# Patient Record
Sex: MALE | Race: WHITE | NOT HISPANIC OR LATINO | Employment: FULL TIME | URBAN - METROPOLITAN AREA
[De-identification: names, ages, dates, MRNs, and addresses within clinical notes are randomized per-mention and may not be internally consistent; named-entity substitution may affect disease eponyms.]

---

## 2018-05-19 LAB
BUN SERPL-MCNC: 9 MG/DL (ref 8–20)
CALCIUM SERPL-MCNC: 8.5 MG/DL (ref 7.7–10.4)
CHLORIDE: 101 MMOL/L (ref 98–110)
CO2 SERPL-SCNC: 25.9 MMOL/L (ref 22.8–31.6)
CREATININE: 0.72 MG/DL (ref 0.6–1.4)
GLUCOSE: 116 MG/DL (ref 70–99)
HCT VFR BLD AUTO: 40.1 % (ref 39–55)
HGB BLD-MCNC: 13.2 G/DL (ref 14–16)
MCH RBC QN AUTO: 29.6 PG (ref 25–35)
MCHC RBC AUTO-ENTMCNC: 32.9 G/DL (ref 31–36)
MCV RBC AUTO: 89.9 FL (ref 80–100)
NUCLEATED RBCS: 0 %
PLATELET # BLD AUTO: 247 K/UL (ref 140–440)
POTASSIUM SERPL-SCNC: 3.4 MMOL/L (ref 3.5–5)
RBC # BLD AUTO: 4.46 M/UL (ref 4.3–5.9)
SODIUM: 134 MMOL/L (ref 134–144)
WBC # BLD AUTO: 13.8 K/UL (ref 5–10)

## 2018-05-20 LAB
HCT VFR BLD AUTO: 39.9 % (ref 39–55)
HGB BLD-MCNC: 13.4 G/DL (ref 14–16)
MCH RBC QN AUTO: 29.8 PG (ref 25–35)
MCHC RBC AUTO-ENTMCNC: 33.6 G/DL (ref 31–36)
MCV RBC AUTO: 88.7 FL (ref 80–100)
NUCLEATED RBCS: 0 %
PLATELET # BLD AUTO: 254 K/UL (ref 140–440)
RBC # BLD AUTO: 4.5 M/UL (ref 4.3–5.9)
WBC # BLD AUTO: 14.2 K/UL (ref 5–10)

## 2018-05-21 LAB
HCT VFR BLD AUTO: 39 % (ref 39–55)
HGB BLD-MCNC: 13 G/DL (ref 14–16)
MCH RBC QN AUTO: 29.7 PG (ref 25–35)
MCHC RBC AUTO-ENTMCNC: 33.3 G/DL (ref 31–36)
MCV RBC AUTO: 89 FL (ref 80–100)
NUCLEATED RBCS: 0 %
PLATELET # BLD AUTO: 264 K/UL (ref 140–440)
RBC # BLD AUTO: 4.38 M/UL (ref 4.3–5.9)
WBC # BLD AUTO: 12.9 K/UL (ref 5–10)

## 2018-05-29 LAB
BUN SERPL-MCNC: 12 MG/DL (ref 8–20)
CALCIUM SERPL-MCNC: 8.5 MG/DL (ref 7.7–10.4)
CHLORIDE: 96 MMOL/L (ref 98–110)
CO2 SERPL-SCNC: 30.1 MMOL/L (ref 22.8–31.6)
CREATININE: 0.77 MG/DL (ref 0.6–1.4)
GLUCOSE: 156 MG/DL (ref 70–99)
HCT VFR BLD AUTO: 35.6 % (ref 39–55)
HGB BLD-MCNC: 11.5 G/DL (ref 14–16)
MCH RBC QN AUTO: 29 PG (ref 25–35)
MCHC RBC AUTO-ENTMCNC: 32.3 G/DL (ref 31–36)
MCV RBC AUTO: 89.7 FL (ref 80–100)
NUCLEATED RBCS: 0 %
PLATELET # BLD AUTO: 351 K/UL (ref 140–440)
POTASSIUM SERPL-SCNC: 4.1 MMOL/L (ref 3.5–5)
RBC # BLD AUTO: 3.97 M/UL (ref 4.3–5.9)
SODIUM: 133 MMOL/L (ref 134–144)
WBC # BLD AUTO: 14.1 K/UL (ref 5–10)

## 2018-05-30 LAB
HCT VFR BLD AUTO: 36.7 % (ref 39–55)
HGB BLD-MCNC: 11.9 G/DL (ref 14–16)
MCH RBC QN AUTO: 29.2 PG (ref 25–35)
MCHC RBC AUTO-ENTMCNC: 32.4 G/DL (ref 31–36)
MCV RBC AUTO: 90.2 FL (ref 80–100)
NUCLEATED RBCS: 0 %
PLATELET # BLD AUTO: 358 K/UL (ref 140–440)
RBC # BLD AUTO: 4.07 M/UL (ref 4.3–5.9)
WBC # BLD AUTO: 13.6 K/UL (ref 5–10)

## 2018-06-01 DIAGNOSIS — Z98.890 POST-OPERATIVE STATE: Primary | ICD-10-CM

## 2018-06-01 DIAGNOSIS — S52.592A OTHER CLOSED FRACTURE OF DISTAL END OF LEFT RADIUS, INITIAL ENCOUNTER: ICD-10-CM

## 2018-06-08 ENCOUNTER — OFFICE VISIT (OUTPATIENT)
Dept: ORTHOPEDICS | Facility: CLINIC | Age: 63
End: 2018-06-08
Payer: OTHER MISCELLANEOUS

## 2018-06-08 VITALS
BODY MASS INDEX: 30.06 KG/M2 | SYSTOLIC BLOOD PRESSURE: 140 MMHG | HEIGHT: 70 IN | WEIGHT: 210 LBS | HEART RATE: 83 BPM | TEMPERATURE: 99 F | DIASTOLIC BLOOD PRESSURE: 100 MMHG

## 2018-06-08 DIAGNOSIS — S80.02XD CONTUSION OF LEFT KNEE, SUBSEQUENT ENCOUNTER: ICD-10-CM

## 2018-06-08 DIAGNOSIS — S52.592A OTHER CLOSED FRACTURE OF DISTAL END OF LEFT RADIUS, INITIAL ENCOUNTER: ICD-10-CM

## 2018-06-08 DIAGNOSIS — Z98.890 POST-OPERATIVE STATE: Primary | ICD-10-CM

## 2018-06-08 PROBLEM — S80.02XA CONTUSION OF LEFT KNEE: Status: ACTIVE | Noted: 2018-06-08

## 2018-06-08 PROBLEM — S52.502A CLOSED FRACTURE OF LEFT DISTAL RADIUS: Status: ACTIVE | Noted: 2018-06-08

## 2018-06-08 PROCEDURE — 20610 DRAIN/INJ JOINT/BURSA W/O US: CPT | Mod: 58,LT,, | Performed by: ORTHOPAEDIC SURGERY

## 2018-06-08 PROCEDURE — 99499 UNLISTED E&M SERVICE: CPT | Mod: ,,, | Performed by: ORTHOPAEDIC SURGERY

## 2018-06-08 PROCEDURE — 73100 X-RAY EXAM OF WRIST: CPT | Mod: FY,LT,, | Performed by: ORTHOPAEDIC SURGERY

## 2018-06-08 RX ORDER — HYDROCODONE BITARTRATE AND ACETAMINOPHEN 10; 325 MG/1; MG/1
1 TABLET ORAL
Refills: 0 | COMMUNITY
Start: 2018-06-01 | End: 2018-06-15 | Stop reason: SDUPTHER

## 2018-06-08 RX ORDER — LISINOPRIL 40 MG/1
1 TABLET ORAL DAILY
Refills: 0 | COMMUNITY
Start: 2018-06-01

## 2018-06-08 NOTE — PROCEDURES
Large Joint Aspiration/Injection  Date/Time: 6/8/2018 4:46 PM  Performed by: CHRISTI SERVIN JR  Authorized by: CHRISTI SERVIN JR     Consent Done?:  Yes (Verbal)  Indications:  Pain and joint swelling  Procedure site marked: Yes    Timeout: Prior to procedure the correct patient, procedure, and site was verified      Location:  Knee  Site:  L knee  Prep: Patient was prepped and draped in usual sterile fashion    Ultrasonic Guidance for needle placement: No  Needle size:  22 G  Approach:  Lateral  Aspirate amount (ml): 55 cc.  Aspirate:  Clear and yellow  Patient tolerance:  Patient tolerated the procedure well with no immediate complications

## 2018-06-08 NOTE — PROGRESS NOTES
Subjective:       Chief Complaint    Chief Complaint   Patient presents with    Post-op Evaluation     11 days P/O, DOS: 05/28/18, Open reduction internal fixation closed comminuted displaced intra-articular fracture distal left radius and appliation of external fixator.  Patient having sharp, throbbing pain.  Taking Norco  mg  for pain every 4 hours.  Patient also stated he has fever every night with chills but has not taken his temperture when he gets it.         HPI  Juan C Sanchez is a 62 y.o.  male who presents  Follow-up on treatment of a very severe closed comminuted displaced distal radius fracture of his left wrist. Initially had to be placed in a external fixator because of somewhat swelling in his hand. After the swelling subsided. He was then taken back to surgery where the fixator was left in place and the fracture was reduced as well as we could get it and placed a volar plate as a buttress and continued with the external fixator.      Past History  No past medical history on file.  Past Surgical History:   Procedure Laterality Date    ORIF closed comminuted displaced intra-articular fx distal left radius & application of external fixator Left 05/28/2018     Social History     Social History    Marital status: Single     Spouse name: N/A    Number of children: N/A    Years of education: N/A     Occupational History    Not on file.     Social History Main Topics    Smoking status: Never Smoker    Smokeless tobacco: Never Used    Alcohol use Yes      Comment: social    Drug use: No    Sexual activity: Not on file     Other Topics Concern    Not on file     Social History Narrative    No narrative on file         Medications  Current Outpatient Prescriptions   Medication Sig    HYDROcodone-acetaminophen (NORCO)  mg per tablet 1 tablet 6 (six) times daily.    lisinopril (PRINIVIL,ZESTRIL) 40 MG tablet 1 tablet Daily.     No current facility-administered medications for this  visit.        Allergies  Review of patient's allergies indicates:  No Known Allergies      Review of Systems     Constitutional: Negative    HENT: Negative  Eyes: Negative  Respiratory: Negative  Cardiovascular: Negative  Musculoskeletal: HPI  Skin: Negative  Neurological: Negative  Hematological: Negative  Endocrine: Negative      Physical Exam    Vitals:    06/08/18 1414   BP: (!) 140/100   Pulse: 83   Temp: 98.7 °F (37.1 °C)     Physical Examination:External fixator in place on the left wrist and forearm area. Dressings removed except for the pin skin Dressings with Xeroform. Swelling is down. Neurovascular is intact. Incision appears to be healed lower aspect of the wrist. Sutures were taken out. Redressed. He is to continue with the external fixator.   -Moderately large effusion is present in the left knee. Range of motion 0-105°. Appears to be stable. Varus alignment.     Skin-clear  General appearance -  well appearing, and in no distress  Mental status - awake  Neck - supple  Chest -  symmetric air entry  Heart - normal rate   Abdomen - soft      Assessment/Plan   Post-operative state  -     X-Ray Wrist 2 View Left    Other closed fracture of distal end of left radius, initial encounter  -     X-Ray Wrist 2 View Left      Arthrocentesis of the left knee was performed under sterile conditions and 50 cc of clear yellow fluid removed. Follow-up in one week. As scheduled.      This note was dictated using voice recognition software and may contain grammatical errors.

## 2018-06-11 ENCOUNTER — TELEPHONE (OUTPATIENT)
Dept: ORTHOPEDICS | Facility: CLINIC | Age: 63
End: 2018-06-11

## 2018-06-11 NOTE — TELEPHONE ENCOUNTER
----- Message from Marion Tavarez sent at 6/11/2018  1:32 PM CDT -----  Contact: Christiano adjustor [phone]  Several questions concerning treatment.  Please call.  States she is [place] also.

## 2018-06-11 NOTE — TELEPHONE ENCOUNTER
Spoke w/ Christiano who advised this message was from Friday last week.  I had spoke w/ her earlier this morning and handled her questions already

## 2018-06-15 ENCOUNTER — OFFICE VISIT (OUTPATIENT)
Dept: ORTHOPEDICS | Facility: CLINIC | Age: 63
End: 2018-06-15
Payer: OTHER MISCELLANEOUS

## 2018-06-15 VITALS
SYSTOLIC BLOOD PRESSURE: 142 MMHG | HEIGHT: 70 IN | DIASTOLIC BLOOD PRESSURE: 90 MMHG | HEART RATE: 88 BPM | BODY MASS INDEX: 30.06 KG/M2 | WEIGHT: 210 LBS

## 2018-06-15 DIAGNOSIS — Z98.890 POST-OPERATIVE STATE: Primary | ICD-10-CM

## 2018-06-15 DIAGNOSIS — S52.572D OTHER CLOSED INTRA-ARTICULAR FRACTURE OF DISTAL END OF LEFT RADIUS WITH ROUTINE HEALING, SUBSEQUENT ENCOUNTER: ICD-10-CM

## 2018-06-15 PROCEDURE — 73110 X-RAY EXAM OF WRIST: CPT | Mod: FY,LT,, | Performed by: ORTHOPAEDIC SURGERY

## 2018-06-15 PROCEDURE — 99024 POSTOP FOLLOW-UP VISIT: CPT | Mod: ,,, | Performed by: ORTHOPAEDIC SURGERY

## 2018-06-15 RX ORDER — GABAPENTIN 300 MG/1
300 CAPSULE ORAL 3 TIMES DAILY
Qty: 90 CAPSULE | Refills: 1 | Status: SHIPPED | OUTPATIENT
Start: 2018-06-15 | End: 2018-06-29 | Stop reason: SINTOL

## 2018-06-15 RX ORDER — HYDROCODONE BITARTRATE AND ACETAMINOPHEN 10; 325 MG/1; MG/1
1 TABLET ORAL EVERY 6 HOURS PRN
Qty: 60 TABLET | Refills: 0 | Status: SHIPPED | OUTPATIENT
Start: 2018-06-15 | End: 2018-07-09 | Stop reason: SDUPTHER

## 2018-06-15 RX ORDER — HYDROCODONE BITARTRATE AND ACETAMINOPHEN 10; 325 MG/1; MG/1
1 TABLET ORAL EVERY 6 HOURS PRN
Qty: 1 TABLET | Refills: 0 | Status: SHIPPED | OUTPATIENT
Start: 2018-06-15 | End: 2018-06-15 | Stop reason: SDUPTHER

## 2018-06-15 NOTE — PROGRESS NOTES
Subjective:       Chief Complaint    Chief Complaint   Patient presents with    Post-op Evaluation     2 weeks and 4 days P/O, DOS: 05/28/18, Open reduction internal fixation closed comminuted displaced intra-articular fracture distal left radius and appliation of external fixator.  Patient states he needs more pain medication for his pain (Norco  mg. ) His fingers stay swollen.all the time.  He puts ice on it but it doesn't help with the swelling.  He get nauseated in the evenings and would like something for the nausea.        HPI  Juan C Sanchez is a 62 y.o.  male who presentsFollow-up on distal radius fracture, left wrist. Having an issue with pain and moderate swelling of his fingers.       Past History  History reviewed. No pertinent past medical history.  Past Surgical History:   Procedure Laterality Date    ORIF closed comminuted displaced intra-articular fx distal left radius & application of external fixator Left 05/28/2018     Social History     Social History    Marital status: Single     Spouse name: N/A    Number of children: N/A    Years of education: N/A     Occupational History    Not on file.     Social History Main Topics    Smoking status: Never Smoker    Smokeless tobacco: Never Used    Alcohol use Yes      Comment: social    Drug use: No    Sexual activity: Not on file     Other Topics Concern    Not on file     Social History Narrative    No narrative on file         Medications  Current Outpatient Prescriptions   Medication Sig    HYDROcodone-acetaminophen (NORCO)  mg per tablet 1 tablet 6 (six) times daily.    lisinopril (PRINIVIL,ZESTRIL) 40 MG tablet 1 tablet Daily.     No current facility-administered medications for this visit.        Allergies  Review of patient's allergies indicates:  No Known Allergies      Review of Systems     Constitutional: Negative    HENT: Negative  Eyes: Negative  Respiratory: Negative  Cardiovascular: Negative  Musculoskeletal:  HPI  Skin: Negative  Neurological: Negative  Hematological: Negative  Endocrine: Negative      Physical Exam    Vitals:    06/15/18 1338   BP: (!) 142/90   Pulse: 88     Physical Examination:Examination left wrist shows the presence of the external fixator. Fingers are swollen in the motion is restricted at the DIP and PIP joints. Neurovascular is intact.     Skin-clear  General appearance -  well appearing, and in no distress  Mental status - awake  Neck - supple  Chest -  symmetric air entry  Heart - normal rate   Abdomen - soft      Assessment/Plan   Post-operative state  -     X-Ray Wrist Complete Left  -     Ambulatory Referral to Physical/Occupational Therapy    Other closed intra-articular fracture of distal end of left radius with routine healing, subsequent encounter  -     X-Ray Wrist Complete Left  -     Ambulatory Referral to Physical/Occupational Therapy    Repeat x-ray of the left wrist shows no change in position. Alignment of the fracture.        This note was dictated using voice recognition software and may contain grammatical errors.

## 2018-06-29 ENCOUNTER — OFFICE VISIT (OUTPATIENT)
Dept: ORTHOPEDICS | Facility: CLINIC | Age: 63
End: 2018-06-29
Payer: OTHER MISCELLANEOUS

## 2018-06-29 VITALS
HEART RATE: 73 BPM | HEIGHT: 70 IN | BODY MASS INDEX: 30.06 KG/M2 | DIASTOLIC BLOOD PRESSURE: 90 MMHG | SYSTOLIC BLOOD PRESSURE: 148 MMHG | WEIGHT: 210 LBS

## 2018-06-29 DIAGNOSIS — S80.01XD CONTUSION OF RIGHT KNEE, SUBSEQUENT ENCOUNTER: ICD-10-CM

## 2018-06-29 DIAGNOSIS — S52.572D OTHER CLOSED INTRA-ARTICULAR FRACTURE OF DISTAL END OF LEFT RADIUS WITH ROUTINE HEALING, SUBSEQUENT ENCOUNTER: ICD-10-CM

## 2018-06-29 DIAGNOSIS — S80.02XD CONTUSION OF LEFT KNEE, SUBSEQUENT ENCOUNTER: ICD-10-CM

## 2018-06-29 DIAGNOSIS — Z98.890 POST-OPERATIVE STATE: Primary | ICD-10-CM

## 2018-06-29 PROBLEM — S80.01XA CONTUSION OF RIGHT KNEE: Status: ACTIVE | Noted: 2018-06-29

## 2018-06-29 PROCEDURE — 99024 POSTOP FOLLOW-UP VISIT: CPT | Mod: 25,,, | Performed by: ORTHOPAEDIC SURGERY

## 2018-06-29 NOTE — PROGRESS NOTES
"Subjective:       Chief Complaint    Chief Complaint   Patient presents with    Post-op Evaluation     4 weeks & 4 days.  DOS: 05/28/18, ORIF of closed comminuted displaced intra-articular fracture distal left radius and appliation of external fixator.  DOI: 5/17/18, due to jumping off a concrete dumpster @ work.  Patient reports still having pain where the external fixator goes into the skin.  Started therapy on Wednesday on his fingers & will be attending 2 times a week for 3 weeks @ [facility].  He can't take the Gabapentin because it "knocks me out & gives me nightmares".    Follow-up     Both knees are still painful & still slightly swollen.       HPI  Juan C Sanchez is a 62 y.o.  male who presents Follow up of fractured distal left radius and contusion of  Both knees.      Past History  History reviewed. No pertinent past medical history.  Past Surgical History:   Procedure Laterality Date    ORIF closed comminuted displaced intra-articular fx distal left radius & application of external fixator Left 05/28/2018     Social History     Social History    Marital status: Single     Spouse name: N/A    Number of children: N/A    Years of education: N/A     Occupational History    Not on file.     Social History Main Topics    Smoking status: Never Smoker    Smokeless tobacco: Never Used    Alcohol use Yes      Comment: social    Drug use: No    Sexual activity: Not on file     Other Topics Concern    Not on file     Social History Narrative    No narrative on file         Medications  Current Outpatient Prescriptions   Medication Sig    HYDROcodone-acetaminophen (NORCO)  mg per tablet Take 1 tablet by mouth every 6 (six) hours as needed for Pain.    lisinopril (PRINIVIL,ZESTRIL) 40 MG tablet 1 tablet Daily.     No current facility-administered medications for this visit.        Allergies  Review of patient's allergies indicates:   Allergen Reactions    Gabapentin Other (See Comments)     " Hypersomnia, nightmares         Review of Systems     Constitutional: Negative    HENT: Negative  Eyes: Negative  Respiratory: Negative  Cardiovascular: Negative  Musculoskeletal: HPI  Skin: Negative  Neurological: Negative  Hematological: Negative  Endocrine: Negative      Physical Exam    Vitals:    06/29/18 0956   BP: (!) 148/90   Pulse: 73     Physical Examination:Left wrist reveals external fixator in place. The pin skin junctions are intact. His fingers are Swollen and stiff.  Right knee-Range of motion 120 plus. Knee is Swollen. Varus alignment. Diffuse tenderness,  Moderate patellofemora crepitance.  Left knee-range of motion 0-105. Large effusion. Varus alignment. Diffuse joint line tenderness. Patellofemoral crepitance.    .   Skin-Clear  General appearance -  well appearing, and in no distress  Mental status - awake  Neck - supple  Chest -  symmetric air entry  Heart - normal rate   Abdomen - soft      Assessment/Plan   Post-operative state    Other closed intra-articular fracture of distal end of left radius with routine healing, subsequent encounter    Contusion of left knee, subsequent encounter    Contusion of right knee, subsequent encounter       Occupational therapy ordered for his hand.  Started therapy was day. Advised to use an elliptical for his knee issues. He's had an aggravation of severe tricompartment arthrith knees. Will ultimately require knee replacement.      This note was dictated using voice recognition software and may contain grammatical errors.

## 2018-07-06 RX ORDER — CEPHALEXIN 500 MG/1
500 CAPSULE ORAL 4 TIMES DAILY
Qty: 20 CAPSULE | Refills: 0
Start: 2018-07-06 | End: 2018-07-09 | Stop reason: ALTCHOICE

## 2018-07-09 ENCOUNTER — OFFICE VISIT (OUTPATIENT)
Dept: ORTHOPEDICS | Facility: CLINIC | Age: 63
End: 2018-07-09
Payer: OTHER MISCELLANEOUS

## 2018-07-09 VITALS
WEIGHT: 210 LBS | HEIGHT: 70 IN | HEART RATE: 80 BPM | DIASTOLIC BLOOD PRESSURE: 82 MMHG | SYSTOLIC BLOOD PRESSURE: 138 MMHG | BODY MASS INDEX: 30.06 KG/M2

## 2018-07-09 DIAGNOSIS — M17.0 PRIMARY OSTEOARTHRITIS OF BOTH KNEES: ICD-10-CM

## 2018-07-09 DIAGNOSIS — S52.572D OTHER CLOSED INTRA-ARTICULAR FRACTURE OF DISTAL END OF LEFT RADIUS WITH ROUTINE HEALING, SUBSEQUENT ENCOUNTER: Primary | ICD-10-CM

## 2018-07-09 DIAGNOSIS — S80.02XD CONTUSION OF LEFT KNEE, SUBSEQUENT ENCOUNTER: ICD-10-CM

## 2018-07-09 DIAGNOSIS — S80.01XD CONTUSION OF RIGHT KNEE, SUBSEQUENT ENCOUNTER: ICD-10-CM

## 2018-07-09 PROCEDURE — 73110 X-RAY EXAM OF WRIST: CPT | Mod: FY,LT,, | Performed by: ORTHOPAEDIC SURGERY

## 2018-07-09 PROCEDURE — 99024 POSTOP FOLLOW-UP VISIT: CPT | Mod: ,,, | Performed by: ORTHOPAEDIC SURGERY

## 2018-07-09 RX ORDER — HYDROCODONE BITARTRATE AND ACETAMINOPHEN 10; 325 MG/1; MG/1
1 TABLET ORAL EVERY 6 HOURS PRN
Qty: 60 TABLET | Refills: 0 | Status: SHIPPED | OUTPATIENT
Start: 2018-07-09 | End: 2018-08-21

## 2018-07-09 NOTE — PROGRESS NOTES
Subjective:       Chief Complaint    Chief Complaint   Patient presents with    Post-op Evaluation     6 weeks.  DOS: 05/28/18, ORIF of closed comminuted displaced intra-articular fracture distal left radius and appliation of external fixator.  DOI: 5/17/18, due to jumping off a concrete dumpster @ work.  Patient reports his hand is still swelling & painful.  Finished Keflex as instructed.    Follow-up     Both knees are still painful & swollen w/ the left one being worse than the right.       HPI  Juan C Sanchez is a 62 y.o.  male who presents Follow-up on fractured left wrist contusion of both knees with pre-existing primary osteoarthritis.      Past History  Past Medical History:   Diagnosis Date    Hypertension      Past Surgical History:   Procedure Laterality Date    ORIF closed comminuted displaced intra-articular fx distal left radius & application of external fixator Left 05/28/2018     Social History     Social History    Marital status: Single     Spouse name: N/A    Number of children: N/A    Years of education: N/A     Occupational History    Not on file.     Social History Main Topics    Smoking status: Never Smoker    Smokeless tobacco: Never Used    Alcohol use Yes      Comment: social    Drug use: No    Sexual activity: Not on file     Other Topics Concern    Not on file     Social History Narrative    No narrative on file         Medications  Current Outpatient Prescriptions   Medication Sig    HYDROcodone-acetaminophen (NORCO)  mg per tablet Take 1 tablet by mouth every 6 (six) hours as needed for Pain.    lisinopril (PRINIVIL,ZESTRIL) 40 MG tablet 1 tablet Daily.     No current facility-administered medications for this visit.        Allergies  Review of patient's allergies indicates:   Allergen Reactions    Gabapentin Other (See Comments)     Hypersomnia, nightmares         Review of Systems     Constitutional: Negative    HENT: Negative  Eyes: Negative  Respiratory:  Negative  Cardiovascular: Negative  Musculoskeletal: HPI  Skin: Negative  Neurological: Negative  Hematological: Negative  Endocrine: Negative      Physical Exam    Vitals:    07/09/18 1418   BP: 138/82   Pulse: 80     Physical Examination:Nontender at the distal metaphyseal area of the left wrist. Rotation is moderately restricted with 45° of pronation 20° of supination, flexion,. Wrist flexion 20° wrist extension 0°. Hand is stiff, unable to make a complete fist. Thumb can reach the tips of the second, third and fourth fingers. Cannot reach the tip of the fifth finger. Range of motion in right and left knees is 110° from 0. Both knees are in varus and swollen with the left being more swollen than the right.     Skin- clear  General appearance -  well appearing, and in no distress  Mental status - awake  Neck - supple  Chest -  symmetric air entry  Heart - normal rate   Abdomen - soft      Assessment/Plan   Other closed intra-articular fracture of distal end of left radius with routine healing, subsequent encounter  -     X-Ray Wrist Complete Left    Primary osteoarthritis of both knees  -     X-Ray Wrist Complete Left    Contusion of left knee, subsequent encounter  -     X-Ray Wrist Complete Left    Contusion of right knee, subsequent encounter  -     X-Ray Wrist Complete Left    Continue OT 3 times a week. Discontinue the wrist splint. History of transient be the biggest problem though is difficult to hip. Full return. The injury to his knees is aggravated his underlying primary osteoarthritis. He will require bilateral total knee replacements in near future. He is not going to be suitable to return to his previous occupation.        This note was dictated using voice recognition software and may contain grammatical errors.

## 2018-07-31 ENCOUNTER — OFFICE VISIT (OUTPATIENT)
Dept: ORTHOPEDICS | Facility: CLINIC | Age: 63
End: 2018-07-31
Payer: OTHER MISCELLANEOUS

## 2018-07-31 VITALS — BODY MASS INDEX: 30.06 KG/M2 | WEIGHT: 210 LBS | HEIGHT: 70 IN

## 2018-07-31 DIAGNOSIS — S52.572D OTHER CLOSED INTRA-ARTICULAR FRACTURE OF DISTAL END OF LEFT RADIUS WITH ROUTINE HEALING, SUBSEQUENT ENCOUNTER: Primary | ICD-10-CM

## 2018-07-31 DIAGNOSIS — M17.0 PRIMARY OSTEOARTHRITIS OF BOTH KNEES: ICD-10-CM

## 2018-07-31 DIAGNOSIS — S80.01XD CONTUSION OF RIGHT KNEE, SUBSEQUENT ENCOUNTER: ICD-10-CM

## 2018-07-31 DIAGNOSIS — S80.02XD CONTUSION OF LEFT KNEE, SUBSEQUENT ENCOUNTER: ICD-10-CM

## 2018-07-31 PROBLEM — S52.502D CLOSED FRACTURE OF LOWER END OF LEFT RADIUS WITH ROUTINE HEALING: Status: ACTIVE | Noted: 2018-06-08

## 2018-07-31 PROCEDURE — 99214 OFFICE O/P EST MOD 30 MIN: CPT | Mod: ,,, | Performed by: ORTHOPAEDIC SURGERY

## 2018-07-31 RX ORDER — GABAPENTIN 300 MG/1
1 CAPSULE ORAL 3 TIMES DAILY PRN
Refills: 1 | COMMUNITY
Start: 2018-07-18 | End: 2018-10-24 | Stop reason: SINTOL

## 2018-07-31 NOTE — PROGRESS NOTES
Subjective:       Chief Complaint    Chief Complaint   Patient presents with    Post-op Evaluation     9 weeks & 1 day.  DOS: 05/28/18, ORIF of closed comminuted displaced intra-articular fracture distal left radius and appliation of external fixator.  DOI: 5/17/18, due to jumping off a concrete dumpster @ work.  Patient reports still slightly swollen & sore.  Attending P.T. 3 times a week.    Follow-up     Bilateral knees (left is worse).  No change in pain level since last visit.         HPI  Juan C Sanchez is a 62 y.o.  male who presents Follow-up on a severe comminuted displaced fracture of his distal radius, left wrist and continued pain and swelling in his left knee. Also has continued problems with the right knee, not as bad as the left.      Past History  Past Medical History:   Diagnosis Date    Hypertension      Past Surgical History:   Procedure Laterality Date    ORIF closed comminuted displaced intra-articular fx distal left radius & application of external fixator Left 05/28/2018     Social History     Social History    Marital status: Single     Spouse name: N/A    Number of children: N/A    Years of education: N/A     Occupational History    Not on file.     Social History Main Topics    Smoking status: Never Smoker    Smokeless tobacco: Never Used    Alcohol use Yes      Comment: social    Drug use: No    Sexual activity: Not on file     Other Topics Concern    Not on file     Social History Narrative    No narrative on file         Medications  Current Outpatient Prescriptions   Medication Sig    gabapentin (NEURONTIN) 300 MG capsule Take 1 capsule by mouth 3 (three) times daily as needed.    HYDROcodone-acetaminophen (NORCO)  mg per tablet Take 1 tablet by mouth every 6 (six) hours as needed for Pain.    lisinopril (PRINIVIL,ZESTRIL) 40 MG tablet 1 tablet Daily.     No current facility-administered medications for this visit.        Allergies  Review of patient's  allergies indicates:   Allergen Reactions    Gabapentin Other (See Comments)     Hypersomnia, nightmares         Review of Systems     Constitutional: Negative    HENT: Negative  Eyes: Negative  Respiratory: Negative  Cardiovascular: Negative  Musculoskeletal: HPI  Skin: Negative  Neurological: Negative  Hematological: Negative  Endocrine: Negative      Physical Exam    There were no vitals filed for this visit.  Physical Examination: Left wrist reveals increased flexion-extension of the wrist and increased ability to make a fist with decreased swelling in the wrist and hand area. Dramatic improvement from the previous evaluation. Forearm rotation. Has also improved considerably. Left knee is in varus, boggy and diffusely tender. Range of motion 0-110°. Right knee is about same, but not as boggy and not as painful.     Skin-clear  General appearance -  well appearing, and in no distress  Mental status - awake  Neck - supple  Chest -  symmetric air entry  Heart - normal rate   Abdomen - soft      Assessment/Plan   Other closed intra-articular fracture of distal end of left radius with routine healing, subsequent encounter    Primary osteoarthritis of both knees    Contusion of right knee, subsequent encounter    Contusion of left knee, subsequent encounter    Patient is not fit for duty at this time. He will continue with occupational therapy with Janina Estrada. He has been improving quite well. At some point in time, a total knee arthroplasty of the left knee will have to be done. This knee hurts considerably more than the right knee which also is a problem.        This note was dictated using voice recognition software and may contain grammatical errors.

## 2018-08-21 ENCOUNTER — OFFICE VISIT (OUTPATIENT)
Dept: ORTHOPEDICS | Facility: CLINIC | Age: 63
End: 2018-08-21
Payer: OTHER MISCELLANEOUS

## 2018-08-21 VITALS — HEIGHT: 70 IN | BODY MASS INDEX: 30.06 KG/M2 | WEIGHT: 210 LBS

## 2018-08-21 DIAGNOSIS — S52.572D OTHER CLOSED INTRA-ARTICULAR FRACTURE OF DISTAL END OF LEFT RADIUS WITH ROUTINE HEALING, SUBSEQUENT ENCOUNTER: ICD-10-CM

## 2018-08-21 DIAGNOSIS — M17.0 PRIMARY OSTEOARTHRITIS OF BOTH KNEES: ICD-10-CM

## 2018-08-21 DIAGNOSIS — Z98.890 POST-OPERATIVE STATE: Primary | ICD-10-CM

## 2018-08-21 PROCEDURE — 99213 OFFICE O/P EST LOW 20 MIN: CPT | Mod: ,,, | Performed by: ORTHOPAEDIC SURGERY

## 2018-08-21 NOTE — PROGRESS NOTES
Subjective:       Chief Complaint    Chief Complaint   Patient presents with    Post-op Evaluation     12 weeks & 1 day.  DOS: 05/28/18, ORIF of closed comminuted displaced intra-articular fracture distal left radius and appliation of external fixator.  DOI: 5/17/18, due to jumping off a concrete dumpster @ work.  Patient reports pain near the lateral wrist.  Knows his limits as far as lifting/picking up items.  P.T. 3 times a week.    Follow-up     Bilateral knees (left is worse).  Left knee constantly hurts & at times it is unbearable.  Right knee hurts but nowhere near the level of the left knee.       HPI  Juan C Sanchez is a 62 y.o.  male who presents follow-up on his left wrist and both knees. He continues to have problems with his knees. The knee problems will get worse with time. . He continues to improve with the occupational therapy.      Past History  Past Medical History:   Diagnosis Date    Hypertension      Past Surgical History:   Procedure Laterality Date    ORIF closed comminuted displaced intra-articular fx distal left radius & application of external fixator Left 05/28/2018     Social History     Socioeconomic History    Marital status: Single     Spouse name: Not on file    Number of children: Not on file    Years of education: Not on file    Highest education level: Not on file   Social Needs    Financial resource strain: Not on file    Food insecurity - worry: Not on file    Food insecurity - inability: Not on file    Transportation needs - medical: Not on file    Transportation needs - non-medical: Not on file   Occupational History    Not on file   Tobacco Use    Smoking status: Never Smoker    Smokeless tobacco: Never Used   Substance and Sexual Activity    Alcohol use: Yes     Comment: social    Drug use: No    Sexual activity: Not on file   Other Topics Concern    Not on file   Social History Narrative    Not on file         Medications  Current Outpatient  Medications   Medication Sig    gabapentin (NEURONTIN) 300 MG capsule Take 1 capsule by mouth 3 (three) times daily as needed.    lisinopril (PRINIVIL,ZESTRIL) 40 MG tablet 1 tablet Daily.     No current facility-administered medications for this visit.        Allergies  Review of patient's allergies indicates:   Allergen Reactions    Gabapentin Other (See Comments)     Hypersomnia, nightmares         Review of Systems     Constitutional: Negative    HENT: Negative  Eyes: Negative  Respiratory: Negative  Cardiovascular: Negative  Musculoskeletal: HPI  Skin: Negative  Neurological: Negative  Hematological: Negative  Endocrine: Negative      Physical Exam    There were no vitals filed for this visit.  Physical Examination: There is swelling and some tenderness at the radiocarpal area with the moderately severe restriction of dorsiflexion and extension. Has excellent range of forearm rotation with mild restriction of terminal motion. Still some swelling in his hand, but that continues to decrease. Right and left knees remain boggy in varus with restricted motion and diffuse tenderness and patellofemoral crepitance. Range of motion roughly 0-105°. Left hurts more than the right.     Skin-clear  General appearance -  well appearing, and in no distress  Mental status - awake  Neck - supple  Chest -  symmetric air entry  Heart - normal rate   Abdomen - soft      Assessment/Plan   Post-operative state    Other closed intra-articular fracture of distal end of left radius with routine healing, subsequent encounter    Primary osteoarthritis of both knees      Continue with his occupational therapy. Encouraged to continue losing weight. He is not fit for duty.      This note was dictated using voice recognition software and may contain grammatical errors.

## 2018-09-18 ENCOUNTER — OFFICE VISIT (OUTPATIENT)
Dept: ORTHOPEDICS | Facility: CLINIC | Age: 63
End: 2018-09-18
Payer: OTHER MISCELLANEOUS

## 2018-09-18 VITALS — BODY MASS INDEX: 30.06 KG/M2 | WEIGHT: 210 LBS | HEIGHT: 70 IN

## 2018-09-18 DIAGNOSIS — S52.572D OTHER CLOSED INTRA-ARTICULAR FRACTURE OF DISTAL END OF LEFT RADIUS WITH ROUTINE HEALING, SUBSEQUENT ENCOUNTER: ICD-10-CM

## 2018-09-18 DIAGNOSIS — Z98.890 POST-OPERATIVE STATE: Primary | ICD-10-CM

## 2018-09-18 DIAGNOSIS — M17.0 PRIMARY OSTEOARTHRITIS OF BOTH KNEES: ICD-10-CM

## 2018-09-18 PROCEDURE — 99214 OFFICE O/P EST MOD 30 MIN: CPT | Mod: ,,, | Performed by: ORTHOPAEDIC SURGERY

## 2018-09-18 NOTE — LETTER
September 18, 2018      [facility] Orthopedic [facility]  [address] [place] [address]  [address]  [facility] [address]  Phone: [phone]  Fax: [phone]       Patient: Juan C Sanchez   YOB: 1955  Date of Visit: 09/18/2018      To Whom It May Concern:      Mr. Sanchez was seen in my office today for a post operative visit on his left radius fracture and a follow up on both knees.  He is still not fit for duty.  His next appointment will be on October 23, 2018.  Please contact my office with any questions or concerns        Sincerely,              Alejandro Barber Jr., M.D.    KIM[facility]glenn

## 2018-10-23 ENCOUNTER — OFFICE VISIT (OUTPATIENT)
Dept: ORTHOPEDICS | Facility: CLINIC | Age: 63
End: 2018-10-23
Payer: OTHER MISCELLANEOUS

## 2018-10-23 VITALS — WEIGHT: 204 LBS | HEIGHT: 70 IN | BODY MASS INDEX: 29.2 KG/M2

## 2018-10-23 DIAGNOSIS — M17.0 PRIMARY OSTEOARTHRITIS OF BOTH KNEES: ICD-10-CM

## 2018-10-23 DIAGNOSIS — S52.572D OTHER CLOSED INTRA-ARTICULAR FRACTURE OF DISTAL END OF LEFT RADIUS WITH ROUTINE HEALING, SUBSEQUENT ENCOUNTER: Primary | ICD-10-CM

## 2018-10-23 DIAGNOSIS — Z98.890 POST-OPERATIVE STATE: ICD-10-CM

## 2018-10-23 PROCEDURE — 99214 OFFICE O/P EST MOD 30 MIN: CPT | Mod: ,,, | Performed by: ORTHOPAEDIC SURGERY

## 2018-10-23 RX ORDER — ACETAMINOPHEN 500 MG
500 TABLET ORAL EVERY 6 HOURS PRN
COMMUNITY

## 2018-10-23 RX ORDER — IBUPROFEN 200 MG
200 TABLET ORAL EVERY 6 HOURS PRN
COMMUNITY

## 2018-10-23 NOTE — PROGRESS NOTES
Subjective:       Chief Complaint    Chief Complaint   Patient presents with    Follow-up     DOS: 05/28/18, ORIF of closed comminuted displaced intra-articular fracture distal left radius and appliation of external fixator.  DOI: 5/17/18, due to jumping off a concrete dumpster @ work.  States some improvement but nowhere near where he wants to be.  Attending P.T. and doing exercises at home.    Follow-up     Bilateral knees (left is worse).  Left knee is still very painful.       HPI  Juan C Sanchez is a 62 y.o.  male who presents follow-up on fixation of closed comminuted displaced intra-articular fracture of his distal left radius. He also injured both knees which had moderately severe bilateral osteoarthritis pre-existing but was not very symptomatic. Following his injury both knees have been a problem the left more so than the right.      Past History  Past Medical History:   Diagnosis Date    Hypertension      Past Surgical History:   Procedure Laterality Date    ORIF closed comminuted displaced intra-articular fx distal left radius & application of external fixator Left 05/28/2018     Social History     Socioeconomic History    Marital status: Single     Spouse name: Not on file    Number of children: Not on file    Years of education: Not on file    Highest education level: Not on file   Social Needs    Financial resource strain: Not on file    Food insecurity - worry: Not on file    Food insecurity - inability: Not on file    Transportation needs - medical: Not on file    Transportation needs - non-medical: Not on file   Occupational History    Not on file   Tobacco Use    Smoking status: Never Smoker    Smokeless tobacco: Never Used   Substance and Sexual Activity    Alcohol use: Yes     Comment: social    Drug use: No    Sexual activity: Not on file   Other Topics Concern    Not on file   Social History Narrative    Not on file         Medications  Current Outpatient Medications    Medication Sig    acetaminophen (TYLENOL EXTRA STRENGTH) 500 MG tablet Take 500 mg by mouth every 6 (six) hours as needed for Pain.    ibuprofen (ADVIL,MOTRIN) 200 MG tablet Take 200 mg by mouth every 6 (six) hours as needed for Pain.    lisinopril (PRINIVIL,ZESTRIL) 40 MG tablet 1 tablet Daily.    nortriptyline (PAMELOR) 50 MG capsule Take 1 capsule (50 mg total) by mouth every evening.     No current facility-administered medications for this visit.        Allergies  Review of patient's allergies indicates:   Allergen Reactions    Gabapentin Other (See Comments)     Hypersomnia, nightmares         Review of Systems     Constitutional: Negative    HENT: Negative  Eyes: Negative  Respiratory: Negative  Cardiovascular: Negative  Musculoskeletal: HPI  Skin: Negative  Neurological: Negative  Hematological: Negative  Endocrine: Negative      Physical Exam    There were no vitals filed for this visit.  Physical Examination: Left wrist is moderately swollen tender at the radial carpal area. Extension is 15°. Wrist flexion is 30°. Forearm rotation mildly restricted terminally but very functional.  Right knee boggy and diffusely tender varus alignment range of motion 0-110°. Left knee has a moderate effusion diffusely tender range of motion 0-95°. Patellofemoral crepitance.     Skin-clear  General appearance -  well appearing, and in no distress  Mental status - awake  Neck - supple  Chest -  symmetric air entry  Heart - normal rate   Abdomen - soft      Assessment/Plan   Other closed intra-articular fracture of distal end of left radius with routine healing, subsequent encounter  -     gabapentin (NEURONTIN) 300 MG capsule; Take 1 capsule (300 mg total) by mouth 3 (three) times daily as needed.  Dispense: 90 capsule; Refill: 2    Post-operative state    Primary osteoarthritis of both knees  -     gabapentin (NEURONTIN) 300 MG capsule; Take 1 capsule (300 mg total) by mouth 3 (three) times daily as needed.  Dispense:  90 capsule; Refill: 2      Discussion on non-total knee replacement on the left knee. Both knees were aggravated by the injury the left more so than the right. He did have pre-existing primary osteoarthritis in both knees but was functioning very well with it prior to the injury. He is currently not fit for duty. Continue with occupational therapy. The occupational therapist has done wonders with his range of motion. He still has a high level of discomfort with the wrist when he uses it. Gabapentin is canceled he has had adverse reactions. He is placed on amitriptyline to help him sleep..      This note was dictated using voice recognition software and may contain grammatical errors.

## 2018-10-23 NOTE — LETTER
October 23, 2018      [facility] - [facility]  [address] [place] [address]  [address]  [facility] [address]  Phone: [phone]  Fax: [phone]       Patient: Juan C Sanchez   YOB: 1955  Date of Visit: 10/23/2018      To Whom It May Concern:      Mr. Sanchez was seen in my office today for a follow up on his left radius fracture and bilateral knees.  He is still not fit for duty.  His next appointment will be on November 27, 2018.  Please contact my office with any questions or concerns      Sincerely,              Alejandro Barber Jr., M.D.    KIM/glenn

## 2018-10-23 NOTE — LETTER
October 23, 2018      [facility]  [address] [place] [address]  [address]  [facility] [address]  Phone: [phone]  Fax: [phone]       Patient: Juan C Sanchez   YOB: 1955  Date of Visit: 10/23/2018      To Whom It May Concern:      Mr. Sanchez was seen in my office today for a follow up on his left radius fracture and bilateral knees.  He is still not fit for duty.  His next appointment will be on November 27, 2018.  Please contact my office with any questions or concerns      Sincerely,          Alejandro Barber Jr., M.D.    KIM/glenn

## 2018-10-24 DIAGNOSIS — S52.502D CLOSED FRACTURE OF DISTAL END OF LEFT RADIUS WITH ROUTINE HEALING, UNSPECIFIED FRACTURE MORPHOLOGY, SUBSEQUENT ENCOUNTER: ICD-10-CM

## 2018-10-24 DIAGNOSIS — Z98.890 POST-OPERATIVE STATE: Primary | ICD-10-CM

## 2018-10-24 RX ORDER — NORTRIPTYLINE HYDROCHLORIDE 50 MG/1
50 CAPSULE ORAL NIGHTLY
Qty: 30 CAPSULE | Refills: 11 | Status: SHIPPED | OUTPATIENT
Start: 2018-10-24 | End: 2019-10-24

## 2018-10-28 RX ORDER — GABAPENTIN 300 MG/1
300 CAPSULE ORAL 3 TIMES DAILY PRN
Qty: 90 CAPSULE | Refills: 2 | Status: CANCELLED | OUTPATIENT
Start: 2018-10-28

## 2018-12-12 ENCOUNTER — TELEPHONE (OUTPATIENT)
Dept: ORTHOPEDICS | Facility: CLINIC | Age: 63
End: 2018-12-12

## 2018-12-12 NOTE — TELEPHONE ENCOUNTER
Spoke w/ Alie, , to get status of their decision of which physician to see since Dr. Barber is no longer w/ the network.  She stated they are in the process of getting in w/ Dr. Null @ [facility] because none of the other providers let the  in with the patient.

## 2022-02-18 NOTE — TELEPHONE ENCOUNTER
Nortriptyline sent to pharmacy per Dr. Barber.  Alie notified who will notify the patient.  
Patient states Gabapentin gives him nightmares.  Requesting something else for pain.  
Name band;

## 2024-01-31 NOTE — PROGRESS NOTES
IV started, blood work collected and sent to lab   Subjective:       Chief Complaint    Chief Complaint   Patient presents with    Follow-up     DOS: 05/28/18, ORIF of closed comminuted displaced intra-articular fracture distal left radius and appliation of external fixator.  DOI: 5/17/18, due to jumping off a concrete dumpster @ work.  Patient reports severe pain if he uses it to push up or with twisting motions.    Follow-up     Bilateral knees (left is worse).  Difficulty getting up from a kneeling position.  Knee are still giving him issues.       HPI  Juan C Sanchez is a 62 y.o.  male who presents follow-up on internal fixation distal left radius and reevaluation on his knees. He is attending occupational therapy for his wrist and is making improvement in range of motion, but he still has significant pain at the radiohumeral joint      Past History  Past Medical History:   Diagnosis Date    Hypertension      Past Surgical History:   Procedure Laterality Date    ORIF closed comminuted displaced intra-articular fx distal left radius & application of external fixator Left 05/28/2018     Social History     Socioeconomic History    Marital status: Single     Spouse name: Not on file    Number of children: Not on file    Years of education: Not on file    Highest education level: Not on file   Social Needs    Financial resource strain: Not on file    Food insecurity - worry: Not on file    Food insecurity - inability: Not on file    Transportation needs - medical: Not on file    Transportation needs - non-medical: Not on file   Occupational History    Not on file   Tobacco Use    Smoking status: Never Smoker    Smokeless tobacco: Never Used   Substance and Sexual Activity    Alcohol use: Yes     Comment: social    Drug use: No    Sexual activity: Not on file   Other Topics Concern    Not on file   Social History Narrative    Not on file         Medications  Current Outpatient Medications   Medication Sig    gabapentin (NEURONTIN) 300 MG  capsule Take 1 capsule by mouth 3 (three) times daily as needed.    lisinopril (PRINIVIL,ZESTRIL) 40 MG tablet 1 tablet Daily.     No current facility-administered medications for this visit.        Allergies  Review of patient's allergies indicates:   Allergen Reactions    Gabapentin Other (See Comments)     Hypersomnia, nightmares         Review of Systems     Constitutional: Negative    HENT: Negative  Eyes: Negative  Respiratory: Negative  Cardiovascular: Negative  Musculoskeletal: HPI  Skin: Negative  Neurological: Negative  Hematological: Negative  Endocrine: Negative      Physical Exam    There were no vitals filed for this visit.  Physical Examination: Left wrist demonstrates decreased range of motion in flexion-extension, but ranges as measured by the occupational therapist is available in the record. Overall he is showing some improvement in his range of motion in both flexion, extension and forearm rotation. Neurovascular status is intact. He is tender at the distal radiohumeral joint.  strength is still weak because of his injury. But the swelling in his hand has subsided considerably and is able to make a fist. Describes his wrist pain is 6/10. He has 20° loss of pronation and 30° loss of supination. Flexion is 40°, extension 10°.  Left knees demonstrate range of motion 0-95°. Right knee range of motion is 0-120° with varus alignment, 4/10 pain level. left knee pain level approaches 9-10/10. Neurovascular status is intact.     Skin-clear  General appearance -  well appearing, and in no distress  Mental status - awake  Neck - supple  Chest -  symmetric air entry  Heart - normal rate   Abdomen - soft      Assessment/Plan   Post-operative state  -     X-Ray Wrist Complete Left    Other closed intra-articular fracture of distal end of left radius with routine healing, subsequent encounter  -     X-Ray Wrist Complete Left    Primary osteoarthritis of both knees         Continue with physical and  therapy. Again, encouraged to continue using r the elliptical or stationary bike as able.    This note was dictated using voice recognition software and may contain grammatical errors.

## 2025-02-11 ENCOUNTER — HOSPITAL ENCOUNTER (INPATIENT)
Facility: HOSPITAL | Age: 70
LOS: 13 days | Discharge: NURSING, REHAB OR LONG-TERM CARE | DRG: 255 | End: 2025-02-24
Attending: STUDENT IN AN ORGANIZED HEALTH CARE EDUCATION/TRAINING PROGRAM | Admitting: INTERNAL MEDICINE
Payer: MEDICARE

## 2025-02-11 DIAGNOSIS — Z13.6 SCREENING FOR CARDIOVASCULAR CONDITION: ICD-10-CM

## 2025-02-11 DIAGNOSIS — I50.23 ACUTE ON CHRONIC SYSTOLIC CONGESTIVE HEART FAILURE: ICD-10-CM

## 2025-02-11 DIAGNOSIS — I50.9 ACUTE ON CHRONIC CONGESTIVE HEART FAILURE, UNSPECIFIED HEART FAILURE TYPE: ICD-10-CM

## 2025-02-11 DIAGNOSIS — M79.89 FOOT SWELLING: ICD-10-CM

## 2025-02-11 DIAGNOSIS — I50.9 ACUTE ON CHRONIC CONGESTIVE HEART FAILURE: Primary | ICD-10-CM

## 2025-02-11 DIAGNOSIS — I10 HYPERTENSION: ICD-10-CM

## 2025-02-11 DIAGNOSIS — I48.0 PAROXYSMAL ATRIAL FIBRILLATION: ICD-10-CM

## 2025-02-11 DIAGNOSIS — R07.9 CHEST PAIN: ICD-10-CM

## 2025-02-11 DIAGNOSIS — L97.524 ULCER OF LEFT FOOT WITH NECROSIS OF BONE: ICD-10-CM

## 2025-02-11 DIAGNOSIS — I50.9 ACUTE EXACERBATION OF CHF (CONGESTIVE HEART FAILURE): ICD-10-CM

## 2025-02-11 DIAGNOSIS — M86.10 ACUTE OSTEOMYELITIS: ICD-10-CM

## 2025-02-11 DIAGNOSIS — L89.152 PRESSURE INJURY OF SACRAL REGION, STAGE 2: ICD-10-CM

## 2025-02-11 DIAGNOSIS — I73.9 PAD (PERIPHERAL ARTERY DISEASE): ICD-10-CM

## 2025-02-11 PROBLEM — I42.8 NON-ISCHEMIC CARDIOMYOPATHY: Status: ACTIVE | Noted: 2025-01-07

## 2025-02-11 PROBLEM — Z95.810 USES LIFEVEST DEFIBRILLATOR: Status: ACTIVE | Noted: 2025-01-27

## 2025-02-11 PROBLEM — I34.0 MR (MITRAL REGURGITATION): Status: ACTIVE | Noted: 2025-02-11

## 2025-02-11 LAB
ALBUMIN SERPL BCP-MCNC: 3.1 G/DL (ref 3.5–5.2)
ALP SERPL-CCNC: 76 U/L (ref 55–135)
ALT SERPL W/O P-5'-P-CCNC: 8 U/L (ref 10–44)
ANION GAP SERPL CALC-SCNC: 6 MMOL/L (ref 8–16)
AST SERPL-CCNC: 12 U/L (ref 10–40)
BASOPHILS # BLD AUTO: 0.06 K/UL (ref 0–0.2)
BASOPHILS NFR BLD: 0.8 % (ref 0–1.9)
BILIRUB SERPL-MCNC: 0.6 MG/DL (ref 0.1–1)
BNP SERPL-MCNC: 3163 PG/ML (ref 0–99)
BUN SERPL-MCNC: 17 MG/DL (ref 8–23)
CALCIUM SERPL-MCNC: 8.3 MG/DL (ref 8.7–10.5)
CHLORIDE SERPL-SCNC: 97 MMOL/L (ref 95–110)
CO2 SERPL-SCNC: 31 MMOL/L (ref 23–29)
CREAT SERPL-MCNC: 1.4 MG/DL (ref 0.5–1.4)
DIFFERENTIAL METHOD BLD: ABNORMAL
EOSINOPHIL # BLD AUTO: 0.2 K/UL (ref 0–0.5)
EOSINOPHIL NFR BLD: 2.8 % (ref 0–8)
ERYTHROCYTE [DISTWIDTH] IN BLOOD BY AUTOMATED COUNT: 18.5 % (ref 11.5–14.5)
EST. GFR  (NO RACE VARIABLE): 55.1 ML/MIN/1.73 M^2
GLUCOSE SERPL-MCNC: 86 MG/DL (ref 70–110)
HCT VFR BLD AUTO: 32.8 % (ref 40–54)
HGB BLD-MCNC: 10 G/DL (ref 14–18)
IMM GRANULOCYTES # BLD AUTO: 0.03 K/UL (ref 0–0.04)
IMM GRANULOCYTES NFR BLD AUTO: 0.4 % (ref 0–0.5)
INFLUENZA A, MOLECULAR: NEGATIVE
INFLUENZA B, MOLECULAR: NEGATIVE
LACTATE SERPL-SCNC: 2.1 MMOL/L (ref 0.5–1.9)
LDH SERPL L TO P-CCNC: 1.72 MMOL/L (ref 0.5–2.2)
LYMPHOCYTES # BLD AUTO: 1.5 K/UL (ref 1–4.8)
LYMPHOCYTES NFR BLD: 20.3 % (ref 18–48)
MCH RBC QN AUTO: 23.9 PG (ref 27–31)
MCHC RBC AUTO-ENTMCNC: 30.5 G/DL (ref 32–36)
MCV RBC AUTO: 78 FL (ref 82–98)
MONOCYTES # BLD AUTO: 0.9 K/UL (ref 0.3–1)
MONOCYTES NFR BLD: 11.4 % (ref 4–15)
NEUTROPHILS # BLD AUTO: 4.8 K/UL (ref 1.8–7.7)
NEUTROPHILS NFR BLD: 64.3 % (ref 38–73)
NRBC BLD-RTO: 0 /100 WBC
PLATELET # BLD AUTO: 269 K/UL (ref 150–450)
PMV BLD AUTO: 10 FL (ref 9.2–12.9)
POTASSIUM SERPL-SCNC: 3.8 MMOL/L (ref 3.5–5.1)
PROT SERPL-MCNC: 6.8 G/DL (ref 6–8.4)
RBC # BLD AUTO: 4.19 M/UL (ref 4.6–6.2)
SAMPLE: NORMAL
SARS-COV-2 RDRP RESP QL NAA+PROBE: NEGATIVE
SODIUM SERPL-SCNC: 134 MMOL/L (ref 136–145)
SPECIMEN SOURCE: NORMAL
TROPONIN I SERPL HS-MCNC: 21.3 PG/ML (ref 0–14.9)
WBC # BLD AUTO: 7.45 K/UL (ref 3.9–12.7)

## 2025-02-11 PROCEDURE — 85025 COMPLETE CBC W/AUTO DIFF WBC: CPT | Performed by: PHYSICIAN ASSISTANT

## 2025-02-11 PROCEDURE — 93005 ELECTROCARDIOGRAM TRACING: CPT | Performed by: GENERAL PRACTICE

## 2025-02-11 PROCEDURE — 96375 TX/PRO/DX INJ NEW DRUG ADDON: CPT

## 2025-02-11 PROCEDURE — 36415 COLL VENOUS BLD VENIPUNCTURE: CPT | Performed by: PHYSICIAN ASSISTANT

## 2025-02-11 PROCEDURE — 83605 ASSAY OF LACTIC ACID: CPT | Performed by: PHYSICIAN ASSISTANT

## 2025-02-11 PROCEDURE — 25000003 PHARM REV CODE 250: Performed by: STUDENT IN AN ORGANIZED HEALTH CARE EDUCATION/TRAINING PROGRAM

## 2025-02-11 PROCEDURE — 12000002 HC ACUTE/MED SURGE SEMI-PRIVATE ROOM

## 2025-02-11 PROCEDURE — 87040 BLOOD CULTURE FOR BACTERIA: CPT | Mod: 59 | Performed by: PHYSICIAN ASSISTANT

## 2025-02-11 PROCEDURE — 84484 ASSAY OF TROPONIN QUANT: CPT | Performed by: PHYSICIAN ASSISTANT

## 2025-02-11 PROCEDURE — 99285 EMERGENCY DEPT VISIT HI MDM: CPT | Mod: 25

## 2025-02-11 PROCEDURE — 93010 ELECTROCARDIOGRAM REPORT: CPT | Mod: ,,, | Performed by: GENERAL PRACTICE

## 2025-02-11 PROCEDURE — 96367 TX/PROPH/DG ADDL SEQ IV INF: CPT

## 2025-02-11 PROCEDURE — 87635 SARS-COV-2 COVID-19 AMP PRB: CPT | Performed by: STUDENT IN AN ORGANIZED HEALTH CARE EDUCATION/TRAINING PROGRAM

## 2025-02-11 PROCEDURE — 83880 ASSAY OF NATRIURETIC PEPTIDE: CPT | Performed by: PHYSICIAN ASSISTANT

## 2025-02-11 PROCEDURE — 63600175 PHARM REV CODE 636 W HCPCS: Performed by: STUDENT IN AN ORGANIZED HEALTH CARE EDUCATION/TRAINING PROGRAM

## 2025-02-11 PROCEDURE — 96365 THER/PROPH/DIAG IV INF INIT: CPT

## 2025-02-11 PROCEDURE — 87502 INFLUENZA DNA AMP PROBE: CPT | Performed by: PHYSICIAN ASSISTANT

## 2025-02-11 PROCEDURE — 80053 COMPREHEN METABOLIC PANEL: CPT | Performed by: PHYSICIAN ASSISTANT

## 2025-02-11 RX ORDER — FUROSEMIDE 10 MG/ML
20 INJECTION INTRAMUSCULAR; INTRAVENOUS
Status: COMPLETED | OUTPATIENT
Start: 2025-02-11 | End: 2025-02-11

## 2025-02-11 RX ORDER — DOXYCYCLINE 100 MG/1
100 CAPSULE ORAL 2 TIMES DAILY
Status: ON HOLD | COMMUNITY
End: 2025-02-24 | Stop reason: HOSPADM

## 2025-02-11 RX ORDER — LANOLIN ALCOHOL/MO/W.PET/CERES
800 CREAM (GRAM) TOPICAL
Status: DISCONTINUED | OUTPATIENT
Start: 2025-02-12 | End: 2025-02-24 | Stop reason: HOSPADM

## 2025-02-11 RX ORDER — GABAPENTIN 300 MG/1
300 CAPSULE ORAL 2 TIMES DAILY
COMMUNITY

## 2025-02-11 RX ORDER — SODIUM,POTASSIUM PHOSPHATES 280-250MG
2 POWDER IN PACKET (EA) ORAL
Status: DISCONTINUED | OUTPATIENT
Start: 2025-02-12 | End: 2025-02-24 | Stop reason: HOSPADM

## 2025-02-11 RX ORDER — CARVEDILOL 3.12 MG/1
3.12 TABLET ORAL 2 TIMES DAILY
Status: ON HOLD | COMMUNITY
End: 2025-02-24 | Stop reason: HOSPADM

## 2025-02-11 RX ORDER — FUROSEMIDE 20 MG/1
40 TABLET ORAL 2 TIMES DAILY
Status: ON HOLD | COMMUNITY
Start: 2025-01-29 | End: 2025-02-24 | Stop reason: HOSPADM

## 2025-02-11 RX ORDER — PANTOPRAZOLE SODIUM 40 MG/1
40 TABLET, DELAYED RELEASE ORAL DAILY
Status: DISCONTINUED | OUTPATIENT
Start: 2025-02-12 | End: 2025-02-24 | Stop reason: HOSPADM

## 2025-02-11 RX ORDER — PANTOPRAZOLE SODIUM 40 MG/1
40 TABLET, DELAYED RELEASE ORAL DAILY
COMMUNITY

## 2025-02-11 RX ORDER — MIDODRINE HYDROCHLORIDE 2.5 MG/1
2.5 TABLET ORAL 3 TIMES DAILY PRN
Status: DISCONTINUED | OUTPATIENT
Start: 2025-02-12 | End: 2025-02-24 | Stop reason: HOSPADM

## 2025-02-11 RX ORDER — SACUBITRIL AND VALSARTAN 24; 26 MG/1; MG/1
1 TABLET, FILM COATED ORAL 2 TIMES DAILY
Status: ON HOLD | COMMUNITY
Start: 2025-02-03 | End: 2025-02-24 | Stop reason: HOSPADM

## 2025-02-11 RX ORDER — OXYCODONE AND ACETAMINOPHEN 10; 325 MG/1; MG/1
1 TABLET ORAL EVERY 8 HOURS PRN
Status: ON HOLD | COMMUNITY
Start: 2025-02-07 | End: 2025-02-24

## 2025-02-11 RX ORDER — AMOXICILLIN 250 MG
1 CAPSULE ORAL 2 TIMES DAILY PRN
Status: DISCONTINUED | OUTPATIENT
Start: 2025-02-12 | End: 2025-02-24 | Stop reason: HOSPADM

## 2025-02-11 RX ORDER — GLUCAGON 1 MG
1 KIT INJECTION
Status: DISCONTINUED | OUTPATIENT
Start: 2025-02-12 | End: 2025-02-12

## 2025-02-11 RX ORDER — IBUPROFEN 200 MG
24 TABLET ORAL
Status: DISCONTINUED | OUTPATIENT
Start: 2025-02-12 | End: 2025-02-12

## 2025-02-11 RX ORDER — AMIODARONE HYDROCHLORIDE 200 MG/1
200 TABLET ORAL 2 TIMES DAILY
Status: DISCONTINUED | OUTPATIENT
Start: 2025-02-12 | End: 2025-02-24 | Stop reason: HOSPADM

## 2025-02-11 RX ORDER — FUROSEMIDE 10 MG/ML
20 INJECTION INTRAMUSCULAR; INTRAVENOUS EVERY 12 HOURS
Status: DISCONTINUED | OUTPATIENT
Start: 2025-02-12 | End: 2025-02-12

## 2025-02-11 RX ORDER — CYCLOBENZAPRINE HCL 10 MG
1 TABLET ORAL 3 TIMES DAILY PRN
Status: ON HOLD | COMMUNITY
Start: 2025-02-07 | End: 2025-02-13 | Stop reason: SINTOL

## 2025-02-11 RX ORDER — ENOXAPARIN SODIUM 100 MG/ML
40 INJECTION SUBCUTANEOUS EVERY 24 HOURS
Status: DISCONTINUED | OUTPATIENT
Start: 2025-02-12 | End: 2025-02-12

## 2025-02-11 RX ORDER — METOPROLOL TARTRATE 25 MG/1
25 TABLET, FILM COATED ORAL 2 TIMES DAILY
Status: ON HOLD | COMMUNITY
Start: 2025-02-03 | End: 2025-02-24 | Stop reason: HOSPADM

## 2025-02-11 RX ORDER — AMIODARONE HYDROCHLORIDE 200 MG/1
200 TABLET ORAL 2 TIMES DAILY
COMMUNITY

## 2025-02-11 RX ORDER — ONDANSETRON HYDROCHLORIDE 2 MG/ML
4 INJECTION, SOLUTION INTRAVENOUS EVERY 6 HOURS PRN
Status: DISCONTINUED | OUTPATIENT
Start: 2025-02-12 | End: 2025-02-24 | Stop reason: HOSPADM

## 2025-02-11 RX ORDER — NALOXONE HCL 0.4 MG/ML
0.02 VIAL (ML) INJECTION
Status: DISCONTINUED | OUTPATIENT
Start: 2025-02-12 | End: 2025-02-24 | Stop reason: HOSPADM

## 2025-02-11 RX ORDER — SODIUM CHLORIDE 0.9 % (FLUSH) 0.9 %
10 SYRINGE (ML) INJECTION
Status: DISCONTINUED | OUTPATIENT
Start: 2025-02-12 | End: 2025-02-14

## 2025-02-11 RX ORDER — NITROGLYCERIN 0.4 MG/1
0.4 TABLET SUBLINGUAL EVERY 5 MIN PRN
Status: ON HOLD | COMMUNITY
End: 2025-02-24 | Stop reason: HOSPADM

## 2025-02-11 RX ORDER — PROMETHAZINE HYDROCHLORIDE AND DEXTROMETHORPHAN HYDROBROMIDE 6.25; 15 MG/5ML; MG/5ML
5 SYRUP ORAL EVERY 6 HOURS PRN
Status: ON HOLD | COMMUNITY
Start: 2025-02-07 | End: 2025-02-24 | Stop reason: HOSPADM

## 2025-02-11 RX ORDER — ASPIRIN 81 MG/1
81 TABLET ORAL DAILY
COMMUNITY
End: 2025-02-11

## 2025-02-11 RX ORDER — TALC
9 POWDER (GRAM) TOPICAL NIGHTLY PRN
Status: DISCONTINUED | OUTPATIENT
Start: 2025-02-12 | End: 2025-02-24 | Stop reason: HOSPADM

## 2025-02-11 RX ORDER — IBUPROFEN 200 MG
16 TABLET ORAL
Status: DISCONTINUED | OUTPATIENT
Start: 2025-02-12 | End: 2025-02-12

## 2025-02-11 RX ORDER — ALUMINUM HYDROXIDE, MAGNESIUM HYDROXIDE, AND SIMETHICONE 1200; 120; 1200 MG/30ML; MG/30ML; MG/30ML
30 SUSPENSION ORAL 4 TIMES DAILY PRN
Status: DISCONTINUED | OUTPATIENT
Start: 2025-02-12 | End: 2025-02-24 | Stop reason: HOSPADM

## 2025-02-11 RX ORDER — ACETAMINOPHEN 325 MG/1
650 TABLET ORAL EVERY 4 HOURS PRN
Status: DISCONTINUED | OUTPATIENT
Start: 2025-02-12 | End: 2025-02-24 | Stop reason: HOSPADM

## 2025-02-11 RX ORDER — LEVOFLOXACIN 500 MG/1
500 TABLET, FILM COATED ORAL DAILY
Status: ON HOLD | COMMUNITY
Start: 2025-02-07 | End: 2025-02-24 | Stop reason: HOSPADM

## 2025-02-11 RX ADMIN — PIPERACILLIN SODIUM AND TAZOBACTAM SODIUM 4.5 G: 4; .5 INJECTION, POWDER, LYOPHILIZED, FOR SOLUTION INTRAVENOUS at 07:02

## 2025-02-11 RX ADMIN — FUROSEMIDE 20 MG: 10 INJECTION, SOLUTION INTRAMUSCULAR; INTRAVENOUS at 09:02

## 2025-02-11 RX ADMIN — VANCOMYCIN HYDROCHLORIDE 1750 MG: 500 INJECTION, POWDER, LYOPHILIZED, FOR SOLUTION INTRAVENOUS at 08:02

## 2025-02-11 RX ADMIN — SODIUM CHLORIDE 500 ML: 9 INJECTION, SOLUTION INTRAVENOUS at 07:02

## 2025-02-11 NOTE — FIRST PROVIDER EVALUATION
" Emergency Department TeleTriage Encounter Note      CHIEF COMPLAINT    Chief Complaint   Patient presents with    Hypotension     Patient had PT come into the home today; found BP to be low and referred to ER.  Pt "feels bad"  since this morning.  +SOB; increased swelling in both legs.  Recently hospitalized for CHF        VITAL SIGNS   Initial Vitals [02/11/25 1727]   BP Pulse Resp Temp SpO2   95/62 109 (!) 24 97.8 °F (36.6 °C) 98 %      MAP       --            ALLERGIES    Review of patient's allergies indicates:   Allergen Reactions    Gabapentin Other (See Comments)     Hypersomnia, nightmares    Morphine Other (See Comments)     Pt states systemic cramping       PROVIDER TRIAGE NOTE  Patient on antibiotics for lung infection  and recent CHF exacerbation presenting with hypotension, edema and increased shortness of breath. No fever.       ORDERS  Labs Reviewed - No data to display    ED Orders (720h ago, onward)      Start Ordered     Status Ordering Provider    02/11/25 1720 02/11/25 1719  EKG 12-lead  Once         Completed by LIZ DUNCAN on 2/11/2025 at  5:20 PM RADHA FELIX              Virtual Visit Note: The provider triage portion of this emergency department evaluation and documentation was performed via [employer], a HIPAA-compliant telemedicine application, in concert with a tele-presenter in the room. A face to face patient evaluation with one of my colleagues will occur once the patient is placed in an emergency department room.      DISCLAIMER: This note was prepared with M*Modal voice recognition transcription software. Garbled syntax, mangled pronouns, and other bizarre constructions may be attributed to that software system.    "

## 2025-02-11 NOTE — Clinical Note
25 ml of contrast were injected throughout the case. 125 mL of contrast was the total wasted during the case. 150 mL was the total amount used during the case.

## 2025-02-11 NOTE — Clinical Note
An angiography of the  abdominal aorta and bilateral lower extremity was performed with the catheter and hand injected with 5 mL of contrast

## 2025-02-12 PROBLEM — R93.89 ABNORMAL COMPUTED TOMOGRAPHY ANGIOGRAPHY (CTA): Status: ACTIVE | Noted: 2025-02-12

## 2025-02-12 LAB
ALBUMIN SERPL BCP-MCNC: 2.9 G/DL (ref 3.5–5.2)
ALP SERPL-CCNC: 76 U/L (ref 55–135)
ALT SERPL W/O P-5'-P-CCNC: 8 U/L (ref 10–44)
ANION GAP SERPL CALC-SCNC: 7 MMOL/L (ref 8–16)
AST SERPL-CCNC: 17 U/L (ref 10–40)
BASOPHILS # BLD AUTO: 0.06 K/UL (ref 0–0.2)
BASOPHILS NFR BLD: 0.7 % (ref 0–1.9)
BILIRUB SERPL-MCNC: 0.6 MG/DL (ref 0.1–1)
BUN SERPL-MCNC: 18 MG/DL (ref 8–23)
CALCIUM SERPL-MCNC: 8.2 MG/DL (ref 8.7–10.5)
CHLORIDE SERPL-SCNC: 97 MMOL/L (ref 95–110)
CHOLEST SERPL-MCNC: 102 MG/DL (ref 120–199)
CHOLEST/HDLC SERPL: 3.1 {RATIO} (ref 2–5)
CO2 SERPL-SCNC: 31 MMOL/L (ref 23–29)
CREAT SERPL-MCNC: 1.4 MG/DL (ref 0.5–1.4)
DIFFERENTIAL METHOD BLD: ABNORMAL
EOSINOPHIL # BLD AUTO: 0.2 K/UL (ref 0–0.5)
EOSINOPHIL NFR BLD: 2.2 % (ref 0–8)
ERYTHROCYTE [DISTWIDTH] IN BLOOD BY AUTOMATED COUNT: 18.4 % (ref 11.5–14.5)
EST. GFR  (NO RACE VARIABLE): 55.1 ML/MIN/1.73 M^2
ESTIMATED AVG GLUCOSE: 154 MG/DL (ref 68–131)
FERRITIN SERPL-MCNC: 28.6 NG/ML (ref 20–300)
GLUCOSE SERPL-MCNC: 120 MG/DL (ref 70–110)
HBA1C MFR BLD: 7 % (ref 4.5–6.2)
HCT VFR BLD AUTO: 33.8 % (ref 40–54)
HDLC SERPL-MCNC: 33 MG/DL (ref 40–75)
HDLC SERPL: 32.4 % (ref 20–50)
HGB BLD-MCNC: 10.2 G/DL (ref 14–18)
IMM GRANULOCYTES # BLD AUTO: 0.02 K/UL (ref 0–0.04)
IMM GRANULOCYTES NFR BLD AUTO: 0.2 % (ref 0–0.5)
IRON SERPL-MCNC: 12 UG/DL (ref 45–160)
LACTATE SERPL-SCNC: 1.8 MMOL/L (ref 0.5–1.9)
LDLC SERPL CALC-MCNC: 52.6 MG/DL (ref 63–159)
LYMPHOCYTES # BLD AUTO: 1.5 K/UL (ref 1–4.8)
LYMPHOCYTES NFR BLD: 18.8 % (ref 18–48)
MAGNESIUM SERPL-MCNC: 2 MG/DL (ref 1.6–2.6)
MCH RBC QN AUTO: 23.7 PG (ref 27–31)
MCHC RBC AUTO-ENTMCNC: 30.2 G/DL (ref 32–36)
MCV RBC AUTO: 79 FL (ref 82–98)
MONOCYTES # BLD AUTO: 0.8 K/UL (ref 0.3–1)
MONOCYTES NFR BLD: 9.3 % (ref 4–15)
NEUTROPHILS # BLD AUTO: 5.6 K/UL (ref 1.8–7.7)
NEUTROPHILS NFR BLD: 68.8 % (ref 38–73)
NONHDLC SERPL-MCNC: 69 MG/DL
NRBC BLD-RTO: 0 /100 WBC
PHOSPHATE SERPL-MCNC: 4.1 MG/DL (ref 2.7–4.5)
PLATELET # BLD AUTO: 249 K/UL (ref 150–450)
PMV BLD AUTO: 9.9 FL (ref 9.2–12.9)
POTASSIUM SERPL-SCNC: 4.2 MMOL/L (ref 3.5–5.1)
PROT SERPL-MCNC: 6.9 G/DL (ref 6–8.4)
RBC # BLD AUTO: 4.3 M/UL (ref 4.6–6.2)
SATURATED IRON: 3 % (ref 20–50)
SODIUM SERPL-SCNC: 135 MMOL/L (ref 136–145)
T4 FREE SERPL-MCNC: 0.83 NG/DL (ref 0.71–1.51)
TOTAL IRON BINDING CAPACITY: 423 UG/DL (ref 250–450)
TRANSFERRIN SERPL-MCNC: 302 MG/DL (ref 200–375)
TRIGL SERPL-MCNC: 82 MG/DL (ref 30–150)
TROPONIN I SERPL HS-MCNC: 16.8 PG/ML (ref 0–14.9)
TSH SERPL DL<=0.005 MIU/L-ACNC: 7.49 UIU/ML (ref 0.34–5.6)
WBC # BLD AUTO: 8.15 K/UL (ref 3.9–12.7)

## 2025-02-12 PROCEDURE — 63600175 PHARM REV CODE 636 W HCPCS: Performed by: STUDENT IN AN ORGANIZED HEALTH CARE EDUCATION/TRAINING PROGRAM

## 2025-02-12 PROCEDURE — 83605 ASSAY OF LACTIC ACID: CPT

## 2025-02-12 PROCEDURE — 84466 ASSAY OF TRANSFERRIN: CPT

## 2025-02-12 PROCEDURE — 80061 LIPID PANEL: CPT

## 2025-02-12 PROCEDURE — 63600175 PHARM REV CODE 636 W HCPCS

## 2025-02-12 PROCEDURE — 83735 ASSAY OF MAGNESIUM: CPT

## 2025-02-12 PROCEDURE — 99223 1ST HOSP IP/OBS HIGH 75: CPT | Mod: ,,, | Performed by: INTERNAL MEDICINE

## 2025-02-12 PROCEDURE — 85025 COMPLETE CBC W/AUTO DIFF WBC: CPT

## 2025-02-12 PROCEDURE — 82728 ASSAY OF FERRITIN: CPT

## 2025-02-12 PROCEDURE — 99900031 HC PATIENT EDUCATION (STAT)

## 2025-02-12 PROCEDURE — 86580 TB INTRADERMAL TEST: CPT | Performed by: STUDENT IN AN ORGANIZED HEALTH CARE EDUCATION/TRAINING PROGRAM

## 2025-02-12 PROCEDURE — 25000003 PHARM REV CODE 250

## 2025-02-12 PROCEDURE — 84100 ASSAY OF PHOSPHORUS: CPT

## 2025-02-12 PROCEDURE — 63600150 PHARM REV CODE 636

## 2025-02-12 PROCEDURE — 97161 PT EVAL LOW COMPLEX 20 MIN: CPT

## 2025-02-12 PROCEDURE — 21400001 HC TELEMETRY ROOM

## 2025-02-12 PROCEDURE — 94761 N-INVAS EAR/PLS OXIMETRY MLT: CPT

## 2025-02-12 PROCEDURE — 83036 HEMOGLOBIN GLYCOSYLATED A1C: CPT

## 2025-02-12 PROCEDURE — 84443 ASSAY THYROID STIM HORMONE: CPT

## 2025-02-12 PROCEDURE — 84484 ASSAY OF TROPONIN QUANT: CPT

## 2025-02-12 PROCEDURE — 97535 SELF CARE MNGMENT TRAINING: CPT

## 2025-02-12 PROCEDURE — 84439 ASSAY OF FREE THYROXINE: CPT

## 2025-02-12 PROCEDURE — 30200315 PPD INTRADERMAL TEST REV CODE 302: Performed by: STUDENT IN AN ORGANIZED HEALTH CARE EDUCATION/TRAINING PROGRAM

## 2025-02-12 PROCEDURE — 25000003 PHARM REV CODE 250: Performed by: STUDENT IN AN ORGANIZED HEALTH CARE EDUCATION/TRAINING PROGRAM

## 2025-02-12 PROCEDURE — 99223 1ST HOSP IP/OBS HIGH 75: CPT | Mod: ,,, | Performed by: NURSE PRACTITIONER

## 2025-02-12 PROCEDURE — 80053 COMPREHEN METABOLIC PANEL: CPT

## 2025-02-12 PROCEDURE — 36415 COLL VENOUS BLD VENIPUNCTURE: CPT

## 2025-02-12 PROCEDURE — 97165 OT EVAL LOW COMPLEX 30 MIN: CPT

## 2025-02-12 RX ORDER — FUROSEMIDE 10 MG/ML
40 INJECTION INTRAMUSCULAR; INTRAVENOUS EVERY 12 HOURS
Status: DISCONTINUED | OUTPATIENT
Start: 2025-02-12 | End: 2025-02-12

## 2025-02-12 RX ORDER — OXYCODONE AND ACETAMINOPHEN 10; 325 MG/1; MG/1
1 TABLET ORAL EVERY 8 HOURS PRN
Status: DISCONTINUED | OUTPATIENT
Start: 2025-02-12 | End: 2025-02-15

## 2025-02-12 RX ORDER — FUROSEMIDE 10 MG/ML
40 INJECTION INTRAMUSCULAR; INTRAVENOUS ONCE
Status: DISCONTINUED | OUTPATIENT
Start: 2025-02-12 | End: 2025-02-12

## 2025-02-12 RX ORDER — METOPROLOL TARTRATE 25 MG/1
25 TABLET, FILM COATED ORAL 3 TIMES DAILY
Status: DISCONTINUED | OUTPATIENT
Start: 2025-02-12 | End: 2025-02-13

## 2025-02-12 RX ORDER — METOPROLOL TARTRATE 1 MG/ML
5 INJECTION, SOLUTION INTRAVENOUS EVERY 5 MIN PRN
Status: DISCONTINUED | OUTPATIENT
Start: 2025-02-12 | End: 2025-02-24 | Stop reason: HOSPADM

## 2025-02-12 RX ORDER — FUROSEMIDE 10 MG/ML
80 INJECTION INTRAMUSCULAR; INTRAVENOUS EVERY 12 HOURS
Status: DISCONTINUED | OUTPATIENT
Start: 2025-02-12 | End: 2025-02-12

## 2025-02-12 RX ORDER — DOBUTAMINE HYDROCHLORIDE 400 MG/100ML
2.5 INJECTION INTRAVENOUS CONTINUOUS
Status: DISCONTINUED | OUTPATIENT
Start: 2025-02-12 | End: 2025-02-19

## 2025-02-12 RX ORDER — AMMONIUM LACTATE 12 G/100G
LOTION TOPICAL 2 TIMES DAILY
Status: DISCONTINUED | OUTPATIENT
Start: 2025-02-12 | End: 2025-02-24 | Stop reason: HOSPADM

## 2025-02-12 RX ORDER — METOPROLOL TARTRATE 25 MG/1
25 TABLET, FILM COATED ORAL 4 TIMES DAILY
Status: DISCONTINUED | OUTPATIENT
Start: 2025-02-12 | End: 2025-02-12

## 2025-02-12 RX ORDER — ENOXAPARIN SODIUM 100 MG/ML
1 INJECTION SUBCUTANEOUS
Status: DISCONTINUED | OUTPATIENT
Start: 2025-02-12 | End: 2025-02-24 | Stop reason: HOSPADM

## 2025-02-12 RX ADMIN — ENOXAPARIN SODIUM 100 MG: 100 INJECTION SUBCUTANEOUS at 06:02

## 2025-02-12 RX ADMIN — SODIUM CHLORIDE 10 MG/HR: 9 INJECTION, SOLUTION INTRAVENOUS at 05:02

## 2025-02-12 RX ADMIN — ACETAMINOPHEN 650 MG: 325 TABLET ORAL at 01:02

## 2025-02-12 RX ADMIN — OXYCODONE HYDROCHLORIDE AND ACETAMINOPHEN 1 TABLET: 10; 325 TABLET ORAL at 02:02

## 2025-02-12 RX ADMIN — FUROSEMIDE 40 MG: 10 INJECTION, SOLUTION INTRAMUSCULAR; INTRAVENOUS at 08:02

## 2025-02-12 RX ADMIN — DOBUTAMINE HYDROCHLORIDE 5 MCG/KG/MIN: 400 INJECTION INTRAVENOUS at 06:02

## 2025-02-12 RX ADMIN — TUBERCULIN PURIFIED PROTEIN DERIVATIVE 5 UNITS: 5 INJECTION, SOLUTION INTRADERMAL at 06:02

## 2025-02-12 RX ADMIN — PANTOPRAZOLE SODIUM 40 MG: 40 TABLET, DELAYED RELEASE ORAL at 05:02

## 2025-02-12 RX ADMIN — METOPROLOL TARTRATE 25 MG: 25 TABLET, FILM COATED ORAL at 01:02

## 2025-02-12 RX ADMIN — METOPROLOL TARTRATE 25 MG: 25 TABLET ORAL at 09:02

## 2025-02-12 RX ADMIN — PIPERACILLIN SODIUM AND TAZOBACTAM SODIUM 4.5 G: 4; .5 INJECTION, POWDER, LYOPHILIZED, FOR SOLUTION INTRAVENOUS at 03:02

## 2025-02-12 RX ADMIN — METOPROLOL TARTRATE 25 MG: 25 TABLET, FILM COATED ORAL at 08:02

## 2025-02-12 RX ADMIN — AMIODARONE HYDROCHLORIDE 200 MG: 200 TABLET ORAL at 10:02

## 2025-02-12 RX ADMIN — AMIODARONE HYDROCHLORIDE 200 MG: 200 TABLET ORAL at 08:02

## 2025-02-12 RX ADMIN — AMMONIUM LACTATE: 12 LOTION TOPICAL at 09:02

## 2025-02-12 NOTE — SUBJECTIVE & OBJECTIVE
Past Medical History:   Diagnosis Date    Hypertension        Past Surgical History:   Procedure Laterality Date    ORIF closed comminuted displaced intra-articular fx distal left radius & application of external fixator Left 05/28/2018       Review of patient's allergies indicates:   Allergen Reactions    Gabapentin Other (See Comments)     Hypersomnia, nightmares    Morphine Other (See Comments)     Pt states systemic cramping       No current facility-administered medications on file prior to encounter.     Current Outpatient Medications on File Prior to Encounter   Medication Sig    acetaminophen (TYLENOL EXTRA STRENGTH) 500 MG tablet Take 1,000 mg by mouth every 6 (six) hours as needed for Pain.    amiodarone (PACERONE) 200 MG Tab Take 200 mg by mouth 2 (two) times daily.    cyclobenzaprine (FLEXERIL) 10 MG tablet Take 1 tablet by mouth 3 times daily as needed for Muscle spasms.    doxycycline (MONODOX) 100 MG capsule Take 100 mg by mouth 2 (two) times daily.    empagliflozin (JARDIANCE) 10 mg tablet Take 10 mg by mouth once daily.    ENTRESTO 24-26 mg per tablet Take 1 tablet by mouth 2 (two) times daily.    furosemide (LASIX) 20 MG tablet Take 40 mg by mouth 0900, 1800.    gabapentin (NEURONTIN) 300 MG capsule Take 300 mg by mouth 2 (two) times daily.    levoFLOXacin (LEVAQUIN) 500 MG tablet Take 500 mg by mouth once daily.    metoprolol tartrate (LOPRESSOR) 25 MG tablet Take 25 mg by mouth 2 (two) times daily.    nitroGLYCERIN (NITROSTAT) 0.4 MG SL tablet Place 0.4 mg under the tongue every 5 (five) minutes as needed for Chest pain.    oxyCODONE-acetaminophen (PERCOCET)  mg per tablet Take 1 tablet by mouth every 8 (eight) hours as needed for Pain.    pantoprazole (PROTONIX) 40 MG tablet Take 40 mg by mouth once daily.    promethazine-dextromethorphan (PROMETHAZINE-DM) 6.25-15 mg/5 mL Syrp Take 5 mLs by mouth every 6 (six) hours as needed.    [DISCONTINUED] apixaban (ELIQUIS) 5 mg Tab Take 5 mg by  mouth 2 (two) times daily.    carvediloL (COREG) 3.125 MG tablet Take 3.125 mg by mouth 2 (two) times daily. (Patient not taking: Reported on 2/11/2025)    [DISCONTINUED] aspirin (ECOTRIN) 81 MG EC tablet Take 81 mg by mouth once daily.    [DISCONTINUED] ibuprofen (ADVIL,MOTRIN) 200 MG tablet Take 200 mg by mouth every 6 (six) hours as needed for Pain.    [DISCONTINUED] lisinopril (PRINIVIL,ZESTRIL) 40 MG tablet 1 tablet Daily.    [DISCONTINUED] nortriptyline (PAMELOR) 50 MG capsule Take 1 capsule (50 mg total) by mouth every evening.     Family History    None       Tobacco Use    Smoking status: Never    Smokeless tobacco: Never   Substance and Sexual Activity    Alcohol use: Yes     Comment: social    Drug use: No    Sexual activity: Not on file     Review of Systems   Constitutional:  Positive for chills and fatigue. Negative for fever.   Respiratory:  Positive for cough and shortness of breath.    Cardiovascular:  Positive for leg swelling. Negative for chest pain.   Gastrointestinal:  Positive for abdominal pain and nausea. Negative for diarrhea and vomiting.   Genitourinary:  Positive for decreased urine volume. Negative for dysuria and hematuria.   Neurological:  Positive for light-headedness. Negative for dizziness and syncope.     Objective:     Vital Signs (Most Recent):  Temp: 97.8 °F (36.6 °C) (02/11/25 1727)  Pulse: (!) 112 (02/11/25 2142)  Resp: 18 (02/11/25 2007)  BP: 116/66 (02/11/25 2142)  SpO2: 98 % (02/11/25 1727) Vital Signs (24h Range):  Temp:  [97.8 °F (36.6 °C)] 97.8 °F (36.6 °C)  Pulse:  [108-112] 112  Resp:  [18-24] 18  SpO2:  [98 %] 98 %  BP: ()/(62-74) 116/66     Weight: 92.5 kg (204 lb)  Body mass index is 29.27 kg/m².     Physical Exam  Vitals reviewed.   Constitutional:       General: He is awake. He is not in acute distress.     Appearance: He is not diaphoretic.   Cardiovascular:      Rate and Rhythm: Tachycardia present.      Heart sounds:      No friction rub. No gallop.    Pulmonary:      Effort: No accessory muscle usage or respiratory distress.      Breath sounds: Examination of the right-lower field reveals decreased breath sounds. Examination of the left-lower field reveals decreased breath sounds. Decreased breath sounds and rales (diffuse bilateral) present. No wheezing or rhonchi.      Comments: Patient not in respiratory distress.  However, during interview and exam conversational dyspnea appreciated as well as increased pulmonary effort.  Abdominal:      General: Bowel sounds are normal.      Palpations: Abdomen is soft.      Tenderness: There is abdominal tenderness in the epigastric area. There is no guarding or rebound.   Musculoskeletal:      Right lower le+ Pitting Edema present.      Left lower le+ Pitting Edema present.      Comments: Significant bilateral lower extremity swelling appreciated.  4+ pitting edema to bilateral lower extremities up to bilateral thighs.   Feet:      Comments: Left foot is wrapped and covered.  Patient reports wound to left 3rd digit.  Right foot is cold to touch and purple discoloration appreciated.  Neurological:      Mental Status: He is alert and oriented to person, place, and time.   Psychiatric:         Behavior: Behavior is cooperative.                Significant Labs: All pertinent labs within the past 24 hours have been reviewed.  CBC:   Recent Labs   Lab 25   WBC 7.45   HGB 10.0*   HCT 32.8*        CMP:   Recent Labs   Lab 25   *   K 3.8   CL 97   CO2 31*   GLU 86   BUN 17   CREATININE 1.4   CALCIUM 8.3*   PROT 6.8   ALBUMIN 3.1*   BILITOT 0.6   ALKPHOS 76   AST 12   ALT 8*   ANIONGAP 6*     Cardiac Markers:   Recent Labs   Lab 258   BNP 3,163*     Lactic Acid:   Recent Labs   Lab 25  2115   LACTATE 2.1*     Troponin:   Recent Labs   Lab 25  1818   TROPONINIHS 21.3*       Significant Imaging:   Imaging Results              US Lower Extremity Veins Bilateral (In  process)                      X-Ray Foot Complete Left (Final result)  Result time 02/11/25 21:08:06      Final result by Eusebio Leonardo MD (02/11/25 21:08:06)                   Impression:      As above.  If concern for osteomyelitis, recommend MRI.      Electronically signed by: Eusebio Leonardo  Date:    02/11/2025  Time:    21:08               Narrative:    EXAMINATION:  XR FOOT COMPLETE 3 VIEW LEFT    CLINICAL HISTORY:  .  Other specified soft tissue disorders    TECHNIQUE:  AP, lateral and oblique views of the left foot were performed.    COMPARISON:  None    FINDINGS:  Osteopenia.  No overt erosions or osseous destruction.  No acute fracture or dislocation.  Mild multifocal midfoot osteoarthritis.  Forefoot swelling.                                       X-Ray Chest AP Portable (Final result)  Result time 02/11/25 18:27:01      Final result by Eusebio Leonardo MD (02/11/25 18:27:01)                   Impression:      As above      Electronically signed by: Eusebio Leonardo  Date:    02/11/2025  Time:    18:27               Narrative:    EXAMINATION:  XR CHEST AP PORTABLE    CLINICAL HISTORY:  Sepsis;    TECHNIQUE:  Single frontal view of the chest was performed.    COMPARISON:  05/28/2018    FINDINGS:  Enlarged cardiac silhouette with pulmonary vascular congestion.  Bilateral pleural effusions.  Mild bibasilar airspace disease and/or atelectasis.

## 2025-02-12 NOTE — CONSULTS
[facility]  Wound Care    Patient Name:  Juan C Sanchez   MRN:  [id number]  Date: 2/12/2025  Diagnosis: Acute on chronic congestive heart failure    History:     Past Medical History:   Diagnosis Date    Hypertension        Social History     Socioeconomic History    Marital status: Single   Tobacco Use    Smoking status: Never    Smokeless tobacco: Never   Substance and Sexual Activity    Alcohol use: Yes     Comment: social    Drug use: No     Social Drivers of Health     Financial Resource Strain: Patient Declined (2/12/2025)    Overall Financial Resource Strain (CARDIA)     Difficulty of Paying Living Expenses: Patient declined   Food Insecurity: Patient Declined (2/12/2025)    Hunger Vital Sign     Worried About Running Out of Food in the Last Year: Patient declined     Ran Out of Food in the Last Year: Patient declined   Transportation Needs: Patient Declined (2/3/2025)    Received from [facility]    PRAPARE - Transportation     Lack of Transportation (Medical): Patient declined     Lack of Transportation (Non-Medical): Patient declined   Physical Activity: Patient Declined (2/3/2025)    Received from [facility]    Exercise Vital Sign     Days of Exercise per Week: Patient declined     Minutes of Exercise per Session: Patient declined   Stress: Patient Declined (2/12/2025)    [employer] [place] [employer] - Occupational Stress Questionnaire     Feeling of Stress : Patient declined   Housing Stability: Patient Declined (2/12/2025)    Housing Stability Vital Sign     Unable to Pay for Housing in the Last Year: Patient declined     Homeless in the Last Year: Patient declined       Precautions:     Allergies as of 02/11/2025 - Reviewed 02/11/2025   Allergen Reaction Noted    Gabapentin Other (See Comments) 06/29/2018    Morphine Other (See Comments) 01/24/2025       [facility] Assessment Details/Treatment     Pt seen by  wound Care for consultation of Left foot.  Skin assessment performed.  Right heel noted with Blanchable redness, dry flaking skin, no bogginess.  Left heel noted with dry linear fissure with red wound base.  No drainage noted.  No s/s infection.  Left lateral 3rd toe with open wound with red, granular moist wound base with yellow slough. Medial and lateral 4th toe noted with open wounds with red and yellow tissue, moist web spaces.  Cleaned and applied Xerofoam, gauze, ABD pad and wrapped with kerlex, including left heel.  Bilateral groin noted with redness and excoriation with satellite petechiae.  cleaned with bath wipe and applied Miconazole powder.Sacrum noted with Stage 2 pressure injury.  Small wound noted on left.  Dry flaking tissue noted.  Non-blanchable redness with excoriation.  Applied Triad cream and educated Pt on pressure injury prevention by turning and repositioning often.        02/12/25 1015   WOCN Assessment   WOCN Total Time (mins) 50   Visit Date 02/12/25   Visit Time 1015   Consult Type New   WOCN Speciality Wound   Wound moisture;other;pressure  (fissure)   Intervention assessed;changed;applied;chart review   Teaching on-going        Wound 02/12/25 0730 Other (comment) Right anterior Leg #1   Date First Assessed/Time First Assessed: 02/12/25 0730   Present on Original Admission: Yes  Primary Wound Type: (c) Other (comment)  Side: Right  Orientation: anterior  Location: Leg  Wound Number: #1  Is this injury device related?: No   Wound Image    Dressing Appearance Open to air   Drainage Amount None   Drainage Characteristics/Odor No odor   Appearance Intact;Red;Maroon;Dry;Ecchymotic   Periwound Area Intact;Dry;Ecchymotic;Hemosiderin Staining;Redness   Wound Edges Undefined   Care Cleansed with:;Wound cleanser   Dressing   (Moisturizer cream--ordered)   Periwound Care Topical treatment applied        Wound 02/12/25 0730 Other (comment) Left distal;anterior Leg #2   Date First Assessed/Time First  Assessed: 02/12/25 0730   Present on Original Admission: Yes  Primary Wound Type: (c) Other (comment)  Side: Left  Orientation: distal;anterior  Location: Leg  Wound Number: #2   Wound Image    Dressing Appearance Open to air   Drainage Amount None   Drainage Characteristics/Odor No odor   Appearance Intact;Red;Maroon;Dry;Ecchymotic   Periwound Area Intact;Dry;Ecchymotic;Hemosiderin Staining;Redness   Wound Edges Undefined   Care Cleansed with:;Wound cleanser   Dressing   (Moisturizer--ordered Lac-Hydrin)   Periwound Care Topical treatment applied        Wound 02/12/25 0730 Other (comment) Left anterior;dorsal Foot #3   Date First Assessed/Time First Assessed: 02/12/25 0730   Present on Original Admission: Yes  Primary Wound Type: Other (comment)  Side: Left  Orientation: anterior;dorsal  Location: (c) Foot  Wound Number: #3   Wound Image       Dressing Appearance Intact;Moist drainage   Drainage Amount None   Drainage Characteristics/Odor No odor   Appearance Red;Black;Yellow;Eschar;Moist;Ecchymotic   Periwound Area Denuded;Excoriated;Moist;Redness   Wound Edges Irregular   Care Cleansed with:;Wound cleanser   Dressing Non-adherent;Gauze;Absorptive Pad;Rolled gauze  (Xerofoam, gauze, ABD pad, kerlex)   Periwound Care Cleansed with pH balanced cleanser;Dry periwound area maintained;Skin barrier film applied        Wound 02/12/25 0730 Pressure Injury posterior Sacral spine #4   Date First Assessed/Time First Assessed: 02/12/25 0730   Present on Original Admission: Yes  Primary Wound Type: (c) Pressure Injury  Orientation: posterior  Location: Sacral spine  Wound Number: #4   Wound Image    Pressure Injury Stage 2   Dressing Appearance Open to air   Drainage Amount None   Drainage Characteristics/Odor No odor   Appearance Red;Maroon;Dry;Ecchymotic   Periwound Area Ecchymotic;Excoriated;Moist;Maroon;Redness   Wound Edges Undefined   Wound Length (cm) 0.5 cm   Wound Width (cm) 0.5 cm   Wound Depth (cm) 0.1 cm   Wound  Volume (cm^3) 0.025 cm^3   Wound Surface Area (cm^2) 0.25 cm^2   Care Cleansed with:;Soap and water   Dressing Applied  (Triad cream)   Periwound Care Cleansed with pH balanced cleanser;Dry periwound area maintained;Moisture barrier applied        Wound 02/12/25 1015 Fissure Left Heel   Date First Assessed/Time First Assessed: 02/12/25 1015   Present on Original Admission: Yes  Primary Wound Type: Fissure  Side: Left  Location: Heel   Wound Image     Dressing Appearance Intact;Dry   Drainage Amount None   Drainage Characteristics/Odor No odor   Appearance Red;Dry;Moist;Ecchymotic   Periwound Area Intact;Dry;Excoriated   Wound Edges Defined   Care Cleansed with:;Soap and water   Dressing Non-adherent;Foam;Absorptive Pad;Rolled gauze  (Xerofoam, foam dressing, ABD pad, Kerlex.)   Periwound Care Cleansed with pH balanced cleanser;Dry periwound area maintained;Moisturizer applied        Wound 02/12/25 1015 Moisture associated dermatitis Groin   Date First Assessed/Time First Assessed: 02/12/25 1015   Present on Original Admission: Yes  Primary Wound Type: Moisture associated dermatitis  Location: Groin   Wound Image     Dressing Appearance Open to air   Drainage Amount None   Drainage Characteristics/Odor No odor   Appearance Red;Maroon;Dry;Moist;Ecchymotic   Periwound Area Ecchymotic;Excoriated;Maroon;Moist;Redness;Satellite lesion   Care Wound cleanser   Dressing Applied  (Miconazole powder)   Periwound Care Cleansed with pH balanced cleanser;Dry periwound area maintained;Topical treatment applied       Recommendations made to primary team:  Sacrum--Stage 2 pressure injury-POA-clean with soap and water.  Apply Triad  cream to area BID and PRN.      Bilateral Groins--MASD/Yeast--clean with soap and water.  Apply Miconazole powder BID and PRN.    Left heel-Fissure--clean with soap and water or wound cleanser.  Apply Xerofoam to fissue/heel.  Apply ABD pad, Wrap lightly with kerlex.  Change Daily and PRN    Left Foot/3rd  and 4th toes--Clean with soap and water.  Dry thoroughly, especially between toes.  Apply Xerofoam between toes. Cover with gauze.  Wrap with Kerlex.  Change Daily and PRN.    02/12/2025

## 2025-02-12 NOTE — ASSESSMENT & PLAN NOTE
Patient has Systolic (HFrEF) heart failure that is Acute on chronic. On presentation their CHF was decompensated. Evidence of decompensated CHF on presentation includes: edema, crackles on lung auscultation, orthopnea, paroxysmal nocturnal dyspnea (PND), dyspnea on exertion (COOPER), and shortness of breath. Most recent BNP and echo results are listed below.  Recent Labs     02/11/25  1818   BNP 3,163*     Latest ECHO 1/8/25  Impressions   -----------     1.The estimated LV ejection fraction is 25 %     2.There is moderate to severe global hypokinesis     3.Diastolic function is indeterminate.     4.There is mild aortic valve sclerosis without significant stenosis     5.There is severe mitral regurgitation     6.Estimated RVSP systolic pressure is 37.13 mmHg     7.Compared to the report from prior study dated 04/04/2022, the   severity of mitral regurgitation has increased       Current Heart Failure Medications  , 2 times daily, Oral  , 2 times daily, Oral  , Daily, Oral  , 2 times daily, Oral  furosemide injection 20 mg, Every 12 hours, Intravenous    Plan  - Monitor strict I&Os and daily weights.    - Place on telemetry  - Low sodium diet  - Place on fluid restriction of 1.5 L.   - Cardiology has been consulted, appreciate recs  - The patient's volume status is stable but not at their baseline as indicated by edema, crackles on lung auscultation, orthopnea, paroxysmal nocturnal dyspnea (PND), dyspnea on exertion (COOPER), and shortness of breath  - Patient is significantly fluid overloaded with 4+ pitting edema to bilateral lower extremities and rales on auscultation.  - Will need gentle diuresis for now in the setting of hypotension  - Lasix 20 mg IV BID  - Midodrine TID PRN for SBP <120 or MAP <65  - Holding home antihypertensives for now, can resume once BP improves  - Patient also tells me he has had decreased urine output, PRN bladder scans

## 2025-02-12 NOTE — ED PROVIDER NOTES
"Encounter Date: 2/11/2025       History     Chief Complaint   Patient presents with    Hypotension     Patient had PT come into the home today; found BP to be low and referred to ER.  Pt "feels bad"  since this morning.  +SOB; increased swelling in both legs.  Recently hospitalized for CHF      HPI    Juan C Sanchez is a 69 y.o. male with a past medical history of hypertension and heart failure with reduced ejection fraction, atrial fibrillation currently on amiodarone and Eliquis, and anemia that presents emergency department for evaluation shortness of breath and bilateral leg swelling.  Patient was last seen in the emergency department on 02/06 for CHF exacerbation.  At that time, he had borderline low blood pressures with BP of 96/68 and 95/60.  He received a dose of IV Lasix at the ED visit and was supposed to be admitted to the hospital.  Hospitalist at that time reviewed the chart and believe that he would need to be transferred for cardiology evaluation.  Patient wished to not be transferred.  Received a 2nd dose of Lasix and was feeling better.  Ultimately left against medical advice rather than be transferred for cardiology evaluation.  Continued to feel unwell following this with worsening bilateral lower extremity swelling and shortness of breath.  He states that his urine output has decreased and he takes Lasix 20 mg b.i.d..  He does endorse a cough with yellow sputum.  He does feel like he gets significantly short of breath when he gets up and we will sometimes fall over.  He feels like he has no balance.  Not complaining of any chest pain, nausea, vomiting, or changes in bowel habits.    Review of patient's allergies indicates:   Allergen Reactions    Gabapentin Other (See Comments)     Hypersomnia, nightmares    Morphine Other (See Comments)     Pt states systemic cramping     Past Medical History:   Diagnosis Date    Hypertension      Past Surgical History:   Procedure Laterality Date    ORIF " closed comminuted displaced intra-articular fx distal left radius & application of external fixator Left 05/28/2018     No family history on file.  Social History     Tobacco Use    Smoking status: Never    Smokeless tobacco: Never   Substance Use Topics    Alcohol use: Yes     Comment: social    Drug use: No     Review of Systems   Constitutional:  Negative for fever.   HENT:  Negative for sore throat.    Respiratory:  Positive for cough and shortness of breath.    Cardiovascular:  Positive for leg swelling. Negative for chest pain.   Gastrointestinal:  Negative for abdominal pain, diarrhea, nausea and vomiting.   Genitourinary:  Negative for dysuria, frequency and hematuria.   Musculoskeletal:  Negative for back pain.   Skin:  Negative for rash.   Neurological:  Negative for dizziness, weakness, numbness and headaches.   Hematological:  Does not bruise/bleed easily.       Physical Exam     Initial Vitals [02/11/25 1727]   BP Pulse Resp Temp SpO2   95/62 109 (!) 24 97.8 °F (36.6 °C) 98 %      MAP       --         Physical Exam    Nursing note and vitals reviewed.  Constitutional: He appears well-developed and well-nourished.   HENT:   Head: Normocephalic and atraumatic.   Eyes: EOM are normal. Pupils are equal, round, and reactive to light.   Neck:   Normal range of motion.  Cardiovascular:  Normal rate, regular rhythm and normal heart sounds.           Pulmonary/Chest: No respiratory distress. He has no wheezes. He has no rhonchi. He has rales.   Abdominal: Abdomen is soft. He exhibits no distension. There is no abdominal tenderness. There is no rebound.   Musculoskeletal:         General: Edema (4+ bilateral lower extremity edema.) present. Normal range of motion.      Cervical back: Normal range of motion.     Neurological: He is alert and oriented to person, place, and time. He has normal strength. No cranial nerve deficit or sensory deficit. GCS score is 15. GCS eye subscore is 4. GCS verbal subscore is 5. GCS  motor subscore is 6.   Skin: Capillary refill takes less than 2 seconds.   Skin changes noted as pictured below.  There are skin excoriations in the intertriginous spaces between the 3rd and 4th as well as 4th and 5th toes.  Minimal fibrinous drainage noted.  No bleeding.  Minimally tender.  No surrounding erythema.   Psychiatric: He has a normal mood and affect.               ED Course   Procedures  Labs Reviewed   CBC W/ AUTO DIFFERENTIAL - Abnormal       Result Value    WBC 7.45      RBC 4.19 (*)     Hemoglobin 10.0 (*)     Hematocrit 32.8 (*)     MCV 78 (*)     MCH 23.9 (*)     MCHC 30.5 (*)     RDW 18.5 (*)     Platelets 269      MPV 10.0      Immature Granulocytes 0.4      Gran # (ANC) 4.8      Immature Grans (Abs) 0.03      Lymph # 1.5      Mono # 0.9      Eos # 0.2      Baso # 0.06      nRBC 0      Gran % 64.3      Lymph % 20.3      Mono % 11.4      Eosinophil % 2.8      Basophil % 0.8      Differential Method Automated     COMPREHENSIVE METABOLIC PANEL - Abnormal    Sodium 134 (*)     Potassium 3.8      Chloride 97      CO2 31 (*)     Glucose 86      BUN 17      Creatinine 1.4      Calcium 8.3 (*)     Total Protein 6.8      Albumin 3.1 (*)     Total Bilirubin 0.6      Alkaline Phosphatase 76      AST 12      ALT 8 (*)     eGFR 55.1 (*)     Anion Gap 6 (*)    B-TYPE NATRIURETIC PEPTIDE - Abnormal    BNP 3,163 (*)    TROPONIN I HIGH SENSITIVITY - Abnormal    Troponin I High Sensitivity 21.3 (*)    CULTURE, BLOOD   CULTURE, BLOOD   INFLUENZA A AND B ANTIGEN    Influenza A, Molecular Negative      Influenza B, Molecular Negative      Flu A & B Source Nasal swab      Narrative:     Specimen Source->Nasopharyngeal Swab   SARS-COV-2 RNA AMPLIFICATION, QUAL    SARS-CoV-2 RNA, Amplification, Qual Negative     LACTIC ACID, PLASMA   URINALYSIS, REFLEX TO URINE CULTURE   ISTAT LACTATE    POC Lactate 1.72      Sample VENOUS     POCT LACTATE          Imaging Results              X-Ray Foot Complete Left (Final result)   Result time 02/11/25 21:08:06      Final result by Eusebio Leonardo MD (02/11/25 21:08:06)                   Impression:      As above.  If concern for osteomyelitis, recommend MRI.      Electronically signed by: Eusebio Leonardo  Date:    02/11/2025  Time:    21:08               Narrative:    EXAMINATION:  XR FOOT COMPLETE 3 VIEW LEFT    CLINICAL HISTORY:  .  Other specified soft tissue disorders    TECHNIQUE:  AP, lateral and oblique views of the left foot were performed.    COMPARISON:  None    FINDINGS:  Osteopenia.  No overt erosions or osseous destruction.  No acute fracture or dislocation.  Mild multifocal midfoot osteoarthritis.  Forefoot swelling.                                       X-Ray Chest AP Portable (Final result)  Result time 02/11/25 18:27:01      Final result by Eusebio Leonardo MD (02/11/25 18:27:01)                   Impression:      As above      Electronically signed by: Eusebio Leonardo  Date:    02/11/2025  Time:    18:27               Narrative:    EXAMINATION:  XR CHEST AP PORTABLE    CLINICAL HISTORY:  Sepsis;    TECHNIQUE:  Single frontal view of the chest was performed.    COMPARISON:  05/28/2018    FINDINGS:  Enlarged cardiac silhouette with pulmonary vascular congestion.  Bilateral pleural effusions.  Mild bibasilar airspace disease and/or atelectasis.                                       Medications   vancomycin - pharmacy to dose (has no administration in time range)   piperacillin-tazobactam (ZOSYN) 4.5 g in D5W 100 mL IVPB (MB+) (0 g Intravenous Stopped 2/11/25 2040)   sodium chloride 0.9% bolus 500 mL 500 mL (0 mLs Intravenous Stopped 2/11/25 2040)   vancomycin (VANCOCIN) 1,750 mg in 0.9% NaCl 500 mL IVPB (1,750 mg Intravenous New Bag 2/11/25 2002)   furosemide injection 20 mg (20 mg Intravenous Given 2/11/25 2154)     Medical Decision Making  Juan C Sanchez is a 69 y.o. male with a past medical history of hypertension and heart failure with reduced  ejection fraction, atrial fibrillation currently on amiodarone and Eliquis, and anemia that presents emergency department for evaluation shortness of breath and bilateral leg swelling.  Initial blood pressure 95/62 and heart rate of 109.  Nontoxic male.  Rales heard bilaterally.  Significant bilateral lower extremity edema.  Wounds as described above.  Presentation concerning for heart failure exacerbation.  EKG shows atrial fibrillation without acute ST segment or T-wave changes.  Rate is slightly overall 100, but given hypotension, we will hold off on rate-controlling agents.  Troponin mildly elevated to 21.3.  BNP of 31 63.  Chest x-ray with bilateral edema.  Given the relative hypotension with poor ejection fraction and decompensated heart failure, I am concerned he could be approaching cardiogenic shock.  Given 500 cc of fluid with normalization of blood pressure.   Considered sepsis initially.  Blood cultures pending.  Initial lactic was within normal limits.  Given vanc and Zosyn.  Obtained an x-ray of the foot to evaluate for possible osteomyelitis which was negative.  Currently do not have a good source of infection at this time.  No leukocytosis seen.  Patient does appear to be volume overloaded and after a small bolus of fluids, his blood pressure has stabilized.  We will begin to gently diurese with 20 mg of Lasix.  Admitted to Hospital Medicine.    Amount and/or Complexity of Data Reviewed  Radiology: ordered.    Risk  Prescription drug management.  Decision regarding hospitalization.                                      Clinical Impression:  Final diagnoses:  [I10] Hypertension  [Z13.6] Screening for cardiovascular condition  [M79.89] Foot swelling  [M79.89] Foot swelling - C/f osteomyelitis                 Cody Morataya MD  02/12/25 0129

## 2025-02-12 NOTE — PT/OT/SLP EVAL
Occupational Therapy   Evaluation    Name: Juan C Sanchez  MRN: [id number]  Admitting Diagnosis: Acute on chronic congestive heart failure  Recent Surgery: * No surgery found *      Recommendations:     Discharge Recommendations: Moderate Intensity Therapy  Discharge Equipment Recommendations:  none  Barriers to discharge:   (increased physical assistance with ADLs and functional mobility.)    Assessment:     Juan C Sanchez is a 69 y.o. male with a medical diagnosis of Acute on chronic congestive heart failure.  He presents with general weakness. Performance deficits affecting function: weakness, impaired endurance, impaired self care skills, impaired functional mobility, gait instability, impaired balance, decreased safety awareness, decreased lower extremity function, decreased upper extremity function.      Rehab Prognosis: Fair; patient would benefit from acute skilled OT services to address these deficits and reach maximum level of function.       Plan:     Patient to be seen 3 x/week to address the above listed problems via self-care/home management, therapeutic activities, therapeutic exercises  Plan of Care Expires: 03/12/25  Plan of Care Reviewed with: patient    Subjective     Chief Complaint: General weakness  Patient/Family Comments/goals: Improved functional mobility and ADL independence.     Occupational Profile:  Living Environment: lives alone in a trailer with 3 steps, no rails to enter.   Previous level of function: modified independent using either cane or walker to ambulate, ADLs and IADLs.   Roles and Routines: primary homemaker  Equipment Used at Home: walker, rolling, cane, straight, cane, quad, bedside commode  Assistance upon Discharge: Neighbors and family, intermittent only.    Pain/Comfort:  Pain Rating Post-Intervention 1: 0/10  Pain Rating Post-Intervention 2: 0/10    Patients cultural, spiritual, Mosque conflicts given the current situation:      Objective:     Communicated  with: nurse prior to session.  Patient found HOB elevated with telemetry, peripheral IV upon OT entry to room.    General Precautions: Standard, fall  Orthopedic Precautions: N/A  Braces: N/A  Respiratory Status: Room air    Occupational Performance:    Bed Mobility:    Patient completed Scooting/Bridging with contact guard assistance  Patient completed Supine to Sit with contact guard assistance  Patient completed Sit to Supine with contact guard assistance  Performed unsupported sitting EOB with contact guard assistance.     Functional Mobility/Transfers:  Patient completed Sit <> Stand Transfer with minimum assistance  with  rolling walker     Activities of Daily Living:  Lower Body Dressing: minimum assistance to don sock sitting EOB.     Cognitive/Visual Perceptual:  Cognitive/Psychosocial Skills:     -       Oriented to: Person, Place, Time, and Situation   -       Follows Commands/attention:Follows multistep  commands  -       Communication: clear/fluent  -       Memory: No Deficits noted  -       Safety awareness/insight to disability: impaired   -       Mood/Affect/Coping skills/emotional control: Cooperative and Pleasant  Visual/Perceptual:      -Intact Acuity    Physical Exam:  Balance:    -       Sitting: Contact Guard, Standing: Minimal Assist  Upper Extremity Range of Motion:     -       Right Upper Extremity: WFL  -       Left Upper Extremity: WFL  Upper Extremity Strength:    -       Right Upper Extremity: 4/5  -       Left Upper Extremity: 4/5   Strength:    -       Right Upper Extremity: WFL  -       Left Upper Extremity: WFL  Fine Motor Coordination:    -       Intact    AMPAC 6 Click ADL:  AMPAC Total Score: 19    Treatment & Education:  Patient educated on the purpose of Occupational Therapy and the importance of getting OOB.     Patient left HOB elevated with all lines intact, call button in reach, and bed alarm on    GOALS:   Multidisciplinary Problems       Occupational Therapy Goals           Problem: Occupational Therapy    Goal Priority Disciplines Outcome Interventions   Occupational Therapy Goal     OT, PT/OT     Description: Goals to be met by: 3/12/2025     Patient will increase functional independence with ADLs by performing:    UE Dressing with Supervision.  LE Dressing with Supervision.  Grooming while standing at sink with Supervision.  Toileting from toilet with Supervision for hygiene and clothing management.   Toilet transfer to toilet with Supervision.  Perform sitting/standing ADL activity with no LOB.                       History:     Past Medical History:   Diagnosis Date    Hypertension          Past Surgical History:   Procedure Laterality Date    ORIF closed comminuted displaced intra-articular fx distal left radius & application of external fixator Left 05/28/2018       Time Tracking:     OT Date of Treatment: 02/12/25  OT Start Time: 1105  OT Stop Time: 1124  OT Total Time (min): 19 min    Billable Minutes:Evaluation 9  Self Care/Home Management 10    2/12/2025

## 2025-02-12 NOTE — PLAN OF CARE
Referrals sent in [facility]     02/12/25 [id number]   Post-Acute Status   Post-Acute Authorization Placement   Post-Acute Placement Status Referrals Sent   Coverage Payor: [facility] DUAL COMPLETE O SNP -   Discharge Delays None known at this time   Discharge Plan   Discharge Plan A Skilled Nursing Facility   Discharge Plan B Home Health

## 2025-02-12 NOTE — PT/OT/SLP EVAL
Physical Therapy Evaluation    Patient Name:  Juan C Sanchez   MRN:  [id number]    Recommendations:     Discharge Recommendations: Moderate Intensity Therapy   Discharge Equipment Recommendations: none   Barriers to discharge: increased need for physical assistance, impaired endurance    Assessment:     Juan C Sanchez is a 69 y.o. male admitted with a medical diagnosis of Acute on chronic congestive heart failure.  He presents with the following impairments/functional limitations: impaired endurance, weakness, impaired self care skills, impaired functional mobility, gait instability, impaired balance, decreased lower extremity function, impaired cardiopulmonary response to activity.    Pt found supine upon arrival. Pt agreeable to therapy. Pt performed supine<>sit with SBA and sat EOB with SBA. Pt transferred sit<>stand with RW/CGA. Pt ambulated in room ~25' with RW/CGA. Pt returned seated EOB and left with OT in room.    Rehab Prognosis: Fair; patient would benefit from acute skilled PT services to address these deficits and reach maximum level of function.    Recent Surgery: * No surgery found *      Plan:     During this hospitalization, patient to be seen 5 x/week to address the identified rehab impairments via gait training, therapeutic activities, therapeutic exercises and progress toward the following goals:    Plan of Care Expires:  03/12/25    Subjective     Chief Complaint: left foot pain  Patient/Family Comments/goals: to get stronger  Pain/Comfort:  Pain Rating 1: 0/10    Patients cultural, spiritual, Islam conflicts given the current situation: no    Living Environment:  Pt lives with friend in a camper, has 3 steps to enter. Pt reports he wants to become independent as possible because his friend can't help a lot due to work.  Prior to admission, patients level of function was independent.  Equipment used at home: walker, rolling, bedside commode, cane, quad, cane, straight.  DME owned (not  currently used): none.  Upon discharge, patient will have assistance from friend.    Objective:     Communicated with RN prior to session.  Patient found supine with telemetry, peripheral IV  upon PT entry to room.    General Precautions: Standard, fall  Orthopedic Precautions:N/A   Braces: N/A  Respiratory Status: Room air    Exams:  RLE ROM: WFL  RLE Strength: WFL  LLE ROM: WFL  LLE Strength: WFL    Functional Mobility:  Bed Mobility:     Supine to Sit: stand by assistance  Transfers:     Sit to Stand:  contact guard assistance with rolling walker  Gait: ~25' with RW/CGA, initially pt able to hop on R leg. Pt then progressed to full WB on LLE.      AM-PAC 6 CLICK MOBILITY  Total Score:21       Treatment & Education:  Pt educated on PT POC.    Patient left sitting edge of bed with all lines intact, call button in reach, and OT present.    GOALS:   Multidisciplinary Problems       Physical Therapy Goals          Problem: Physical Therapy    Goal Priority Disciplines Outcome Interventions   Physical Therapy Goal     PT, PT/OT     Description: Goals to be met by: 3/12/25     Patient will increase functional independence with mobility by performin. Supine to sit with Modified [place]  2. Sit to supine with Modified [place]  3. Sit to stand transfer with Supervision and using Rolling Walker  4. Gait  x 100 feet with Stand-by Assistance using Rolling Walker.   5. Ascend/descend 3 stair with bilateral Handrails Contact Guard Assistance using No Assistive Device.                          DME Justifications:  No DME recommended requiring DME justifications    History:     Past Medical History:   Diagnosis Date    Hypertension        Past Surgical History:   Procedure Laterality Date    ORIF closed comminuted displaced intra-articular fx distal left radius & application of external fixator Left 2018       Time Tracking:     PT Received On: 25  PT Start Time: 1107     PT Stop Time: 1117  PT Total  Time (min): 10 min     Billable Minutes: Evaluation 10      02/12/2025

## 2025-02-12 NOTE — ASSESSMENT & PLAN NOTE
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 Faint opacification of PTA at the ankle. Slow flow could be partial etiology of nonvisualization of distal vessels. Clinical correlation needed. Left lower extremity without significant stenosis with three-vessel opacification to the ankle.   - Vascular surgery consulted, appreciate recs

## 2025-02-12 NOTE — PLAN OF CARE
[facility]  Initial Discharge Assessment       Primary Care Provider: Katlin Graf NP    Admission Diagnosis: Acute on chronic congestive heart failure, unspecified heart failure type [I50.9]    Admission Date: 2/11/2025  Expected Discharge Date:     Transition of Care Barriers: None    CM met with patient bedside. CM verified demographics, insurance, supports, and PCP.  Patient reported he lives alone and has minimal help, has had multiple hospitalizations and feels like he needs more therapy/help. CM assessed patient's needs. Patient is able to complete ADLs independently. Patient verified at home DME: rolling walker, walker, bedside commode, canes.  Patient HH with [facility], No Dialysis, No Blood Thinners, and No Oxygen.     Patient verified pharmacy of choice: [facility] [address] [place]  Patient confirmed [facility] [phone] will be source of transportation at the time of discharge.     Payor: [facility] MCARE / Plan: [facility] DUAL COMPLETE HMO SNP / Product Type: Medicare Advantage /     Extended Emergency Contact Information  Primary Emergency Contact: Suly Cox   [facility]  Home Phone: [phone]  Mobile Phone: [phone]  Relation: Friend    Discharge Plan A: Skilled Nursing Facility  Discharge Plan B: Home with [facility] - [place], [place] - [address]  [address] [facility]  [place] [address]  Phone: [phone] Fax: [phone]    [id number] - [place], [place] - [address] AT [facility]  [address]  [address]  [place] [address]  Phone: [phone] Fax: [phone]      Initial Assessment (most recent)       Adult Discharge Assessment - 02/12/25 0956          Discharge Assessment    Assessment Type Discharge Planning Assessment     Confirmed/corrected address, phone number and insurance Yes     Confirmed Demographics Correct on Facesheet     Source of  Information patient     Communicated PHILIP with patient/caregiver Yes     Reason For Admission CHF     People in Home alone     Facility Arrived From: home     Prior to hospitilization cognitive status: Alert/Oriented;No Deficits     Current cognitive status: Alert/Oriented;No Deficits     Walking or Climbing Stairs Difficulty yes     Walking or Climbing Stairs ambulation difficulty, requires equipment     Mobility Management independent with equipment     Dressing/Bathing Difficulty no     Equipment Currently Used at Home walker, rolling;walker, standard;cane, quad;cane, straight;bedside commode     Readmission within 30 days? Yes   at [facility] for CHF    Patient currently being followed by outpatient case management? No     Do you currently have service(s) that help you manage your care at home? Yes     Name and Contact number of agency [facility] [place] [phone]     Is the pt/caregiver preference to resume services with current agency Yes     Do you take prescription medications? Yes     Do you have prescription coverage? Yes     Coverage [facility]     Do you have any problems affording any of your prescribed medications? No     Is the patient taking medications as prescribed? yes     Who is going to help you get home at discharge? [facility], [phone]     How do you get to doctors appointments? car, drives self     Are you on dialysis? No     Do you take coumadin? No     Discharge Plan A Skilled Nursing Facility     Discharge Plan B Home with family     DME Needed Upon Discharge  none     Discharge Plan discussed with: Patient     Transition of Care Barriers None

## 2025-02-12 NOTE — ASSESSMENT & PLAN NOTE
Patient has paroxysmal (<7 days) atrial fibrillation. Patient is currently in atrial fibrillation. [id number] Score: 1. The patients heart rate in the last 24 hours is as follows:  Pulse  Min: 108  Max: 112     Antiarrhythmics  , 2 times daily, Oral  , 2 times daily, Oral  amiodarone tablet 200 mg, 2 times daily, Oral    Anticoagulants  enoxaparin injection 40 mg, Every 24 hours, Subcutaneous    Plan  - Replete lytes with a goal of K>4, Mg >2  - Patient is not anticoagulated due to not on OP anticoag  - Patient's afib is currently controlled  - Continue home amiodarone

## 2025-02-12 NOTE — CONSULTS
[facility]  Consult Note      PATIENT NAME: Juan C Sanchez  MRN: [id number]  TODAY'S DATE: 02/12/2025  ADMIT DATE: 2/11/2025                          CONSULT REQUESTED BY: Dao Sterling MD    SUBJECTIVE   PRINCIPAL PROBLEM: Acute on chronic congestive heart failure    Reason for Consult/Chief Complaint: High grade stenosis RLE on CTA 1/14/2025    History of Present Illness:  Juan C Sanchez is a 69 y.o. male with a history of hypertension, mitral regurgitation, HFrEF (EF 25%), atrial fibrillation (on home amiodarone and Eliquis), PAD, anemia, obesity, and GERD who presented to the ED on 2/11/2025 with complaints of shortness of breath and bilateral lower extremity edema. He was admitted for an acute on chronic HFrEF exacerbation and afib with RVR. Cardiology has been consulted and he is currently being treated with Lasix 40mg IVP BID and metoprolol 25mg PO 4 times daily and IV PRN. Per the patient, he has been experiencing severe lower extremity edema for the past 3-4 months that has been worked up outpatient. The workup included a lower extremity CTA which illustrated right lower extremity with high-grade stenosis anterior tibial artery nonostial origin tapering to occlusion mid calf with posterior tibial artery dominance and small caliber peroneal artery to near the ankle. This was noticed during chart review at admit and vascular surgery was consulted.  The patient denies lower extremity pain, just tightness and itching secondary to the swelling. He reports that his leg swelling is slightly improved with leg elevation but quickly returns when they are dependent again. He denies use of compression stockings.     Past Medical History:   Diagnosis Date    Hypertension      Past Surgical History:   Procedure Laterality Date    ORIF closed comminuted displaced intra-articular fx distal left radius & application of external fixator Left 05/28/2018     Review of patient's  allergies indicates:   Allergen Reactions    Gabapentin Other (See Comments)     Hypersomnia, nightmares    Morphine Other (See Comments)     Pt states systemic cramping     No family history on file.  Social History     Tobacco Use    Smoking status: Never    Smokeless tobacco: Never   Substance Use Topics    Alcohol use: Yes     Comment: social    Drug use: No          Review of Systems:  Review of Systems   Constitutional:  Negative for chills and fever.   Respiratory:  Positive for cough, sputum production and shortness of breath. Negative for wheezing.    Cardiovascular:  Positive for palpitations and leg swelling. Negative for chest pain.   Gastrointestinal:  Negative for diarrhea, nausea and vomiting.   Skin:  Positive for itching (LE itching).   Neurological:  Positive for weakness. Negative for dizziness and headaches.       OBJECTIVE   VITAL SIGNS (Most Recent)  Temp: 97.7 °F (36.5 °C) (02/12/25 0800)  Pulse: (!) 113 (02/12/25 0800)  Resp: 18 (02/12/25 0800)  BP: 108/80 (89) (02/12/25 0800)  SpO2: 96 % (02/12/25 0800)    I & O (Last 24H):  Intake/Output Summary (Last 24 hours) at 2/12/2025 0931  Last data filed at 2/12/2025 0907  Gross per 24 hour   Intake 1340 ml   Output 900 ml   Net 440 ml       PHYSICAL EXAM  Physical Exam  Constitutional: Awake and oriented to person, place, and time. Appears well-developed and well-nourished. In no apparent distress.  HEENT: Normocephalic. Pupils normal and conjunctivae normal. Mucous membranes normal, no cyanosis or icterus, trachea central, no pallor or icterus is noted.  Neck: Normal range of motion. Neck supple. No JVD present. No bruit present.   Cardiovascular: Afib 100s-110s. Normal heart sounds. S1, S2.   Pulmonary/Chest: Effort normal. No respiratory distress. Dyspnea after repositioning in bed. Coarse breath sounds.   Abdominal: Obese. Soft. Bowel sounds are normal.   Urinary: No livingston catheter present  Skin: Skin is warm and dry. Bilateral lower extremity  "erythema calf down.  Extremities: Left foot dressing in place, wound between 3rd and 4th digits per patient. Bilateral LE pitting edema +4.   Peripheral vascular system: Palpable right DP pulse. Unable to palpate left due to dressing.               INPATIENT MEDS:      amiodarone  200 mg Oral BID    enoxparin  40 mg Subcutaneous Daily    furosemide (LASIX) injection  40 mg Intravenous Q12H    metoprolol tartrate  25 mg Oral QID    pantoprazole  40 mg Oral Daily       Current Facility-Administered Medications:     acetaminophen, 650 mg, Oral, Q4H PRN    aluminum-magnesium hydroxide-simethicone, 30 mL, Oral, QID PRN    magnesium oxide, 800 mg, Oral, PRN    magnesium oxide, 800 mg, Oral, PRN    melatonin, 9 mg, Oral, Nightly PRN    metoprolol, 5 mg, Intravenous, Q5 Min PRN    midodrine, 2.5 mg, Oral, TID PRN    naloxone, 0.02 mg, Intravenous, PRN    ondansetron, 4 mg, Intravenous, Q6H PRN    potassium bicarbonate, 35 mEq, Oral, PRN    potassium bicarbonate, 50 mEq, Oral, PRN    potassium bicarbonate, 60 mEq, Oral, PRN    potassium, sodium phosphates, 2 packet, Oral, PRN    potassium, sodium phosphates, 2 packet, Oral, PRN    potassium, sodium phosphates, 2 packet, Oral, PRN    senna-docusate 8.6-50 mg, 1 tablet, Oral, BID PRN    sodium chloride 0.9%, 10 mL, Intravenous, PRN       LABS AND DIAGNOSTICS   CURRENT/PREVIOUS VISIT EKG    CARDIAC PROFILE LAST 3 DAYS  Recent Labs   Lab 02/11/25 1818   BNP 3,163*       CBC LAST 3 DAYS  Recent Labs   Lab 02/11/25 1818 02/12/25  0158   WBC 7.45 8.15   HGB 10.0* 10.2*   HCT 32.8* 33.8*    249       COAGULATION LAST 3 DAYS  No results for input(s): "LABPT", "INR", "APTT" in the last 168 hours.    CHEMISTRY LAST 3 DAYS  Recent Labs   Lab 02/11/25 1818 02/12/25  0158   * 135*   K 3.8 4.2   CO2 31* 31*   ANIONGAP 6* 7*   BUN 17 18   CREATININE 1.4 1.4   GLU 86 120*   CALCIUM 8.3* 8.2*   MG  --  2.0   ALBUMIN 3.1* 2.9*   PROT 6.8 6.9   ALKPHOS 76 76   ALT 8* 8*   AST 12 " 17   BILITOT 0.6 0.6       MOST RECENT IMAGING  CT Abdomen Pelvis  Without Contrast  Narrative: EXAMINATION:  CT ABDOMEN PELVIS WITHOUT CONTRAST    CLINICAL HISTORY:  Abdominal pain, acute, nonlocalized;    TECHNIQUE:  Axial CT imaging obtained through the abdomen and pelvis without contrast, coronal and sagittal reformatted images    CMS Mandated Quality Data-CT Radiation Dose-436    All CT scans at this facility dose modulation, iterative reconstruction, and or weight-based dosing when appropriate to reduce radiation dose to as low as reasonably achievable.    COMPARISON:  None    FINDINGS:  Imaging through the lower thorax shows interlobular septal thickening and small volume pleural fluid bilaterally, potentially on the basis of pulmonary edema.  There is diffuse body wall edema.  Bone window images show no acute or aggressive osseous abnormality.  Thoracolumbar degenerative changes and scoliosis.    Low-attenuation of blood pool relative to myocardium suggesting anemia.    On this noncontrast exam, no focal hepatic or splenic lesion.  Somewhat nodular hepatic contour raising the possibility of cirrhosis.  Cholelithiasis.  No intrahepatic or extrahepatic bile duct dilation.  Small volume perihepatic fluid and perisplenic fluid.  Mesenteric edema.  No focal pancreatic lesion.  No adrenal lesion.  Left upper pole renal cysts.  No renal calculi.  Ureters are normal in caliber.  Urinary bladder is unremarkable.    Stomach is unremarkable.  No evidence of small-bowel obstruction.  No evidence of colitis.  Distal colonic diverticula.    Aortoiliac atherosclerotic calcification.  Prominent retroperitoneal and mesenteric lymph nodes.  Prominent linn hepatis and portacaval lymph nodes.  Small volume free fluid along the pericolic gutters and in the pelvis.  Fluid in the left inguinal canal.  Impression: Small bilateral pleural effusions, pulmonary edema, as well as diffuse body wall/mesenteric edema.    Small volume  free fluid in the abdomen and pelvis.    Nodular hepatic contour suggestive of cirrhosis.    Cholelithiasis.    Diverticulosis of the distal colon.    CT findings suggestive of anemia.    Atherosclerosis, fluid in the left inguinal canal, and other incidental findings as above.    Electronically signed by: Alejandro Carney  Date:    02/12/2025  Time:    07:10  US Lower Extremity Veins Bilateral  Narrative: EXAMINATION:  US LOWER EXTREMITY VEINS BILATERAL    CLINICAL HISTORY:  extensive bilateral lower extremity edema;    TECHNIQUE:  Duplex and color flow Doppler and dynamic compression was performed of the bilateral lower extremity veins was performed.    COMPARISON:  None    FINDINGS:  Right thigh veins: The common femoral, femoral, popliteal, upper greater saphenous, and deep femoral veins are patent and free of thrombus. The veins are normally compressible and have normal phasic flow and augmentation response.    Right calf veins: The visualized calf veins are patent.    Left thigh veins: The common femoral, femoral, popliteal, upper greater saphenous, and deep femoral veins are patent and free of thrombus. The veins are normally compressible and have normal phasic flow and augmentation response.    Left calf veins: The visualized calf veins are patent.    Miscellaneous: Soft tissue edema present.  Few prominent lymph nodes left groin, nonspecific.  Impression: No evidence of deep venous thrombosis in either lower extremity.    Additional findings as above.    Electronically signed by: Rocco Levin MD  Date:    02/12/2025  Time:    00:38    Results for orders placed or performed during the hospital encounter of 02/11/25 (from the past 2160 hours)   CT Abdomen Pelvis  Without Contrast    Narrative    EXAMINATION:  CT ABDOMEN PELVIS WITHOUT CONTRAST    CLINICAL HISTORY:  Abdominal pain, acute, nonlocalized;    TECHNIQUE:  Axial CT imaging obtained through the abdomen and pelvis without contrast, coronal and  sagittal reformatted images    CMS Mandated Quality Data-CT Radiation Dose-436    All CT scans at this facility dose modulation, iterative reconstruction, and or weight-based dosing when appropriate to reduce radiation dose to as low as reasonably achievable.    COMPARISON:  None    FINDINGS:  Imaging through the lower thorax shows interlobular septal thickening and small volume pleural fluid bilaterally, potentially on the basis of pulmonary edema.  There is diffuse body wall edema.  Bone window images show no acute or aggressive osseous abnormality.  Thoracolumbar degenerative changes and scoliosis.    Low-attenuation of blood pool relative to myocardium suggesting anemia.    On this noncontrast exam, no focal hepatic or splenic lesion.  Somewhat nodular hepatic contour raising the possibility of cirrhosis.  Cholelithiasis.  No intrahepatic or extrahepatic bile duct dilation.  Small volume perihepatic fluid and perisplenic fluid.  Mesenteric edema.  No focal pancreatic lesion.  No adrenal lesion.  Left upper pole renal cysts.  No renal calculi.  Ureters are normal in caliber.  Urinary bladder is unremarkable.    Stomach is unremarkable.  No evidence of small-bowel obstruction.  No evidence of colitis.  Distal colonic diverticula.    Aortoiliac atherosclerotic calcification.  Prominent retroperitoneal and mesenteric lymph nodes.  Prominent linn hepatis and portacaval lymph nodes.  Small volume free fluid along the pericolic gutters and in the pelvis.  Fluid in the left inguinal canal.      Impression    Small bilateral pleural effusions, pulmonary edema, as well as diffuse body wall/mesenteric edema.    Small volume free fluid in the abdomen and pelvis.    Nodular hepatic contour suggestive of cirrhosis.    Cholelithiasis.    Diverticulosis of the distal colon.    CT findings suggestive of anemia.    Atherosclerosis, fluid in the left inguinal canal, and other incidental findings as above.      Electronically  signed by: Alejandro Carney  Date:    02/12/2025  Time:    07:10        ASSESSMENT/PLAN:     Active Hospital Problems    Diagnosis    *Acute on chronic congestive heart failure    Abnormal computed tomography angiography (CTA)    MR (mitral regurgitation)    Paroxysmal atrial fibrillation    Hypertension     # Bilateral lower extremity swelling  # Abnormal CTA  - Venous US lower extremity 2/12/2025: negative for DVT  - Vascular LE arterial US 1/9/2025: ARMANDO on the right 1.5, left 1.1  - Repeat vascular LE arterial US 1/24/2025: Mild atherosclerosis but no arterial insufficiency. Left leg spectral Doppler waveforms and color flow interrogation normal. Right leg spectral Doppler wave forms and color flow interrogation normal. Some mild atherosclerosis noted.   - CTA lower extremity 1/14/2025: Right lower extremity with high-grade stenosis anterior tibial artery nonostial origin tapering to occlusion mid calf with posterior tibial artery dominance and small caliber peroneal artery to near the ankle. Faint opacification of PTA at the ankle. Slow flow could be partial etiology of nonvisualization of distal vessels. Clinical correlation needed. Left lower extremity without significant stenosis with three-vessel opacification to the ankle.     - Compression stockings and leg elevation  - Consider outpatient venous insufficiency ultrasound. Unfortunately, this study cannot be completed while inpatient.  - Continue diuretics per cardiology   - Palpable right lower extremity pulse. Therefore, no indication for intervention at this time.     Case and plan of care discussed with MD. Additional recommendations from Dr. Faith to follow.     Vianca Medina NP  [facility]   Date of Service: 02/12/2025

## 2025-02-12 NOTE — PLAN OF CARE
Assuming care of 69 year old male with a past medical history of HFrEF, Afib, PAD, anemia, obesity, and GERD who presented with an acute on chronic HFrEF exacerbation as well as Afib with RVR. He is being diuresed with IV Lasix. He is on PO metoprolol with PRN IV pushes at this time. Cardiology has been consulted.

## 2025-02-12 NOTE — CONSULTS
"[facility]  Department of Cardiology  Consult Note      PATIENT NAME: Juan C Sanchez  MRN: [id number]  TODAY'S DATE: 02/12/2025  ADMIT DATE: 2/11/2025                          CONSULT REQUESTED BY: Dao Sterling MD    SUBJECTIVE     PRINCIPAL PROBLEM: Acute on chronic congestive heart failure      REASON FOR CONSULT:  CHF exacerbation     HPI:  Mr. Sanchez is a 69 yr old male with pmh of known HFrEF, HTN, mitral regurg, OA, GERD, and anemia who originally come into the ER yesterday evening after home PT encouraged him to come be evaluated after noticing some worsening swelling in BLE, hypotension, and generalized weakness. It appears he has been in and out of the ER about 8x since the start of this new year. BNP 3163. Trops 21.3 then 16.8. Most recent ECHO uploaded in care everywhere shows EF 25%. Pt also states he had a recent angiogram done in [place] (the last dated one I could see was 2022) showed trivial nonobstructive CAD. He states he follows with Dr. Alegria but has not been seen "in a while." He states compliance with medications but feels PO lasix is not working for him.     Per hospital medicine notes:  Mr. Sanchez is a 69 yr old male with a hx of HTN, MR, PAF, chronic systolic CHF with EF of 25, OA, GERD, nonischemic cardiomyopathy, anemia who presented to the ED with a chief complaint of shortness of breath, leg swelling and hypotension.  Patient states that he had PT come to his home today and he was found to be hypotensive and advised to come to the emergency room.  Patient endorses shortness of breath as well as leg swelling, malaise, fatigue, occasional chills, productive cough with milky sputum, upper abdominal pain, occasional nausea, decreased urine output, and positional lightheadedness.  He states that these symptoms have been progressively worsening over the last 1-2 months possibly even longer  He states that his symptoms are worse when lying flat as well as with " exertion and better with rest and while sitting up.  He states that he has been taking his home medications without improvement of his symptoms.  He does endorse that most of all of his contacts recently have been sick. He denies any home oxygen use.  He does endorse occasional PND and states that he has been having to sit to sleep.  He denies tobacco and recreational drug use and states he seldomly drinks alcohol.  He denies fever, chest pain, vomiting, diarrhea, dysuria, hematuria, dizziness, and syncope.     Upon arrival to ED, patient afebrile, HR of 109, RR of 24, BP of 95/62, satting 98% on RA.  Workup in the ED revealed RBC of 4.19, H/H of 10/32.8, sodium of 134, pCO2 of 31, GFR of 55.1, corrected calcium of 9, albumin of 3.1, BNP of 3163, troponin of 21.3, lactic acid of 2.1.  Blood cultures pending.  CXR shows enlarged cardiac silhouette with pulmonary vascular congestion.  Bilateral pleural effusions.  Mild bibasilar airspace disease and or atelectasis.  Left foot x-ray shows osteopenia.  No overt erosions or osseous destruction.  No acute fracture or dislocation.  Mild multifocal midfoot arthritis.  Forefoot swelling.  Patient was given Lasix 20 mg IV, Zosyn 4.5 g IV, 500 mL NS bolus, vancomycin 20 mg/kg while in the ED. discussed case with ED provider and patient will be admitted under hospital medicine services for further management.        Review of patient's allergies indicates:   Allergen Reactions    Gabapentin Other (See Comments)     Hypersomnia, nightmares    Morphine Other (See Comments)     Pt states systemic cramping       Past Medical History:   Diagnosis Date    Hypertension      Past Surgical History:   Procedure Laterality Date    ORIF closed comminuted displaced intra-articular fx distal left radius & application of external fixator Left 05/28/2018     Social History     Tobacco Use    Smoking status: Never    Smokeless tobacco: Never   Substance Use Topics    Alcohol use: Yes      Comment: social    Drug use: No        REVIEW OF SYSTEMS    As mentioned in HPI    OBJECTIVE     VITAL SIGNS (Most Recent)  Temp: 97.7 °F (36.5 °C) (02/12/25 0800)  Pulse: (!) 113 (02/12/25 0800)  Resp: 18 (02/12/25 0800)  BP: 108/80 (89) (02/12/25 0800)  SpO2: 96 % (02/12/25 0800)    VENTILATION STATUS  Resp: 18 (02/12/25 0800)  SpO2: 96 % (02/12/25 0800)           I & O (Last 24H):  Intake/Output Summary (Last 24 hours) at 2/12/2025 0912  Last data filed at 2/12/2025 0907  Gross per 24 hour   Intake 1340 ml   Output 900 ml   Net 440 ml       WEIGHTS  Wt Readings from Last 3 Encounters:   02/12/25 0050 98.6 kg (217 lb 6 oz)   02/11/25 1727 92.5 kg (204 lb)   10/23/18 1426 92.5 kg (204 lb)   09/18/18 1505 95.3 kg (210 lb)       PHYSICAL EXAM    CONSTITUTIONAL: NAD, elderly male lying in bed with HOB elevated   HEENT: Normocephalic. No pallor  NECK: no JVD  LUNGS: crackles in the bases, mild tachypnea   HEART: irregular rate and rhythm -afib 80-90s, S1, S2 normal, no murmur   ABDOMEN: soft, mild tenderness+, bowel sounds normal  EXTREMITIES: BLE edema +2  SKIN: No rash  NEURO: AAO X 3  PSYCH: normal affect     HOME MEDICATIONS:  No current facility-administered medications on file prior to encounter.     Current Outpatient Medications on File Prior to Encounter   Medication Sig Dispense Refill    acetaminophen (TYLENOL EXTRA STRENGTH) 500 MG tablet Take 1,000 mg by mouth every 6 (six) hours as needed for Pain.      amiodarone (PACERONE) 200 MG Tab Take 200 mg by mouth 2 (two) times daily.      cyclobenzaprine (FLEXERIL) 10 MG tablet Take 1 tablet by mouth 3 times daily as needed for Muscle spasms.      doxycycline (MONODOX) 100 MG capsule Take 100 mg by mouth 2 (two) times daily.      empagliflozin (JARDIANCE) 10 mg tablet Take 10 mg by mouth once daily.      ENTRESTO 24-26 mg per tablet Take 1 tablet by mouth 2 (two) times daily.      furosemide (LASIX) 20 MG tablet Take 40 mg by mouth 0900, 1800.      gabapentin  (NEURONTIN) 300 MG capsule Take 300 mg by mouth 2 (two) times daily.      levoFLOXacin (LEVAQUIN) 500 MG tablet Take 500 mg by mouth once daily.      metoprolol tartrate (LOPRESSOR) 25 MG tablet Take 25 mg by mouth 2 (two) times daily.      nitroGLYCERIN (NITROSTAT) 0.4 MG SL tablet Place 0.4 mg under the tongue every 5 (five) minutes as needed for Chest pain.      oxyCODONE-acetaminophen (PERCOCET)  mg per tablet Take 1 tablet by mouth every 8 (eight) hours as needed for Pain.      pantoprazole (PROTONIX) 40 MG tablet Take 40 mg by mouth once daily.      promethazine-dextromethorphan (PROMETHAZINE-DM) 6.25-15 mg/5 mL Syrp Take 5 mLs by mouth every 6 (six) hours as needed.      carvediloL (COREG) 3.125 MG tablet Take 3.125 mg by mouth 2 (two) times daily. (Patient not taking: Reported on 2/11/2025)         SCHEDULED MEDS:   amiodarone  200 mg Oral BID    enoxparin  40 mg Subcutaneous Daily    furosemide (LASIX) injection  40 mg Intravenous Q12H    metoprolol tartrate  25 mg Oral QID    pantoprazole  40 mg Oral Daily       CONTINUOUS INFUSIONS:    PRN MEDS:  Current Facility-Administered Medications:     acetaminophen, 650 mg, Oral, Q4H PRN    aluminum-magnesium hydroxide-simethicone, 30 mL, Oral, QID PRN    magnesium oxide, 800 mg, Oral, PRN    magnesium oxide, 800 mg, Oral, PRN    melatonin, 9 mg, Oral, Nightly PRN    metoprolol, 5 mg, Intravenous, Q5 Min PRN    midodrine, 2.5 mg, Oral, TID PRN    naloxone, 0.02 mg, Intravenous, PRN    ondansetron, 4 mg, Intravenous, Q6H PRN    potassium bicarbonate, 35 mEq, Oral, PRN    potassium bicarbonate, 50 mEq, Oral, PRN    potassium bicarbonate, 60 mEq, Oral, PRN    potassium, sodium phosphates, 2 packet, Oral, PRN    potassium, sodium phosphates, 2 packet, Oral, PRN    potassium, sodium phosphates, 2 packet, Oral, PRN    senna-docusate 8.6-50 mg, 1 tablet, Oral, BID PRN    sodium chloride 0.9%, 10 mL, Intravenous, PRN    LABS AND DIAGNOSTICS     CBC LAST 3  "DAYS  Recent Labs   Lab 02/11/25 1818 02/12/25  0158   WBC 7.45 8.15   RBC 4.19* 4.30*   HGB 10.0* 10.2*   HCT 32.8* 33.8*   MCV 78* 79*   MCH 23.9* 23.7*   MCHC 30.5* 30.2*   RDW 18.5* 18.4*    249   MPV 10.0 9.9   GRAN 64.3  4.8 68.8  5.6   LYMPH 20.3  1.5 18.8  1.5   MONO 11.4  0.9 9.3  0.8   BASO 0.06 0.06   NRBC 0 0       COAGULATION LAST 3 DAYS  No results for input(s): "LABPT", "INR", "APTT" in the last 168 hours.    CHEMISTRY LAST 3 DAYS  Recent Labs   Lab 02/11/25 1818 02/12/25  0158   * 135*   K 3.8 4.2   CL 97 97   CO2 31* 31*   ANIONGAP 6* 7*   BUN 17 18   CREATININE 1.4 1.4   GLU 86 120*   CALCIUM 8.3* 8.2*   MG  --  2.0   ALBUMIN 3.1* 2.9*   PROT 6.8 6.9   ALKPHOS 76 76   ALT 8* 8*   AST 12 17   BILITOT 0.6 0.6       CARDIAC PROFILE LAST 3 DAYS  Recent Labs   Lab 02/11/25 1818 02/12/25  0158   BNP 3,163*  --    TROPONINIHS 21.3* 16.8*       ENDOCRINE LAST 3 DAYS  Recent Labs   Lab 02/12/25  0158   TSH 7.487*       LAST ARTERIAL BLOOD GAS  ABG  No results for input(s): "PH", "PO2", "PCO2", "HCO3", "BE" in the last 168 hours.    LAST 7 DAYS MICROBIOLOGY   Microbiology Results (last 7 days)       Procedure Component Value Units Date/Time    Blood culture x two cultures. Draw prior to antibiotics. [[id number]] Collected: 02/11/25 1744    Order Status: Completed Specimen: Blood from Peripheral, Antecubital, Left Updated: 02/12/25 0117     Blood Culture, Routine No Growth to date    Narrative:      Aerobic and anaerobic    Blood culture x two cultures. Draw prior to antibiotics. [[id number]] Collected: 02/11/25 1749    Order Status: Completed Specimen: Blood from Peripheral, Antecubital, Right Updated: 02/12/25 0117     Blood Culture, Routine No Growth to date    Narrative:      Aerobic and anaerobic            MOST RECENT IMAGING  CT Abdomen Pelvis  Without Contrast  Narrative: EXAMINATION:  CT ABDOMEN PELVIS WITHOUT CONTRAST    CLINICAL HISTORY:  Abdominal pain, acute, " nonlocalized;    TECHNIQUE:  Axial CT imaging obtained through the abdomen and pelvis without contrast, coronal and sagittal reformatted images    CMS Mandated Quality Data-CT Radiation Dose-436    All CT scans at this facility dose modulation, iterative reconstruction, and or weight-based dosing when appropriate to reduce radiation dose to as low as reasonably achievable.    COMPARISON:  None    FINDINGS:  Imaging through the lower thorax shows interlobular septal thickening and small volume pleural fluid bilaterally, potentially on the basis of pulmonary edema.  There is diffuse body wall edema.  Bone window images show no acute or aggressive osseous abnormality.  Thoracolumbar degenerative changes and scoliosis.    Low-attenuation of blood pool relative to myocardium suggesting anemia.    On this noncontrast exam, no focal hepatic or splenic lesion.  Somewhat nodular hepatic contour raising the possibility of cirrhosis.  Cholelithiasis.  No intrahepatic or extrahepatic bile duct dilation.  Small volume perihepatic fluid and perisplenic fluid.  Mesenteric edema.  No focal pancreatic lesion.  No adrenal lesion.  Left upper pole renal cysts.  No renal calculi.  Ureters are normal in caliber.  Urinary bladder is unremarkable.    Stomach is unremarkable.  No evidence of small-bowel obstruction.  No evidence of colitis.  Distal colonic diverticula.    Aortoiliac atherosclerotic calcification.  Prominent retroperitoneal and mesenteric lymph nodes.  Prominent linn hepatis and portacaval lymph nodes.  Small volume free fluid along the pericolic gutters and in the pelvis.  Fluid in the left inguinal canal.  Impression: Small bilateral pleural effusions, pulmonary edema, as well as diffuse body wall/mesenteric edema.    Small volume free fluid in the abdomen and pelvis.    Nodular hepatic contour suggestive of cirrhosis.    Cholelithiasis.    Diverticulosis of the distal colon.    CT findings suggestive of  anemia.    Atherosclerosis, fluid in the left inguinal canal, and other incidental findings as above.    Electronically signed by: Alejandro Carney  Date:    02/12/2025  Time:    07:10  US Lower Extremity Veins Bilateral  Narrative: EXAMINATION:  US LOWER EXTREMITY VEINS BILATERAL    CLINICAL HISTORY:  extensive bilateral lower extremity edema;    TECHNIQUE:  Duplex and color flow Doppler and dynamic compression was performed of the bilateral lower extremity veins was performed.    COMPARISON:  None    FINDINGS:  Right thigh veins: The common femoral, femoral, popliteal, upper greater saphenous, and deep femoral veins are patent and free of thrombus. The veins are normally compressible and have normal phasic flow and augmentation response.    Right calf veins: The visualized calf veins are patent.    Left thigh veins: The common femoral, femoral, popliteal, upper greater saphenous, and deep femoral veins are patent and free of thrombus. The veins are normally compressible and have normal phasic flow and augmentation response.    Left calf veins: The visualized calf veins are patent.    Miscellaneous: Soft tissue edema present.  Few prominent lymph nodes left groin, nonspecific.  Impression: No evidence of deep venous thrombosis in either lower extremity.    Additional findings as above.    Electronically signed by: Rocco Levin MD  Date:    02/12/2025  Time:    00:38      ECHOCARDIOGRAM RESULTS (last 5)  No results found for this or any previous visit.    Echo done locally    Impressions   -----------     1.The estimated LV ejection fraction is 25 %     2.There is moderate to severe global hypokinesis     3.Diastolic function is indeterminate.     4.There is mild aortic valve sclerosis without significant stenosis     5.There is severe mitral regurgitation     6.Estimated RVSP systolic pressure is 37.13 mmHg     7.Compared to the report from prior study dated 04/04/2022, the   severity of mitral regurgitation has  increased     Findings   --------     Left Ventricle   The estimated LV ejection fraction is 25 %. The calculated LV ejection   fraction (MOD, Biplane) is 29.99 %. LV chamber is mildly dilated.   There is mild concentric LV hypertrophy. LV systolic function is   normal. There is moderate to severe global hypokinesis. Diastolic   function is indeterminate.     Right Ventricle   RV size is moderately dilated. The RV systolic function is normal.     Septum   There is no atrial septal defect (ASD). There is no ventricular septal   defect (VSD).     Left Atrium   LA chamber size is normal. LA diameter is 3.81 cm.     Right Atrium   RA chamber size is normal.     Aortic Valve   There is a trileaflet aortic valve. There is mild aortic valve   sclerosis without significant stenosis. There is no aortic   regurgitation. Aortic valve area by VMax: 1.02 cm2. Aortic valve area   by VTI: 0.93 cm2. AV Mean gradient: 7.37 mmHg. AV Peak gradient: 13.48   mmHg.     Mitral Valve   Mitral valve is not well visualized. There is severe mitral   regurgitation. There is no mitral stenosis.     Tricuspid Valve   Tricuspid valve is structurally normal. There is mild tricuspid   regurgitation. The estimated RV systolic pressure is normal. Estimated   RVSP systolic pressure is 37.13 mmHg. There is no tricuspid valve   stenosis.     Pulmonary Valve   Pulmonic valve is not well visualized.     Pericardium   The pericardium is normal. There is no pericardial effusion present.     Aorta   The size of the visualized portion of aortic root is within normal   limits. The thoracic aorta is not well visualized. The ascending aorta   is not well visualized.     Venous   The inferior vena cava dimension is normal.     Thrombus/Mass   There is no intracardiac mass or thrombus identified.     Echo Dimensions   --------------     LA Dimen (2D): 3.81 cm   IVS(D) (2D): 1.16 cm   LVPW(D) (2D): 1.16 cm   LV(D) (2D): 6.14 cm   LV(S) (2D): 4.56 cm   RV(D (2D):  3.7 cm   LVOT (2D): 2 cm   EF Teich (2D): 50 %   EF Teich (M-Mode): 25 %   FS (2D): 26 %   RVSP (Doppler): 37.13 mmHg   Doppler   -------   AVvel: 1.84 m/s   Pk Grad: 13.48 mmHg   LUCAS(pk): 1.02 cm2   PV Lee: 0.69 m/s   LVOTvel: 0.6 m/s   Mn Grad: 7.37 mmHg   LUCAS(VTI): 0.93 cm2   Ao Stenosis Dimen Idx: 0.33   MV PHT: 51.71 ms   Diastology   ----------   MV E: 1.27 m/s   MV A: 0.42 m/s   MV E/A: 3.01   MV Dec T: 175.64 ms   E' Lat: 9.29 cm/s   E' Med: 3.93 cm/s   E/Lat E': 13.66   E/Med E': 32.29   TR Lee: 1.72 m/s     Electronically signed by: Sudheer Lee DO   01/08/2025 11:49 AM   Impressions   -----------     1.The estimated LV ejection fraction is 25 %     2.There is moderate to severe global hypokinesis     3.Diastolic function is indeterminate.     4.There is mild aortic valve sclerosis without significant stenosis     5.There is severe mitral regurgitation     6.Estimated RVSP systolic pressure is 37.13 mmHg     7.Compared to the report from prior study dated 04/04/2022, the   severity of mitral regurgitation has increased     Findings   --------     Left Ventricle   The estimated LV ejection fraction is 25 %. The calculated LV ejection   fraction (MOD, Biplane) is 29.99 %. LV chamber is mildly dilated.   There is mild concentric LV hypertrophy. LV systolic function is   normal. There is moderate to severe global hypokinesis. Diastolic   function is indeterminate.     Right Ventricle   RV size is moderately dilated. The RV systolic function is normal.     Septum   There is no atrial septal defect (ASD). There is no ventricular septal   defect (VSD).     Left Atrium   LA chamber size is normal. LA diameter is 3.81 cm.     Right Atrium   RA chamber size is normal.     Aortic Valve   There is a trileaflet aortic valve. There is mild aortic valve   sclerosis without significant stenosis. There is no aortic   regurgitation. Aortic valve area by VMax: 1.02 cm2. Aortic valve area   by VTI: 0.93 cm2. AV Mean  gradient: 7.37 mmHg. AV Peak gradient: 13.48   mmHg.     Mitral Valve   Mitral valve is not well visualized. There is severe mitral   regurgitation. There is no mitral stenosis.     Tricuspid Valve   Tricuspid valve is structurally normal. There is mild tricuspid   regurgitation. The estimated RV systolic pressure is normal. Estimated   RVSP systolic pressure is 37.13 mmHg. There is no tricuspid valve   stenosis.     Pulmonary Valve   Pulmonic valve is not well visualized.     Pericardium   The pericardium is normal. There is no pericardial effusion present.     Aorta   The size of the visualized portion of aortic root is within normal   limits. The thoracic aorta is not well visualized. The ascending aorta   is not well visualized.     Venous   The inferior vena cava dimension is normal.     Thrombus/Mass   There is no intracardiac mass or thrombus identified.     Echo Dimensions   --------------     LA Dimen (2D): 3.81 cm   IVS(D) (2D): 1.16 cm   LVPW(D) (2D): 1.16 cm   LV(D) (2D): 6.14 cm   LV(S) (2D): 4.56 cm   RV(D (2D): 3.7 cm   LVOT (2D): 2 cm   EF Teich (2D): 50 %   EF Teich (M-Mode): 25 %   FS (2D): 26 %   RVSP (Doppler): 37.13 mmHg   Doppler   -------   AVvel: 1.84 m/s   Pk Grad: 13.48 mmHg   LUCAS(pk): 1.02 cm2   PV Lee: 0.69 m/s   LVOTvel: 0.6 m/s   Mn Grad: 7.37 mmHg   LUCAS(VTI): 0.93 cm2   Ao Stenosis Dimen Idx: 0.33   MV PHT: 51.71 ms   Diastology   ----------   MV E: 1.27 m/s   MV A: 0.42 m/s   MV E/A: 3.01   MV Dec T: 175.64 ms   E' Lat: 9.29 cm/s   E' Med: 3.93 cm/s   E/Lat E': 13.66   E/Med E': 32.29   TR Lee: 1.72 m/s     Electronically signed by: Sudheer Lee DO   01/08/2025 11:49 AM     CURRENT/PREVIOUS VISIT EKG  Results for orders placed or performed during the hospital encounter of 02/11/25   EKG 12-lead    Collection Time: 02/11/25  5:14 PM   Result Value Ref Range    QRS Duration 110 ms    [facility] QTC Calculation 534 ms    Narrative    Test Reason : I10,    Vent. Rate : 107 BPM     Atrial Rate  :    BPM     P-R Int :    ms          QRS Dur : 110 ms      QT Int : 400 ms       P-R-T Axes :     99 124 degrees    QTcB Int : 534 ms    Atrial fibrillation with rapid ventricular response  Rightward axis  Nonspecific T wave abnormality  Abnormal ECG  No previous ECGs available    Referred By: ANDERSONERRAL SELF           Confirmed By:            ASSESSMENT/PLAN:     Active Hospital Problems    Diagnosis    *Acute on chronic congestive heart failure    Abnormal computed tomography angiography (CTA)    MR (mitral regurgitation)    Paroxysmal atrial fibrillation    Hypertension       ASSESSMENT & PLAN:     Acute on chronic HFrEF   Mitral regurgitation   PAF  HTN       RECOMMENDATIONS:    Start dobutamine gtt at 5.   BNP 3163; Start Lasix gtt at 10.   Continue with strict I/Os, daily weights, and adherence to low sodium diet.  He has history of PAF and is currently in Afib (not on anticoagulation) consider increasing Lovenox to full dose (was on Eliquis at home per notes? But pt states no). TSH also elevated at 7.487.   Continue amio 200 mg BID.  Lopressor was increased by hospital team to 25 mg 4x daily - in Afib with rate currently controlled in 70s-80s.   Will eventually need GDMT optimized after more euvolemic - he was on Entresto, jardiance, metoprolol, and Lasix at home. Will make adjustments as course progresses.   We will continue to follow, thank you for this consult.     Geri Schrader   Department of Cardiology  Date of Service: 02/12/2025    69-year-old gentleman with history of dilated cardiomyopathy morbid obesity multiple admissions in the past month for this gentleman at the local hospital now admitted here with the acute decompensation of chronic congestive heart failure with severe systolic dysfunction with the extreme fluid overload.  Patient has severe bilateral lower extremity edema apparently tested negative for deep vein thrombosis.  Ejection fraction estimated to be paroxysmally 25%  I have  personally interviewed and examined the patient, I have reviewed the Nurse Practitioner's history and physical, assessment, and plan. I agree with the findings and plan.  Recommend to initiate intravenous Lasix therapy 10 mg and also with dobutamine inotrope therapy at 5 mics per kilos per minute.  And patient has maintain chronic atrial fibrillation recommend full anticoagulation therapy eventually he will need to be on Eliquis which she has not started as yet although instructed do so on last discharge from the local hospital  Long-term prognosis appears guarded.    Dr. José Luis Arellano   Department of Cardiology  Date of Service: 02/12/2025  9:12 AM

## 2025-02-12 NOTE — H&P
"  [facility] - Emergency Dept  Hospital Medicine  History & Physical    Patient Name: Juan C Sanchez  MRN: [id number]  Patient Class: IP- Inpatient  Admission Date: 2/11/2025  Attending Physician: Yaniv Lucas MD   Primary Care Provider: Katlin Graf NP         Patient information was obtained from patient, past medical records, and ER records.     Subjective:     Principal Problem:Acute on chronic congestive heart failure    Chief Complaint:   Chief Complaint   Patient presents with    Hypotension     Patient had PT come into the home today; found BP to be low and referred to ER.  Pt "feels bad"  since this morning.  +SOB; increased swelling in both legs.  Recently hospitalized for CHF         HPI: Mr. Sanchez is a 69 yr old male with a hx of HTN, MR, PAF, chronic systolic CHF with EF of 25, OA, GERD, nonischemic cardiomyopathy, anemia who presented to the ED with a chief complaint of shortness of breath, leg swelling and hypotension.  Patient states that he had PT come to his home today and he was found to be hypotensive and advised to come to the emergency room.  Patient endorses shortness of breath as well as leg swelling, malaise, fatigue, occasional chills, productive cough with milky sputum, upper abdominal pain, occasional nausea, decreased urine output, and positional lightheadedness.  He states that these symptoms have been progressively worsening over the last 1-2 months possibly even longer  He states that his symptoms are worse when lying flat as well as with exertion and better with rest and while sitting up.  He states that he has been taking his home medications without improvement of his symptoms.  He does endorse that most of all of his contacts recently have been sick. He denies any home oxygen use.  He does endorse occasional PND and states that he has been having to sit to sleep.  He denies tobacco and recreational drug use and states he seldomly drinks alcohol.  He " denies fever, chest pain, vomiting, diarrhea, dysuria, hematuria, dizziness, and syncope.    Upon arrival to ED, patient afebrile, HR of 109, RR of 24, BP of 95/62, satting 98% on RA.  Workup in the ED revealed RBC of 4.19, H/H of 10/32.8, sodium of 134, pCO2 of 31, GFR of 55.1, corrected calcium of 9, albumin of 3.1, BNP of 3163, troponin of 21.3, lactic acid of 2.1.  Blood cultures pending.  CXR shows enlarged cardiac silhouette with pulmonary vascular congestion.  Bilateral pleural effusions.  Mild bibasilar airspace disease and or atelectasis.  Left foot x-ray shows osteopenia.  No overt erosions or osseous destruction.  No acute fracture or dislocation.  Mild multifocal midfoot arthritis.  Forefoot swelling.  Patient was given Lasix 20 mg IV, Zosyn 4.5 g IV, 500 mL NS bolus, vancomycin 20 mg/kg while in the ED. discussed case with ED provider and patient will be admitted under hospital medicine services for further management.    Past Medical History:   Diagnosis Date    Hypertension        Past Surgical History:   Procedure Laterality Date    ORIF closed comminuted displaced intra-articular fx distal left radius & application of external fixator Left 05/28/2018       Review of patient's allergies indicates:   Allergen Reactions    Gabapentin Other (See Comments)     Hypersomnia, nightmares    Morphine Other (See Comments)     Pt states systemic cramping       No current facility-administered medications on file prior to encounter.     Current Outpatient Medications on File Prior to Encounter   Medication Sig    acetaminophen (TYLENOL EXTRA STRENGTH) 500 MG tablet Take 1,000 mg by mouth every 6 (six) hours as needed for Pain.    amiodarone (PACERONE) 200 MG Tab Take 200 mg by mouth 2 (two) times daily.    cyclobenzaprine (FLEXERIL) 10 MG tablet Take 1 tablet by mouth 3 times daily as needed for Muscle spasms.    doxycycline (MONODOX) 100 MG capsule Take 100 mg by mouth 2 (two) times daily.    empagliflozin  (JARDIANCE) 10 mg tablet Take 10 mg by mouth once daily.    ENTRESTO 24-26 mg per tablet Take 1 tablet by mouth 2 (two) times daily.    furosemide (LASIX) 20 MG tablet Take 40 mg by mouth 0900, 1800.    gabapentin (NEURONTIN) 300 MG capsule Take 300 mg by mouth 2 (two) times daily.    levoFLOXacin (LEVAQUIN) 500 MG tablet Take 500 mg by mouth once daily.    metoprolol tartrate (LOPRESSOR) 25 MG tablet Take 25 mg by mouth 2 (two) times daily.    nitroGLYCERIN (NITROSTAT) 0.4 MG SL tablet Place 0.4 mg under the tongue every 5 (five) minutes as needed for Chest pain.    oxyCODONE-acetaminophen (PERCOCET)  mg per tablet Take 1 tablet by mouth every 8 (eight) hours as needed for Pain.    pantoprazole (PROTONIX) 40 MG tablet Take 40 mg by mouth once daily.    promethazine-dextromethorphan (PROMETHAZINE-DM) 6.25-15 mg/5 mL Syrp Take 5 mLs by mouth every 6 (six) hours as needed.    [DISCONTINUED] apixaban (ELIQUIS) 5 mg Tab Take 5 mg by mouth 2 (two) times daily.    carvediloL (COREG) 3.125 MG tablet Take 3.125 mg by mouth 2 (two) times daily. (Patient not taking: Reported on 2/11/2025)    [DISCONTINUED] aspirin (ECOTRIN) 81 MG EC tablet Take 81 mg by mouth once daily.    [DISCONTINUED] ibuprofen (ADVIL,MOTRIN) 200 MG tablet Take 200 mg by mouth every 6 (six) hours as needed for Pain.    [DISCONTINUED] lisinopril (PRINIVIL,ZESTRIL) 40 MG tablet 1 tablet Daily.    [DISCONTINUED] nortriptyline (PAMELOR) 50 MG capsule Take 1 capsule (50 mg total) by mouth every evening.     Family History    None       Tobacco Use    Smoking status: Never    Smokeless tobacco: Never   Substance and Sexual Activity    Alcohol use: Yes     Comment: social    Drug use: No    Sexual activity: Not on file     Review of Systems   Constitutional:  Positive for chills and fatigue. Negative for fever.   Respiratory:  Positive for cough and shortness of breath.    Cardiovascular:  Positive for leg swelling. Negative for chest pain.    Gastrointestinal:  Positive for abdominal pain and nausea. Negative for diarrhea and vomiting.   Genitourinary:  Positive for decreased urine volume. Negative for dysuria and hematuria.   Neurological:  Positive for light-headedness. Negative for dizziness and syncope.     Objective:     Vital Signs (Most Recent):  Temp: 97.8 °F (36.6 °C) (25)  Pulse: (!) 112 (25)  Resp: 18 (25)  BP: 116/66 (25)  SpO2: 98 % (25) Vital Signs (24h Range):  Temp:  [97.8 °F (36.6 °C)] 97.8 °F (36.6 °C)  Pulse:  [108-112] 112  Resp:  [18-24] 18  SpO2:  [98 %] 98 %  BP: ()/(62-74) 116/66     Weight: 92.5 kg (204 lb)  Body mass index is 29.27 kg/m².     Physical Exam  Vitals reviewed.   Constitutional:       General: He is awake. He is not in acute distress.     Appearance: He is not diaphoretic.   Cardiovascular:      Rate and Rhythm: Tachycardia present.      Heart sounds:      No friction rub. No gallop.   Pulmonary:      Effort: No accessory muscle usage or respiratory distress.      Breath sounds: Examination of the right-lower field reveals decreased breath sounds. Examination of the left-lower field reveals decreased breath sounds. Decreased breath sounds and rales (diffuse bilateral) present. No wheezing or rhonchi.      Comments: Patient not in respiratory distress.  However, during interview and exam conversational dyspnea appreciated as well as increased pulmonary effort.  Abdominal:      General: Bowel sounds are normal.      Palpations: Abdomen is soft.      Tenderness: There is abdominal tenderness in the epigastric area. There is no guarding or rebound.   Musculoskeletal:      Right lower le+ Pitting Edema present.      Left lower le+ Pitting Edema present.      Comments: Significant bilateral lower extremity swelling appreciated.  4+ pitting edema to bilateral lower extremities up to bilateral thighs.   Feet:      Comments: Left foot is wrapped and  covered.  Patient reports wound to left 3rd digit.  Right foot is cold to touch and purple discoloration appreciated.  Neurological:      Mental Status: He is alert and oriented to person, place, and time.   Psychiatric:         Behavior: Behavior is cooperative.                Significant Labs: All pertinent labs within the past 24 hours have been reviewed.  CBC:   Recent Labs   Lab 02/11/25 1818   WBC 7.45   HGB 10.0*   HCT 32.8*        CMP:   Recent Labs   Lab 02/11/25 1818   *   K 3.8   CL 97   CO2 31*   GLU 86   BUN 17   CREATININE 1.4   CALCIUM 8.3*   PROT 6.8   ALBUMIN 3.1*   BILITOT 0.6   ALKPHOS 76   AST 12   ALT 8*   ANIONGAP 6*     Cardiac Markers:   Recent Labs   Lab 02/11/25 1818   BNP 3,163*     Lactic Acid:   Recent Labs   Lab 02/11/25 2115   LACTATE 2.1*     Troponin:   Recent Labs   Lab 02/11/25 1818   TROPONINIHS 21.3*       Significant Imaging:   Imaging Results              US Lower Extremity Veins Bilateral (In process)                      X-Ray Foot Complete Left (Final result)  Result time 02/11/25 21:08:06      Final result by Eusebio Leonardo MD (02/11/25 21:08:06)                   Impression:      As above.  If concern for osteomyelitis, recommend MRI.      Electronically signed by: Eusebio Leonardo  Date:    02/11/2025  Time:    21:08               Narrative:    EXAMINATION:  XR FOOT COMPLETE 3 VIEW LEFT    CLINICAL HISTORY:  .  Other specified soft tissue disorders    TECHNIQUE:  AP, lateral and oblique views of the left foot were performed.    COMPARISON:  None    FINDINGS:  Osteopenia.  No overt erosions or osseous destruction.  No acute fracture or dislocation.  Mild multifocal midfoot osteoarthritis.  Forefoot swelling.                                       X-Ray Chest AP Portable (Final result)  Result time 02/11/25 18:27:01      Final result by Eusebio Leonardo MD (02/11/25 18:27:01)                   Impression:      As above      Electronically  signed by: Eusebio Leonardo  Date:    02/11/2025  Time:    18:27               Narrative:    EXAMINATION:  XR CHEST AP PORTABLE    CLINICAL HISTORY:  Sepsis;    TECHNIQUE:  Single frontal view of the chest was performed.    COMPARISON:  05/28/2018    FINDINGS:  Enlarged cardiac silhouette with pulmonary vascular congestion.  Bilateral pleural effusions.  Mild bibasilar airspace disease and/or atelectasis.                                     Assessment/Plan:     * Acute on chronic congestive heart failure  Patient has Systolic (HFrEF) heart failure that is Acute on chronic. On presentation their CHF was decompensated. Evidence of decompensated CHF on presentation includes: edema, crackles on lung auscultation, orthopnea, paroxysmal nocturnal dyspnea (PND), dyspnea on exertion (COOPER), and shortness of breath. Most recent BNP and echo results are listed below.  Recent Labs     02/11/25  1818   BNP 3,163*     Latest ECHO 1/8/25  Impressions   -----------     1.The estimated LV ejection fraction is 25 %     2.There is moderate to severe global hypokinesis     3.Diastolic function is indeterminate.     4.There is mild aortic valve sclerosis without significant stenosis     5.There is severe mitral regurgitation     6.Estimated RVSP systolic pressure is 37.13 mmHg     7.Compared to the report from prior study dated 04/04/2022, the   severity of mitral regurgitation has increased       Current Heart Failure Medications  , 2 times daily, Oral  , 2 times daily, Oral  , Daily, Oral  , 2 times daily, Oral  furosemide injection 20 mg, Every 12 hours, Intravenous    Plan  - Monitor strict I&Os and daily weights.    - Place on telemetry  - Low sodium diet  - Place on fluid restriction of 1.5 L.   - Cardiology has been consulted, appreciate recs  - The patient's volume status is stable but not at their baseline as indicated by edema, crackles on lung auscultation, orthopnea, paroxysmal nocturnal dyspnea (PND), dyspnea on exertion  (COOPER), and shortness of breath  - Patient is significantly fluid overloaded with 4+ pitting edema to bilateral lower extremities and rales on auscultation.  - Will need gentle diuresis for now in the setting of hypotension  - Lasix 20 mg IV BID  - Midodrine TID PRN for SBP <120 or MAP <65  - Holding home antihypertensives for now, can resume once BP improves  - Patient also tells me he has had decreased urine output, PRN bladder scans    Abnormal computed tomography angiography (CTA)  - Patients feet cool to touch on exam and has discoloration  - CTA lower extremity runoff reviewed from 1/14/25 and revealed Right lower extremity with high-grade stenosis anterior tibial artery nonostial origin tapering to occlusion mid calf with posterior tibial artery dominance and small caliber peroneal artery to near the ankle. Faint opacification of PTA at the ankle. Slow flow could be partial etiology of nonvisualization of distal vessels. Clinical correlation needed. Left lower extremity without significant stenosis with three-vessel opacification to the ankle.   - Vascular surgery consulted, appreciate recs    Paroxysmal atrial fibrillation  Patient has paroxysmal (<7 days) atrial fibrillation. Patient is currently in atrial fibrillation. [id number] Score: 1. The patients heart rate in the last 24 hours is as follows:  Pulse  Min: 108  Max: 112     Antiarrhythmics  , 2 times daily, Oral  , 2 times daily, Oral  amiodarone tablet 200 mg, 2 times daily, Oral    Anticoagulants  enoxaparin injection 40 mg, Every 24 hours, Subcutaneous    Plan  - Replete lytes with a goal of K>4, Mg >2  - Patient is not anticoagulated due to not on OP anticoag  - Patient's afib is currently controlled  - Continue home amiodarone    MR (mitral regurgitation)  Echocardiogram with evidence of mitral regurgitation that is severe . The patient's most recent echocardiogram result is listed below.    1/8/2025  Impressions   -----------     1.The estimated  LV ejection fraction is 25 %     2.There is moderate to severe global hypokinesis     3.Diastolic function is indeterminate.     4.There is mild aortic valve sclerosis without significant stenosis     5.There is severe mitral regurgitation     6.Estimated RVSP systolic pressure is 37.13 mmHg     7.Compared to the report from prior study dated 04/04/2022, the   severity of mitral regurgitation has increased       Hypertension  Chronic,  Latest blood pressure and vitals reviewed-     Temp:  [97.8 °F (36.6 °C)]   Pulse:  [108-112]   Resp:  [18-24]   BP: ()/(62-74)   SpO2:  [98 %] .   Home meds for hypertension were reviewed and noted below-  Hypertension Medications               carvediloL (COREG) 3.125 MG tablet Take 3.125 mg by mouth 2 (two) times daily.    ENTRESTO 24-26 mg per tablet Take 1 tablet by mouth 2 (two) times daily.    furosemide (LASIX) 20 MG tablet Take 40 mg by mouth 0900, 1800.    metoprolol tartrate (LOPRESSOR) 25 MG tablet Take 25 mg by mouth 2 (two) times daily.    nitroGLYCERIN (NITROSTAT) 0.4 MG SL tablet Place 0.4 mg under the tongue every 5 (five) minutes as needed for Chest pain.            While in the hospital, will manage blood pressure as follows; Adjust home antihypertensive regimen as follows- holding home antihypertensives in the setting of hypotension, resume once blood pressure improves    Will utilize p.r.n. blood pressure medication only if patient's blood pressure greater than 180/110 and he develops symptoms such as worsening chest pain or shortness of breath.        VTE Risk Mitigation (From admission, onward)           Ordered     enoxaparin injection 40 mg  Daily         02/11/25 2331     IP VTE HIGH RISK PATIENT  Once         02/11/25 2331     Place sequential compression device  Until discontinued         02/11/25 2331                       On 02/12/2025, patient will be placed under hospital medicine services in collaboration with Yaniv Lucas MD.                Amanda Salmeron PA-C  Department of Hospital Medicine  [facility] - Emergency Dept

## 2025-02-12 NOTE — PROGRESS NOTES
VANCOMYCIN PHARMACOKINETIC NOTE:  Vancomycin Day # 1    Objective/Assessment:    Diagnosis/Indication for Vancomycin:Sepsis      69 y.o., male; Actual Body Weight = 98.6 kg (217 lb 6 oz).    The patient has the following labs:  2/12/2025 Estimated Creatinine Clearance: 61.3 mL/min (based on SCr of 1.4 mg/dL). Lab Results   Component Value Date    BUN 18 02/12/2025     Lab Results   Component Value Date    WBC 8.15 02/12/2025          Plan:  Adjust vancomycin dose and/or frequency based on the patient's actual weight and renal function:  Initiate Vancomycin 2000 mg IV every 24 hours.  Orders have been entered into patient's chart.        Vancomycin trough level has been ordered for 1900 on 02/13.    Pharmacy will manage vancomycin therapy, monitor serum vancomycin levels, monitor renal function and adjust regimen as necessary.    Thank you for allowing us to participate in this patient's care.     Virginia Loving 2/12/2025   Department of Pharmacy  Ext [phone]

## 2025-02-12 NOTE — HOSPITAL COURSE
"Juan C Sanchez is a 69 year old male with a past medical history of HFrEF, Afib, PAD, anemia, obesity, and GERD who presented with an acute on chronic HFrEF (EF 20-25%) exacerbation as well as Afib with RVR. He is being diuresed with a Lasix infusion as well as dobutamine. Cardiology has been consulted.  Underwent ANGEL cardioversion 2/14. His course was also complicated by a third L toe acute osteomyelitis seen on MRI. Podiatry and ID have been consulted.  He underwent L 3rd toe amputation 2/14. He is on empiric vancomycin and cefepime. Vascular Surgery has been consulted performed angiogram of the lower extremities 2/17.  Underwent angiogram without intervention.  Patient was transitioned off of Lasix and dobutamine infusions and changed to p.o. torsemide and spironolactone.  Medications were adjusted by Cardiology. Plan per ID "Continue Unasyn while in the hospital.  At discharge use Augmentin 875 mg b.i.d.  to complete 4 week course of antibiotics through 03/03/2025.     He should follow with podiatry within 2 weeks, Infectious Disease within 2 weeks and Cardiology within 2 weeks.        "

## 2025-02-12 NOTE — PROGRESS NOTES
"   02/12/25 0050   Vital Signs   Temp 97.6 °F (36.4 °C)   Temp Source Oral   Pulse 107   Resp 18   SpO2 98 %   /77   MAP (mmHg) 87   BP Location Left arm   BP Method Automatic   Height and Weight   Height 5' 11" (1.803 m)   Height Method Stated   Weight 98.6 kg (217 lb 6 oz)   Weight Method Bed Scale   BSA (Calculated - sq m) 2.22 sq meters   BMI (Calculated) 30.3   Weight in (lb) to have BMI = 25 178.9     Received report from nurse Elizabeth RN in ER. Patient admitted to room 2503. Arrived via stretcher. Transferred to room bed without incident. Orientated to unit. Plan of care discussed. AAOx4 answer all questions appropriately. Belonging at bedside and phone in hand. On tele. SR up x 2; urinal; call bell and bedside table near. Bed low and lock. See flowsheet for full assessment. Encouraged the pt. to call for assistance when needed. Patient stated he notified his family on admission.   "

## 2025-02-12 NOTE — PLAN OF CARE
Spoke with Jeremiah from [facility] she is on the list for [facility], no bed available at this time. Patient cannot go home due to no caregiver available.  following to assess for senior living.       02/12/25 8214   Post-Acute Status   Post-Acute Authorization Hospice   Post-Acute Placement Status Pending Bed Availability

## 2025-02-12 NOTE — ED NOTES
Transfer of care given to RADHIKA Rucker. Telemetry on/ verified. VSS. Ultrasound at bedside, will transfer to unit when US is complete.

## 2025-02-12 NOTE — ED NOTES
Pt c/o trouble urinating. Bladder scan showed 259. Physician made aware. Will bladder scan in a hour.

## 2025-02-12 NOTE — HPI
Mr. Sanchez is a 69 yr old male with a hx of HTN, MR, PAF, chronic systolic CHF with EF of 25, OA, GERD, nonischemic cardiomyopathy, anemia who presented to the ED with a chief complaint of shortness of breath, leg swelling and hypotension.  Patient states that he had PT come to his home today and he was found to be hypotensive and advised to come to the emergency room.  Patient endorses shortness of breath as well as leg swelling, malaise, fatigue, occasional chills, productive cough with milky sputum, upper abdominal pain, occasional nausea, decreased urine output, and positional lightheadedness.  He states that these symptoms have been progressively worsening over the last 1-2 months possibly even longer  He states that his symptoms are worse when lying flat as well as with exertion and better with rest and while sitting up.  He states that he has been taking his home medications without improvement of his symptoms.  He does endorse that most of all of his contacts recently have been sick. He denies any home oxygen use.  He does endorse occasional PND and states that he has been having to sit to sleep.  He denies tobacco and recreational drug use and states he seldomly drinks alcohol.  He denies fever, chest pain, vomiting, diarrhea, dysuria, hematuria, dizziness, and syncope.    Upon arrival to ED, patient afebrile, HR of 109, RR of 24, BP of 95/62, satting 98% on RA.  Workup in the ED revealed RBC of 4.19, H/H of 10/32.8, sodium of 134, pCO2 of 31, GFR of 55.1, corrected calcium of 9, albumin of 3.1, BNP of 3163, troponin of 21.3, lactic acid of 2.1.  Blood cultures pending.  CXR shows enlarged cardiac silhouette with pulmonary vascular congestion.  Bilateral pleural effusions.  Mild bibasilar airspace disease and or atelectasis.  Left foot x-ray shows osteopenia.  No overt erosions or osseous destruction.  No acute fracture or dislocation.  Mild multifocal midfoot arthritis.  Forefoot swelling.  Patient was  given Lasix 20 mg IV, Zosyn 4.5 g IV, 500 mL NS bolus, vancomycin 20 mg/kg while in the ED. discussed case with ED provider and patient will be admitted under hospital medicine services for further management.

## 2025-02-12 NOTE — CONSULTS
[facility]  Adult Nutrition  Education Short Note      Nutrition Education    Previous education: no    Diet at home: Regular      Handouts provided: Low Sodium Nutrition Therapy, Fluid-Restricted Nutrition Therapy      Comments: Discussed importance of a low sodium diet. Reviewed high sodium foods that should be avoided. Food labels, salt free seasonings, and recommended sodium intake reviewed. Encouraged healthy, fresh foods that are low in sodium that are good for consumption. Fluid intake and conversions discussed. Foods considered fluids were reviewed and encouraged to monitor. General healthy diet encouraged. All questions/concerns were addressed.    68 yo M admitted with hypotension, SOB, swelling in legs; acute on chronic CHF. Recently hospitalized for CHF. States #, wt up 2/2 fluid retention in abdomin and legs. Skin intact per chart.     Discussed with: patient    Educational Need? yes    Barriers: none identified    Interventions: General healthful diet, Fluid modified diet, and Sodium modified diet    Patient and/or family comprehend instructions: yes    Outcome: Verbalizes understanding     Thanks for the consult!    Waqas Starkey RD 02/12/2025 3:12 PM

## 2025-02-12 NOTE — ASSESSMENT & PLAN NOTE
Echocardiogram with evidence of mitral regurgitation that is severe . The patient's most recent echocardiogram result is listed below.    1/8/2025  Impressions   -----------     1.The estimated LV ejection fraction is 25 %     2.There is moderate to severe global hypokinesis     3.Diastolic function is indeterminate.     4.There is mild aortic valve sclerosis without significant stenosis     5.There is severe mitral regurgitation     6.Estimated RVSP systolic pressure is 37.13 mmHg     7.Compared to the report from prior study dated 04/04/2022, the   severity of mitral regurgitation has increased

## 2025-02-12 NOTE — PROGRESS NOTES
Unable to complete right foot at this time. Nurse called that vascular surgeon was at the patients room to see pt. & to bring him back now that dr could not wait for us to do other mri

## 2025-02-12 NOTE — ASSESSMENT & PLAN NOTE
Chronic,  Latest blood pressure and vitals reviewed-     Temp:  [97.8 °F (36.6 °C)]   Pulse:  [108-112]   Resp:  [18-24]   BP: ()/(62-74)   SpO2:  [98 %] .   Home meds for hypertension were reviewed and noted below-  Hypertension Medications               carvediloL (COREG) 3.125 MG tablet Take 3.125 mg by mouth 2 (two) times daily.    ENTRESTO 24-26 mg per tablet Take 1 tablet by mouth 2 (two) times daily.    furosemide (LASIX) 20 MG tablet Take 40 mg by mouth 0900, 1800.    metoprolol tartrate (LOPRESSOR) 25 MG tablet Take 25 mg by mouth 2 (two) times daily.    nitroGLYCERIN (NITROSTAT) 0.4 MG SL tablet Place 0.4 mg under the tongue every 5 (five) minutes as needed for Chest pain.            While in the hospital, will manage blood pressure as follows; Adjust home antihypertensive regimen as follows- holding home antihypertensives in the setting of hypotension, resume once blood pressure improves    Will utilize p.r.n. blood pressure medication only if patient's blood pressure greater than 180/110 and he develops symptoms such as worsening chest pain or shortness of breath.

## 2025-02-12 NOTE — PROGRESS NOTES
Pharmacy Consult Discontinuation: Vancomycin    Juan C Sanchez [id number] is a 69 y.o. male was consulted for vancomycin pharmacotherapy management by pharmacy.    Pharmacy consult for vancomycin dosing is no longer required.  Vancomycin was discontinued 02/12/2025.    Thank you for allowing us to participate in this patient's care. Should you have any questions or concerns please feel free to contact the pharmacy department at [phone].    Rao Loving, PharmD

## 2025-02-13 PROBLEM — L89.152 PRESSURE INJURY OF SACRAL REGION, STAGE 2: Status: ACTIVE | Noted: 2025-02-13

## 2025-02-13 PROBLEM — L97.524 ULCER OF LEFT FOOT WITH NECROSIS OF BONE: Status: ACTIVE | Noted: 2025-02-13

## 2025-02-13 PROBLEM — I73.9 PAD (PERIPHERAL ARTERY DISEASE): Status: ACTIVE | Noted: 2025-02-13

## 2025-02-13 PROBLEM — S80.01XA CONTUSION OF RIGHT KNEE: Status: RESOLVED | Noted: 2018-06-29 | Resolved: 2025-02-13

## 2025-02-13 PROBLEM — M86.10 ACUTE OSTEOMYELITIS: Status: ACTIVE | Noted: 2025-02-13

## 2025-02-13 PROBLEM — S80.02XA CONTUSION OF LEFT KNEE: Status: RESOLVED | Noted: 2018-06-08 | Resolved: 2025-02-13

## 2025-02-13 PROBLEM — S52.502D CLOSED FRACTURE OF LOWER END OF LEFT RADIUS WITH ROUTINE HEALING: Status: RESOLVED | Noted: 2018-06-08 | Resolved: 2025-02-13

## 2025-02-13 PROBLEM — Z98.890 POST-OPERATIVE STATE: Status: RESOLVED | Noted: 2018-06-08 | Resolved: 2025-02-13

## 2025-02-13 LAB
ALBUMIN SERPL BCP-MCNC: 3 G/DL (ref 3.5–5.2)
ALP SERPL-CCNC: 73 U/L (ref 55–135)
ALT SERPL W/O P-5'-P-CCNC: 6 U/L (ref 10–44)
ANION GAP SERPL CALC-SCNC: 8 MMOL/L (ref 8–16)
AST SERPL-CCNC: 11 U/L (ref 10–40)
BASOPHILS # BLD AUTO: 0.05 K/UL (ref 0–0.2)
BASOPHILS NFR BLD: 0.8 % (ref 0–1.9)
BILIRUB SERPL-MCNC: 0.7 MG/DL (ref 0.1–1)
BUN SERPL-MCNC: 18 MG/DL (ref 8–23)
CALCIUM SERPL-MCNC: 8.1 MG/DL (ref 8.7–10.5)
CHLORIDE SERPL-SCNC: 98 MMOL/L (ref 95–110)
CO2 SERPL-SCNC: 28 MMOL/L (ref 23–29)
CREAT SERPL-MCNC: 1.4 MG/DL (ref 0.5–1.4)
CRP SERPL-MCNC: 2.2 MG/DL
DIFFERENTIAL METHOD BLD: ABNORMAL
EOSINOPHIL # BLD AUTO: 0.1 K/UL (ref 0–0.5)
EOSINOPHIL NFR BLD: 2 % (ref 0–8)
ERYTHROCYTE [DISTWIDTH] IN BLOOD BY AUTOMATED COUNT: 18.4 % (ref 11.5–14.5)
ERYTHROCYTE [SEDIMENTATION RATE] IN BLOOD BY WESTERGREN METHOD: 9 MM/HR (ref 0–10)
EST. GFR  (NO RACE VARIABLE): 55.1 ML/MIN/1.73 M^2
GLUCOSE SERPL-MCNC: 120 MG/DL (ref 70–110)
HCT VFR BLD AUTO: 32 % (ref 40–54)
HGB BLD-MCNC: 9.7 G/DL (ref 14–18)
IMM GRANULOCYTES # BLD AUTO: 0.03 K/UL (ref 0–0.04)
IMM GRANULOCYTES NFR BLD AUTO: 0.5 % (ref 0–0.5)
LYMPHOCYTES # BLD AUTO: 1.7 K/UL (ref 1–4.8)
LYMPHOCYTES NFR BLD: 25.5 % (ref 18–48)
MAGNESIUM SERPL-MCNC: 2 MG/DL (ref 1.6–2.6)
MCH RBC QN AUTO: 23.5 PG (ref 27–31)
MCHC RBC AUTO-ENTMCNC: 30.3 G/DL (ref 32–36)
MCV RBC AUTO: 78 FL (ref 82–98)
MONOCYTES # BLD AUTO: 0.9 K/UL (ref 0.3–1)
MONOCYTES NFR BLD: 13.2 % (ref 4–15)
MRSA SCREEN BY PCR: DETECTED
NEUTROPHILS # BLD AUTO: 3.9 K/UL (ref 1.8–7.7)
NEUTROPHILS NFR BLD: 58 % (ref 38–73)
NRBC BLD-RTO: 0 /100 WBC
PLATELET # BLD AUTO: 260 K/UL (ref 150–450)
PMV BLD AUTO: 10.1 FL (ref 9.2–12.9)
POTASSIUM SERPL-SCNC: 3.5 MMOL/L (ref 3.5–5.1)
PROCALCITONIN SERPL IA-MCNC: <0.05 NG/ML (ref 0–0.5)
PROT SERPL-MCNC: 6.6 G/DL (ref 6–8.4)
RBC # BLD AUTO: 4.12 M/UL (ref 4.6–6.2)
SODIUM SERPL-SCNC: 134 MMOL/L (ref 136–145)
WBC # BLD AUTO: 6.66 K/UL (ref 3.9–12.7)

## 2025-02-13 PROCEDURE — 36415 COLL VENOUS BLD VENIPUNCTURE: CPT

## 2025-02-13 PROCEDURE — 85025 COMPLETE CBC W/AUTO DIFF WBC: CPT

## 2025-02-13 PROCEDURE — 86140 C-REACTIVE PROTEIN: CPT

## 2025-02-13 PROCEDURE — 25000003 PHARM REV CODE 250: Performed by: NURSE PRACTITIONER

## 2025-02-13 PROCEDURE — 63600175 PHARM REV CODE 636 W HCPCS: Performed by: NURSE PRACTITIONER

## 2025-02-13 PROCEDURE — 0QBR0ZZ EXCISION OF LEFT TOE PHALANX, OPEN APPROACH: ICD-10-PCS | Performed by: PODIATRIST

## 2025-02-13 PROCEDURE — 99233 SBSQ HOSP IP/OBS HIGH 50: CPT | Mod: ,,, | Performed by: NURSE PRACTITIONER

## 2025-02-13 PROCEDURE — 80053 COMPREHEN METABOLIC PANEL: CPT

## 2025-02-13 PROCEDURE — 25000003 PHARM REV CODE 250

## 2025-02-13 PROCEDURE — C1751 CATH, INF, PER/CENT/MIDLINE: HCPCS

## 2025-02-13 PROCEDURE — 99222 1ST HOSP IP/OBS MODERATE 55: CPT | Mod: 25,,, | Performed by: PODIATRIST

## 2025-02-13 PROCEDURE — 87205 SMEAR GRAM STAIN: CPT | Performed by: PODIATRIST

## 2025-02-13 PROCEDURE — 76937 US GUIDE VASCULAR ACCESS: CPT

## 2025-02-13 PROCEDURE — 36410 VNPNXR 3YR/> PHY/QHP DX/THER: CPT

## 2025-02-13 PROCEDURE — 99223 1ST HOSP IP/OBS HIGH 75: CPT | Mod: ,,, | Performed by: NURSE PRACTITIONER

## 2025-02-13 PROCEDURE — 99233 SBSQ HOSP IP/OBS HIGH 50: CPT | Mod: ,,, | Performed by: INTERNAL MEDICINE

## 2025-02-13 PROCEDURE — 63600175 PHARM REV CODE 636 W HCPCS: Performed by: INTERNAL MEDICINE

## 2025-02-13 PROCEDURE — 27000207 HC ISOLATION

## 2025-02-13 PROCEDURE — 87070 CULTURE OTHR SPECIMN AEROBIC: CPT | Performed by: PODIATRIST

## 2025-02-13 PROCEDURE — 21400001 HC TELEMETRY ROOM

## 2025-02-13 PROCEDURE — 11044 DBRDMT BONE 1ST 20 SQ CM/<: CPT | Mod: ,,, | Performed by: PODIATRIST

## 2025-02-13 PROCEDURE — 63600175 PHARM REV CODE 636 W HCPCS

## 2025-02-13 PROCEDURE — 87077 CULTURE AEROBIC IDENTIFY: CPT | Performed by: PODIATRIST

## 2025-02-13 PROCEDURE — 99221 1ST HOSP IP/OBS SF/LOW 40: CPT | Mod: ,,, | Performed by: FAMILY MEDICINE

## 2025-02-13 PROCEDURE — 87075 CULTR BACTERIA EXCEPT BLOOD: CPT | Performed by: PODIATRIST

## 2025-02-13 PROCEDURE — 84145 PROCALCITONIN (PCT): CPT

## 2025-02-13 PROCEDURE — 83735 ASSAY OF MAGNESIUM: CPT

## 2025-02-13 PROCEDURE — 87186 SC STD MICRODIL/AGAR DIL: CPT | Performed by: PODIATRIST

## 2025-02-13 PROCEDURE — 87641 MR-STAPH DNA AMP PROBE: CPT | Performed by: NURSE PRACTITIONER

## 2025-02-13 PROCEDURE — 85651 RBC SED RATE NONAUTOMATED: CPT

## 2025-02-13 PROCEDURE — 25000003 PHARM REV CODE 250: Performed by: STUDENT IN AN ORGANIZED HEALTH CARE EDUCATION/TRAINING PROGRAM

## 2025-02-13 RX ORDER — SODIUM CHLORIDE 9 MG/ML
INJECTION, SOLUTION INTRAVENOUS CONTINUOUS
Status: CANCELLED | OUTPATIENT
Start: 2025-02-13

## 2025-02-13 RX ORDER — CEFEPIME HYDROCHLORIDE 2 G/1
2 INJECTION, POWDER, FOR SOLUTION INTRAVENOUS
Status: DISCONTINUED | OUTPATIENT
Start: 2025-02-13 | End: 2025-02-16

## 2025-02-13 RX ORDER — METOCLOPRAMIDE HYDROCHLORIDE 5 MG/ML
10 INJECTION INTRAMUSCULAR; INTRAVENOUS ONCE
Status: COMPLETED | OUTPATIENT
Start: 2025-02-14 | End: 2025-02-13

## 2025-02-13 RX ORDER — MAGNESIUM SULFATE HEPTAHYDRATE 40 MG/ML
2 INJECTION, SOLUTION INTRAVENOUS ONCE
Status: CANCELLED | OUTPATIENT
Start: 2025-02-13 | End: 2025-02-13

## 2025-02-13 RX ORDER — DIAZEPAM 10 MG/2ML
2.5 INJECTION INTRAMUSCULAR ONCE
Status: COMPLETED | OUTPATIENT
Start: 2025-02-14 | End: 2025-02-13

## 2025-02-13 RX ORDER — SODIUM CHLORIDE 0.9 % (FLUSH) 0.9 %
10 SYRINGE (ML) INJECTION
Status: DISCONTINUED | OUTPATIENT
Start: 2025-02-13 | End: 2025-02-14 | Stop reason: HOSPADM

## 2025-02-13 RX ADMIN — PANTOPRAZOLE SODIUM 40 MG: 40 TABLET, DELAYED RELEASE ORAL at 05:02

## 2025-02-13 RX ADMIN — DIAZEPAM 2.5 MG: 10 INJECTION, SOLUTION INTRAMUSCULAR; INTRAVENOUS at 11:02

## 2025-02-13 RX ADMIN — ENOXAPARIN SODIUM 100 MG: 100 INJECTION SUBCUTANEOUS at 05:02

## 2025-02-13 RX ADMIN — CEFEPIME 2 G: 2 INJECTION, POWDER, FOR SOLUTION INTRAVENOUS at 05:02

## 2025-02-13 RX ADMIN — METOCLOPRAMIDE 10 MG: 5 INJECTION, SOLUTION INTRAMUSCULAR; INTRAVENOUS at 11:02

## 2025-02-13 RX ADMIN — AMIODARONE HYDROCHLORIDE 200 MG: 200 TABLET ORAL at 08:02

## 2025-02-13 RX ADMIN — AMMONIUM LACTATE: 12 LOTION TOPICAL at 09:02

## 2025-02-13 RX ADMIN — AMMONIUM LACTATE: 12 LOTION TOPICAL at 08:02

## 2025-02-13 RX ADMIN — AMIODARONE HYDROCHLORIDE 200 MG: 200 TABLET ORAL at 09:02

## 2025-02-13 RX ADMIN — POTASSIUM BICARBONATE 50 MEQ: 978 TABLET, EFFERVESCENT ORAL at 05:02

## 2025-02-13 RX ADMIN — OXYCODONE HYDROCHLORIDE AND ACETAMINOPHEN 1 TABLET: 10; 325 TABLET ORAL at 05:02

## 2025-02-13 RX ADMIN — OXYCODONE HYDROCHLORIDE AND ACETAMINOPHEN 1 TABLET: 10; 325 TABLET ORAL at 09:02

## 2025-02-13 RX ADMIN — SODIUM CHLORIDE 10 MG/HR: 9 INJECTION, SOLUTION INTRAVENOUS at 10:02

## 2025-02-13 RX ADMIN — OXYCODONE HYDROCHLORIDE AND ACETAMINOPHEN 1 TABLET: 10; 325 TABLET ORAL at 12:02

## 2025-02-13 RX ADMIN — VANCOMYCIN HYDROCHLORIDE 2000 MG: 500 INJECTION, POWDER, LYOPHILIZED, FOR SOLUTION INTRAVENOUS at 03:02

## 2025-02-13 NOTE — ASSESSMENT & PLAN NOTE
-Lasix and dobutamine infusions  -Hold metoprolol  -Telemetry  -Strict I's and O's  -Keep K > 4 and Mg > 2  -Cardiology consulted
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
## 2025-02-13 NOTE — NURSING
Pt is scheduled for surgery in am to have toe amputated from left foot. He is also scheduled for a ANGEL with Cardioversion in am. Dr. Arellano wants to have ANGEL done before the surgery for the toe. Pt to be NPO after midnight.

## 2025-02-13 NOTE — PROGRESS NOTES
[facility]  Progress Note  PATIENT NAME: Juan C Sanchez  MRN: [id number]  TODAY'S DATE: 02/13/2025  ADMIT DATE: 2/11/2025    Patient's Chief Complaint:  Acute on chronic congestive heart failure    History of Present Illness:  Juan C Sanchez is a 69 y.o. male with a history of hypertension, mitral regurgitation, HFrEF (EF 25%), atrial fibrillation (on home amiodarone and Eliquis), PAD, anemia, obesity, and GERD who presented to the ED on 2/11/2025 with complaints of shortness of breath and bilateral lower extremity edema. He was admitted for an acute on chronic HFrEF exacerbation and afib with RVR. Cardiology has been consulted and he is currently being treated with Lasix 40mg IVP BID and metoprolol 25mg PO 4 times daily and IV PRN. Per the patient, he has been experiencing severe lower extremity edema for the past 3-4 months that has been worked up outpatient. The workup included a lower extremity CTA which illustrated right lower extremity with high-grade stenosis anterior tibial artery nonostial origin tapering to occlusion mid calf with posterior tibial artery dominance and small caliber peroneal artery to near the ankle. This was noticed during chart review at admit and vascular surgery was consulted.  The patient denies lower extremity pain, just tightness and itching secondary to the swelling. He reports that his leg swelling is slightly improved with leg elevation but quickly returns when they are dependent again. He denies use of compression stockings.     Last 24 hour interval:  2/13/2025           Patient s/p left 3rd toe debridement per podiatry, dressing to left foot CDI. Patient now on dobutamine and lasix infusions.                                        Past Medical History:   Diagnosis Date    Hypertension      Past Surgical History:   Procedure Laterality Date    ORIF closed comminuted displaced intra-articular fx distal left radius & application of  external fixator Left 05/28/2018     Review of patient's allergies indicates:   Allergen Reactions    Gabapentin Other (See Comments)     Hypersomnia, nightmares    Morphine Other (See Comments)     Pt states systemic cramping     No family history on file.  Social History     Tobacco Use    Smoking status: Never    Smokeless tobacco: Never   Substance Use Topics    Alcohol use: Yes     Comment: social    Drug use: No        Review of Systems:  Constitutional:  Negative for chills and fever.   Respiratory:  Positive for cough, sputum production and shortness of breath. Negative for wheezing.    Cardiovascular:  Positive for palpitations and leg swelling. Negative for chest pain.   Gastrointestinal:  Negative for diarrhea, nausea and vomiting.   Skin:  Positive for itching (LE itching).   Neurological:  Positive for weakness. Negative for dizziness and headaches.      OBJECTIVE   VITAL SIGNS (Most Recent)  Temp: 96.3 °F (35.7 °C) (02/13/25 1145)  Pulse: 101 (02/13/25 0800)  Resp: 18 (02/13/25 1145)  BP: 95/65 (02/13/25 1145)  SpO2: 96 % (02/12/25 2016)    I & O (Last 24H):  Intake/Output Summary (Last 24 hours) at 2/13/2025 1321  Last data filed at 2/13/2025 1052  Gross per 24 hour   Intake 480 ml   Output 4400 ml   Net -3920 ml       PHYSICAL EXAM  Constitutional: Awake and oriented to person, place, and time. Appears well-developed and well-nourished. In no apparent distress.  HEENT: Normocephalic. Pupils normal and conjunctivae normal. Mucous membranes normal, no cyanosis or icterus, trachea central, no pallor or icterus is noted.  Neck: Normal range of motion. Neck supple. No JVD present. No bruit present.   Cardiovascular: Afib 100s-110s. Normal heart sounds. S1, S2.   Pulmonary/Chest: Effort normal. No respiratory distress. Dyspnea after repositioning in bed. Coarse breath sounds.   Abdominal: Obese. Soft. Bowel sounds are normal.   Urinary: No livingston catheter present  Skin: Skin is warm and dry. Bilateral lower  extremity erythema calf down.  Extremities: Left foot dressing in place, wound between 3rd and 4th digits per patient. Bilateral LE pitting edema +4.   Peripheral vascular system: Palpable bilateral DP pulses.     INPATIENT MEDS:    DOBUTamine IV infusion (non-titrating)  5 mcg/kg/min Intravenous Continuous 7.4 mL/hr at 02/12/25 1805 5 mcg/kg/min at 02/12/25 1805    furosemide (LASIX) 2 mg/mL continuous infusion (non-titrating)  10 mg/hr Intravenous Continuous 5 mL/hr at 02/13/25 1023 10 mg/hr at 02/13/25 1023      amiodarone  200 mg Oral BID    ammonium lactate   Topical (Top) BID    ceFEPime IV (PEDS and ADULTS)  2 g Intravenous Q8H    enoxparin  1 mg/kg Subcutaneous Q12H    pantoprazole  40 mg Oral Daily    vancomycin (VANCOCIN) IV (PEDS and ADULTS)  2,000 mg Intravenous Q24H       Current Facility-Administered Medications:     acetaminophen, 650 mg, Oral, Q4H PRN    aluminum-magnesium hydroxide-simethicone, 30 mL, Oral, QID PRN    magnesium oxide, 800 mg, Oral, PRN    magnesium oxide, 800 mg, Oral, PRN    melatonin, 9 mg, Oral, Nightly PRN    metoprolol, 5 mg, Intravenous, Q5 Min PRN    midodrine, 2.5 mg, Oral, TID PRN    naloxone, 0.02 mg, Intravenous, PRN    ondansetron, 4 mg, Intravenous, Q6H PRN    oxyCODONE-acetaminophen, 1 tablet, Oral, Q8H PRN    potassium bicarbonate, 35 mEq, Oral, PRN    potassium bicarbonate, 50 mEq, Oral, PRN    potassium bicarbonate, 60 mEq, Oral, PRN    potassium, sodium phosphates, 2 packet, Oral, PRN    potassium, sodium phosphates, 2 packet, Oral, PRN    potassium, sodium phosphates, 2 packet, Oral, PRN    senna-docusate 8.6-50 mg, 1 tablet, Oral, BID PRN    sodium chloride 0.9%, 10 mL, Intravenous, PRN    sodium chloride 0.9%, 10 mL, Intravenous, PRN    Pharmacy to dose Vancomycin consult, , , Once **AND** vancomycin - pharmacy to dose, , Intravenous, pharmacy to manage frequency    GI Prophylaxis:  PPI:proton pump inhibitor per orders  DVT Prophylaxis:  Anticoagulants  "  Medication Route Frequency    enoxaparin injection 100 mg Subcutaneous Q12H       CURRENT CONSULTS:  IP CONSULT TO SOCIAL WORK/CASE MANAGEMENT  IP CONSULT TO REGISTERED DIETITIAN/NUTRITIONIST  IP CONSULT TO CARDIOLOGY  WOUND CARE CONSULT  IP CONSULT TO VASCULAR SURGERY  IP CONSULT TO PODIATRY  IP CONSULT TO VASCULAR SURGERY  IP CONSULT TO INFECTIOUS DISEASES  IP CONSULT TO INFECTIOUS DISEASES  WOUND CARE CONSULT  PHARMACY TO DOSE VANCOMYCIN CONSULT  IP CONSULT TO MIDLINE TEAM    LABS AND DIAGNOSTICS   CBC LAST 3 DAYS  Recent Labs   Lab 02/11/25 1818 02/12/25 0158 02/13/25  0346   WBC 7.45 8.15 6.66   HGB 10.0* 10.2* 9.7*   HCT 32.8* 33.8* 32.0*    249 260       COAGULATION LAST 3 DAYS  No results for input(s): "LABPT", "INR", "APTT" in the last 168 hours.    CHEMISTRY LAST 3 DAYS  Recent Labs   Lab 02/11/25 1818 02/12/25 0158 02/13/25  0346   * 135* 134*   K 3.8 4.2 3.5   CO2 31* 31* 28   ANIONGAP 6* 7* 8   BUN 17 18 18   CREATININE 1.4 1.4 1.4   GLU 86 120* 120*   CALCIUM 8.3* 8.2* 8.1*   MG  --  2.0 2.0   ALBUMIN 3.1* 2.9* 3.0*   PROT 6.8 6.9 6.6   ALKPHOS 76 76 73   ALT 8* 8* 6*   AST 12 17 11   BILITOT 0.6 0.6 0.7       CARDIAC PROFILE LAST 3 DAYS  Recent Labs   Lab 02/11/25 1818   BNP 3,163*       MOST RECENT IMAGING  MRI Forefoot WO Contrast LT  Narrative: EXAMINATION:  MRI FOREFOOT WO CONTRAST LT    CLINICAL HISTORY:  Foot swelling, nondiabetic, osteomyelitis suspected;    COMPARISON:  Radiographs, February 11, 2025    FINDINGS:  Multiplanar noncontrast imaging was performed.  Exam is limited by repetitive motion artifact.  Additional sequences were not obtained as result of patient discomfort.    There is diffuse soft tissue edema over the dorsum of the midfoot and forefoot, as well as nonspecific edema within the interosseous musculature.  No focal soft tissue fluid collections are identified to indicate abscess formation.    Best appreciated on short axis T1 weighted images, there is " confluent T1 hypointensity throughout the middle phalanx of the 3rd toe, and to a lesser extent involving the distal aspect of the proximal phalanx.  MR findings are suspicious for osteomyelitis.  Marrow signal is within normal limits at all other levels.    There is no evidence of fracture or dislocation.  No pathologic joint fluid accumulation is identified.  All included tendons appear intact.  Impression: 1. MR findings suspicious for acute osteomyelitis of the left 3rd toe.  2. Subcutaneous and intramuscular edema throughout the midfoot and forefoot.  No evidence of abscess formation.    Electronically signed by: Johnny Joel  Date:    02/12/2025  Time:    15:53  US Lower Extremity Arteries Bilateral  Narrative: EXAMINATION:  US LOWER EXTREMITY ARTERIES BILATERAL    CLINICAL HISTORY:  Ulcers Pvd bilateral feet;    COMPARISON:  None    FINDINGS:  Grayscale, color and spectral Doppler analysis of the bilateral lower extremity arteries was performed.    Right leg:    Grayscale images show trace atherosclerotic plaque.  Triphasic spectral waveforms occur in right common femoral, superficial femoral, popliteal, and dorsalis pedis arteries.  Monophasic spectral waveforms occur in profundus femoral, posterior tibial and anterior tibial arteries distally.  Right peroneal artery appears occluded.  No focal elevated peak systolic velocity to suggest a hemodynamically significant arterial stenosis.    Left leg:    Grayscale images show trace atherosclerotic plaque.  Triphasic spectral waveforms occur in left common femoral, superficial femoral, and popliteal arteries.  Biphasic spectral waveform occurs in left profundus femoral artery.  Monophasic flow occurs in distal left posterior tibial and peroneal arteries.  Anterior tibial and dorsalis pedis arteries contain triphasic spectral waveforms.  No focal elevated peak systolic velocity to suggest a hemodynamically significant stenosis, no focal arterial  occlusion.  Impression: 1. Apparent occlusion of right peroneal artery, with no flow identified.  2. Monophasic spectral waveforms in bilateral calf arteries suggesting sequelae of peripheral arterial disease.  3. No focal hemodynamically significant arterial stenosis identified.    Electronically signed by: Tomi Nelson  Date:    02/12/2025  Time:    13:38  CT Abdomen Pelvis  Without Contrast  Narrative: EXAMINATION:  CT ABDOMEN PELVIS WITHOUT CONTRAST    CLINICAL HISTORY:  Abdominal pain, acute, nonlocalized;    TECHNIQUE:  Axial CT imaging obtained through the abdomen and pelvis without contrast, coronal and sagittal reformatted images    CMS Mandated Quality Data-CT Radiation Dose-436    All CT scans at this facility dose modulation, iterative reconstruction, and or weight-based dosing when appropriate to reduce radiation dose to as low as reasonably achievable.    COMPARISON:  None    FINDINGS:  Imaging through the lower thorax shows interlobular septal thickening and small volume pleural fluid bilaterally, potentially on the basis of pulmonary edema.  There is diffuse body wall edema.  Bone window images show no acute or aggressive osseous abnormality.  Thoracolumbar degenerative changes and scoliosis.    Low-attenuation of blood pool relative to myocardium suggesting anemia.    On this noncontrast exam, no focal hepatic or splenic lesion.  Somewhat nodular hepatic contour raising the possibility of cirrhosis.  Cholelithiasis.  No intrahepatic or extrahepatic bile duct dilation.  Small volume perihepatic fluid and perisplenic fluid.  Mesenteric edema.  No focal pancreatic lesion.  No adrenal lesion.  Left upper pole renal cysts.  No renal calculi.  Ureters are normal in caliber.  Urinary bladder is unremarkable.    Stomach is unremarkable.  No evidence of small-bowel obstruction.  No evidence of colitis.  Distal colonic diverticula.    Aortoiliac atherosclerotic calcification.  Prominent retroperitoneal and  mesenteric lymph nodes.  Prominent linn hepatis and portacaval lymph nodes.  Small volume free fluid along the pericolic gutters and in the pelvis.  Fluid in the left inguinal canal.  Impression: Small bilateral pleural effusions, pulmonary edema, as well as diffuse body wall/mesenteric edema.    Small volume free fluid in the abdomen and pelvis.    Nodular hepatic contour suggestive of cirrhosis.    Cholelithiasis.    Diverticulosis of the distal colon.    CT findings suggestive of anemia.    Atherosclerosis, fluid in the left inguinal canal, and other incidental findings as above.    Electronically signed by: Alejandro Carney  Date:    02/12/2025  Time:    07:10  US Lower Extremity Veins Bilateral  Narrative: EXAMINATION:  US LOWER EXTREMITY VEINS BILATERAL    CLINICAL HISTORY:  extensive bilateral lower extremity edema;    TECHNIQUE:  Duplex and color flow Doppler and dynamic compression was performed of the bilateral lower extremity veins was performed.    COMPARISON:  None    FINDINGS:  Right thigh veins: The common femoral, femoral, popliteal, upper greater saphenous, and deep femoral veins are patent and free of thrombus. The veins are normally compressible and have normal phasic flow and augmentation response.    Right calf veins: The visualized calf veins are patent.    Left thigh veins: The common femoral, femoral, popliteal, upper greater saphenous, and deep femoral veins are patent and free of thrombus. The veins are normally compressible and have normal phasic flow and augmentation response.    Left calf veins: The visualized calf veins are patent.    Miscellaneous: Soft tissue edema present.  Few prominent lymph nodes left groin, nonspecific.  Impression: No evidence of deep venous thrombosis in either lower extremity.    Additional findings as above.    Electronically signed by: Rocco Levin MD  Date:    02/12/2025  Time:    00:38    Results for orders placed or performed during the hospital  encounter of 02/11/25 (from the past 2160 hours)   CT Abdomen Pelvis  Without Contrast    Narrative    EXAMINATION:  CT ABDOMEN PELVIS WITHOUT CONTRAST    CLINICAL HISTORY:  Abdominal pain, acute, nonlocalized;    TECHNIQUE:  Axial CT imaging obtained through the abdomen and pelvis without contrast, coronal and sagittal reformatted images    CMS Mandated Quality Data-CT Radiation Dose-436    All CT scans at this facility dose modulation, iterative reconstruction, and or weight-based dosing when appropriate to reduce radiation dose to as low as reasonably achievable.    COMPARISON:  None    FINDINGS:  Imaging through the lower thorax shows interlobular septal thickening and small volume pleural fluid bilaterally, potentially on the basis of pulmonary edema.  There is diffuse body wall edema.  Bone window images show no acute or aggressive osseous abnormality.  Thoracolumbar degenerative changes and scoliosis.    Low-attenuation of blood pool relative to myocardium suggesting anemia.    On this noncontrast exam, no focal hepatic or splenic lesion.  Somewhat nodular hepatic contour raising the possibility of cirrhosis.  Cholelithiasis.  No intrahepatic or extrahepatic bile duct dilation.  Small volume perihepatic fluid and perisplenic fluid.  Mesenteric edema.  No focal pancreatic lesion.  No adrenal lesion.  Left upper pole renal cysts.  No renal calculi.  Ureters are normal in caliber.  Urinary bladder is unremarkable.    Stomach is unremarkable.  No evidence of small-bowel obstruction.  No evidence of colitis.  Distal colonic diverticula.    Aortoiliac atherosclerotic calcification.  Prominent retroperitoneal and mesenteric lymph nodes.  Prominent linn hepatis and portacaval lymph nodes.  Small volume free fluid along the pericolic gutters and in the pelvis.  Fluid in the left inguinal canal.      Impression    Small bilateral pleural effusions, pulmonary edema, as well as diffuse body wall/mesenteric  edema.    Small volume free fluid in the abdomen and pelvis.    Nodular hepatic contour suggestive of cirrhosis.    Cholelithiasis.    Diverticulosis of the distal colon.    CT findings suggestive of anemia.    Atherosclerosis, fluid in the left inguinal canal, and other incidental findings as above.      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 Left leg spectral Doppler waveforms and color flow interrogation normal. Right leg spectral Doppler wave forms and color flow interrogation normal. Some mild atherosclerosis noted.   - CTA lower extremity 1/14/2025: Right lower extremity with high-grade stenosis anterior tibial artery nonostial origin tapering to occlusion mid calf with posterior tibial artery dominance and small caliber peroneal artery to near the ankle. Faint opacification of PTA at the ankle. Slow flow could be partial etiology of nonvisualization of distal vessels. Clinical correlation needed. Left lower extremity without significant stenosis with three-vessel opacification to the ankle.     - Compression stockings and leg elevation  - Consider outpatient venous insufficiency ultrasound. Unfortunately, this study cannot be completed while inpatient.  - Continue diuretics per cardiology   - Continue antibiotics per ID   - Palpable  bilateral DP pulses  - Plan for OR Friday, February 14th for amputation of left 3rd toe with podiatry   - Per imaging and the presence of palpable pulse, Dr. Faith does believe that the patient has adequate blood flow to heal amputation but will perform a LLE angiogram on Monday to ensure considering the patient's history of PVD  - Plan for LLE angiogram with Dr. Faith on Monday, February 17th. NPO after midnight.     Case and plan of care discussed with MD. Additional recommendations from Dr. Faith to follow.     Vianca Medina NP  [facility]   Date of Service: 02/13/2025

## 2025-02-13 NOTE — PROGRESS NOTES
Pharmacokinetic Initial Assessment: IV Vancomycin    Assessment/Plan:    Initiate intravenous vancomycin with a maintenance dose of vancomycin 2000 mg IV every 24 hours  Desired empiric serum trough concentration is 15 to 20 mcg/mL  Draw vancomycin trough level 60 min prior to third dose on 2/15 at approximately 1000  Pharmacy will continue to follow and monitor vancomycin.      Please contact pharmacy at extension [phone] with any questions regarding this assessment.     Thank you for the consult,   Rao Fabienne       Patient brief summary:  Juan C Sanchez is a 69 y.o. male initiated on antimicrobial therapy with IV Vancomycin for treatment of suspected bone/joint infection    Drug Allergies:   Review of patient's allergies indicates:   Allergen Reactions    Gabapentin Other (See Comments)     Hypersomnia, nightmares    Morphine Other (See Comments)     Pt states systemic cramping       Actual Body Weight:   93.9 kg    Renal Function:   Estimated Creatinine Clearance: 59.9 mL/min (based on SCr of 1.4 mg/dL).,     Dialysis Method (if applicable):  N/A    CBC (last 72 hours):  Recent Labs   Lab Result Units 02/11/25 1818 02/12/25 0158 02/12/25 0159 02/13/25  0346   WBC K/uL 7.45 8.15  --  6.66   Hemoglobin g/dL 10.0* 10.2*  --  9.7*   Hemoglobin A1C %  --   --  7.0*  --    Hematocrit % 32.8* 33.8*  --  32.0*   Platelets K/uL 269 249  --  260   Gran % % 64.3 68.8  --  58.0   Lymph % % 20.3 18.8  --  25.5   Mono % % 11.4 9.3  --  13.2   Eosinophil % % 2.8 2.2  --  2.0   Basophil % % 0.8 0.7  --  0.8   Differential Method  Automated Automated  --  Automated       Metabolic Panel (last 72 hours):  Recent Labs   Lab Result Units 02/11/25 1818 02/12/25 0158 02/13/25  0346   Sodium mmol/L 134* 135* 134*   Potassium mmol/L 3.8 4.2 3.5   Chloride mmol/L 97 97 98   CO2 mmol/L 31* 31* 28   Glucose mg/dL 86 120* 120*   BUN mg/dL 17 18 18   Creatinine mg/dL 1.4 1.4 1.4   Albumin g/dL 3.1* 2.9* 3.0*   Total Bilirubin mg/dL  "0.6 0.6 0.7   Alkaline Phosphatase U/L 76 76 73   AST U/L 12 17 11   ALT U/L 8* 8* 6*   Magnesium mg/dL  --  2.0 2.0   Phosphorus mg/dL  --  4.1  --        Drug levels (last 3 results):  No results for input(s): "VANCOMYCINRA", "VANCORANDOM", "VANCOMYCINPE", "VANCOPEAK", "VANCOMYCINTR", "VANCOTROUGH" in the last 72 hours.    Microbiologic Results:  Microbiology Results (last 7 days)       Procedure Component Value Units Date/Time    MRSA Screen by PCR [[id number]]     Order Status: No result Specimen: Nasal Swab     Tissue culture [[id number]] Collected: 02/13/25 0748    Order Status: No result Specimen: Bone from Toe, Left Foot Updated: 02/13/25 0808    Culture, Anaerobic [[id number]] Collected: 02/13/25 0748    Order Status: Sent Specimen: Bone from Toe, Left Foot Updated: 02/13/25 0757    Aerobic culture [[id number]] Collected: 02/13/25 0748    Order Status: Canceled Specimen: Bone from Toe, Left Foot     Blood culture x two cultures. Draw prior to antibiotics. [[id number]] Collected: 02/11/25 1744    Order Status: Completed Specimen: Blood from Peripheral, Antecubital, Left Updated: 02/12/25 2032     Blood Culture, Routine No Growth to date      No Growth to date    Narrative:      Aerobic and anaerobic    Blood culture x two cultures. Draw prior to antibiotics. [[id number]] Collected: 02/11/25 1749    Order Status: Completed Specimen: Blood from Peripheral, Antecubital, Right Updated: 02/12/25 2032     Blood Culture, Routine No Growth to date      No Growth to date    Narrative:      Aerobic and anaerobic            "

## 2025-02-13 NOTE — PROGRESS NOTES
"[facility]  Department of Cardiology  Progress Note      PATIENT NAME: Juan C Sanchez  MRN: [id number]  TODAY'S DATE: 02/13/2025  ADMIT DATE: 2/11/2025                          CONSULT REQUESTED BY: Dao Sterling MD    SUBJECTIVE     PRINCIPAL PROBLEM: Acute on chronic congestive heart failure    02/13/2025  Patient seen sitting up in bed with no acute distress noted.  Denies any chest discomfort.  Breathing is stable.  He has been diuresing very well, and his weight is down significantly.    REASON FOR CONSULT:  CHF exacerbation     HPI:  Mr. Sanchez is a 69 yr old male with pmh of known HFrEF, HTN, mitral regurg, OA, GERD, and anemia who originally come into the ER yesterday evening after home PT encouraged him to come be evaluated after noticing some worsening swelling in BLE, hypotension, and generalized weakness. It appears he has been in and out of the ER about 8x since the start of this new year. BNP 3163. Trops 21.3 then 16.8. Most recent ECHO uploaded in care everywhere shows EF 25%. Pt also states he had a recent angiogram done in [place] (the last dated one I could see was 2022) showed trivial nonobstructive CAD. He states he follows with Dr. Alegria but has not been seen "in a while." He states compliance with medications but feels PO lasix is not working for him.     Per hospital medicine notes:  Mr. Sanchez is a 69 yr old male with a hx of HTN, MR, PAF, chronic systolic CHF with EF of 25, OA, GERD, nonischemic cardiomyopathy, anemia who presented to the ED with a chief complaint of shortness of breath, leg swelling and hypotension.  Patient states that he had PT come to his home today and he was found to be hypotensive and advised to come to the emergency room.  Patient endorses shortness of breath as well as leg swelling, malaise, fatigue, occasional chills, productive cough with milky sputum, upper abdominal pain, occasional nausea, decreased urine output, and positional " lightheadedness.  He states that these symptoms have been progressively worsening over the last 1-2 months possibly even longer  He states that his symptoms are worse when lying flat as well as with exertion and better with rest and while sitting up.  He states that he has been taking his home medications without improvement of his symptoms.  He does endorse that most of all of his contacts recently have been sick. He denies any home oxygen use.  He does endorse occasional PND and states that he has been having to sit to sleep.  He denies tobacco and recreational drug use and states he seldomly drinks alcohol.  He denies fever, chest pain, vomiting, diarrhea, dysuria, hematuria, dizziness, and syncope.     Upon arrival to ED, patient afebrile, HR of 109, RR of 24, BP of 95/62, satting 98% on RA.  Workup in the ED revealed RBC of 4.19, H/H of 10/32.8, sodium of 134, pCO2 of 31, GFR of 55.1, corrected calcium of 9, albumin of 3.1, BNP of 3163, troponin of 21.3, lactic acid of 2.1.  Blood cultures pending.  CXR shows enlarged cardiac silhouette with pulmonary vascular congestion.  Bilateral pleural effusions.  Mild bibasilar airspace disease and or atelectasis.  Left foot x-ray shows osteopenia.  No overt erosions or osseous destruction.  No acute fracture or dislocation.  Mild multifocal midfoot arthritis.  Forefoot swelling.  Patient was given Lasix 20 mg IV, Zosyn 4.5 g IV, 500 mL NS bolus, vancomycin 20 mg/kg while in the ED. discussed case with ED provider and patient will be admitted under hospital medicine services for further management.        Review of patient's allergies indicates:   Allergen Reactions    Gabapentin Other (See Comments)     Hypersomnia, nightmares    Morphine Other (See Comments)     Pt states systemic cramping       Past Medical History:   Diagnosis Date    Hypertension      Past Surgical History:   Procedure Laterality Date    ORIF closed comminuted displaced intra-articular fx distal left  radius & application of external fixator Left 05/28/2018     Social History     Tobacco Use    Smoking status: Never    Smokeless tobacco: Never   Substance Use Topics    Alcohol use: Yes     Comment: social    Drug use: No        REVIEW OF SYSTEMS    As mentioned in HPI    OBJECTIVE     VITAL SIGNS (Most Recent)  Temp: 97.8 °F (36.6 °C) (02/13/25 0800)  Pulse: 101 (02/13/25 0800)  Resp: 18 (02/13/25 0800)  BP: 111/77 (02/13/25 0800)  SpO2: 96 % (02/12/25 2016)    VENTILATION STATUS  Resp: 18 (02/13/25 0800)  SpO2: 96 % (02/12/25 2016)           I & O (Last 24H):  Intake/Output Summary (Last 24 hours) at 2/13/2025 0845  Last data filed at 2/13/2025 0646  Gross per 24 hour   Intake 720 ml   Output 3900 ml   Net -3180 ml       WEIGHTS  Wt Readings from Last 3 Encounters:   02/13/25 0604 93.9 kg (207 lb 0.2 oz)   02/12/25 0050 98.6 kg (217 lb 6 oz)   02/11/25 1727 92.5 kg (204 lb)   10/23/18 1426 92.5 kg (204 lb)   09/18/18 1505 95.3 kg (210 lb)       PHYSICAL EXAM    CONSTITUTIONAL: NAD, elderly male lying in bed with HOB elevated   HEENT: Normocephalic. No pallor  NECK: no JVD  LUNGS: crackles in the bases, mild tachypnea   HEART: irregular rate and rhythm -afib 80-90s, S1, S2 normal, no murmur   ABDOMEN: soft, mild tenderness+, bowel sounds normal  EXTREMITIES: BLE edema +2 persists, redness noted to left lower extremity, left foot with dressing clean dry and intact  SKIN: No rash  NEURO: AAO X 3  PSYCH: normal affect     HOME MEDICATIONS:  No current facility-administered medications on file prior to encounter.     Current Outpatient Medications on File Prior to Encounter   Medication Sig Dispense Refill    acetaminophen (TYLENOL EXTRA STRENGTH) 500 MG tablet Take 1,000 mg by mouth every 6 (six) hours as needed for Pain.      amiodarone (PACERONE) 200 MG Tab Take 200 mg by mouth 2 (two) times daily.      cyclobenzaprine (FLEXERIL) 10 MG tablet Take 1 tablet by mouth 3 times daily as needed for Muscle spasms.       doxycycline (MONODOX) 100 MG capsule Take 100 mg by mouth 2 (two) times daily.      empagliflozin (JARDIANCE) 10 mg tablet Take 10 mg by mouth once daily.      ENTRESTO 24-26 mg per tablet Take 1 tablet by mouth 2 (two) times daily.      furosemide (LASIX) 20 MG tablet Take 40 mg by mouth 0900, 1800.      gabapentin (NEURONTIN) 300 MG capsule Take 300 mg by mouth 2 (two) times daily.      levoFLOXacin (LEVAQUIN) 500 MG tablet Take 500 mg by mouth once daily.      metoprolol tartrate (LOPRESSOR) 25 MG tablet Take 25 mg by mouth 2 (two) times daily.      nitroGLYCERIN (NITROSTAT) 0.4 MG SL tablet Place 0.4 mg under the tongue every 5 (five) minutes as needed for Chest pain.      oxyCODONE-acetaminophen (PERCOCET)  mg per tablet Take 1 tablet by mouth every 8 (eight) hours as needed for Pain.      pantoprazole (PROTONIX) 40 MG tablet Take 40 mg by mouth once daily.      promethazine-dextromethorphan (PROMETHAZINE-DM) 6.25-15 mg/5 mL Syrp Take 5 mLs by mouth every 6 (six) hours as needed.      carvediloL (COREG) 3.125 MG tablet Take 3.125 mg by mouth 2 (two) times daily. (Patient not taking: Reported on 2/11/2025)         SCHEDULED MEDS:   amiodarone  200 mg Oral BID    ammonium lactate   Topical (Top) BID    enoxparin  1 mg/kg Subcutaneous Q12H    pantoprazole  40 mg Oral Daily       CONTINUOUS INFUSIONS:   DOBUTamine IV infusion (non-titrating)  5 mcg/kg/min Intravenous Continuous 7.4 mL/hr at 02/12/25 1805 5 mcg/kg/min at 02/12/25 1805    furosemide (LASIX) 2 mg/mL continuous infusion (non-titrating)  10 mg/hr Intravenous Continuous 5 mL/hr at 02/12/25 1757 10 mg/hr at 02/12/25 1757       PRN MEDS:  Current Facility-Administered Medications:     acetaminophen, 650 mg, Oral, Q4H PRN    aluminum-magnesium hydroxide-simethicone, 30 mL, Oral, QID PRN    magnesium oxide, 800 mg, Oral, PRN    magnesium oxide, 800 mg, Oral, PRN    melatonin, 9 mg, Oral, Nightly PRN    metoprolol, 5 mg, Intravenous, Q5 Min PRN     "midodrine, 2.5 mg, Oral, TID PRN    naloxone, 0.02 mg, Intravenous, PRN    ondansetron, 4 mg, Intravenous, Q6H PRN    oxyCODONE-acetaminophen, 1 tablet, Oral, Q8H PRN    potassium bicarbonate, 35 mEq, Oral, PRN    potassium bicarbonate, 50 mEq, Oral, PRN    potassium bicarbonate, 60 mEq, Oral, PRN    potassium, sodium phosphates, 2 packet, Oral, PRN    potassium, sodium phosphates, 2 packet, Oral, PRN    potassium, sodium phosphates, 2 packet, Oral, PRN    senna-docusate 8.6-50 mg, 1 tablet, Oral, BID PRN    sodium chloride 0.9%, 10 mL, Intravenous, PRN    sodium chloride 0.9%, 10 mL, Intravenous, PRN    LABS AND DIAGNOSTICS     CBC LAST 3 DAYS  Recent Labs   Lab 02/11/25 1818 02/12/25 0158 02/13/25  0346   WBC 7.45 8.15 6.66   RBC 4.19* 4.30* 4.12*   HGB 10.0* 10.2* 9.7*   HCT 32.8* 33.8* 32.0*   MCV 78* 79* 78*   MCH 23.9* 23.7* 23.5*   MCHC 30.5* 30.2* 30.3*   RDW 18.5* 18.4* 18.4*    249 260   MPV 10.0 9.9 10.1   GRAN 64.3  4.8 68.8  5.6 58.0  3.9   LYMPH 20.3  1.5 18.8  1.5 25.5  1.7   MONO 11.4  0.9 9.3  0.8 13.2  0.9   BASO 0.06 0.06 0.05   NRBC 0 0 0       COAGULATION LAST 3 DAYS  No results for input(s): "LABPT", "INR", "APTT" in the last 168 hours.    CHEMISTRY LAST 3 DAYS  Recent Labs   Lab 02/11/25 1818 02/12/25 0158 02/13/25  0346   * 135* 134*   K 3.8 4.2 3.5   CL 97 97 98   CO2 31* 31* 28   ANIONGAP 6* 7* 8   BUN 17 18 18   CREATININE 1.4 1.4 1.4   GLU 86 120* 120*   CALCIUM 8.3* 8.2* 8.1*   MG  --  2.0 2.0   ALBUMIN 3.1* 2.9* 3.0*   PROT 6.8 6.9 6.6   ALKPHOS 76 76 73   ALT 8* 8* 6*   AST 12 17 11   BILITOT 0.6 0.6 0.7       CARDIAC PROFILE LAST 3 DAYS  Recent Labs   Lab 02/11/25  1818 02/12/25  0158   BNP 3,163*  --    TROPONINIHS 21.3* 16.8*       ENDOCRINE LAST 3 DAYS  Recent Labs   Lab 02/12/25  0158   TSH 7.487*       LAST ARTERIAL BLOOD GAS  ABG  No results for input(s): "PH", "PO2", "PCO2", "HCO3", "BE" in the last 168 hours.    LAST 7 DAYS MICROBIOLOGY   Microbiology " Results (last 7 days)       Procedure Component Value Units Date/Time    Tissue culture [[id number]] Collected: 02/13/25 0748    Order Status: No result Specimen: Bone from Toe, Left Foot Updated: 02/13/25 0808    Culture, Anaerobic [[id number]] Collected: 02/13/25 0748    Order Status: Sent Specimen: Bone from Toe, Left Foot Updated: 02/13/25 0757    Aerobic culture [[id number]] Collected: 02/13/25 0748    Order Status: Canceled Specimen: Bone from Toe, Left Foot     Blood culture x two cultures. Draw prior to antibiotics. [[id number]] Collected: 02/11/25 1744    Order Status: Completed Specimen: Blood from Peripheral, Antecubital, Left Updated: 02/12/25 2032     Blood Culture, Routine No Growth to date      No Growth to date    Narrative:      Aerobic and anaerobic    Blood culture x two cultures. Draw prior to antibiotics. [[id number]] Collected: 02/11/25 1749    Order Status: Completed Specimen: Blood from Peripheral, Antecubital, Right Updated: 02/12/25 2032     Blood Culture, Routine No Growth to date      No Growth to date    Narrative:      Aerobic and anaerobic            MOST RECENT IMAGING  MRI Forefoot WO Contrast LT  Narrative: EXAMINATION:  MRI FOREFOOT WO CONTRAST LT    CLINICAL HISTORY:  Foot swelling, nondiabetic, osteomyelitis suspected;    COMPARISON:  Radiographs, February 11, 2025    FINDINGS:  Multiplanar noncontrast imaging was performed.  Exam is limited by repetitive motion artifact.  Additional sequences were not obtained as result of patient discomfort.    There is diffuse soft tissue edema over the dorsum of the midfoot and forefoot, as well as nonspecific edema within the interosseous musculature.  No focal soft tissue fluid collections are identified to indicate abscess formation.    Best appreciated on short axis T1 weighted images, there is confluent T1 hypointensity throughout the middle phalanx of the 3rd toe, and to a lesser extent involving the distal aspect of the proximal  phalanx.  MR findings are suspicious for osteomyelitis.  Marrow signal is within normal limits at all other levels.    There is no evidence of fracture or dislocation.  No pathologic joint fluid accumulation is identified.  All included tendons appear intact.  Impression: 1. MR findings suspicious for acute osteomyelitis of the left 3rd toe.  2. Subcutaneous and intramuscular edema throughout the midfoot and forefoot.  No evidence of abscess formation.    Electronically signed by: Johnny Joel  Date:    02/12/2025  Time:    15:53  US Lower Extremity Arteries Bilateral  Narrative: EXAMINATION:  US LOWER EXTREMITY ARTERIES BILATERAL    CLINICAL HISTORY:  Ulcers Pvd bilateral feet;    COMPARISON:  None    FINDINGS:  Grayscale, color and spectral Doppler analysis of the bilateral lower extremity arteries was performed.    Right leg:    Grayscale images show trace atherosclerotic plaque.  Triphasic spectral waveforms occur in right common femoral, superficial femoral, popliteal, and dorsalis pedis arteries.  Monophasic spectral waveforms occur in profundus femoral, posterior tibial and anterior tibial arteries distally.  Right peroneal artery appears occluded.  No focal elevated peak systolic velocity to suggest a hemodynamically significant arterial stenosis.    Left leg:    Grayscale images show trace atherosclerotic plaque.  Triphasic spectral waveforms occur in left common femoral, superficial femoral, and popliteal arteries.  Biphasic spectral waveform occurs in left profundus femoral artery.  Monophasic flow occurs in distal left posterior tibial and peroneal arteries.  Anterior tibial and dorsalis pedis arteries contain triphasic spectral waveforms.  No focal elevated peak systolic velocity to suggest a hemodynamically significant stenosis, no focal arterial occlusion.  Impression: 1. Apparent occlusion of right peroneal artery, with no flow identified.  2. Monophasic spectral waveforms in bilateral calf  arteries suggesting sequelae of peripheral arterial disease.  3. No focal hemodynamically significant arterial stenosis identified.    Electronically signed by: Tomi Nelson  Date:    02/12/2025  Time:    13:38  CT Abdomen Pelvis  Without Contrast  Narrative: EXAMINATION:  CT ABDOMEN PELVIS WITHOUT CONTRAST    CLINICAL HISTORY:  Abdominal pain, acute, nonlocalized;    TECHNIQUE:  Axial CT imaging obtained through the abdomen and pelvis without contrast, coronal and sagittal reformatted images    CMS Mandated Quality Data-CT Radiation Dose-436    All CT scans at this facility dose modulation, iterative reconstruction, and or weight-based dosing when appropriate to reduce radiation dose to as low as reasonably achievable.    COMPARISON:  None    FINDINGS:  Imaging through the lower thorax shows interlobular septal thickening and small volume pleural fluid bilaterally, potentially on the basis of pulmonary edema.  There is diffuse body wall edema.  Bone window images show no acute or aggressive osseous abnormality.  Thoracolumbar degenerative changes and scoliosis.    Low-attenuation of blood pool relative to myocardium suggesting anemia.    On this noncontrast exam, no focal hepatic or splenic lesion.  Somewhat nodular hepatic contour raising the possibility of cirrhosis.  Cholelithiasis.  No intrahepatic or extrahepatic bile duct dilation.  Small volume perihepatic fluid and perisplenic fluid.  Mesenteric edema.  No focal pancreatic lesion.  No adrenal lesion.  Left upper pole renal cysts.  No renal calculi.  Ureters are normal in caliber.  Urinary bladder is unremarkable.    Stomach is unremarkable.  No evidence of small-bowel obstruction.  No evidence of colitis.  Distal colonic diverticula.    Aortoiliac atherosclerotic calcification.  Prominent retroperitoneal and mesenteric lymph nodes.  Prominent linn hepatis and portacaval lymph nodes.  Small volume free fluid along the pericolic gutters and in the pelvis.   Fluid in the left inguinal canal.  Impression: Small bilateral pleural effusions, pulmonary edema, as well as diffuse body wall/mesenteric edema.    Small volume free fluid in the abdomen and pelvis.    Nodular hepatic contour suggestive of cirrhosis.    Cholelithiasis.    Diverticulosis of the distal colon.    CT findings suggestive of anemia.    Atherosclerosis, fluid in the left inguinal canal, and other incidental findings as above.    Electronically signed by: Alejandro Carney  Date:    02/12/2025  Time:    07:10  US Lower Extremity Veins Bilateral  Narrative: EXAMINATION:  US LOWER EXTREMITY VEINS BILATERAL    CLINICAL HISTORY:  extensive bilateral lower extremity edema;    TECHNIQUE:  Duplex and color flow Doppler and dynamic compression was performed of the bilateral lower extremity veins was performed.    COMPARISON:  None    FINDINGS:  Right thigh veins: The common femoral, femoral, popliteal, upper greater saphenous, and deep femoral veins are patent and free of thrombus. The veins are normally compressible and have normal phasic flow and augmentation response.    Right calf veins: The visualized calf veins are patent.    Left thigh veins: The common femoral, femoral, popliteal, upper greater saphenous, and deep femoral veins are patent and free of thrombus. The veins are normally compressible and have normal phasic flow and augmentation response.    Left calf veins: The visualized calf veins are patent.    Miscellaneous: Soft tissue edema present.  Few prominent lymph nodes left groin, nonspecific.  Impression: No evidence of deep venous thrombosis in either lower extremity.    Additional findings as above.    Electronically signed by: Rocco Levin MD  Date:    02/12/2025  Time:    00:38      ECHOCARDIOGRAM RESULTS (last 5)  No results found for this or any previous visit.    Echo done locally    Impressions   -----------     1.The estimated LV ejection fraction is 25 %     2.There is moderate to severe  global hypokinesis     3.Diastolic function is indeterminate.     4.There is mild aortic valve sclerosis without significant stenosis     5.There is severe mitral regurgitation     6.Estimated RVSP systolic pressure is 37.13 mmHg     7.Compared to the report from prior study dated 04/04/2022, the   severity of mitral regurgitation has increased     Findings   --------     Left Ventricle   The estimated LV ejection fraction is 25 %. The calculated LV ejection   fraction (MOD, Biplane) is 29.99 %. LV chamber is mildly dilated.   There is mild concentric LV hypertrophy. LV systolic function is   normal. There is moderate to severe global hypokinesis. Diastolic   function is indeterminate.     Right Ventricle   RV size is moderately dilated. The RV systolic function is normal.     Septum   There is no atrial septal defect (ASD). There is no ventricular septal   defect (VSD).     Left Atrium   LA chamber size is normal. LA diameter is 3.81 cm.     Right Atrium   RA chamber size is normal.     Aortic Valve   There is a trileaflet aortic valve. There is mild aortic valve   sclerosis without significant stenosis. There is no aortic   regurgitation. Aortic valve area by VMax: 1.02 cm2. Aortic valve area   by VTI: 0.93 cm2. AV Mean gradient: 7.37 mmHg. AV Peak gradient: 13.48   mmHg.     Mitral Valve   Mitral valve is not well visualized. There is severe mitral   regurgitation. There is no mitral stenosis.     Tricuspid Valve   Tricuspid valve is structurally normal. There is mild tricuspid   regurgitation. The estimated RV systolic pressure is normal. Estimated   RVSP systolic pressure is 37.13 mmHg. There is no tricuspid valve   stenosis.     Pulmonary Valve   Pulmonic valve is not well visualized.     Pericardium   The pericardium is normal. There is no pericardial effusion present.     Aorta   The size of the visualized portion of aortic root is within normal   limits. The thoracic aorta is not well visualized. The  ascending aorta   is not well visualized.     Venous   The inferior vena cava dimension is normal.     Thrombus/Mass   There is no intracardiac mass or thrombus identified.     Echo Dimensions   --------------     LA Dimen (2D): 3.81 cm   IVS(D) (2D): 1.16 cm   LVPW(D) (2D): 1.16 cm   LV(D) (2D): 6.14 cm   LV(S) (2D): 4.56 cm   RV(D (2D): 3.7 cm   LVOT (2D): 2 cm   EF Teich (2D): 50 %   EF Teich (M-Mode): 25 %   FS (2D): 26 %   RVSP (Doppler): 37.13 mmHg   Doppler   -------   AVvel: 1.84 m/s   Pk Grad: 13.48 mmHg   LUCAS(pk): 1.02 cm2   PV Lee: 0.69 m/s   LVOTvel: 0.6 m/s   Mn Grad: 7.37 mmHg   LUCAS(VTI): 0.93 cm2   Ao Stenosis Dimen Idx: 0.33   MV PHT: 51.71 ms   Diastology   ----------   MV E: 1.27 m/s   MV A: 0.42 m/s   MV E/A: 3.01   MV Dec T: 175.64 ms   E' Lat: 9.29 cm/s   E' Med: 3.93 cm/s   E/Lat E': 13.66   E/Med E': 32.29   TR Lee: 1.72 m/s     Electronically signed by: Sudheer Lee DO   01/08/2025 11:49 AM   Impressions   -----------     1.The estimated LV ejection fraction is 25 %     2.There is moderate to severe global hypokinesis     3.Diastolic function is indeterminate.     4.There is mild aortic valve sclerosis without significant stenosis     5.There is severe mitral regurgitation     6.Estimated RVSP systolic pressure is 37.13 mmHg     7.Compared to the report from prior study dated 04/04/2022, the   severity of mitral regurgitation has increased     Findings   --------     Left Ventricle   The estimated LV ejection fraction is 25 %. The calculated LV ejection   fraction (MOD, Biplane) is 29.99 %. LV chamber is mildly dilated.   There is mild concentric LV hypertrophy. LV systolic function is   normal. There is moderate to severe global hypokinesis. Diastolic   function is indeterminate.     Right Ventricle   RV size is moderately dilated. The RV systolic function is normal.     Septum   There is no atrial septal defect (ASD). There is no ventricular septal   defect (VSD).     Left Atrium   LA  chamber size is normal. LA diameter is 3.81 cm.     Right Atrium   RA chamber size is normal.     Aortic Valve   There is a trileaflet aortic valve. There is mild aortic valve   sclerosis without significant stenosis. There is no aortic   regurgitation. Aortic valve area by VMax: 1.02 cm2. Aortic valve area   by VTI: 0.93 cm2. AV Mean gradient: 7.37 mmHg. AV Peak gradient: 13.48   mmHg.     Mitral Valve   Mitral valve is not well visualized. There is severe mitral   regurgitation. There is no mitral stenosis.     Tricuspid Valve   Tricuspid valve is structurally normal. There is mild tricuspid   regurgitation. The estimated RV systolic pressure is normal. Estimated   RVSP systolic pressure is 37.13 mmHg. There is no tricuspid valve   stenosis.     Pulmonary Valve   Pulmonic valve is not well visualized.     Pericardium   The pericardium is normal. There is no pericardial effusion present.     Aorta   The size of the visualized portion of aortic root is within normal   limits. The thoracic aorta is not well visualized. The ascending aorta   is not well visualized.     Venous   The inferior vena cava dimension is normal.     Thrombus/Mass   There is no intracardiac mass or thrombus identified.     Echo Dimensions   --------------     LA Dimen (2D): 3.81 cm   IVS(D) (2D): 1.16 cm   LVPW(D) (2D): 1.16 cm   LV(D) (2D): 6.14 cm   LV(S) (2D): 4.56 cm   RV(D (2D): 3.7 cm   LVOT (2D): 2 cm   EF Teich (2D): 50 %   EF Teich (M-Mode): 25 %   FS (2D): 26 %   RVSP (Doppler): 37.13 mmHg   Doppler   -------   AVvel: 1.84 m/s   Pk Grad: 13.48 mmHg   LUCAS(pk): 1.02 cm2   PV Lee: 0.69 m/s   LVOTvel: 0.6 m/s   Mn Grad: 7.37 mmHg   LUCAS(VTI): 0.93 cm2   Ao Stenosis Dimen Idx: 0.33   MV PHT: 51.71 ms   Diastology   ----------   MV E: 1.27 m/s   MV A: 0.42 m/s   MV E/A: 3.01   MV Dec T: 175.64 ms   E' Lat: 9.29 cm/s   E' Med: 3.93 cm/s   E/Lat E': 13.66   E/Med E': 32.29   TR Lee: 1.72 m/s     Electronically signed by: Sudheer Lee DO    01/08/2025 11:49 AM     CURRENT/PREVIOUS VISIT EKG  Results for orders placed or performed during the hospital encounter of 02/11/25   EKG 12-lead    Collection Time: 02/11/25  5:14 PM   Result Value Ref Range    QRS Duration 110 ms    [facility] QTC Calculation 534 ms    Narrative    Test Reason : I10,    Vent. Rate : 107 BPM     Atrial Rate :    BPM     P-R Int :    ms          QRS Dur : 110 ms      QT Int : 400 ms       P-R-T Axes :     99 124 degrees    QTcB Int : 534 ms    Atrial fibrillation with rapid ventricular response  Rightward axis  Nonspecific T wave abnormality  Abnormal ECG  No previous ECGs available    Referred By: AAAREFERRAL SELF           Confirmed By:            ASSESSMENT/PLAN:     Active Hospital Problems    Diagnosis    *Acute on chronic congestive heart failure    PAD (peripheral artery disease)    Acute osteomyelitis    MR (mitral regurgitation)    Paroxysmal atrial fibrillation    GERD (gastroesophageal reflux disease)    Hypertension       ASSESSMENT & PLAN:     Acute on chronic HFrEF   Mitral regurgitation   PAF  HTN   Osteomyelitis of left 3rd toe      RECOMMENDATIONS:    Continue dobutamine at 5 microgram/kilogram per minute and IV Lasix at 10 mg an hour.  Strict I&O.  Trend labs.  Remains in AFib with heart rate in the low 100s.  Continue amiodarone 200 mg p.o. b.i.d..  Consider cardioversion once optimized and once other potential procedures have been completed.  MRI was suspicious for acute osteomyelitis of the left 3rd toe.  ID is on the case and discussing plans and recommendations.  Will follow.    Thelma Perez NP  Department of Cardiology  Date of Service: 02/13/2025    69-year-old admitted with acute decompensation of chronic congestive heart failure with significant fluid overload  Aggressive diuresis was initiated with intravenous Lasix and dobutamine inotrope therapy  He has responded very well overnight.  He is negative over 3.5 L  Patient denies having chest discomfort is  breathing easier.  Cellulitis changes noted in the lower extremities  Suspected to have osteomyelitis of the 3rd toe in the left may need surgical intervention  Meanwhile continue on present therapy to include amiodarone  And Lovenox    Patient has persistent to have atrial fibrillation  May consider ANGEL and electrical cardioversion this has may help his cardiac output better  Will keep him NPO overnight and plan for ANGEL cardioversion tomorrow  I have personally interviewed and examined the patient, I have reviewed the Nurse Practitioner's history and physical, assessment, and plan. I have personally evaluated the patient at bedside and agree with the findings and made appropriate changes as necessary in recommendations.    Risks and Benefits of the procedure have been explained to the patient. alternatives discussed in detail regarding upcoming procedures and sedation and possible complications. Some of the more  complications include but not limited to immediate or delayed perforation, bleeding, infections, pain, inadvertent injury to surrounding tissue / organs and possible need for surgical evaluation.To include but  not limited to oropharyngeal damage, tooth damage, aspiration, sore throat and risk of anesthesia have been explained to patient.  Other risks of conscious sedation to be explained by anesthesiologist.  All questions have been answered to patient's satisfaction Informed consent obtained.  Also discussed with the nursing staff    José Luis Arellano MD  Department of Cardiology  [facility]  02/13/2025 12:53 PM

## 2025-02-13 NOTE — HPI
Mr. Sanchez is a 69 yr old male with a hx of HTN, MR, PAF, chronic systolic CHF with EF of 25, OA, GERD, nonischemic cardiomyopathy, anemia who presented to the ED with a chief complaint of shortness of breath, leg swelling and hypotension.  Patient states that he had PT come to his home today and he was found to be hypotensive and advised to come to the emergency room.  Patient endorses shortness of breath as well as leg swelling, malaise, fatigue, occasional chills, productive cough with milky sputum, upper abdominal pain, occasional nausea, decreased urine output, and positional lightheadedness.  He states that these symptoms have been progressively worsening over the last 1-2 months possibly even longer  He states that his symptoms are worse when lying flat as well as with exertion and better with rest and while sitting up.  He states that he has been taking his home medications without improvement of his symptoms.  He does endorse that most of all of his contacts recently have been sick. He denies any home oxygen use.  He does endorse occasional PND and states that he has been having to sit to sleep.  He denies tobacco and recreational drug use and states he seldomly drinks alcohol.  He denies fever, chest pain, vomiting, diarrhea, dysuria, hematuria, dizziness, and syncope.     Upon arrival to ED, patient afebrile, HR of 109, RR of 24, BP of 95/62, satting 98% on RA.  Workup in the ED revealed RBC of 4.19, H/H of 10/32.8, sodium of 134, pCO2 of 31, GFR of 55.1, corrected calcium of 9, albumin of 3.1, BNP of 3163, troponin of 21.3, lactic acid of 2.1.  Blood cultures pending.  CXR shows enlarged cardiac silhouette with pulmonary vascular congestion.  Bilateral pleural effusions.  Mild bibasilar airspace disease and or atelectasis.  Left foot x-ray shows osteopenia.  No overt erosions or osseous destruction.  No acute fracture or dislocation.  Mild multifocal midfoot arthritis.  Forefoot swelling.  Patient was  given Lasix 20 mg IV, Zosyn 4.5 g IV, 500 mL NS bolus, vancomycin 20 mg/kg while in the ED. discussed case with ED provider and patient will be admitted under hospital medicine services for further management.        Once admitted, patient was noted to have chronic wounds to his left foot.  Podiatry was consulted.

## 2025-02-13 NOTE — PROGRESS NOTES
02/12/25 1901   Edema   Edema dependent;arm, left;arm, right;periorbital;hip, left;hip, right   Leg, Right Edema 4+ (Severe)   Leg, Left Edema 4+ (Severe)   Dependent Edema 4+ (Severe)   Arm, Left Edema 3+ (Moderate)   Arm, Right Edema 2+ (Mild)   Periorbital Edema 1+ (Trace)   Hip, Right Edema 2+ (Mild)   Hip, Left Edema 2+ (Mild)     MD made aware of severe edema, patient unable to tolerate purewick and can only urinate when standing, however, high fall risk. Orders for livingston placed per MD Rosetta for accurate I&O's. 14 Fr. Coude livingston catheter placed. Pt tolerated well. Educated patient on strict fluid intake given being on lasix gtt. Safety measures addressed.

## 2025-02-13 NOTE — CONSULTS
VASCULAR ACCESS NOTE       Bed:2503/2503-A    Single lumen 18G, 10CM midline placed in the left basilic vein. Needle advanced into the vessel under real time ultrasound guidance.    Attempts: 1  Max dwell date: 3/14/25  Lot number: [id number]     Dakota Higuera RN

## 2025-02-13 NOTE — PROGRESS NOTES
[facility]  Progress Note    Patient Name: Juan C Sanchez  MRN: [id number]  Patient Class: IP- Inpatient   Admission Date: 2/11/2025  Length of Stay: 2 days  Attending Physician: Dao Sterling MD  Primary Care Provider: Katlin Graf NP        Subjective     Principal Problem:Acute on chronic congestive heart failure        HPI:  Mr. Sanchez is a 69 yr old male with a hx of HTN, MR, PAF, chronic systolic CHF with EF of 25, OA, GERD, nonischemic cardiomyopathy, anemia who presented to the ED with a chief complaint of shortness of breath, leg swelling and hypotension.  Patient states that he had PT come to his home today and he was found to be hypotensive and advised to come to the emergency room.  Patient endorses shortness of breath as well as leg swelling, malaise, fatigue, occasional chills, productive cough with milky sputum, upper abdominal pain, occasional nausea, decreased urine output, and positional lightheadedness.  He states that these symptoms have been progressively worsening over the last 1-2 months possibly even longer  He states that his symptoms are worse when lying flat as well as with exertion and better with rest and while sitting up.  He states that he has been taking his home medications without improvement of his symptoms.  He does endorse that most of all of his contacts recently have been sick. He denies any home oxygen use.  He does endorse occasional PND and states that he has been having to sit to sleep.  He denies tobacco and recreational drug use and states he seldomly drinks alcohol.  He denies fever, chest pain, vomiting, diarrhea, dysuria, hematuria, dizziness, and syncope.    Upon arrival to ED, patient afebrile, HR of 109, RR of 24, BP of 95/62, satting 98% on RA.  Workup in the ED revealed RBC of 4.19, H/H of 10/32.8, sodium of 134, pCO2 of 31, GFR of 55.1, corrected calcium of 9, albumin of 3.1, BNP of 3163, troponin of 21.3, lactic acid  "of 2.1.  Blood cultures pending.  CXR shows enlarged cardiac silhouette with pulmonary vascular congestion.  Bilateral pleural effusions.  Mild bibasilar airspace disease and or atelectasis.  Left foot x-ray shows osteopenia.  No overt erosions or osseous destruction.  No acute fracture or dislocation.  Mild multifocal midfoot arthritis.  Forefoot swelling.  Patient was given Lasix 20 mg IV, Zosyn 4.5 g IV, 500 mL NS bolus, vancomycin 20 mg/kg while in the ED. discussed case with ED provider and patient will be admitted under hospital medicine services for further management.    Overview/Hospital Course:  Juan C Sanchez is a 69 year old male with a past medical history of HFrEF, Afib, PAD, anemia, obesity, and GERD who presented with an acute on chronic HFrEF exacerbation as well as Afib with RVR. He is being diuresed with a Lasix infusion as as dobutamine. Cardiology has been consulted. His course was also complicated by a third L toe acute osteomyelitis seen on MRI. Podiatry and ID have been consulted.    Interval History: see "Hospital Course"    Review of Systems   Constitutional:  Positive for activity change.   Respiratory:  Positive for shortness of breath.    Cardiovascular:  Positive for leg swelling. Negative for chest pain and palpitations.   Skin:  Positive for wound.     Objective:     Vital Signs (Most Recent):  Temp: 97.7 °F (36.5 °C) (02/13/25 0353)  Pulse: 74 (02/13/25 0554)  Resp: 18 (02/13/25 0305)  BP: 93/61 (02/13/25 0554)  SpO2: 96 % (02/12/25 2016) Vital Signs (24h Range):  Temp:  [97.7 °F (36.5 °C)-98.1 °F (36.7 °C)] 97.7 °F (36.5 °C)  Pulse:  [] 74  Resp:  [17-46] 18  SpO2:  [96 %] 96 %  BP: ()/(54-80) 93/61     Weight: 93.9 kg (207 lb 0.2 oz)  Body mass index is 28.87 kg/m².    Intake/Output Summary (Last 24 hours) at 2/13/2025 2028  Last data filed at 2/13/2025 0646  Gross per 24 hour   Intake 720 ml   Output 3900 ml   Net -3180 ml         Physical Exam  Vitals and nursing " note reviewed.   Constitutional:       General: He is not in acute distress.  HENT:      Head: Normocephalic and atraumatic.      Right Ear: External ear normal.      Left Ear: External ear normal.      Nose: Nose normal.      Mouth/Throat:      Mouth: Mucous membranes are moist.   Eyes:      Extraocular Movements: Extraocular movements intact.      Conjunctiva/sclera: Conjunctivae normal.   Cardiovascular:      Rate and Rhythm: Normal rate. Rhythm irregular.      Pulses: Normal pulses.      Heart sounds: Normal heart sounds.   Pulmonary:      Effort: Pulmonary effort is normal.      Breath sounds: Rales present.      Comments: RA.  Abdominal:      General: Bowel sounds are normal. There is no distension.      Palpations: Abdomen is soft.      Tenderness: There is no abdominal tenderness.   Genitourinary:     Comments: Viri.  Musculoskeletal:      Cervical back: Normal range of motion and neck supple.      Right lower leg: Edema present.      Left lower leg: Edema present.   Skin:     General: Skin is warm and dry.      Comments: See pictures regarding wounds.   Neurological:      Mental Status: He is alert. Mental status is at baseline.   Psychiatric:         Behavior: Behavior normal.             Significant Labs: All pertinent labs within the past 24 hours have been reviewed.    Significant Imaging: I have reviewed all pertinent imaging results/findings within the past 24 hours.    Assessment and Plan     * Acute on chronic congestive heart failure  -Lasix and dobutamine infusions  -Hold metoprolol  -Telemetry  -Strict I's and O's  -Keep K > 4 and Mg > 2  -Cardiology consulted    Acute osteomyelitis  Not septic. L third toe.  -Follow up MRI R foot  -ID consulted  -Podiatry consulted  -Will defer initiation of antibiotics to ID and Podiatry pending possible bone biopsy      PAD (peripheral artery disease)  -No intervention indicated per Vascular Surgery at this time.      Anemia  Ferritin at lower limit of  normal.  -Trend Hgb with CBC    Paroxysmal atrial fibrillation  -Telemetry  -Lovenox  -Hold metoprolol while on dobutamine    MR (mitral regurgitation)  Seen on TTE at OSH.  -Cardiology consulted      Hypertension  -Home medications held  -Continue to monitor      GERD (gastroesophageal reflux disease)  -PPI        VTE Risk Mitigation (From admission, onward)           Ordered     enoxaparin injection 100 mg  Every 12 hours (non-standard times)         02/12/25 1622     IP VTE HIGH RISK PATIENT  Once         02/11/25 2331     Place sequential compression device  Until discontinued         02/11/25 2331                    Discharge Planning   PHILIP: 2/14/2025     Code Status: Full Code   Medical Readiness for Discharge Date:   Discharge Plan A: Skilled Nursing Facility   Discharge Delays: None known at this time            Please place Justification for DME        Dao Sterling MD  Department of Hospital Medicine   [facility]

## 2025-02-13 NOTE — ASSESSMENT & PLAN NOTE
Bedside debridement of left 3rd toe ulcer with bone debrided sent for culture analysis    MRI left forefoot:  Impression:  1. MR findings suspicious for acute osteomyelitis of the left 3rd toe.  2. Subcutaneous and intramuscular edema throughout the midfoot and forefoot.  No evidence of abscess formation.     Arterial ultrasounds reviewed.  Vascular surgery was consulted.  Discussed with vascular surgery. At this time vascular surgery states that they do believe that patient has adequate blood flow to heal a left 3rd toe amputation.    Discussed with patient in detail options for treatment for bone infection with wound to left 3rd toe.  Discussed IV antibiotics and wound care versus amputation.  Patient would like to proceed with amputation of left 3rd toe.     Plans for OR Friday, February 14th for amputation of left 3rd toe.    Recommend Aquacel Ag weaved in between all digits of left foot.  Followed by lightly wrapped Kerlix.    Patient will need to be weight-bearing as tolerated in a Cam walker boot following amputation of left 3rd toe.  Patient will need to wear cam walker boot at all times while transferring or walking until the sutures are removed at a proximally 2-3 weeks postop..    Infectious disease consulted    Carlos A and protein drinks     Counseled patient on increasing protein intake, not getting wound wet, keeping dressing clean dry and intact, following a healthy diet, elevating legs when able, removing pressure from wound    Discussed with vascular surgery

## 2025-02-13 NOTE — CONSULTS
Wound care done with Dr. Canchola and Rick. Cleaned and dried bilateral feet and toes, painted toes dry scabs with betadine, weaved aquacel ag thru toes on left foot, elevated on pillows.

## 2025-02-13 NOTE — SUBJECTIVE & OBJECTIVE
"Interval History: see "Hospital Course"    Review of Systems   Constitutional:  Positive for activity change.   Respiratory:  Positive for shortness of breath.    Cardiovascular:  Positive for leg swelling. Negative for chest pain and palpitations.   Skin:  Positive for wound.     Objective:     Vital Signs (Most Recent):  Temp: 97.7 °F (36.5 °C) (02/13/25 0353)  Pulse: 74 (02/13/25 0554)  Resp: 18 (02/13/25 0305)  BP: 93/61 (02/13/25 0554)  SpO2: 96 % (02/12/25 2016) Vital Signs (24h Range):  Temp:  [97.7 °F (36.5 °C)-98.1 °F (36.7 °C)] 97.7 °F (36.5 °C)  Pulse:  [] 74  Resp:  [17-46] 18  SpO2:  [96 %] 96 %  BP: ()/(54-80) 93/61     Weight: 93.9 kg (207 lb 0.2 oz)  Body mass index is 28.87 kg/m².    Intake/Output Summary (Last 24 hours) at 2/13/2025 0738  Last data filed at 2/13/2025 0646  Gross per 24 hour   Intake 720 ml   Output 3900 ml   Net -3180 ml         Physical Exam  Vitals and nursing note reviewed.   Constitutional:       General: He is not in acute distress.  HENT:      Head: Normocephalic and atraumatic.      Right Ear: External ear normal.      Left Ear: External ear normal.      Nose: Nose normal.      Mouth/Throat:      Mouth: Mucous membranes are moist.   Eyes:      Extraocular Movements: Extraocular movements intact.      Conjunctiva/sclera: Conjunctivae normal.   Cardiovascular:      Rate and Rhythm: Normal rate. Rhythm irregular.      Pulses: Normal pulses.      Heart sounds: Normal heart sounds.   Pulmonary:      Effort: Pulmonary effort is normal.      Breath sounds: Rales present.      Comments: RA.  Abdominal:      General: Bowel sounds are normal. There is no distension.      Palpations: Abdomen is soft.      Tenderness: There is no abdominal tenderness.   Genitourinary:     Comments: Meredith.  Musculoskeletal:      Cervical back: Normal range of motion and neck supple.      Right lower leg: Edema present.      Left lower leg: Edema present.   Skin:     General: Skin is warm and " dry.      Comments: See pictures regarding wounds.   Neurological:      Mental Status: He is alert. Mental status is at baseline.   Psychiatric:         Behavior: Behavior normal.             Significant Labs: All pertinent labs within the past 24 hours have been reviewed.    Significant Imaging: I have reviewed all pertinent imaging results/findings within the past 24 hours.

## 2025-02-13 NOTE — PLAN OF CARE
Pt has been accepted to [facility]. SONIA will meet with pt at bedside to discuss this.      02/13/25 0854   Post-Acute Status   Post-Acute Authorization Placement   Post-Acute Placement Status Referrals Sent   Discharge Plan   Discharge Plan A Skilled Nursing Facility       1150- SONIA met with pt at bedside to discuss SNF. Pt states he wants to go to [facility]. SONIA called Apolonia and notified her. Per [facility] she is going to submit for auth. SW will cont to follow. Pt choice signed into .     4667- SONIA called Apolonia with [facility] and asked her not to submit auth yet due to pt having a procedure tomorrow, 02/14/2025. Per Apolonia they have already submitted.

## 2025-02-13 NOTE — CONSULTS
[facility]  Podiatry  Consult Note    Patient Name: Juan C Sanchez  MRN: [id number]  Admission Date: 2/11/2025  Hospital Length of Stay: 2 days  Attending Physician: Dao Sterling MD  Primary Care Provider: Katlin Graf, NP     Consults  Subjective:     History of Present Illness:  Mr. Sanchez is a 69 yr old male with a hx of HTN, MR, PAF, chronic systolic CHF with EF of 25, OA, GERD, nonischemic cardiomyopathy, anemia who presented to the ED with a chief complaint of shortness of breath, leg swelling and hypotension.  Patient states that he had PT come to his home today and he was found to be hypotensive and advised to come to the emergency room.  Patient endorses shortness of breath as well as leg swelling, malaise, fatigue, occasional chills, productive cough with milky sputum, upper abdominal pain, occasional nausea, decreased urine output, and positional lightheadedness.  He states that these symptoms have been progressively worsening over the last 1-2 months possibly even longer  He states that his symptoms are worse when lying flat as well as with exertion and better with rest and while sitting up.  He states that he has been taking his home medications without improvement of his symptoms.  He does endorse that most of all of his contacts recently have been sick. He denies any home oxygen use.  He does endorse occasional PND and states that he has been having to sit to sleep.  He denies tobacco and recreational drug use and states he seldomly drinks alcohol.  He denies fever, chest pain, vomiting, diarrhea, dysuria, hematuria, dizziness, and syncope.     Upon arrival to ED, patient afebrile, HR of 109, RR of 24, BP of 95/62, satting 98% on RA.  Workup in the ED revealed RBC of 4.19, H/H of 10/32.8, sodium of 134, pCO2 of 31, GFR of 55.1, corrected calcium of 9, albumin of 3.1, BNP of 3163, troponin of 21.3, lactic acid of 2.1.  Blood cultures pending.  CXR shows enlarged cardiac  silhouette with pulmonary vascular congestion.  Bilateral pleural effusions.  Mild bibasilar airspace disease and or atelectasis.  Left foot x-ray shows osteopenia.  No overt erosions or osseous destruction.  No acute fracture or dislocation.  Mild multifocal midfoot arthritis.  Forefoot swelling.  Patient was given Lasix 20 mg IV, Zosyn 4.5 g IV, 500 mL NS bolus, vancomycin 20 mg/kg while in the ED. discussed case with ED provider and patient will be admitted under hospital medicine services for further management.        Once admitted, patient was noted to have chronic wounds to his left foot.  Podiatry was consulted.      Scheduled Meds:   amiodarone  200 mg Oral BID    ammonium lactate   Topical (Top) BID    ceFEPime IV (PEDS and ADULTS)  2 g Intravenous Q8H    enoxparin  1 mg/kg Subcutaneous Q12H    pantoprazole  40 mg Oral Daily    vancomycin (VANCOCIN) IV (PEDS and ADULTS)  2,000 mg Intravenous Q24H     Continuous Infusions:   DOBUTamine IV infusion (non-titrating)  5 mcg/kg/min Intravenous Continuous 7.4 mL/hr at 02/12/25 1805 5 mcg/kg/min at 02/12/25 1805    furosemide (LASIX) 2 mg/mL continuous infusion (non-titrating)  10 mg/hr Intravenous Continuous 5 mL/hr at 02/13/25 1023 10 mg/hr at 02/13/25 1023     PRN Meds:  Current Facility-Administered Medications:     acetaminophen, 650 mg, Oral, Q4H PRN    aluminum-magnesium hydroxide-simethicone, 30 mL, Oral, QID PRN    magnesium oxide, 800 mg, Oral, PRN    magnesium oxide, 800 mg, Oral, PRN    melatonin, 9 mg, Oral, Nightly PRN    metoprolol, 5 mg, Intravenous, Q5 Min PRN    midodrine, 2.5 mg, Oral, TID PRN    naloxone, 0.02 mg, Intravenous, PRN    ondansetron, 4 mg, Intravenous, Q6H PRN    oxyCODONE-acetaminophen, 1 tablet, Oral, Q8H PRN    potassium bicarbonate, 35 mEq, Oral, PRN    potassium bicarbonate, 50 mEq, Oral, PRN    potassium bicarbonate, 60 mEq, Oral, PRN    potassium, sodium phosphates, 2 packet, Oral, PRN    potassium, sodium phosphates, 2  packet, Oral, PRN    potassium, sodium phosphates, 2 packet, Oral, PRN    senna-docusate 8.6-50 mg, 1 tablet, Oral, BID PRN    sodium chloride 0.9%, 10 mL, Intravenous, PRN    sodium chloride 0.9%, 10 mL, Intravenous, PRN    Pharmacy to dose Vancomycin consult, , , Once **AND** vancomycin - pharmacy to dose, , Intravenous, pharmacy to manage frequency    Review of patient's allergies indicates:   Allergen Reactions    Gabapentin Other (See Comments)     Hypersomnia, nightmares    Morphine Other (See Comments)     Pt states systemic cramping        Past Medical History:   Diagnosis Date    Hypertension      Past Surgical History:   Procedure Laterality Date    ORIF closed comminuted displaced intra-articular fx distal left radius & application of external fixator Left 05/28/2018       Family History    None       Tobacco Use    Smoking status: Never    Smokeless tobacco: Never   Substance and Sexual Activity    Alcohol use: Yes     Comment: social    Drug use: No    Sexual activity: Not on file     Review of Systems  Objective:     Vital Signs (Most Recent):  Temp: 96.3 °F (35.7 °C) (02/13/25 1145)  Pulse: 101 (02/13/25 0800)  Resp: 18 (02/13/25 1145)  BP: 95/65 (02/13/25 1145)  SpO2: 96 % (02/12/25 2016) Vital Signs (24h Range):  Temp:  [96.3 °F (35.7 °C)-97.8 °F (36.6 °C)] 96.3 °F (35.7 °C)  Pulse:  [] 101  Resp:  [17-20] 18  SpO2:  [96 %] 96 %  BP: ()/(54-80) 95/65     Weight: 93.9 kg (207 lb 0.2 oz)  Body mass index is 28.87 kg/m².    Foot Exam    Right Foot/Ankle     Neurovascular  Dorsalis pedis: 2+  Posterior tibial: 2+  Saphenous nerve sensation: diminished  Tibial nerve sensation: diminished  Superficial peroneal nerve sensation: diminished  Deep peroneal nerve sensation: diminished  Sural nerve sensation: diminished      Left Foot/Ankle      Neurovascular  Dorsalis pedis: 2+  Posterior tibial: 2+  Saphenous nerve sensation: diminished  Tibial nerve sensation: diminished  Superficial peroneal  nerve sensation: diminished  Deep peroneal nerve sensation: diminished  Sural nerve sensation: diminished                Post debridement picture failed to load.  Grade 3 ulcer to left 3rd toe lateral aspect with bone fully exposed.  Joint exposed.  Surrounding erythema and edema.  Infectious drainage noted.    Grade 2 ulcer present to medial aspect of left 5th toe.  Probes to subcutaneous tissue  No signs of infection  Grade 2 ulcer present to medial aspect left 4th toe.  Probes to subcutaneous tissue No signs of infection                      Laboratory:  All pertinent labs reviewed within the last 24 hours.    Diagnostic Results:  I have reviewed all pertinent imaging results/findings within the past 24 hours.  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 MR findings suspicious for acute osteomyelitis of the left 3rd toe.  2. Subcutaneous and intramuscular edema throughout the midfoot and forefoot.  No evidence of abscess formation.     Arterial ultrasounds reviewed.  Vascular surgery was consulted.  Discussed with vascular surgery. At this time vascular surgery states that they do believe that patient has adequate blood flow to heal a left 3rd toe amputation.    Discussed with patient in detail options for treatment for bone infection with wound to left 3rd toe.  Discussed IV antibiotics and wound care versus amputation.  Patient would like to proceed with amputation of left 3rd toe.     Plans for OR Friday, February 14th for amputation of left 3rd toe.    Recommend Aquacel Ag weaved in between all digits of left foot.  Followed by lightly wrapped Kerlix.    Patient will need to be weight-bearing as tolerated in a Cam walker boot following amputation of left 3rd toe.  Patient will need to wear cam walker boot at all times while transferring or walking until  the sutures are removed at a proximally 2-3 weeks postop..    Infectious disease consulted    Carlos A and protein drinks     Counseled patient on increasing protein intake, not getting wound wet, keeping dressing clean dry and intact, following a healthy diet, elevating legs when able, removing pressure from wound    Discussed with vascular surgery         Thank you for your consult. I will follow-up with patient. Please contact us if you have any additional questions.    Loren España DPM  Podiatry  [facility]

## 2025-02-13 NOTE — PT/OT/SLP PROGRESS
Occupational Therapy      Patient Name:  Juan C Sanchez   MRN:  [id number]    Patient not seen today secondary to Patient unwilling to participate. Will follow-up tomorrow.    2/13/2025

## 2025-02-13 NOTE — DISCHARGE INSTRUCTIONS
Sacrum--Stage 2 pressure injury-POA-clean with soap and water.  Apply Triad  cream to area BID and PRN.       Bilateral Groins--MASD/Yeast--clean with soap and water.  Apply Miconazole powder BID and PRN.     Left heel-Fissure--clean with soap and water or wound cleanser.  Apply Xerofoam to fissue/heel.  Apply ABD pad, Wrap lightly with kerlex.  Change Daily and PRN     Left Foot/3rd and 4th toes--Clean with soap and water.  Dry thoroughly, especially between toes.  Apply Xerofoam between toes. Cover with gauze.  Wrap with Kerlex.  Change Daily and PRN.         [facility]  Facility Transfer Orders        Admit to: SNF    Diagnoses:   Active Hospital Problems    Diagnosis  POA    *Acute on chronic congestive heart failure [I50.9]  Yes    Moderate malnutrition [E44.0]  Yes    PAD (peripheral artery disease) [I73.9]  Yes    Acute osteomyelitis [M86.10]  Yes    Ulcer of left foot with necrosis of bone [L97.524]  Yes    Pressure injury of sacral region, stage 2 [L89.152]  Yes    MR (mitral regurgitation) [I34.0]  Yes    Paroxysmal atrial fibrillation [I48.0]  Yes    GERD (gastroesophageal reflux disease) [K21.9]  Yes    Anemia [D64.9]  Yes    Hypertension [I10]  Yes      Resolved Hospital Problems   No resolved problems to display.     Allergies:   Review of patient's allergies indicates:   Allergen Reactions    Gabapentin Other (See Comments)     Hypersomnia, nightmares    Morphine Other (See Comments)     Pt states systemic cramping       Code Status: Full    Vitals: Every shift       Diet: cardiac diet and 2 gram sodium diet    Activity:LLE: WBAT with camwalker boot     Nursing Precautions: Fall and Pressure ulcer prevention    Bed/Surface: Low Air Loss    Consults: PT to evaluate and treat- 5 times a week, OT to evaluate and treat- 5 times a week, and Wound Care    Oxygen: room air    Dialysis: Patient is not on dialysis.     Labs:   Check CBC, CMP, magnesium and phosphorus twice weekly while as  skilled nursing facility.      Follow-up:  Patient requires follow up with Cardiology in 2 weeks (heart failure and atrial fibrillation.  Discussed tapering amiodarone)   Podiatry within 2 weeks   Infectious Disease within 2 weeks     Pending Diagnostic Studies:       None              Miscellaneous Care:   Routine Skin for Bedridden Patients:  Apply moisture barrier cream to all  CHF Care: Daily Weight with notification of MD/NP of 2lb or > increase in 24 hours    v/s and O2 sat every shift    Oxygen as needed for sats <90%    Report abnormal breath sounds to MD/NP    Edema checks q shift- notify MD/NP of increased edema    Task segmentation by nursing for daily care to decrease exertion        CHF education to include diet ,medication, and CHF flags for MD notification    Wound care:     Left toe amputation site: continue xeroform to incision site, aquacel AG weaved in between digits with wounds, followed by conform, followed by lightly wrapped ace wrap. Three times a week changes.           Left heel-Fissure--clean with soap and water or wound cleanser.  Apply Xerofoam to fissue/heel.  Apply ABD pad, Wrap lightly with kerlex.  Change Daily and PRN    Bilateral Groins--MASD/Yeast--clean with soap and water.  Apply Miconazole powder BID and PRN.    Sacrum--Stage 2 pressure injury-POA-clean with soap and water.  Apply Triad  cream to area BID and PRN.    Lifevest:  Wear at all times except when showering.        Medications: Discontinue all previous medication orders, if any. See new list below.  Current Discharge Medication List        START taking these medications    Details   amoxicillin-clavulanate 875-125mg (AUGMENTIN) 875-125 mg per tablet Take 1 tablet by mouth 2 (two) times daily. for 8 days  Qty: 16 tablet, Refills: 0      apixaban (ELIQUIS) 5 mg Tab Take 1 tablet (5 mg total) by mouth 2 (two) times daily.  Qty: 60 tablet, Refills: 2      ferrous sulfate 325 (65 FE) MG EC tablet Take 1 tablet (325 mg  total) by mouth once daily.  Qty: 30 tablet, Refills: 2      magnesium oxide (MAG-OX) 400 mg (241.3 mg magnesium) tablet Take 1 tablet (400 mg total) by mouth every other day.  Qty: 15 tablet, Refills: 2      metoprolol succinate (TOPROL-XL) 25 MG 24 hr tablet Take 1 tablet (25 mg total) by mouth once daily.  Qty: 30 tablet, Refills: 2    Comments: .      spironolactone (ALDACTONE) 25 MG tablet Take 1 tablet (25 mg total) by mouth 2 (two) times daily.  Qty: 60 tablet, Refills: 2    Comments: .      tamsulosin (FLOMAX) 0.4 mg Cap Take 1 capsule (0.4 mg total) by mouth once daily.  Qty: 30 capsule, Refills: 2      torsemide (DEMADEX) 20 MG Tab Take 1 tablet (20 mg total) by mouth 2 (two) times daily before meals.  Qty: 60 tablet, Refills: 2    Comments: .      zinc sulfate (ZINCATE) 50 mg zinc (220 mg) capsule Take 1 capsule (220 mg total) by mouth once daily. for 15 days  Qty: 15 capsule, Refills: 0           CONTINUE these medications which have CHANGED    Details   acetaminophen (TYLENOL) 325 MG tablet Take 2 tablets (650 mg total) by mouth every 6 (six) hours as needed for Pain (Mild-moderate pain).      oxyCODONE-acetaminophen (PERCOCET)  mg per tablet Take 1 tablet by mouth every 8 (eight) hours as needed for Pain (Severe pain).  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      empagliflozin (JARDIANCE) 10 mg tablet Take 10 mg by mouth once daily.      gabapentin (NEURONTIN) 300 MG capsule Take 300 mg by mouth 2 (two) times daily.      pantoprazole (PROTONIX) 40 MG tablet Take 40 mg by mouth once daily.           STOP taking these medications       doxycycline (MONODOX) 100 MG capsule Comments:   Reason for Stopping:         ENTRESTO 24-26 mg per tablet Comments:   Reason for Stopping:          furosemide (LASIX) 20 MG tablet Comments:   Reason for Stopping:         levoFLOXacin (LEVAQUIN) 500 MG tablet Comments:   Reason for Stopping:         metoprolol tartrate (LOPRESSOR) 25 MG tablet Comments:   Reason for Stopping:         nitroGLYCERIN (NITROSTAT) 0.4 MG SL tablet Comments:   Reason for Stopping:         promethazine-dextromethorphan (PROMETHAZINE-DM) 6.25-15 mg/5 mL Syrp Comments:   Reason for Stopping:         carvediloL (COREG) 3.125 MG tablet Comments:   Reason for Stopping:             Follow up:    Follow-up Information       [facility] Follow up.    Specialties: Nursing Home Agency, SNF Agency, Skilled Nursing  Contact information:  [address] [place] [id number] [phone]                               Immunizations Administered as of 2/24/2025       No immunizations on file.                Isis Dean MD

## 2025-02-13 NOTE — ASSESSMENT & PLAN NOTE
Not septic. L third toe.  -Follow up MRI R foot  -ID consulted  -Podiatry consulted  -Will defer initiation of antibiotics to ID and Podiatry pending possible bone biopsy

## 2025-02-13 NOTE — PT/OT/SLP PROGRESS
Physical Therapy      Patient Name:  Juan C Sanchez   MRN:  [id number]    Patient not seen today secondary to Other (Comment) (Pt declined reports he is to have his toe amputated tomorrow and wants to rest.). Will follow-up 2/14/25.

## 2025-02-13 NOTE — PROCEDURES
"Juan C Sanchez is a 69 y.o. male patient.    Temp: 96.3 °F (35.7 °C) (02/13/25 1145)  Pulse: 101 (02/13/25 0800)  Resp: 18 (02/13/25 1145)  BP: 95/65 (02/13/25 1145)  SpO2: 96 % (02/12/25 2016)  Weight: 93.9 kg (207 lb 0.2 oz) (02/13/25 0604)  Height: 5' 11" (180.3 cm) (02/12/25 0050)       Debridement    Date/Time: 2/13/2025 12:38 PM    Performed by: Loren España DPM  Authorized by: Loren España DPM    Time out: Immediately prior to procedure a "time out" was called to verify the correct patient, procedure, equipment, support staff and site/side marked as required.    Consent Done?:  Yes (Verbal)    Preparation: Patient was prepped and draped in usual sterile fashion, Patient was prepped and draped with aseptic technique and Patient was prepped and draped with clean technique    Local anesthesia used?: No      Wound Details:    Location:  Left foot    Location:  Left 3rd Toe    Type of Debridement:  Excisional       Length (cm):  1.2       Width (cm):  1       Depth (cm):  0.5       Area (sq cm):  1.2       Percent Debrided (%):  60       Total Area Debrided (sq cm):  0.72    Depth of debridement:  Bone    Tissue debrided:  Bone and Subcutaneous    Devitalized tissue debrided:  Biofilm, Slough, Exudate and Fibrin    Instruments:  Forceps, Blade, Curette, Nippers and Rongeur  Patient tolerance:  Patient tolerated the procedure well with no immediate complications  1st Wound Pain Assessment: 1  Specimen Collected: Specimen sent to microbiology      2/13/2025    "

## 2025-02-13 NOTE — SUBJECTIVE & OBJECTIVE
Scheduled Meds:   amiodarone  200 mg Oral BID    ammonium lactate   Topical (Top) BID    ceFEPime IV (PEDS and ADULTS)  2 g Intravenous Q8H    enoxparin  1 mg/kg Subcutaneous Q12H    pantoprazole  40 mg Oral Daily    vancomycin (VANCOCIN) IV (PEDS and ADULTS)  2,000 mg Intravenous Q24H     Continuous Infusions:   DOBUTamine IV infusion (non-titrating)  5 mcg/kg/min Intravenous Continuous 7.4 mL/hr at 02/12/25 1805 5 mcg/kg/min at 02/12/25 1805    furosemide (LASIX) 2 mg/mL continuous infusion (non-titrating)  10 mg/hr Intravenous Continuous 5 mL/hr at 02/13/25 1023 10 mg/hr at 02/13/25 1023     PRN Meds:  Current Facility-Administered Medications:     acetaminophen, 650 mg, Oral, Q4H PRN    aluminum-magnesium hydroxide-simethicone, 30 mL, Oral, QID PRN    magnesium oxide, 800 mg, Oral, PRN    magnesium oxide, 800 mg, Oral, PRN    melatonin, 9 mg, Oral, Nightly PRN    metoprolol, 5 mg, Intravenous, Q5 Min PRN    midodrine, 2.5 mg, Oral, TID PRN    naloxone, 0.02 mg, Intravenous, PRN    ondansetron, 4 mg, Intravenous, Q6H PRN    oxyCODONE-acetaminophen, 1 tablet, Oral, Q8H PRN    potassium bicarbonate, 35 mEq, Oral, PRN    potassium bicarbonate, 50 mEq, Oral, PRN    potassium bicarbonate, 60 mEq, Oral, PRN    potassium, sodium phosphates, 2 packet, Oral, PRN    potassium, sodium phosphates, 2 packet, Oral, PRN    potassium, sodium phosphates, 2 packet, Oral, PRN    senna-docusate 8.6-50 mg, 1 tablet, Oral, BID PRN    sodium chloride 0.9%, 10 mL, Intravenous, PRN    sodium chloride 0.9%, 10 mL, Intravenous, PRN    Pharmacy to dose Vancomycin consult, , , Once **AND** vancomycin - pharmacy to dose, , Intravenous, pharmacy to manage frequency    Review of patient's allergies indicates:   Allergen Reactions    Gabapentin Other (See Comments)     Hypersomnia, nightmares    Morphine Other (See Comments)     Pt states systemic cramping        Past Medical History:   Diagnosis Date    Hypertension      Past Surgical History:    Procedure Laterality Date    ORIF closed comminuted displaced intra-articular fx distal left radius & application of external fixator Left 05/28/2018       Family History    None       Tobacco Use    Smoking status: Never    Smokeless tobacco: Never   Substance and Sexual Activity    Alcohol use: Yes     Comment: social    Drug use: No    Sexual activity: Not on file     Review of Systems  Objective:     Vital Signs (Most Recent):  Temp: 96.3 °F (35.7 °C) (02/13/25 1145)  Pulse: 101 (02/13/25 0800)  Resp: 18 (02/13/25 1145)  BP: 95/65 (02/13/25 1145)  SpO2: 96 % (02/12/25 2016) Vital Signs (24h Range):  Temp:  [96.3 °F (35.7 °C)-97.8 °F (36.6 °C)] 96.3 °F (35.7 °C)  Pulse:  [] 101  Resp:  [17-20] 18  SpO2:  [96 %] 96 %  BP: ()/(54-80) 95/65     Weight: 93.9 kg (207 lb 0.2 oz)  Body mass index is 28.87 kg/m².    Foot Exam    Right Foot/Ankle     Neurovascular  Dorsalis pedis: 2+  Posterior tibial: 2+  Saphenous nerve sensation: diminished  Tibial nerve sensation: diminished  Superficial peroneal nerve sensation: diminished  Deep peroneal nerve sensation: diminished  Sural nerve sensation: diminished      Left Foot/Ankle      Neurovascular  Dorsalis pedis: 2+  Posterior tibial: 2+  Saphenous nerve sensation: diminished  Tibial nerve sensation: diminished  Superficial peroneal nerve sensation: diminished  Deep peroneal nerve sensation: diminished  Sural nerve sensation: diminished                Post debridement picture failed to load.  Grade 3 ulcer to left 3rd toe lateral aspect with bone fully exposed.  Joint exposed.  Surrounding erythema and edema.  Infectious drainage noted.    Grade 2 ulcer present to medial aspect of left 5th toe.  Probes to subcutaneous tissue  No signs of infection  Grade 2 ulcer present to medial aspect left 4th toe.  Probes to subcutaneous tissue No signs of infection                      Laboratory:  All pertinent labs reviewed within the last 24 hours.    Diagnostic  Results:  I have reviewed all pertinent imaging results/findings within the past 24 hours.

## 2025-02-14 ENCOUNTER — ANESTHESIA (OUTPATIENT)
Dept: SURGERY | Facility: HOSPITAL | Age: 70
End: 2025-02-14
Payer: MEDICARE

## 2025-02-14 ENCOUNTER — ANESTHESIA EVENT (OUTPATIENT)
Dept: CARDIOLOGY | Facility: HOSPITAL | Age: 70
End: 2025-02-14
Payer: MEDICARE

## 2025-02-14 ENCOUNTER — ANESTHESIA EVENT (OUTPATIENT)
Dept: SURGERY | Facility: HOSPITAL | Age: 70
End: 2025-02-14
Payer: MEDICARE

## 2025-02-14 ENCOUNTER — ANESTHESIA (OUTPATIENT)
Dept: CARDIOLOGY | Facility: HOSPITAL | Age: 70
End: 2025-02-14
Payer: MEDICARE

## 2025-02-14 ENCOUNTER — CLINICAL SUPPORT (OUTPATIENT)
Dept: CARDIOLOGY | Facility: HOSPITAL | Age: 70
End: 2025-02-14
Attending: INTERNAL MEDICINE
Payer: MEDICARE

## 2025-02-14 VITALS
BODY MASS INDEX: 27 KG/M2 | RESPIRATION RATE: 17 BRPM | WEIGHT: 192.88 LBS | DIASTOLIC BLOOD PRESSURE: 52 MMHG | SYSTOLIC BLOOD PRESSURE: 85 MMHG | HEART RATE: 84 BPM | HEIGHT: 71 IN | OXYGEN SATURATION: 100 %

## 2025-02-14 LAB
ALBUMIN SERPL BCP-MCNC: 3.1 G/DL (ref 3.5–5.2)
ALP SERPL-CCNC: 79 U/L (ref 55–135)
ALT SERPL W/O P-5'-P-CCNC: 6 U/L (ref 10–44)
ANION GAP SERPL CALC-SCNC: 8 MMOL/L (ref 8–16)
AST SERPL-CCNC: 11 U/L (ref 10–40)
BASOPHILS # BLD AUTO: 0.05 K/UL (ref 0–0.2)
BASOPHILS NFR BLD: 0.9 % (ref 0–1.9)
BILIRUB SERPL-MCNC: 0.8 MG/DL (ref 0.1–1)
BSA FOR ECHO PROCEDURE: 2.09 M2
BUN SERPL-MCNC: 19 MG/DL (ref 8–23)
CALCIUM SERPL-MCNC: 8.8 MG/DL (ref 8.7–10.5)
CHLORIDE SERPL-SCNC: 97 MMOL/L (ref 95–110)
CO2 SERPL-SCNC: 32 MMOL/L (ref 23–29)
CREAT SERPL-MCNC: 1.2 MG/DL (ref 0.5–1.4)
DIFFERENTIAL METHOD BLD: ABNORMAL
EOSINOPHIL # BLD AUTO: 0.2 K/UL (ref 0–0.5)
EOSINOPHIL NFR BLD: 4.3 % (ref 0–8)
ERYTHROCYTE [DISTWIDTH] IN BLOOD BY AUTOMATED COUNT: 18.4 % (ref 11.5–14.5)
EST. GFR  (NO RACE VARIABLE): >60 ML/MIN/1.73 M^2
GLUCOSE SERPL-MCNC: 87 MG/DL (ref 70–110)
HCT VFR BLD AUTO: 32.5 % (ref 40–54)
HGB BLD-MCNC: 10 G/DL (ref 14–18)
IMM GRANULOCYTES # BLD AUTO: 0.02 K/UL (ref 0–0.04)
IMM GRANULOCYTES NFR BLD AUTO: 0.4 % (ref 0–0.5)
LYMPHOCYTES # BLD AUTO: 1.6 K/UL (ref 1–4.8)
LYMPHOCYTES NFR BLD: 29.1 % (ref 18–48)
MAGNESIUM SERPL-MCNC: 2 MG/DL (ref 1.6–2.6)
MCH RBC QN AUTO: 23.5 PG (ref 27–31)
MCHC RBC AUTO-ENTMCNC: 30.8 G/DL (ref 32–36)
MCV RBC AUTO: 76 FL (ref 82–98)
MICROSCOPIC COMMENT: NORMAL
MONOCYTES # BLD AUTO: 0.7 K/UL (ref 0.3–1)
MONOCYTES NFR BLD: 12.1 % (ref 4–15)
NEUTROPHILS # BLD AUTO: 3 K/UL (ref 1.8–7.7)
NEUTROPHILS NFR BLD: 53.2 % (ref 38–73)
NRBC BLD-RTO: 0 /100 WBC
PLATELET # BLD AUTO: 253 K/UL (ref 150–450)
PMV BLD AUTO: 9.6 FL (ref 9.2–12.9)
POTASSIUM SERPL-SCNC: 3.7 MMOL/L (ref 3.5–5.1)
PROT SERPL-MCNC: 7.2 G/DL (ref 6–8.4)
RBC # BLD AUTO: 4.26 M/UL (ref 4.6–6.2)
SODIUM SERPL-SCNC: 137 MMOL/L (ref 136–145)
WBC # BLD AUTO: 5.56 K/UL (ref 3.9–12.7)

## 2025-02-14 PROCEDURE — 36000706: Performed by: PODIATRIST

## 2025-02-14 PROCEDURE — 85025 COMPLETE CBC W/AUTO DIFF WBC: CPT

## 2025-02-14 PROCEDURE — 25000003 PHARM REV CODE 250: Performed by: NURSE ANESTHETIST, CERTIFIED REGISTERED

## 2025-02-14 PROCEDURE — 63600175 PHARM REV CODE 636 W HCPCS: Performed by: NURSE PRACTITIONER

## 2025-02-14 PROCEDURE — 21400001 HC TELEMETRY ROOM

## 2025-02-14 PROCEDURE — 63600175 PHARM REV CODE 636 W HCPCS: Performed by: NURSE ANESTHETIST, CERTIFIED REGISTERED

## 2025-02-14 PROCEDURE — 71000039 HC RECOVERY, EACH ADD'L HOUR: Performed by: PODIATRIST

## 2025-02-14 PROCEDURE — 25000003 PHARM REV CODE 250

## 2025-02-14 PROCEDURE — 25000003 PHARM REV CODE 250: Performed by: INTERNAL MEDICINE

## 2025-02-14 PROCEDURE — 93010 ELECTROCARDIOGRAM REPORT: CPT | Mod: ,,, | Performed by: GENERAL PRACTICE

## 2025-02-14 PROCEDURE — 83735 ASSAY OF MAGNESIUM: CPT

## 2025-02-14 PROCEDURE — 27000207 HC ISOLATION

## 2025-02-14 PROCEDURE — 63600175 PHARM REV CODE 636 W HCPCS: Performed by: PODIATRIST

## 2025-02-14 PROCEDURE — 94761 N-INVAS EAR/PLS OXIMETRY MLT: CPT

## 2025-02-14 PROCEDURE — 0Y6U0Z0 DETACHMENT AT LEFT 3RD TOE, COMPLETE, OPEN APPROACH: ICD-10-PCS | Performed by: PODIATRIST

## 2025-02-14 PROCEDURE — 88305 TISSUE EXAM BY PATHOLOGIST: CPT | Mod: TC | Performed by: PATHOLOGY

## 2025-02-14 PROCEDURE — 81001 URINALYSIS AUTO W/SCOPE: CPT | Performed by: INTERNAL MEDICINE

## 2025-02-14 PROCEDURE — 71000033 HC RECOVERY, INTIAL HOUR: Performed by: PODIATRIST

## 2025-02-14 PROCEDURE — 25000003 PHARM REV CODE 250: Performed by: PODIATRIST

## 2025-02-14 PROCEDURE — 36000707: Performed by: PODIATRIST

## 2025-02-14 PROCEDURE — 0JBR0ZZ EXCISION OF LEFT FOOT SUBCUTANEOUS TISSUE AND FASCIA, OPEN APPROACH: ICD-10-PCS | Performed by: PODIATRIST

## 2025-02-14 PROCEDURE — 37000008 HC ANESTHESIA 1ST 15 MINUTES: Performed by: INTERNAL MEDICINE

## 2025-02-14 PROCEDURE — 5A2204Z RESTORATION OF CARDIAC RHYTHM, SINGLE: ICD-10-PCS | Performed by: INTERNAL MEDICINE

## 2025-02-14 PROCEDURE — 99233 SBSQ HOSP IP/OBS HIGH 50: CPT | Mod: ,,, | Performed by: NURSE PRACTITIONER

## 2025-02-14 PROCEDURE — 63600175 PHARM REV CODE 636 W HCPCS

## 2025-02-14 PROCEDURE — 37000008 HC ANESTHESIA 1ST 15 MINUTES: Performed by: PODIATRIST

## 2025-02-14 PROCEDURE — 37000009 HC ANESTHESIA EA ADD 15 MINS: Performed by: INTERNAL MEDICINE

## 2025-02-14 PROCEDURE — 37000009 HC ANESTHESIA EA ADD 15 MINS: Performed by: PODIATRIST

## 2025-02-14 PROCEDURE — 93325 DOPPLER ECHO COLOR FLOW MAPG: CPT

## 2025-02-14 PROCEDURE — 99233 SBSQ HOSP IP/OBS HIGH 50: CPT | Mod: 25,,, | Performed by: INTERNAL MEDICINE

## 2025-02-14 PROCEDURE — 11042 DBRDMT SUBQ TIS 1ST 20SQCM/<: CPT | Mod: 59,,, | Performed by: PODIATRIST

## 2025-02-14 PROCEDURE — 93005 ELECTROCARDIOGRAM TRACING: CPT | Performed by: GENERAL PRACTICE

## 2025-02-14 PROCEDURE — 25000003 PHARM REV CODE 250: Performed by: STUDENT IN AN ORGANIZED HEALTH CARE EDUCATION/TRAINING PROGRAM

## 2025-02-14 PROCEDURE — 80053 COMPREHEN METABOLIC PANEL: CPT

## 2025-02-14 PROCEDURE — 28820 AMPUTATION OF TOE: CPT | Mod: T2,,, | Performed by: PODIATRIST

## 2025-02-14 PROCEDURE — 36415 COLL VENOUS BLD VENIPUNCTURE: CPT

## 2025-02-14 RX ORDER — GLUCAGON 1 MG
1 KIT INJECTION
Status: DISCONTINUED | OUTPATIENT
Start: 2025-02-14 | End: 2025-02-14 | Stop reason: HOSPADM

## 2025-02-14 RX ORDER — ACETAMINOPHEN 10 MG/ML
INJECTION, SOLUTION INTRAVENOUS
Status: DISCONTINUED | OUTPATIENT
Start: 2025-02-14 | End: 2025-02-14

## 2025-02-14 RX ORDER — PROPOFOL 10 MG/ML
VIAL (ML) INTRAVENOUS
Status: DISCONTINUED | OUTPATIENT
Start: 2025-02-14 | End: 2025-02-14

## 2025-02-14 RX ORDER — TRAMADOL HYDROCHLORIDE 50 MG/1
50 TABLET ORAL EVERY 4 HOURS PRN
Status: DISCONTINUED | OUTPATIENT
Start: 2025-02-14 | End: 2025-02-24 | Stop reason: HOSPADM

## 2025-02-14 RX ORDER — ONDANSETRON HYDROCHLORIDE 2 MG/ML
4 INJECTION, SOLUTION INTRAVENOUS DAILY PRN
Status: DISCONTINUED | OUTPATIENT
Start: 2025-02-14 | End: 2025-02-14 | Stop reason: HOSPADM

## 2025-02-14 RX ORDER — CEFAZOLIN 2 G/1
2 INJECTION, POWDER, FOR SOLUTION INTRAMUSCULAR; INTRAVENOUS ONCE
Status: DISCONTINUED | OUTPATIENT
Start: 2025-02-14 | End: 2025-02-14

## 2025-02-14 RX ORDER — LIDOCAINE HYDROCHLORIDE 20 MG/ML
INJECTION, SOLUTION EPIDURAL; INFILTRATION; INTRACAUDAL; PERINEURAL
Status: DISCONTINUED | OUTPATIENT
Start: 2025-02-14 | End: 2025-02-14

## 2025-02-14 RX ORDER — DIPHENHYDRAMINE HYDROCHLORIDE 50 MG/ML
12.5 INJECTION, SOLUTION INTRAMUSCULAR; INTRAVENOUS
Status: DISCONTINUED | OUTPATIENT
Start: 2025-02-14 | End: 2025-02-14 | Stop reason: HOSPADM

## 2025-02-14 RX ORDER — ONDANSETRON 4 MG/1
8 TABLET, ORALLY DISINTEGRATING ORAL EVERY 8 HOURS PRN
Status: CANCELLED | OUTPATIENT
Start: 2025-02-14

## 2025-02-14 RX ORDER — HYDROMORPHONE HYDROCHLORIDE 1 MG/ML
0.2 INJECTION, SOLUTION INTRAMUSCULAR; INTRAVENOUS; SUBCUTANEOUS EVERY 5 MIN PRN
Status: DISCONTINUED | OUTPATIENT
Start: 2025-02-14 | End: 2025-02-14 | Stop reason: HOSPADM

## 2025-02-14 RX ORDER — ONDANSETRON HYDROCHLORIDE 2 MG/ML
INJECTION, SOLUTION INTRAVENOUS
Status: DISCONTINUED | OUTPATIENT
Start: 2025-02-14 | End: 2025-02-14

## 2025-02-14 RX ORDER — PHENAZOPYRIDINE HYDROCHLORIDE 100 MG/1
100 TABLET, FILM COATED ORAL
Status: DISCONTINUED | OUTPATIENT
Start: 2025-02-14 | End: 2025-02-24

## 2025-02-14 RX ORDER — BUPIVACAINE 13.3 MG/ML
INJECTION, SUSPENSION, LIPOSOMAL INFILTRATION
Status: DISCONTINUED | OUTPATIENT
Start: 2025-02-14 | End: 2025-02-14 | Stop reason: HOSPADM

## 2025-02-14 RX ORDER — HYDROCODONE BITARTRATE AND ACETAMINOPHEN 5; 325 MG/1; MG/1
1 TABLET ORAL EVERY 4 HOURS PRN
Status: CANCELLED | OUTPATIENT
Start: 2025-02-14

## 2025-02-14 RX ORDER — LIDOCAINE HYDROCHLORIDE 20 MG/ML
JELLY TOPICAL
Status: DISCONTINUED | OUTPATIENT
Start: 2025-02-14 | End: 2025-02-14

## 2025-02-14 RX ORDER — PROMETHAZINE HYDROCHLORIDE 25 MG/1
25 TABLET ORAL EVERY 6 HOURS PRN
Status: CANCELLED | OUTPATIENT
Start: 2025-02-14

## 2025-02-14 RX ORDER — SPIRONOLACTONE 25 MG/1
25 TABLET ORAL DAILY
Status: DISCONTINUED | OUTPATIENT
Start: 2025-02-14 | End: 2025-02-19

## 2025-02-14 RX ORDER — MUPIROCIN 20 MG/G
OINTMENT TOPICAL 2 TIMES DAILY
Status: DISPENSED | OUTPATIENT
Start: 2025-02-14 | End: 2025-02-19

## 2025-02-14 RX ORDER — FENTANYL CITRATE 50 UG/ML
INJECTION, SOLUTION INTRAMUSCULAR; INTRAVENOUS
Status: DISCONTINUED | OUTPATIENT
Start: 2025-02-14 | End: 2025-02-14

## 2025-02-14 RX ORDER — PHENYLEPHRINE HYDROCHLORIDE 10 MG/ML
INJECTION INTRAVENOUS
Status: DISCONTINUED | OUTPATIENT
Start: 2025-02-14 | End: 2025-02-14

## 2025-02-14 RX ORDER — FAMOTIDINE 10 MG/ML
INJECTION INTRAVENOUS
Status: DISCONTINUED | OUTPATIENT
Start: 2025-02-14 | End: 2025-02-14

## 2025-02-14 RX ORDER — HYDROCODONE BITARTRATE AND ACETAMINOPHEN 10; 325 MG/1; MG/1
1 TABLET ORAL EVERY 4 HOURS PRN
Status: CANCELLED | OUTPATIENT
Start: 2025-02-14

## 2025-02-14 RX ORDER — BUPIVACAINE HYDROCHLORIDE 5 MG/ML
INJECTION, SOLUTION EPIDURAL; INTRACAUDAL; PERINEURAL
Status: DISCONTINUED | OUTPATIENT
Start: 2025-02-14 | End: 2025-02-14 | Stop reason: HOSPADM

## 2025-02-14 RX ORDER — OXYCODONE HYDROCHLORIDE 5 MG/1
5 TABLET ORAL
Status: DISCONTINUED | OUTPATIENT
Start: 2025-02-14 | End: 2025-02-14 | Stop reason: HOSPADM

## 2025-02-14 RX ADMIN — PROPOFOL 20 MG: 10 INJECTION, EMULSION INTRAVENOUS at 09:02

## 2025-02-14 RX ADMIN — CEFEPIME 2 G: 2 INJECTION, POWDER, FOR SOLUTION INTRAVENOUS at 01:02

## 2025-02-14 RX ADMIN — PROPOFOL 50 MG: 10 INJECTION, EMULSION INTRAVENOUS at 09:02

## 2025-02-14 RX ADMIN — VANCOMYCIN HYDROCHLORIDE 2000 MG: 500 INJECTION, POWDER, LYOPHILIZED, FOR SOLUTION INTRAVENOUS at 12:02

## 2025-02-14 RX ADMIN — AMIODARONE HYDROCHLORIDE 200 MG: 200 TABLET ORAL at 07:02

## 2025-02-14 RX ADMIN — AMIODARONE HYDROCHLORIDE 200 MG: 200 TABLET ORAL at 03:02

## 2025-02-14 RX ADMIN — CEFEPIME 2 G: 2 INJECTION, POWDER, FOR SOLUTION INTRAVENOUS at 05:02

## 2025-02-14 RX ADMIN — FAMOTIDINE 20 MG: 10 INJECTION, SOLUTION INTRAVENOUS at 12:02

## 2025-02-14 RX ADMIN — SODIUM CHLORIDE 10 MG/HR: 9 INJECTION, SOLUTION INTRAVENOUS at 01:02

## 2025-02-14 RX ADMIN — MIDODRINE HYDROCHLORIDE 2.5 MG: 2.5 TABLET ORAL at 10:02

## 2025-02-14 RX ADMIN — OXYCODONE HYDROCHLORIDE AND ACETAMINOPHEN 1 TABLET: 10; 325 TABLET ORAL at 03:02

## 2025-02-14 RX ADMIN — ACETAMINOPHEN 1000 MG: 10 INJECTION, SOLUTION INTRAVENOUS at 01:02

## 2025-02-14 RX ADMIN — PHENAZOPYRIDINE HYDROCHLORIDE 100 MG: 100 TABLET ORAL at 10:02

## 2025-02-14 RX ADMIN — PROPOFOL 100 MG: 10 INJECTION, EMULSION INTRAVENOUS at 12:02

## 2025-02-14 RX ADMIN — MUPIROCIN 1 G: 20 OINTMENT TOPICAL at 07:02

## 2025-02-14 RX ADMIN — ONDANSETRON 4 MG: 2 INJECTION INTRAMUSCULAR; INTRAVENOUS at 12:02

## 2025-02-14 RX ADMIN — LIDOCAINE HYDROCHLORIDE 50 MG: 20 INJECTION, SOLUTION EPIDURAL; INFILTRATION; INTRACAUDAL; PERINEURAL at 09:02

## 2025-02-14 RX ADMIN — POTASSIUM BICARBONATE 50 MEQ: 978 TABLET, EFFERVESCENT ORAL at 06:02

## 2025-02-14 RX ADMIN — ENOXAPARIN SODIUM 100 MG: 100 INJECTION SUBCUTANEOUS at 05:02

## 2025-02-14 RX ADMIN — PHENYLEPHRINE HYDROCHLORIDE 100 MCG: 10 INJECTION INTRAVENOUS at 12:02

## 2025-02-14 RX ADMIN — PANTOPRAZOLE SODIUM 40 MG: 40 TABLET, DELAYED RELEASE ORAL at 05:02

## 2025-02-14 RX ADMIN — PHENYLEPHRINE HYDROCHLORIDE 200 MCG: 10 INJECTION INTRAVENOUS at 12:02

## 2025-02-14 RX ADMIN — LIDOCAINE HYDROCHLORIDE 50 MG: 20 INJECTION, SOLUTION INTRAVENOUS at 12:02

## 2025-02-14 RX ADMIN — SODIUM CHLORIDE: 9 INJECTION, SOLUTION INTRAVENOUS at 12:02

## 2025-02-14 RX ADMIN — LIDOCAINE HYDROCHLORIDE 4 ML: 20 JELLY TOPICAL at 12:02

## 2025-02-14 RX ADMIN — FENTANYL CITRATE 50 MCG: 50 INJECTION, SOLUTION INTRAMUSCULAR; INTRAVENOUS at 12:02

## 2025-02-14 RX ADMIN — CEFEPIME 2 G: 2 INJECTION, POWDER, FOR SOLUTION INTRAVENOUS at 12:02

## 2025-02-14 RX ADMIN — SODIUM CHLORIDE: 0.9 INJECTION, SOLUTION INTRAVENOUS at 09:02

## 2025-02-14 RX ADMIN — TRAMADOL HYDROCHLORIDE 50 MG: 50 TABLET, COATED ORAL at 09:02

## 2025-02-14 RX ADMIN — DOBUTAMINE HYDROCHLORIDE 5 MCG/KG/MIN: 400 INJECTION INTRAVENOUS at 01:02

## 2025-02-14 RX ADMIN — OXYCODONE HYDROCHLORIDE AND ACETAMINOPHEN 1 TABLET: 10; 325 TABLET ORAL at 11:02

## 2025-02-14 NOTE — PT/OT/SLP PROGRESS
Occupational Therapy      Patient Name:  Juan C Sanchez   MRN:  [id number]    Patient not seen today secondary to Off the floor for procedure/surgery. Will follow-up next service date.    2/14/2025

## 2025-02-14 NOTE — PLAN OF CARE
Problem: Adult Inpatient Plan of Care  Goal: Plan of Care Review  Outcome: Not Progressing  Goal: Patient-Specific Goal (Individualized)  Outcome: Not Progressing  Goal: Absence of Hospital-Acquired Illness or Injury  Outcome: Not Progressing  Goal: Optimal Comfort and Wellbeing  Outcome: Not Progressing  Goal: Readiness for Transition of Care  Outcome: Not Progressing     Problem: Wound  Goal: Optimal Coping  Outcome: Not Progressing  Goal: Optimal Functional Ability  Outcome: Not Progressing  Goal: Absence of Infection Signs and Symptoms  Outcome: Not Progressing  Goal: Improved Oral Intake  Outcome: Not Progressing  Goal: Optimal Pain Control and Function  Outcome: Not Progressing  Goal: Skin Health and Integrity  Outcome: Not Progressing  Goal: Optimal Wound Healing  Outcome: Not Progressing     Problem: Fall Injury Risk  Goal: Absence of Fall and Fall-Related Injury  Outcome: Not Progressing     Problem: Infection  Goal: Absence of Infection Signs and Symptoms  Outcome: Not Progressing

## 2025-02-14 NOTE — ANESTHESIA PREPROCEDURE EVALUATION
02/14/2025  Juan C Sanchez is a 69 y.o., male.      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 He has never used smokeless tobacco.     Results for orders placed or performed during the hospital encounter of 02/11/25   EKG 12-lead    Collection Time: 02/14/25 10:19 AM   Result Value Ref Range    QRS Duration 108 ms    [facility] QTC Calculation 504 ms    Narrative    Test Reason : Z13.6,    Vent. Rate :  93 BPM     Atrial Rate :  93 BPM     P-R Int : 186 ms          QRS Dur : 108 ms      QT Int : 406 ms       P-R-T Axes :  64  41  63 degrees    QTcB Int : 504 ms    Sinus rhythm with Premature atrial complexes  Nonspecific T wave abnormality  Prolonged QT  Abnormal ECG  When compared with ECG of 11-Feb-2025 17:14,  Sinus rhythm has replaced Atrial fibrillation  Questionable change in The axis  Nonspecific T wave abnormality, improved in Inferior leads  Nonspecific T wave abnormality, improved in Anterior leads    Referred By: AAAREFERRAL SELF           Confirmed By:         Imaging Results              CT Abdomen Pelvis  Without Contrast (Final result)  Result time 02/12/25 07:10:42      Final result by Alejandro Carney MD (02/12/25 07:10:42)                    Impression:      Small bilateral pleural effusions, pulmonary edema, as well as diffuse body wall/mesenteric edema.    Small volume free fluid in the abdomen and pelvis.    Nodular hepatic contour suggestive of cirrhosis.    Cholelithiasis.    Diverticulosis of the distal colon.    CT findings suggestive of anemia.    Atherosclerosis, fluid in the left inguinal canal, and other incidental findings as above.      Electronically signed by: Alejandro Carney  Date:    02/12/2025  Time:    07:10               Narrative:    EXAMINATION:  CT ABDOMEN PELVIS WITHOUT CONTRAST    CLINICAL HISTORY:  Abdominal pain, acute, nonlocalized;    TECHNIQUE:  Axial CT imaging obtained through the abdomen and pelvis without contrast, coronal and sagittal reformatted images    CMS Mandated Quality Data-CT Radiation Dose-436    All CT scans at this facility dose modulation, iterative reconstruction, and or weight-based dosing when appropriate to reduce radiation dose to as low as reasonably achievable.    COMPARISON:  None    FINDINGS:  Imaging through the lower thorax shows interlobular septal thickening and small volume pleural fluid bilaterally, potentially on the basis of pulmonary edema.  There is diffuse body wall edema.  Bone window images show no acute or aggressive osseous abnormality.  Thoracolumbar degenerative changes and scoliosis.    Low-attenuation of blood pool relative to myocardium suggesting anemia.    On this noncontrast exam, no focal hepatic or splenic lesion.  Somewhat nodular hepatic contour raising the possibility of cirrhosis.  Cholelithiasis.  No intrahepatic or extrahepatic bile duct dilation.  Small volume perihepatic fluid and perisplenic fluid.  Mesenteric edema.  No focal pancreatic lesion.  No adrenal lesion.  Left upper pole renal cysts.  No renal calculi.  Ureters are normal in caliber.  Urinary bladder is unremarkable.    Stomach is unremarkable.  No evidence of small-bowel obstruction.  No evidence  of colitis.  Distal colonic diverticula.    Aortoiliac atherosclerotic calcification.  Prominent retroperitoneal and mesenteric lymph nodes.  Prominent linn hepatis and portacaval lymph nodes.  Small volume free fluid along the pericolic gutters and in the pelvis.  Fluid in the left inguinal canal.                                       US Lower Extremity Veins Bilateral (Final result)  Result time 02/12/25 00:38:40      Final result by Rocco Levin MD (02/12/25 00:38:40)                   Impression:      No evidence of deep venous thrombosis in either lower extremity.    Additional findings as above.      Electronically signed by: Rocco Levin MD  Date:    02/12/2025  Time:    00:38               Narrative:    EXAMINATION:  US LOWER EXTREMITY VEINS BILATERAL    CLINICAL HISTORY:  extensive bilateral lower extremity edema;    TECHNIQUE:  Duplex and color flow Doppler and dynamic compression was performed of the bilateral lower extremity veins was performed.    COMPARISON:  None    FINDINGS:  Right thigh veins: The common femoral, femoral, popliteal, upper greater saphenous, and deep femoral veins are patent and free of thrombus. The veins are normally compressible and have normal phasic flow and augmentation response.    Right calf veins: The visualized calf veins are patent.    Left thigh veins: The common femoral, femoral, popliteal, upper greater saphenous, and deep femoral veins are patent and free of thrombus. The veins are normally compressible and have normal phasic flow and augmentation response.    Left calf veins: The visualized calf veins are patent.    Miscellaneous: Soft tissue edema present.  Few prominent lymph nodes left groin, nonspecific.                                       X-Ray Foot Complete Left (Final result)  Result time 02/11/25 21:08:06      Final result by Eusebio Leonardo MD (02/11/25 21:08:06)                   Impression:      As above.  If concern for osteomyelitis,  recommend MRI.      Electronically signed by: Eusebio Leonardo  Date:    02/11/2025  Time:    21:08               Narrative:    EXAMINATION:  XR FOOT COMPLETE 3 VIEW LEFT    CLINICAL HISTORY:  .  Other specified soft tissue disorders    TECHNIQUE:  AP, lateral and oblique views of the left foot were performed.    COMPARISON:  None    FINDINGS:  Osteopenia.  No overt erosions or osseous destruction.  No acute fracture or dislocation.  Mild multifocal midfoot osteoarthritis.  Forefoot swelling.                                       X-Ray Chest AP Portable (Final result)  Result time 02/11/25 18:27:01      Final result by Eusebio Leonardo MD (02/11/25 18:27:01)                   Impression:      As above      Electronically signed by: Eusebio Leonardo  Date:    02/11/2025  Time:    18:27               Narrative:    EXAMINATION:  XR CHEST AP PORTABLE    CLINICAL HISTORY:  Sepsis;    TECHNIQUE:  Single frontal view of the chest was performed.    COMPARISON:  05/28/2018    FINDINGS:  Enlarged cardiac silhouette with pulmonary vascular congestion.  Bilateral pleural effusions.  Mild bibasilar airspace disease and/or atelectasis.                                       Lab Results   Component Value Date    WBC 5.56 02/14/2025    HGB 10.0 (L) 02/14/2025    HCT 32.5 (L) 02/14/2025    MCV 76 (L) 02/14/2025     02/14/2025     BMP  Lab Results   Component Value Date     02/14/2025    K 3.7 02/14/2025    CL 97 02/14/2025    CO2 32 (H) 02/14/2025    BUN 19 02/14/2025    CREATININE 1.2 02/14/2025    CALCIUM 8.8 02/14/2025    ANIONGAP 8 02/14/2025    GLU 87 02/14/2025     (H) 02/13/2025     (H) 02/12/2025       No results found for this or any previous visit.          Pre-op Assessment    I have reviewed the Patient Summary Reports.     I have reviewed the Nursing Notes. I have reviewed the NPO Status.   I have reviewed the Medications.     Review of Systems  Anesthesia Hx:  No problems with  previous Anesthesia             Denies Family Hx of Anesthesia complications.    Denies Personal Hx of Anesthesia complications.                    Social:  Non-Smoker       Hematology/Oncology:       -- Anemia:                                  EENT/Dental:  EENT/Dental Normal           Cardiovascular:     Hypertension    Dysrhythmias atrial fibrillation  CHF       ECG has been reviewed.                            Pulmonary:  Pulmonary Normal                       Renal/:  Renal/ Normal                 Hepatic/GI:     GERD                Musculoskeletal:  Arthritis (Bilateral knees)               Neurological:  Neurology Normal                                      Endocrine:  Endocrine Normal            Psych:  Psychiatric Normal                    Physical Exam  General: Well nourished    Airway:  Mallampati: II   Mouth Opening: Normal  TM Distance: 4 - 6 cm  Tongue: Normal  Neck ROM: Normal ROM    Dental:  Intact    Chest/Lungs:  Clear to auscultation, Normal Respiratory Rate    Heart:  Rate: Normal  Rhythm: Regular Rhythm        Anesthesia Plan  Type of Anesthesia, risks & benefits discussed:    Anesthesia Type: Gen Supraglottic Airway  Intra-op Monitoring Plan: Standard ASA Monitors  Post Op Pain Control Plan:   (medical reason for not using multimodal pain management)  Induction:  IV  Informed Consent: Informed consent signed with the Patient and all parties understand the risks and agree with anesthesia plan.  All questions answered.   ASA Score: 3  Anesthesia Plan Notes: LMA  Multimodal analgesia with ofirmev 1000mg, and ketamine 20 mg.  PONV prophylaxis with Pepcid 20 mg IV, and Zofran 4 mg IV.        Ready For Surgery From Anesthesia Perspective.     .

## 2025-02-14 NOTE — PROGRESS NOTES
[facility]  Progress Note  PATIENT NAME: Juan C Sanchez  MRN: [id number]  TODAY'S DATE: 02/14/2025  ADMIT DATE: 2/11/2025    Patient's Chief Complaint:  Acute on chronic congestive heart failure    History of Present Illness:  Juan C Sanchez is a 69 y.o. male with a history of hypertension, mitral regurgitation, HFrEF (EF 25%), atrial fibrillation (on home amiodarone and Eliquis), PAD, anemia, obesity, and GERD who presented to the ED on 2/11/2025 with complaints of shortness of breath and bilateral lower extremity edema. He was admitted for an acute on chronic HFrEF exacerbation and afib with RVR. Cardiology has been consulted and he is currently being treated with Lasix 40mg IVP BID and metoprolol 25mg PO 4 times daily and IV PRN. Per the patient, he has been experiencing severe lower extremity edema for the past 3-4 months that has been worked up outpatient. The workup included a lower extremity CTA which illustrated right lower extremity with high-grade stenosis anterior tibial artery nonostial origin tapering to occlusion mid calf with posterior tibial artery dominance and small caliber peroneal artery to near the ankle. This was noticed during chart review at admit and vascular surgery was consulted.  The patient denies lower extremity pain, just tightness and itching secondary to the swelling. He reports that his leg swelling is slightly improved with leg elevation but quickly returns when they are dependent again. He denies use of compression stockings.     Last 24 hour interval:  2/14/2025           Patient NPO for left third toe amputation today with podiatry. Denies pain.                              Past Medical History:   Diagnosis Date    Hypertension      Past Surgical History:   Procedure Laterality Date    ORIF closed comminuted displaced intra-articular fx distal left radius & application of external fixator Left 05/28/2018     Review of patient's  allergies indicates:   Allergen Reactions    Gabapentin Other (See Comments)     Hypersomnia, nightmares    Morphine Other (See Comments)     Pt states systemic cramping     No family history on file.  Social History     Tobacco Use    Smoking status: Never    Smokeless tobacco: Never   Substance Use Topics    Alcohol use: Yes     Comment: social    Drug use: No        Review of Systems:  Constitutional:  Negative for chills and fever.   Respiratory:  Positive for cough, sputum production and shortness of breath. Negative for wheezing.    Cardiovascular:  Positive for palpitations and leg swelling. Negative for chest pain.   Gastrointestinal:  Negative for diarrhea, nausea and vomiting.   Skin:  Positive for itching (LE itching).   Neurological:  Positive for weakness. Negative for dizziness and headaches.      OBJECTIVE   VITAL SIGNS (Most Recent)  Temp: 97.9 °F (36.6 °C) (02/14/25 0736)  Pulse: 110 (02/14/25 0736)  Resp: 18 (02/14/25 0736)  BP: (!) 125/98 (02/14/25 0736)  SpO2: 96 % (02/14/25 0736)    I & O (Last 24H):  Intake/Output Summary (Last 24 hours) at 2/14/2025 0752  Last data filed at 2/14/2025 0733  Gross per 24 hour   Intake 360 ml   Output 9400 ml   Net -9040 ml       PHYSICAL EXAM  Constitutional: Awake and oriented to person, place, and time. Appears well-developed and well-nourished. In no apparent distress.  HEENT: Normocephalic. Pupils normal and conjunctivae normal. Mucous membranes normal, no cyanosis or icterus, trachea central, no pallor or icterus is noted.  Neck: Normal range of motion. Neck supple. No JVD present. No bruit present.   Cardiovascular: Afib 100s-110s. Normal heart sounds. S1, S2.   Pulmonary/Chest: Effort normal. No respiratory distress. Dyspnea after repositioning in bed. Coarse breath sounds.   Abdominal: Obese. Soft. Bowel sounds are normal.   Urinary: No livingston catheter present  Skin: Skin is warm and dry. Bilateral lower extremity erythema calf down.  Extremities: Left  foot dressing in place, wound between 3rd and 4th digits per patient. Bilateral LE pitting edema +4.   Peripheral vascular system: Palpable bilateral DP pulses.     INPATIENT MEDS:    DOBUTamine IV infusion (non-titrating)  5 mcg/kg/min Intravenous Continuous 7.4 mL/hr at 02/14/25 0150 5 mcg/kg/min at 02/14/25 0150    furosemide (LASIX) 2 mg/mL continuous infusion (non-titrating)  10 mg/hr Intravenous Continuous 5 mL/hr at 02/14/25 0158 10 mg/hr at 02/14/25 0158      amiodarone  200 mg Oral BID    ammonium lactate   Topical (Top) BID    ceFEPime IV (PEDS and ADULTS)  2 g Intravenous Q8H    enoxparin  1 mg/kg Subcutaneous Q12H    mupirocin   Nasal BID    pantoprazole  40 mg Oral Daily    vancomycin (VANCOCIN) IV (PEDS and ADULTS)  2,000 mg Intravenous Q24H       Current Facility-Administered Medications:     acetaminophen, 650 mg, Oral, Q4H PRN    aluminum-magnesium hydroxide-simethicone, 30 mL, Oral, QID PRN    magnesium oxide, 800 mg, Oral, PRN    magnesium oxide, 800 mg, Oral, PRN    melatonin, 9 mg, Oral, Nightly PRN    metoprolol, 5 mg, Intravenous, Q5 Min PRN    midodrine, 2.5 mg, Oral, TID PRN    naloxone, 0.02 mg, Intravenous, PRN    ondansetron, 4 mg, Intravenous, Q6H PRN    oxyCODONE-acetaminophen, 1 tablet, Oral, Q8H PRN    potassium bicarbonate, 35 mEq, Oral, PRN    potassium bicarbonate, 50 mEq, Oral, PRN    potassium bicarbonate, 60 mEq, Oral, PRN    potassium, sodium phosphates, 2 packet, Oral, PRN    potassium, sodium phosphates, 2 packet, Oral, PRN    potassium, sodium phosphates, 2 packet, Oral, PRN    senna-docusate 8.6-50 mg, 1 tablet, Oral, BID PRN    sodium chloride 0.9%, 10 mL, Intravenous, PRN    Pharmacy to dose Vancomycin consult, , , Once **AND** vancomycin - pharmacy to dose, , Intravenous, pharmacy to manage frequency    GI Prophylaxis:  PPI:proton pump inhibitor per orders  DVT Prophylaxis:  Anticoagulants   Medication Route Frequency    enoxaparin injection 100 mg Subcutaneous Q12H  "      CURRENT CONSULTS:  IP CONSULT TO SOCIAL WORK/CASE MANAGEMENT  IP CONSULT TO REGISTERED DIETITIAN/NUTRITIONIST  IP CONSULT TO CARDIOLOGY  WOUND CARE CONSULT  IP CONSULT TO VASCULAR SURGERY  IP CONSULT TO PODIATRY  IP CONSULT TO VASCULAR SURGERY  IP CONSULT TO INFECTIOUS DISEASES  IP CONSULT TO INFECTIOUS DISEASES  WOUND CARE CONSULT  PHARMACY TO DOSE VANCOMYCIN CONSULT  IP CONSULT TO MIDLINE TEAM  IP CONSULT TO ANESTHESIOLOGY    LABS AND DIAGNOSTICS   CBC LAST 3 DAYS  Recent Labs   Lab 02/12/25 0158 02/13/25  0346 02/14/25  0411   WBC 8.15 6.66 5.56   HGB 10.2* 9.7* 10.0*   HCT 33.8* 32.0* 32.5*    260 253       COAGULATION LAST 3 DAYS  No results for input(s): "LABPT", "INR", "APTT" in the last 168 hours.    CHEMISTRY LAST 3 DAYS  Recent Labs   Lab 02/12/25 0158 02/13/25 0346 02/14/25  0411   * 134* 137   K 4.2 3.5 3.7   CO2 31* 28 32*   ANIONGAP 7* 8 8   BUN 18 18 19   CREATININE 1.4 1.4 1.2   * 120* 87   CALCIUM 8.2* 8.1* 8.8   MG 2.0 2.0 2.0   ALBUMIN 2.9* 3.0* 3.1*   PROT 6.9 6.6 7.2   ALKPHOS 76 73 79   ALT 8* 6* 6*   AST 17 11 11   BILITOT 0.6 0.7 0.8       CARDIAC PROFILE LAST 3 DAYS  Recent Labs   Lab 02/11/25  1818   BNP 3,163*       MOST RECENT IMAGING  X-Ray Chest AP Single View  Narrative: EXAMINATION:  XR CHEST 1 VIEW    CLINICAL HISTORY:  arrhythmia; Paroxysmal atrial fibrillation    COMPARISON:  02/11/2025    FINDINGS:  Cardiac silhouette size is enlarged and similar to prior.  Prominent central pulmonary vascularity and mild increased interstitial markings bilaterally suggestive of pulmonary edema in the setting of CHF.  No large pleural effusion.  No pneumothorax.  Impression: Cardiomegaly with findings of interstitial pulmonary edema/volume overload.    Electronically signed by: Alejandro Carney  Date:    02/14/2025  Time:    06:57    Results for orders placed or performed during the hospital encounter of 02/11/25 (from the past 2160 hours)   CT Abdomen Pelvis  Without " Contrast    Narrative    EXAMINATION:  CT ABDOMEN PELVIS WITHOUT CONTRAST    CLINICAL HISTORY:  Abdominal pain, acute, nonlocalized;    TECHNIQUE:  Axial CT imaging obtained through the abdomen and pelvis without contrast, coronal and sagittal reformatted images    CMS Mandated Quality Data-CT Radiation Dose-436    All CT scans at this facility dose modulation, iterative reconstruction, and or weight-based dosing when appropriate to reduce radiation dose to as low as reasonably achievable.    COMPARISON:  None    FINDINGS:  Imaging through the lower thorax shows interlobular septal thickening and small volume pleural fluid bilaterally, potentially on the basis of pulmonary edema.  There is diffuse body wall edema.  Bone window images show no acute or aggressive osseous abnormality.  Thoracolumbar degenerative changes and scoliosis.    Low-attenuation of blood pool relative to myocardium suggesting anemia.    On this noncontrast exam, no focal hepatic or splenic lesion.  Somewhat nodular hepatic contour raising the possibility of cirrhosis.  Cholelithiasis.  No intrahepatic or extrahepatic bile duct dilation.  Small volume perihepatic fluid and perisplenic fluid.  Mesenteric edema.  No focal pancreatic lesion.  No adrenal lesion.  Left upper pole renal cysts.  No renal calculi.  Ureters are normal in caliber.  Urinary bladder is unremarkable.    Stomach is unremarkable.  No evidence of small-bowel obstruction.  No evidence of colitis.  Distal colonic diverticula.    Aortoiliac atherosclerotic calcification.  Prominent retroperitoneal and mesenteric lymph nodes.  Prominent linn hepatis and portacaval lymph nodes.  Small volume free fluid along the pericolic gutters and in the pelvis.  Fluid in the left inguinal canal.      Impression    Small bilateral pleural effusions, pulmonary edema, as well as diffuse body wall/mesenteric edema.    Small volume free fluid in the abdomen and pelvis.    Nodular hepatic contour  suggestive of cirrhosis.    Cholelithiasis.    Diverticulosis of the distal colon.    CT findings suggestive of anemia.    Atherosclerosis, fluid in the left inguinal canal, and other incidental findings as above.      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 Left leg spectral Doppler waveforms and color flow interrogation normal. Right leg spectral Doppler wave forms and color flow interrogation normal. Some mild atherosclerosis noted.   - CTA lower extremity 1/14/2025: Right lower extremity with high-grade stenosis anterior tibial artery nonostial origin tapering to occlusion mid calf with posterior tibial artery dominance and small caliber peroneal artery to near the ankle. Faint opacification of PTA at the ankle. Slow flow could be partial etiology of nonvisualization of distal vessels. Clinical correlation needed. Left lower extremity without significant stenosis with three-vessel opacification to the ankle.     - Compression stockings and leg elevation  - Consider outpatient venous insufficiency ultrasound. Unfortunately, this study cannot be completed while inpatient.  - Continue diuretics per cardiology   - Continue antibiotics per ID   - Palpable bilateral DP pulses  - Plan for OR  today for amputation of left 3rd toe with podiatry   - Per imaging and the presence of palpable pulse, Dr. Faith does believe that the patient has adequate blood flow to heal amputation but will perform a LLE angiogram on Monday to ensure considering the patient's history of PVD  - Plan for LLE angiogram with Dr. Faith on Monday, February 17th. NPO after midnight.     Case and plan of care discussed with MD. Additional recommendations from Dr. Faith to follow.     Vianca Medina NP  [facility]   Date of Service: 02/14/2025

## 2025-02-14 NOTE — PROGRESS NOTES
"[facility]   Department of Infectious Disease  Progress Note        PATIENT NAME: Juan C Sanchez  MRN: [id number]  TODAY'S DATE: 02/14/2025  ADMIT DATE: 2/11/2025  LENGTH OF STAY: 3 DAYS      CHIEF COMPLAINT: Hypotension (Patient had PT come into the home today; found BP to be low and referred to ER.  Pt "feels bad"  since this morning.  +SOB; increased swelling in both legs.  Recently hospitalized for CHF )    PRINCIPLE PROBLEM: Acute on chronic congestive heart failure    REASON FOR CONSULT: Osteomyelitis plans for amputation pending vascular evaluation    Patient not in the room during ID rounds, in procedure.      Our service will follow cultures over the weekend  "

## 2025-02-14 NOTE — OP NOTE
Operative Report     Patient name: Juan C Sanchez   MRN: [id number]  Date of surgery: 2/14/2025    Surgeon: Jerry España DPM   Assistant: none     Preoperative diagnosis: 1. Osteomyelitis, left 3rd toe 2. Grade 2 ulcer left 4th toe 3. Grade 2 ulcer left 5th toe   Postoperative diagnosis:  Same as above  Procedure:  1.  Amputation left 3rd toe 2.  Excisional debridement to subcutaneous tissue left 4th toe 3.  Excisional debridement to subcutaneous tissue left 5th toe  Anesthesia:  General  Hemostasis:  Pneumatic ankle tourniquet at 250 mmHg  Estimated blood loss:  5 mL   Specimen:  Left 3rd toe  Complications: None  Condition upon discharge: Stable    Procedure in detail:  Patient was brought the operating room placed the operating table in a supine position.  Following induction general anesthesia a well-padded pneumatic ankle tourniquet was placed around the patient's left ankle and set at 250 mmHg.  The left foot was then prepped scrubbed and draped in normal aseptic manner.  Time-out was then called.  An Esmarch bandage was then utilized to exsanguinated the left foot and the left pneumatic ankle tourniquet was inflated to 250 mmHg.  At this time attention was directed to the patient's left foot where the 3rd toe was noted to have necrotic full-thickness ulceration with full exposure of the proximal interphalangeal joint necrotic bone.  Utilizing a 15 blade a racquet type incision was made about the base of the toe at the level of the metatarsophalangeal joint.  Sharp dissection was then carried down to the metatarsophalangeal joint in the 3rd toe was sharply disarticulated and passed from the operating field.  The tissue surrounding the 3rd metatarsophalangeal joint were inspected and appeared healthy.  The wound was copiously irrigated with sterile saline and bleeders cauterized as necessary.  The subcutaneous tissues were closed utilizing 3-0 Vicryl.  The skin was closed utilizing 3-0 Prolene in a continuous  running fashion.  Good tension-free closure was achieved.  At this time attention was directed to the 4th toe where utilizing a metal curette excisional debridement of dermis epidermis and subcutaneous tissue was performed.  Total area debrided measured 1 cm x 1 cm x 0.2 cm.  There was noted be a red granular base following debridement.  Attention was then directed to the 5th toe and intermetatarsal space where again utilizing a metal curette excisional debridement of dermis epidermis subcutaneous tissue was performed.  Total area debrided measured 1.2 cm x 1.2 cm x 0.1 cm.  Postoperative block consisting of 20 cc of 0.5% Marcaine plain and Exparel was utilized in a block of the digits.  The pneumatic ankle tourniquet was deflated neurovascular status was noted be intact to the left foot and digits.  Patient's left foot was then dressed with Xeroform 4x4s Kerlix and an Ace wrap.  The left foot was placed in a postoperative Cam Walker boot.  Patient tolerated the procedure well.  He had anesthesia reversed and left the operating Room stable vitals.

## 2025-02-14 NOTE — TRANSFER OF CARE
"Anesthesia Transfer of Care Note    Patient: Juan C Sanchez    Procedure(s) Performed: Procedure(s) (LRB):  Transesophageal echo (ANEGL) intra-procedure log documentation (N/A)  Transesophageal echo (ANGEL) intra-procedure log documentation (N/A)  Cardioversion or Defibrillation (N/A)    Patient location: PACU    Anesthesia Type: general    Transport from OR: Transported from OR on room air with adequate spontaneous ventilation    Post pain: adequate analgesia    Post assessment: no apparent anesthetic complications    Post vital signs: stable    Level of consciousness: awake, alert and oriented    Nausea/Vomiting: no nausea/vomiting    Complications: none    Transfer of care protocol was followed      Last vitals: Visit Vitals  BP 90/65   Pulse 96   Temp 36.6 °C (97.9 °F) (Axillary)   Resp 18   Ht 5' 11" (1.803 m)   Wt 87.5 kg (192 lb 14.4 oz)   SpO2 98%   BMI 26.90 kg/m²     "

## 2025-02-14 NOTE — ASSESSMENT & PLAN NOTE
-Telemetry  -Lovenox  -Hold metoprolol while on dobutamine  -Continue home amiodarone  -ANGEL cardioversion per Cardiology 2/14

## 2025-02-14 NOTE — ANESTHESIA PROCEDURE NOTES
Intubation    Date/Time: 2/14/2025 12:49 PM    Performed by: Sy Rodriguez CRNA  Authorized by: Daljit Hector MD    Intubation:     Induction:  Intravenous    Intubated:  Postinduction    Mask Ventilation:  N/a    Attempts:  1    Attempted By:  CRNA    Method of Intubation:  Blind intubation    Difficult Airway Encountered?: No      Complications:  None    Airway Device:  Supraglottic airway/LMA    Airway Device Size:  4.0    Style/Cuff Inflation:  Uncuffed    Secured at:  The lips    Placement Verified By:  Capnometry    Complicating Factors:  None    Findings Post-Intubation:  BS equal bilateral

## 2025-02-14 NOTE — CONSULTS
"Chief complaint:  Hypotension (Patient had PT come into the home today; found BP to be low and referred to ER.  Pt "feels bad"  since this morning.  +SOB; increased swelling in both legs.  Recently hospitalized for CHF )      HPI:  Juan C Sanchez is a 69 y.o. male presenting with Stage 2 sacral pressure ulcer. Pt also has multiple BLE open wounds being managed by podiatry. Sacral wound was POA. No other complaints today    PMH:  As per HPI and below:  Past Medical History:   Diagnosis Date    Hypertension        Social History     Socioeconomic History    Marital status: Single   Tobacco Use    Smoking status: Never    Smokeless tobacco: Never   Substance and Sexual Activity    Alcohol use: Yes     Comment: social    Drug use: No     Social Drivers of Health     Financial Resource Strain: Patient Declined (2/12/2025)    Overall Financial Resource Strain (CARDIA)     Difficulty of Paying Living Expenses: Patient declined   Food Insecurity: Patient Declined (2/12/2025)    Hunger Vital Sign     Worried About Running Out of Food in the Last Year: Patient declined     Ran Out of Food in the Last Year: Patient declined   Transportation Needs: Patient Declined (2/3/2025)    Received from [facility]    PRAPARE - Transportation     Lack of Transportation (Medical): Patient declined     Lack of Transportation (Non-Medical): Patient declined   Physical Activity: Patient Declined (2/3/2025)    Received from [facility] and [facility]    Exercise Vital Sign     Days of Exercise per Week: Patient declined     Minutes of Exercise per Session: Patient declined   Stress: Patient Declined (2/12/2025)    [employer] [employer] - Occupational Stress Questionnaire     Feeling of Stress : Patient declined   Housing Stability: Patient Declined (2/12/2025)    Housing Stability Vital Sign     Unable to Pay for Housing in the Last Year: Patient declined     " Homeless in the Last Year: Patient declined       Past Surgical History:   Procedure Laterality Date    ORIF closed comminuted displaced intra-articular fx distal left radius & application of external fixator Left 05/28/2018       No family history on file.    Review of patient's allergies indicates:   Allergen Reactions    Gabapentin Other (See Comments)     Hypersomnia, nightmares    Morphine Other (See Comments)     Pt states systemic cramping       No current facility-administered medications on file prior to encounter.     Current Outpatient Medications on File Prior to Encounter   Medication Sig Dispense Refill    acetaminophen (TYLENOL EXTRA STRENGTH) 500 MG tablet Take 1,000 mg by mouth every 6 (six) hours as needed for Pain.      amiodarone (PACERONE) 200 MG Tab Take 200 mg by mouth 2 (two) times daily.      doxycycline (MONODOX) 100 MG capsule Take 100 mg by mouth 2 (two) times daily.      empagliflozin (JARDIANCE) 10 mg tablet Take 10 mg by mouth once daily.      ENTRESTO 24-26 mg per tablet Take 1 tablet by mouth 2 (two) times daily.      furosemide (LASIX) 20 MG tablet Take 40 mg by mouth 0900, 1800.      gabapentin (NEURONTIN) 300 MG capsule Take 300 mg by mouth 2 (two) times daily.      levoFLOXacin (LEVAQUIN) 500 MG tablet Take 500 mg by mouth once daily.      metoprolol tartrate (LOPRESSOR) 25 MG tablet Take 25 mg by mouth 2 (two) times daily.      nitroGLYCERIN (NITROSTAT) 0.4 MG SL tablet Place 0.4 mg under the tongue every 5 (five) minutes as needed for Chest pain.      oxyCODONE-acetaminophen (PERCOCET)  mg per tablet Take 1 tablet by mouth every 8 (eight) hours as needed for Pain.      pantoprazole (PROTONIX) 40 MG tablet Take 40 mg by mouth once daily.      promethazine-dextromethorphan (PROMETHAZINE-DM) 6.25-15 mg/5 mL Syrp Take 5 mLs by mouth every 6 (six) hours as needed.      [DISCONTINUED] cyclobenzaprine (FLEXERIL) 10 MG tablet Take 1 tablet by mouth 3 times daily as needed for  "Muscle spasms.      carvediloL (COREG) 3.125 MG tablet Take 3.125 mg by mouth 2 (two) times daily. (Patient not taking: Reported on 2025)         ROS: As per HPI and below:  Pertinent items are noted in HPI.      Physical Exam:     Vitals:    25 1145 25 1602 25 1743 25 2030   BP: 95/65 100/66  119/72   BP Location: Left arm Left arm     Patient Position: Lying Lying     Pulse:  77  105   Resp: 18 18 20 18   Temp: 96.3 °F (35.7 °C) 97.8 °F (36.6 °C)  97.7 °F (36.5 °C)   TempSrc: Axillary Oral  Temporal   SpO2:  96%  97%   Weight:       Height:           BP  Min: 85/54  Max: 119/72  Temp  Av.7 °F (36.5 °C)  Min: 96.3 °F (35.7 °C)  Max: 98.7 °F (37.1 °C)  Pulse  Av.2  Min: 74  Max: 123  Resp  Av.3  Min: 17  Max: 46  SpO2  Av.6 %  Min: 95 %  Max: 98 %  Height  Av' 10.5" (179.1 cm)  Min: 5' 10" (177.8 cm)  Max: 5' 11" (180.3 cm)  Weight  Av kg (209 lb 7.4 oz)  Min: 92.5 kg (204 lb)  Max: 98.6 kg (217 lb 6 oz)    Body mass index is 28.87 kg/m².          General:             Well developed, well nourished, no apparent distress  HEENT:              NCAT, no JVD, mucous membranes moist, EOM intact  Cardiovascular:  Regular rate and rhythm, normal S1, normal S2, No murmurs, rubs, or gallops  Respiratory:        Normal breath sounds, no wheezes, no rales, no rhonchi  Abdomen:           Bowel sounds present, non tender, non distended, no masses, no hepatojugular reflux  Extremities:        No clubbing, no cyanosis, no edema  Vascular:            2+ b/l radial.  Peripheral pulses intact.  No carotid bruits.  Neurological:      No focal deficits  Skin:                   No obvious rashes or erythema, Stage 2 sacral pressure ulcer, multiple BLE open wounds managed by podiatry      Lab Results   Component Value Date    WBC 6.66 2025    HGB 9.7 (L) 2025    HCT 32.0 (L) 2025    MCV 78 (L) 2025     2025     Lab Results   Component Value " Date    CHOL 102 (L) 02/12/2025     Lab Results   Component Value Date    HDL 33 (L) 02/12/2025     Lab Results   Component Value Date    LDLCALC 52.6 (L) 02/12/2025     Lab Results   Component Value Date    TRIG 82 02/12/2025     Lab Results   Component Value Date    CHOLHDL 32.4 02/12/2025     CMP  Recent Labs   Lab 02/13/25  0346   *   CALCIUM 8.1*   ALBUMIN 3.0*   PROT 6.6   *   K 3.5   CO2 28   CL 98   BUN 18   CREATININE 1.4   ALKPHOS 73   ALT 6*   AST 11   BILITOT 0.7      Lab Results   Component Value Date    TSH 7.487 (H) 02/12/2025       Assessment and Recommendations       Diagnoses:    Stage 2 sacral pressure ulcer    Plan:  1. Triad to the sacrum q shift    Complexity:    moderate

## 2025-02-14 NOTE — PROGRESS NOTES
"[facility] hospital  Department of Cardiology  Progress Note      PATIENT NAME: Juan C Sanchez  MRN: [id number]  TODAY'S DATE: 02/14/2025  ADMIT DATE: 2/11/2025                          CONSULT REQUESTED BY: Dao Sterling MD    SUBJECTIVE     PRINCIPAL PROBLEM: Acute on chronic congestive heart failure      02/14/2025    Patient seen resting in bed with no distress noted. Breathing is stable. S/p ANGEL/CV today.     2/13/25  Patient seen sitting up in bed with no acute distress noted.  Denies any chest discomfort.  Breathing is stable.  He has been diuresing very well, and his weight is down significantly.    REASON FOR CONSULT:  CHF exacerbation     HPI:  Mr. Sanchez is a 69 yr old male with pmh of known HFrEF, HTN, mitral regurg, OA, GERD, and anemia who originally come into the ER yesterday evening after home PT encouraged him to come be evaluated after noticing some worsening swelling in BLE, hypotension, and generalized weakness. It appears he has been in and out of the ER about 8x since the start of this new year. BNP 3163. Trops 21.3 then 16.8. Most recent ECHO uploaded in care everywhere shows EF 25%. Pt also states he had a recent angiogram done in [place] (the last dated one I could see was 2022) showed trivial nonobstructive CAD. He states he follows with Dr. Aelgria but has not been seen "in a while." He states compliance with medications but feels PO lasix is not working for him.     Per hospital medicine notes:  Mr. Sanchez is a 69 yr old male with a hx of HTN, MR, PAF, chronic systolic CHF with EF of 25, OA, GERD, nonischemic cardiomyopathy, anemia who presented to the ED with a chief complaint of shortness of breath, leg swelling and hypotension.  Patient states that he had PT come to his home today and he was found to be hypotensive and advised to come to the emergency room.  Patient endorses shortness of breath as well as leg swelling, malaise, fatigue, occasional chills, productive " cough with milky sputum, upper abdominal pain, occasional nausea, decreased urine output, and positional lightheadedness.  He states that these symptoms have been progressively worsening over the last 1-2 months possibly even longer  He states that his symptoms are worse when lying flat as well as with exertion and better with rest and while sitting up.  He states that he has been taking his home medications without improvement of his symptoms.  He does endorse that most of all of his contacts recently have been sick. He denies any home oxygen use.  He does endorse occasional PND and states that he has been having to sit to sleep.  He denies tobacco and recreational drug use and states he seldomly drinks alcohol.  He denies fever, chest pain, vomiting, diarrhea, dysuria, hematuria, dizziness, and syncope.     Upon arrival to ED, patient afebrile, HR of 109, RR of 24, BP of 95/62, satting 98% on RA.  Workup in the ED revealed RBC of 4.19, H/H of 10/32.8, sodium of 134, pCO2 of 31, GFR of 55.1, corrected calcium of 9, albumin of 3.1, BNP of 3163, troponin of 21.3, lactic acid of 2.1.  Blood cultures pending.  CXR shows enlarged cardiac silhouette with pulmonary vascular congestion.  Bilateral pleural effusions.  Mild bibasilar airspace disease and or atelectasis.  Left foot x-ray shows osteopenia.  No overt erosions or osseous destruction.  No acute fracture or dislocation.  Mild multifocal midfoot arthritis.  Forefoot swelling.  Patient was given Lasix 20 mg IV, Zosyn 4.5 g IV, 500 mL NS bolus, vancomycin 20 mg/kg while in the ED. discussed case with ED provider and patient will be admitted under hospital medicine services for further management.        Review of patient's allergies indicates:   Allergen Reactions    Gabapentin Other (See Comments)     Hypersomnia, nightmares    Morphine Other (See Comments)     Pt states systemic cramping       Past Medical History:   Diagnosis Date    Hypertension      Past  Surgical History:   Procedure Laterality Date    ORIF closed comminuted displaced intra-articular fx distal left radius & application of external fixator Left 05/28/2018     Social History     Tobacco Use    Smoking status: Never    Smokeless tobacco: Never   Substance Use Topics    Alcohol use: Yes     Comment: social    Drug use: No        REVIEW OF SYSTEMS    As mentioned in HPI    OBJECTIVE     VITAL SIGNS (Most Recent)  Temp: 98 °F (36.7 °C) (02/14/25 1345)  Pulse: 96 (02/14/25 1345)  Resp: 15 (02/14/25 1345)  BP: 110/68 (02/14/25 1345)  SpO2: 96 % (02/14/25 1345)    VENTILATION STATUS  Resp: 15 (02/14/25 1345)  SpO2: 96 % (02/14/25 1345)           I & O (Last 24H):  Intake/Output Summary (Last 24 hours) at 2/14/2025 1406  Last data filed at 2/14/2025 1340  Gross per 24 hour   Intake 875 ml   Output 8650 ml   Net -7775 ml       WEIGHTS  Wt Readings from Last 3 Encounters:   02/14/25 0732 87.5 kg (192 lb 14.4 oz)   02/14/25 0500 94.1 kg (207 lb 7.3 oz)   02/13/25 0604 93.9 kg (207 lb 0.2 oz)   02/12/25 0050 98.6 kg (217 lb 6 oz)   02/11/25 1727 92.5 kg (204 lb)   02/14/25 0940 87.5 kg (192 lb 14.4 oz)   10/23/18 1426 92.5 kg (204 lb)       PHYSICAL EXAM    CONSTITUTIONAL: NAD, elderly male lying in bed with HOB elevated   HEENT: Normocephalic. No pallor  NECK: no JVD  LUNGS: crackles in the bases, mild tachypnea   HEART: irregular rate and rhythm -afib 80-90s, S1, S2 normal, no murmur   ABDOMEN: soft, mild tenderness+, bowel sounds normal  EXTREMITIES: BLE edema +2 persists, redness noted to left lower extremity, left foot with dressing clean dry and intact  SKIN: No rash  NEURO: AAO X 3  PSYCH: normal affect     HOME MEDICATIONS:  No current facility-administered medications on file prior to encounter.     Current Outpatient Medications on File Prior to Encounter   Medication Sig Dispense Refill    acetaminophen (TYLENOL EXTRA STRENGTH) 500 MG tablet Take 1,000 mg by mouth every 6 (six) hours as needed for  Pain.      amiodarone (PACERONE) 200 MG Tab Take 200 mg by mouth 2 (two) times daily.      doxycycline (MONODOX) 100 MG capsule Take 100 mg by mouth 2 (two) times daily.      empagliflozin (JARDIANCE) 10 mg tablet Take 10 mg by mouth once daily.      ENTRESTO 24-26 mg per tablet Take 1 tablet by mouth 2 (two) times daily.      furosemide (LASIX) 20 MG tablet Take 40 mg by mouth 0900, 1800.      gabapentin (NEURONTIN) 300 MG capsule Take 300 mg by mouth 2 (two) times daily.      levoFLOXacin (LEVAQUIN) 500 MG tablet Take 500 mg by mouth once daily.      metoprolol tartrate (LOPRESSOR) 25 MG tablet Take 25 mg by mouth 2 (two) times daily.      nitroGLYCERIN (NITROSTAT) 0.4 MG SL tablet Place 0.4 mg under the tongue every 5 (five) minutes as needed for Chest pain.      oxyCODONE-acetaminophen (PERCOCET)  mg per tablet Take 1 tablet by mouth every 8 (eight) hours as needed for Pain.      pantoprazole (PROTONIX) 40 MG tablet Take 40 mg by mouth once daily.      promethazine-dextromethorphan (PROMETHAZINE-DM) 6.25-15 mg/5 mL Syrp Take 5 mLs by mouth every 6 (six) hours as needed.      carvediloL (COREG) 3.125 MG tablet Take 3.125 mg by mouth 2 (two) times daily. (Patient not taking: Reported on 2/11/2025)         SCHEDULED MEDS:   amiodarone  200 mg Oral BID    ammonium lactate   Topical (Top) BID    ceFEPime IV (PEDS and ADULTS)  2 g Intravenous Q8H    enoxparin  1 mg/kg Subcutaneous Q12H    mupirocin   Nasal BID    pantoprazole  40 mg Oral Daily    vancomycin (VANCOCIN) IV (PEDS and ADULTS)  2,000 mg Intravenous Q24H       CONTINUOUS INFUSIONS:   DOBUTamine IV infusion (non-titrating)  5 mcg/kg/min Intravenous Continuous 7.395 mL/hr at 02/14/25 1306 5 mcg/kg/min at 02/14/25 1306    furosemide (LASIX) 2 mg/mL continuous infusion (non-titrating)  10 mg/hr Intravenous Continuous 5 mL/hr at 02/14/25 0158 10 mg/hr at 02/14/25 0158       PRN MEDS:  Current Facility-Administered Medications:     acetaminophen, 650 mg,  "Oral, Q4H PRN    aluminum-magnesium hydroxide-simethicone, 30 mL, Oral, QID PRN    dextrose 50%, 12.5 g, Intravenous, PRN    diphenhydrAMINE, 12.5 mg, Intravenous, Q15 Min PRN    glucagon (human recombinant), 1 mg, Intramuscular, PRN    HYDROmorphone, 0.2 mg, Intravenous, Q5 Min PRN    magnesium oxide, 800 mg, Oral, PRN    magnesium oxide, 800 mg, Oral, PRN    melatonin, 9 mg, Oral, Nightly PRN    metoprolol, 5 mg, Intravenous, Q5 Min PRN    midodrine, 2.5 mg, Oral, TID PRN    naloxone, 0.02 mg, Intravenous, PRN    ondansetron, 4 mg, Intravenous, Q6H PRN    ondansetron, 4 mg, Intravenous, Daily PRN    oxyCODONE, 5 mg, Oral, Q3H PRN    oxyCODONE-acetaminophen, 1 tablet, Oral, Q8H PRN    potassium bicarbonate, 35 mEq, Oral, PRN    potassium bicarbonate, 50 mEq, Oral, PRN    potassium bicarbonate, 60 mEq, Oral, PRN    potassium, sodium phosphates, 2 packet, Oral, PRN    potassium, sodium phosphates, 2 packet, Oral, PRN    potassium, sodium phosphates, 2 packet, Oral, PRN    senna-docusate 8.6-50 mg, 1 tablet, Oral, BID PRN    traMADoL, 50 mg, Oral, Q4H PRN    Pharmacy to dose Vancomycin consult, , , Once **AND** vancomycin - pharmacy to dose, , Intravenous, pharmacy to manage frequency    LABS AND DIAGNOSTICS     CBC LAST 3 DAYS  Recent Labs   Lab 02/12/25  0158 02/13/25  0346 02/14/25  0411   WBC 8.15 6.66 5.56   RBC 4.30* 4.12* 4.26*   HGB 10.2* 9.7* 10.0*   HCT 33.8* 32.0* 32.5*   MCV 79* 78* 76*   MCH 23.7* 23.5* 23.5*   MCHC 30.2* 30.3* 30.8*   RDW 18.4* 18.4* 18.4*    260 253   MPV 9.9 10.1 9.6   GRAN 68.8  5.6 58.0  3.9 53.2  3.0   LYMPH 18.8  1.5 25.5  1.7 29.1  1.6   MONO 9.3  0.8 13.2  0.9 12.1  0.7   BASO 0.06 0.05 0.05   NRBC 0 0 0       COAGULATION LAST 3 DAYS  No results for input(s): "LABPT", "INR", "APTT" in the last 168 hours.    CHEMISTRY LAST 3 DAYS  Recent Labs   Lab 02/12/25  0158 02/13/25  0346 02/14/25  0411   * 134* 137   K 4.2 3.5 3.7   CL 97 98 97   CO2 31* 28 32* " "  ANIONGAP 7* 8 8   BUN 18 18 19   CREATININE 1.4 1.4 1.2   * 120* 87   CALCIUM 8.2* 8.1* 8.8   MG 2.0 2.0 2.0   ALBUMIN 2.9* 3.0* 3.1*   PROT 6.9 6.6 7.2   ALKPHOS 76 73 79   ALT 8* 6* 6*   AST 17 11 11   BILITOT 0.6 0.7 0.8       CARDIAC PROFILE LAST 3 DAYS  Recent Labs   Lab 02/11/25  1818 02/12/25  0158   BNP 3,163*  --    TROPONINIHS 21.3* 16.8*       ENDOCRINE LAST 3 DAYS  Recent Labs   Lab 02/12/25  0158 02/13/25  1041   TSH 7.487*  --    PROCAL  --  <0.050       LAST ARTERIAL BLOOD GAS  ABG  No results for input(s): "PH", "PO2", "PCO2", "HCO3", "BE" in the last 168 hours.    LAST 7 DAYS MICROBIOLOGY   Microbiology Results (last 7 days)       Procedure Component Value Units Date/Time    Tissue culture [[id number]] Collected: 02/13/25 0748    Order Status: Completed Specimen: Bone from Toe, Left Foot Updated: 02/14/25 0845     Aerobic Culture - Tissue Further report to follow     Gram Stain Result No WBC's      Rare Gram positive cocci    Narrative:      Toe, Left Foot    Blood culture x two cultures. Draw prior to antibiotics. [[id number]] Collected: 02/11/25 1744    Order Status: Completed Specimen: Blood from Peripheral, Antecubital, Left Updated: 02/13/25 2032     Blood Culture, Routine No Growth to date      No Growth to date      No Growth to date    Narrative:      Aerobic and anaerobic    Blood culture x two cultures. Draw prior to antibiotics. [[id number]] Collected: 02/11/25 1749    Order Status: Completed Specimen: Blood from Peripheral, Antecubital, Right Updated: 02/13/25 2032     Blood Culture, Routine No Growth to date      No Growth to date      No Growth to date    Narrative:      Aerobic and anaerobic    MRSA Screen by PCR [[id number]]  (Abnormal) Collected: 02/13/25 1054    Order Status: Completed Specimen: Nasal Swab Updated: 02/13/25 1410     MRSA SCREEN BY PCR Detected     Comment: MRSA  critical result(s) called and verbal readback obtained from   Luanne Stanley RN on Wing B by " [facility] 02/13/2025 14:05         Narrative:       MRSA  critical result(s) called and verbal readback obtained from   Luanne Stanley RN on [facility] B by [facility] 02/13/2025 14:05    Culture, Anaerobic [[id number]] Collected: 02/13/25 0748    Order Status: Sent Specimen: Bone from Toe, Left Foot Updated: 02/13/25 0757    Aerobic culture [[id number]] Collected: 02/13/25 0748    Order Status: Canceled Specimen: Bone from Toe, Left Foot             MOST RECENT IMAGING  MRI Forefoot WO Contrast RT  Narrative: EXAMINATION:  MRI FOREFOOT WO CONTRAST RT    CLINICAL HISTORY:  Foot swelling, diabetic, osteomyelitis suspected, xray done;Foot swelling, nondiabetic, osteomyelitis suspected;    TECHNIQUE:  Multisequence, multiplanar imaging of the right foot obtained without contrast.    COMPARISON:  No prior imaging of the right foot is available for comparison.    FINDINGS:  Focal bone marrow edema with associated cortical offset/linear low signal intensity involving the proximal aspect of the 5th digit proximal phalanx, which is suspicious for acute or subacute fracture.  No overlying soft tissue defect/ulceration is evident.    No other bone marrow signal abnormality.  Osteoarthritic changes of the great toe MTP and IP joints.  Diffuse soft tissue swelling about the foot, most pronounced dorsally.  Intrinsic muscles of the foot are edematous.    Lisfranc ligament is intact.  Visualized flexor and extensor tendons appear intact.  Impression: MRI findings suspicious for acute/subacute fracture involving the proximal phalanx of the 5th digit.  Please correlate with right foot radiography.  If there is an overlying soft tissue defect in this region, superimposed osteomyelitis is possible, but unlikely if skin is intact.    Diffuse soft tissue swelling about the foot, most pronounced dorsally.    Edema involving the intrinsic foot musculature.    Osteoarthritic changes of the great toe.    Electronically signed by: Alejandro  Rommel  Date:    02/14/2025  Time:    07:59  X-Ray Chest AP Single View  Narrative: EXAMINATION:  XR CHEST 1 VIEW    CLINICAL HISTORY:  arrhythmia; Paroxysmal atrial fibrillation    COMPARISON:  02/11/2025    FINDINGS:  Cardiac silhouette size is enlarged and similar to prior.  Prominent central pulmonary vascularity and mild increased interstitial markings bilaterally suggestive of pulmonary edema in the setting of CHF.  No large pleural effusion.  No pneumothorax.  Impression: Cardiomegaly with findings of interstitial pulmonary edema/volume overload.    Electronically signed by: Alejandro Carney  Date:    02/14/2025  Time:    06:57      ECHOCARDIOGRAM RESULTS (last 5)  No results found for this or any previous visit.    Echo done locally    Impressions   -----------     1.The estimated LV ejection fraction is 25 %     2.There is moderate to severe global hypokinesis     3.Diastolic function is indeterminate.     4.There is mild aortic valve sclerosis without significant stenosis     5.There is severe mitral regurgitation     6.Estimated RVSP systolic pressure is 37.13 mmHg     7.Compared to the report from prior study dated 04/04/2022, the   severity of mitral regurgitation has increased     Findings   --------     Left Ventricle   The estimated LV ejection fraction is 25 %. The calculated LV ejection   fraction (MOD, Biplane) is 29.99 %. LV chamber is mildly dilated.   There is mild concentric LV hypertrophy. LV systolic function is   normal. There is moderate to severe global hypokinesis. Diastolic   function is indeterminate.     Right Ventricle   RV size is moderately dilated. The RV systolic function is normal.     Septum   There is no atrial septal defect (ASD). There is no ventricular septal   defect (VSD).     Left Atrium   LA chamber size is normal. LA diameter is 3.81 cm.     Right Atrium   RA chamber size is normal.     Aortic Valve   There is a trileaflet aortic valve. There is mild aortic valve    sclerosis without significant stenosis. There is no aortic   regurgitation. Aortic valve area by VMax: 1.02 cm2. Aortic valve area   by VTI: 0.93 cm2. AV Mean gradient: 7.37 mmHg. AV Peak gradient: 13.48   mmHg.     Mitral Valve   Mitral valve is not well visualized. There is severe mitral   regurgitation. There is no mitral stenosis.     Tricuspid Valve   Tricuspid valve is structurally normal. There is mild tricuspid   regurgitation. The estimated RV systolic pressure is normal. Estimated   RVSP systolic pressure is 37.13 mmHg. There is no tricuspid valve   stenosis.     Pulmonary Valve   Pulmonic valve is not well visualized.     Pericardium   The pericardium is normal. There is no pericardial effusion present.     Aorta   The size of the visualized portion of aortic root is within normal   limits. The thoracic aorta is not well visualized. The ascending aorta   is not well visualized.     Venous   The inferior vena cava dimension is normal.     Thrombus/Mass   There is no intracardiac mass or thrombus identified.     Echo Dimensions   --------------     LA Dimen (2D): 3.81 cm   IVS(D) (2D): 1.16 cm   LVPW(D) (2D): 1.16 cm   LV(D) (2D): 6.14 cm   LV(S) (2D): 4.56 cm   RV(D (2D): 3.7 cm   LVOT (2D): 2 cm   EF Teich (2D): 50 %   EF Teich (M-Mode): 25 %   FS (2D): 26 %   RVSP (Doppler): 37.13 mmHg   Doppler   -------   AVvel: 1.84 m/s   Pk Grad: 13.48 mmHg   LUCAS(pk): 1.02 cm2   PV Lee: 0.69 m/s   LVOTvel: 0.6 m/s   Mn Grad: 7.37 mmHg   LUCAS(VTI): 0.93 cm2   Ao Stenosis Dimen Idx: 0.33   MV PHT: 51.71 ms   Diastology   ----------   MV E: 1.27 m/s   MV A: 0.42 m/s   MV E/A: 3.01   MV Dec T: 175.64 ms   E' Lat: 9.29 cm/s   E' Med: 3.93 cm/s   E/Lat E': 13.66   E/Med E': 32.29   TR Lee: 1.72 m/s     Electronically signed by: Sudheer Lee DO   01/08/2025 11:49 AM   Impressions   -----------     1.The estimated LV ejection fraction is 25 %     2.There is moderate to severe global hypokinesis     3.Diastolic function  is indeterminate.     4.There is mild aortic valve sclerosis without significant stenosis     5.There is severe mitral regurgitation     6.Estimated RVSP systolic pressure is 37.13 mmHg     7.Compared to the report from prior study dated 04/04/2022, the   severity of mitral regurgitation has increased     Findings   --------     Left Ventricle   The estimated LV ejection fraction is 25 %. The calculated LV ejection   fraction (MOD, Biplane) is 29.99 %. LV chamber is mildly dilated.   There is mild concentric LV hypertrophy. LV systolic function is   normal. There is moderate to severe global hypokinesis. Diastolic   function is indeterminate.     Right Ventricle   RV size is moderately dilated. The RV systolic function is normal.     Septum   There is no atrial septal defect (ASD). There is no ventricular septal   defect (VSD).     Left Atrium   LA chamber size is normal. LA diameter is 3.81 cm.     Right Atrium   RA chamber size is normal.     Aortic Valve   There is a trileaflet aortic valve. There is mild aortic valve   sclerosis without significant stenosis. There is no aortic   regurgitation. Aortic valve area by VMax: 1.02 cm2. Aortic valve area   by VTI: 0.93 cm2. AV Mean gradient: 7.37 mmHg. AV Peak gradient: 13.48   mmHg.     Mitral Valve   Mitral valve is not well visualized. There is severe mitral   regurgitation. There is no mitral stenosis.     Tricuspid Valve   Tricuspid valve is structurally normal. There is mild tricuspid   regurgitation. The estimated RV systolic pressure is normal. Estimated   RVSP systolic pressure is 37.13 mmHg. There is no tricuspid valve   stenosis.     Pulmonary Valve   Pulmonic valve is not well visualized.     Pericardium   The pericardium is normal. There is no pericardial effusion present.     Aorta   The size of the visualized portion of aortic root is within normal   limits. The thoracic aorta is not well visualized. The ascending aorta   is not well visualized.      Venous   The inferior vena cava dimension is normal.     Thrombus/Mass   There is no intracardiac mass or thrombus identified.     Echo Dimensions   --------------     LA Dimen (2D): 3.81 cm   IVS(D) (2D): 1.16 cm   LVPW(D) (2D): 1.16 cm   LV(D) (2D): 6.14 cm   LV(S) (2D): 4.56 cm   RV(D (2D): 3.7 cm   LVOT (2D): 2 cm   EF Teich (2D): 50 %   EF Teich (M-Mode): 25 %   FS (2D): 26 %   RVSP (Doppler): 37.13 mmHg   Doppler   -------   AVvel: 1.84 m/s   Pk Grad: 13.48 mmHg   LUCAS(pk): 1.02 cm2   PV Lee: 0.69 m/s   LVOTvel: 0.6 m/s   Mn Grad: 7.37 mmHg   LUCAS(VTI): 0.93 cm2   Ao Stenosis Dimen Idx: 0.33   MV PHT: 51.71 ms   Diastology   ----------   MV E: 1.27 m/s   MV A: 0.42 m/s   MV E/A: 3.01   MV Dec T: 175.64 ms   E' Lat: 9.29 cm/s   E' Med: 3.93 cm/s   E/Lat E': 13.66   E/Med E': 32.29   TR Lee: 1.72 m/s     Electronically signed by: Sudheer Lee DO   01/08/2025 11:49 AM     CURRENT/PREVIOUS VISIT EKG  Results for orders placed or performed during the hospital encounter of 02/11/25   EKG 12-lead    Collection Time: 02/14/25 10:19 AM   Result Value Ref Range    QRS Duration 108 ms    [facility] QTC Calculation 504 ms    Narrative    Test Reason : Z13.6,    Vent. Rate :  93 BPM     Atrial Rate :  93 BPM     P-R Int : 186 ms          QRS Dur : 108 ms      QT Int : 406 ms       P-R-T Axes :  64  41  63 degrees    QTcB Int : 504 ms    Sinus rhythm with Premature atrial complexes  Nonspecific T wave abnormality  Prolonged QT  Abnormal ECG  When compared with ECG of 11-Feb-2025 17:14,  Sinus rhythm has replaced Atrial fibrillation  Questionable change in The axis  Nonspecific T wave abnormality, improved in Inferior leads  Nonspecific T wave abnormality, improved in Anterior leads    Referred By: AAAREFERRAL SELF           Confirmed By:            ASSESSMENT/PLAN:     Active Hospital Problems    Diagnosis    *Acute on chronic congestive heart failure    PAD (peripheral artery disease)    Acute osteomyelitis    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  Strict I&O.  Trend labs.  Continue amiodarone 200 mg p.o. b.i.d..    Continue to check and replace potassium and magnesium. Goal for potassium is 4.0, and goal for magnesium is 2.0.   Will follow.     Thelma Perez NP  Department of Cardiology  Date of Service: 02/14/2025    69-year-old admitted with acute decompensation of chronic congestive heart failure with significant fluid overload  Aggressive diuresis was initiated with intravenous Lasix and dobutamine inotrope therapy  He has responded very well overnight.  He is negative over 3.5 L  Patient denies having chest discomfort is breathing easier.  Cellulitis changes noted in the lower extremities  Suspected to have osteomyelitis of the 3rd toe in the left may need surgical intervention  Meanwhile continue on present therapy to include amiodarone  And Lovenox    Patient has persistent to have atrial fibrillation  May consider ANGEL and electrical cardioversion this has may help his cardiac output better  Will keep him NPO overnight and plan for ANGEL cardioversion tomorrow  I have personally interviewed and examined the patient, I have reviewed the Nurse Practitioner's history and physical, assessment, and plan. I have personally evaluated the patient at bedside and agree with the findings and made appropriate changes as necessary in recommendations.    Risks and Benefits of the procedure have been explained to the patient. alternatives discussed in detail regarding upcoming procedures and sedation and possible complications. Some of the more   complications include but not limited to immediate or delayed perforation, bleeding, infections, pain, inadvertent injury to surrounding tissue / organs and possible need for surgical evaluation.To include but  not limited to oropharyngeal damage, tooth damage, aspiration, sore throat and risk of anesthesia have been explained to patient.  Other risks of conscious sedation to be explained by anesthesiologist.  All questions have been answered to patient's satisfaction Informed consent obtained.  Also discussed with the nursing staff    02/14/2025:   Patient had successful electrical cardioversion with restoration normal sinus  Recommend to continue on dobutamine inotrope therapy and Lasix therapy  Recommend to add Aldactone 25 mg to his regimen.  Currently he is receiving vancomycin and cefepime  Clinically he is more stable to proceed with surgical intervention for his foot if clinically indicated  Meanwhile will reduce the Lasix to 5 mg an hour and continue dobutamine for another 24 hours  In the next 24-48 hours continue on dobutamine inotrope infusion  May consider switching Lasix to torsemide 20 mg p.o. b.i.d.    José Luis Arellano MD  Department of Cardiology  [facility]  02/14/2025 12:53 PM

## 2025-02-14 NOTE — ANESTHESIA PREPROCEDURE EVALUATION
02/14/2025  Juan C Sanchez is a 69 y.o., male.      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 He has never used smokeless tobacco.     Results for orders placed or performed during the hospital encounter of 02/11/25   EKG 12-lead    Collection Time: 02/11/25  5:14 PM   Result Value Ref Range    QRS Duration 110 ms    [facility] QTC Calculation 534 ms    Narrative    Test Reason : I10,    Vent. Rate : 107 BPM     Atrial Rate :    BPM     P-R Int :    ms          QRS Dur : 110 ms      QT Int : 400 ms       P-R-T Axes :     99 124 degrees    QTcB Int : 534 ms    Atrial fibrillation with rapid ventricular response  Rightward axis  Nonspecific T wave abnormality  Abnormal ECG  No previous ECGs available    Referred By: AAAREFERRAL SELF           Confirmed By:         Imaging Results              CT Abdomen Pelvis  Without Contrast (Final result)  Result time 02/12/25 07:10:42      Final result by Alejandro Carney MD (02/12/25 07:10:42)                   Impression:      Small bilateral pleural effusions, pulmonary edema, as well as diffuse body wall/mesenteric edema.    Small volume free fluid in the abdomen and pelvis.    Nodular hepatic contour  suggestive of cirrhosis.    Cholelithiasis.    Diverticulosis of the distal colon.    CT findings suggestive of anemia.    Atherosclerosis, fluid in the left inguinal canal, and other incidental findings as above.      Electronically signed by: Alejandro Carney  Date:    02/12/2025  Time:    07:10               Narrative:    EXAMINATION:  CT ABDOMEN PELVIS WITHOUT CONTRAST    CLINICAL HISTORY:  Abdominal pain, acute, nonlocalized;    TECHNIQUE:  Axial CT imaging obtained through the abdomen and pelvis without contrast, coronal and sagittal reformatted images    CMS Mandated Quality Data-CT Radiation Dose-436    All CT scans at this facility dose modulation, iterative reconstruction, and or weight-based dosing when appropriate to reduce radiation dose to as low as reasonably achievable.    COMPARISON:  None    FINDINGS:  Imaging through the lower thorax shows interlobular septal thickening and small volume pleural fluid bilaterally, potentially on the basis of pulmonary edema.  There is diffuse body wall edema.  Bone window images show no acute or aggressive osseous abnormality.  Thoracolumbar degenerative changes and scoliosis.    Low-attenuation of blood pool relative to myocardium suggesting anemia.    On this noncontrast exam, no focal hepatic or splenic lesion.  Somewhat nodular hepatic contour raising the possibility of cirrhosis.  Cholelithiasis.  No intrahepatic or extrahepatic bile duct dilation.  Small volume perihepatic fluid and perisplenic fluid.  Mesenteric edema.  No focal pancreatic lesion.  No adrenal lesion.  Left upper pole renal cysts.  No renal calculi.  Ureters are normal in caliber.  Urinary bladder is unremarkable.    Stomach is unremarkable.  No evidence of small-bowel obstruction.  No evidence of colitis.  Distal colonic diverticula.    Aortoiliac atherosclerotic calcification.  Prominent retroperitoneal and mesenteric lymph nodes.  Prominent linn hepatis and portacaval lymph nodes.  Small  volume free fluid along the pericolic gutters and in the pelvis.  Fluid in the left inguinal canal.                                       US Lower Extremity Veins Bilateral (Final result)  Result time 02/12/25 00:38:40      Final result by Rocco Levin MD (02/12/25 00:38:40)                   Impression:      No evidence of deep venous thrombosis in either lower extremity.    Additional findings as above.      Electronically signed by: Rocco Levin MD  Date:    02/12/2025  Time:    00:38               Narrative:    EXAMINATION:  US LOWER EXTREMITY VEINS BILATERAL    CLINICAL HISTORY:  extensive bilateral lower extremity edema;    TECHNIQUE:  Duplex and color flow Doppler and dynamic compression was performed of the bilateral lower extremity veins was performed.    COMPARISON:  None    FINDINGS:  Right thigh veins: The common femoral, femoral, popliteal, upper greater saphenous, and deep femoral veins are patent and free of thrombus. The veins are normally compressible and have normal phasic flow and augmentation response.    Right calf veins: The visualized calf veins are patent.    Left thigh veins: The common femoral, femoral, popliteal, upper greater saphenous, and deep femoral veins are patent and free of thrombus. The veins are normally compressible and have normal phasic flow and augmentation response.    Left calf veins: The visualized calf veins are patent.    Miscellaneous: Soft tissue edema present.  Few prominent lymph nodes left groin, nonspecific.                                       X-Ray Foot Complete Left (Final result)  Result time 02/11/25 21:08:06      Final result by Eusebio Leonardo MD (02/11/25 21:08:06)                   Impression:      As above.  If concern for osteomyelitis, recommend MRI.      Electronically signed by: Eusebio Leonardo  Date:    02/11/2025  Time:    21:08               Narrative:    EXAMINATION:  XR FOOT COMPLETE 3 VIEW LEFT    CLINICAL HISTORY:  .   Other specified soft tissue disorders    TECHNIQUE:  AP, lateral and oblique views of the left foot were performed.    COMPARISON:  None    FINDINGS:  Osteopenia.  No overt erosions or osseous destruction.  No acute fracture or dislocation.  Mild multifocal midfoot osteoarthritis.  Forefoot swelling.                                       X-Ray Chest AP Portable (Final result)  Result time 02/11/25 18:27:01      Final result by Eusebio Leonardo MD (02/11/25 18:27:01)                   Impression:      As above      Electronically signed by: Eusebio Leonardo  Date:    02/11/2025  Time:    18:27               Narrative:    EXAMINATION:  XR CHEST AP PORTABLE    CLINICAL HISTORY:  Sepsis;    TECHNIQUE:  Single frontal view of the chest was performed.    COMPARISON:  05/28/2018    FINDINGS:  Enlarged cardiac silhouette with pulmonary vascular congestion.  Bilateral pleural effusions.  Mild bibasilar airspace disease and/or atelectasis.                                       Lab Results   Component Value Date    WBC 5.56 02/14/2025    HGB 10.0 (L) 02/14/2025    HCT 32.5 (L) 02/14/2025    MCV 76 (L) 02/14/2025     02/14/2025     BMP  Lab Results   Component Value Date     02/14/2025    K 3.7 02/14/2025    CL 97 02/14/2025    CO2 32 (H) 02/14/2025    BUN 19 02/14/2025    CREATININE 1.2 02/14/2025    CALCIUM 8.8 02/14/2025    ANIONGAP 8 02/14/2025    GLU 87 02/14/2025     (H) 02/13/2025     (H) 02/12/2025       No results found for this or any previous visit.          Pre-op Assessment    I have reviewed the Patient Summary Reports.     I have reviewed the Nursing Notes. I have reviewed the NPO Status.   I have reviewed the Medications.     Review of Systems  Anesthesia Hx:  No problems with previous Anesthesia             Denies Family Hx of Anesthesia complications.    Denies Personal Hx of Anesthesia complications.                    Social:  Non-Smoker       Hematology/Oncology:       --  Anemia:                                  EENT/Dental:  EENT/Dental Normal           Cardiovascular:     Hypertension    Dysrhythmias atrial fibrillation  CHF       ECG has been reviewed.                            Pulmonary:  Pulmonary Normal                       Renal/:  Renal/ Normal                 Hepatic/GI:     GERD                Musculoskeletal:  Arthritis (Bilateral knees)               Neurological:  Neurology Normal                                      Endocrine:  Endocrine Normal            Psych:  Psychiatric Normal                    Physical Exam  General: Well nourished    Airway:  Mallampati: II   Mouth Opening: Normal  TM Distance: 4 - 6 cm  Tongue: Normal  Neck ROM: Normal ROM    Dental:  Intact    Chest/Lungs:  Clear to auscultation, Normal Respiratory Rate    Heart:  Rate: Normal  Rhythm: Regular Rhythm        Anesthesia Plan  Type of Anesthesia, risks & benefits discussed:    Anesthesia Type: Gen Natural Airway  Intra-op Monitoring Plan: Standard ASA Monitors  Post Op Pain Control Plan:   (medical reason for not using multimodal pain management)  Induction:  IV  Informed Consent: Informed consent signed with the Patient and all parties understand the risks and agree with anesthesia plan.  All questions answered.   ASA Score: 3  Anesthesia Plan Notes: General with natural airway.    Propofol  POM    Ready For Surgery From Anesthesia Perspective.     .

## 2025-02-14 NOTE — ANESTHESIA POSTPROCEDURE EVALUATION
Anesthesia Post Evaluation    Patient: Juan C Sanchez    Procedure(s) Performed: Procedure(s) (LRB):  AMPUTATION, TOE (Left)    Final Anesthesia Type: general      Patient location during evaluation: PACU  Patient participation: Yes- Able to Participate  Level of consciousness: awake and alert  Post-procedure vital signs: reviewed and stable  Pain management: adequate  Airway patency: patent    PONV status at discharge: No PONV  Anesthetic complications: no      Cardiovascular status: blood pressure returned to baseline and stable  Respiratory status: unassisted and room air  Hydration status: euvolemic  Follow-up not needed.              Vitals Value Taken Time   /80 02/14/25 1415   Temp 36.7 °C (98 °F) 02/14/25 1345   Pulse 93 02/14/25 1415   Resp 17 02/14/25 1415   SpO2 99 % 02/14/25 1415   Vitals shown include unfiled device data.      Event Time   Out of Recovery 02/14/2025 14:15:20         Pain/Grecia Score: Pain Rating Prior to Med Admin: 7 (2/13/2025  5:43 PM)  Pain Rating Post Med Admin: 0 (2/13/2025  6:24 PM)  Grecia Score: 10 (2/14/2025  2:00 PM)

## 2025-02-14 NOTE — PROGRESS NOTES
[facility] Medicine  Progress Note    Patient Name: Juan C Sanchez  MRN: [id number]  Patient Class: IP- Inpatient   Admission Date: 2/11/2025  Length of Stay: 3 days  Attending Physician: Dao Sterling MD  Primary Care Provider: Katlin Graf NP        Subjective     Principal Problem:Acute on chronic congestive heart failure        HPI:  Mr. Sanchez is a 69 yr old male with a hx of HTN, MR, PAF, chronic systolic CHF with EF of 25, OA, GERD, nonischemic cardiomyopathy, anemia who presented to the ED with a chief complaint of shortness of breath, leg swelling and hypotension.  Patient states that he had PT come to his home today and he was found to be hypotensive and advised to come to the emergency room.  Patient endorses shortness of breath as well as leg swelling, malaise, fatigue, occasional chills, productive cough with milky sputum, upper abdominal pain, occasional nausea, decreased urine output, and positional lightheadedness.  He states that these symptoms have been progressively worsening over the last 1-2 months possibly even longer  He states that his symptoms are worse when lying flat as well as with exertion and better with rest and while sitting up.  He states that he has been taking his home medications without improvement of his symptoms.  He does endorse that most of all of his contacts recently have been sick. He denies any home oxygen use.  He does endorse occasional PND and states that he has been having to sit to sleep.  He denies tobacco and recreational drug use and states he seldomly drinks alcohol.  He denies fever, chest pain, vomiting, diarrhea, dysuria, hematuria, dizziness, and syncope.    Upon arrival to ED, patient afebrile, HR of 109, RR of 24, BP of 95/62, satting 98% on RA.  Workup in the ED revealed RBC of 4.19, H/H of 10/32.8, sodium of 134, pCO2 of 31, GFR of 55.1, corrected calcium of 9, albumin of 3.1, BNP of 3163, troponin of 21.3, lactic acid  "of 2.1.  Blood cultures pending.  CXR shows enlarged cardiac silhouette with pulmonary vascular congestion.  Bilateral pleural effusions.  Mild bibasilar airspace disease and or atelectasis.  Left foot x-ray shows osteopenia.  No overt erosions or osseous destruction.  No acute fracture or dislocation.  Mild multifocal midfoot arthritis.  Forefoot swelling.  Patient was given Lasix 20 mg IV, Zosyn 4.5 g IV, 500 mL NS bolus, vancomycin 20 mg/kg while in the ED. discussed case with ED provider and patient will be admitted under hospital medicine services for further management.    Overview/Hospital Course:  Juan C Sanchez is a 69 year old male with a past medical history of HFrEF, Afib, PAD, anemia, obesity, and GERD who presented with an acute on chronic HFrEF (EF 20-25%) exacerbation as well as Afib with RVR. He is being diuresed with a Lasix infusion as well as dobutamine. Cardiology has been consulted. He is planned for ANGEL cardioversion 2/14. His course was also complicated by a third L toe acute osteomyelitis seen on MRI. Podiatry and ID have been consulted. He is set for L 3rd toe amputation 2/14. He is on empiric vancomycin and cefepime. Vascular Surgery has been consulted as well and will plan for angiogram of the lower extremities 2/17.    Interval History: see "Hospital Course"    Review of Systems   Constitutional:  Positive for activity change.   Respiratory:  Positive for shortness of breath.    Cardiovascular:  Positive for leg swelling. Negative for chest pain and palpitations.   Skin:  Positive for wound.     Objective:     Vital Signs (Most Recent):  Temp: 97.8 °F (36.6 °C) (02/14/25 0500)  Pulse: 105 (02/14/25 0500)  Resp: 18 (02/14/25 0500)  BP: 113/77 (02/14/25 0500)  SpO2: 97 % (02/14/25 0500) Vital Signs (24h Range):  Temp:  [96.3 °F (35.7 °C)-97.8 °F (36.6 °C)] 97.8 °F (36.6 °C)  Pulse:  [] 105  Resp:  [18-20] 18  SpO2:  [96 %-98 %] 97 %  BP: ()/(65-77) 113/77     Weight: 94.1 kg " (207 lb 7.3 oz)  Body mass index is 28.93 kg/m².    Intake/Output Summary (Last 24 hours) at 2/14/2025 0722  Last data filed at 2/14/2025 0502  Gross per 24 hour   Intake 360 ml   Output 7500 ml   Net -7140 ml         Physical Exam  Vitals and nursing note reviewed.   Constitutional:       General: He is not in acute distress.  HENT:      Head: Normocephalic and atraumatic.      Right Ear: External ear normal.      Left Ear: External ear normal.      Nose: Nose normal.      Mouth/Throat:      Mouth: Mucous membranes are moist.   Eyes:      Extraocular Movements: Extraocular movements intact.      Conjunctiva/sclera: Conjunctivae normal.   Cardiovascular:      Rate and Rhythm: Tachycardia present. Rhythm irregular.      Pulses: Normal pulses.      Heart sounds: Normal heart sounds.   Pulmonary:      Effort: Pulmonary effort is normal.      Breath sounds: Rales present.      Comments: RA.  Abdominal:      General: Bowel sounds are normal. There is no distension.      Palpations: Abdomen is soft.      Tenderness: There is no abdominal tenderness.   Genitourinary:     Comments: Viri.  Musculoskeletal:      Cervical back: Normal range of motion and neck supple.      Right lower leg: Edema present.      Left lower leg: Edema present.   Skin:     General: Skin is warm and dry.      Comments: See pictures regarding wounds.   Neurological:      Mental Status: He is alert. Mental status is at baseline.   Psychiatric:         Behavior: Behavior normal.             Significant Labs: All pertinent labs within the past 24 hours have been reviewed.    Significant Imaging: I have reviewed all pertinent imaging results/findings within the past 24 hours.    Assessment and Plan     * Acute on chronic congestive heart failure  -Lasix and dobutamine infusions  -Hold metoprolol  -Telemetry  -Strict I's and O's  -Keep K > 4 and Mg > 2  -Cardiology consulted    Acute osteomyelitis  Not septic. L third toe.  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  -Trend Hgb with CBC    Paroxysmal atrial fibrillation  -Telemetry  -Lovenox  -Hold metoprolol while on dobutamine  -Continue home amiodarone  -ANGEL cardioversion per Cardiology 2/14    MR (mitral regurgitation)  Seen on TTE at OSH.  -Cardiology consulted      Hypertension  -Home medications held  -Continue to monitor      GERD (gastroesophageal reflux disease)  -PPI        VTE Risk Mitigation (From admission, onward)           Ordered     enoxaparin injection 100 mg  Every 12 hours (non-standard times)         02/12/25 1622     IP VTE HIGH RISK PATIENT  Once         02/11/25 2331     Place sequential compression device  Until discontinued         02/11/25 2331                    Discharge Planning   PHILIP: 2/18/2025     Code Status: Full Code   Medical Readiness for Discharge Date:   Discharge Plan A: Skilled Nursing Facility   Discharge Delays: None known at this time            Please place Justification for DME        Dao Sterling MD  Department of Hospital Medicine   [facility] hospital

## 2025-02-14 NOTE — PT/OT/SLP PROGRESS
Physical Therapy      Patient Name:  Juan C Sanchez   MRN:  [id number]    Patient not seen today secondary to Off the floor for procedure/surgery (ANGEL and toe amputation). Will follow-up next service date.

## 2025-02-14 NOTE — ANESTHESIA POSTPROCEDURE EVALUATION
Anesthesia Post Evaluation    Patient: Juan C Sanchez    Procedure(s) Performed: Procedure(s) (LRB):  Transesophageal echo (ANGEL) intra-procedure log documentation (N/A)  Transesophageal echo (ANGEL) intra-procedure log documentation (N/A)  Cardioversion or Defibrillation (N/A)    Final Anesthesia Type: general      Patient location: [facility].  Patient participation: Yes- Able to Participate  Level of consciousness: awake and alert  Post-procedure vital signs: reviewed and stable  Pain management: adequate  Airway patency: patent    PONV status at discharge: No PONV  Anesthetic complications: no      Cardiovascular status: blood pressure returned to baseline and stable  Respiratory status: unassisted and room air  Hydration status: euvolemic  Follow-up not needed.              Vitals Value Taken Time   BP 94/64 02/14/25 1015   Temp 36.6 °C (97.9 °F) 02/14/25 0736   Pulse 91 02/14/25 1020   Resp 17 02/14/25 1020   SpO2 98 % 02/14/25 1020   Vitals shown include unfiled device data.      No case tracking events are documented in the log.      Pain/Grecia Score: Pain Rating Prior to Med Admin: 7 (2/13/2025  5:43 PM)  Pain Rating Post Med Admin: 0 (2/13/2025  6:24 PM)  Grecia Score: 10 (2/14/2025  9:22 AM)

## 2025-02-14 NOTE — SUBJECTIVE & OBJECTIVE
"Interval History: see "Hospital Course"    Review of Systems   Constitutional:  Positive for activity change.   Respiratory:  Positive for shortness of breath.    Cardiovascular:  Positive for leg swelling. Negative for chest pain and palpitations.   Skin:  Positive for wound.     Objective:     Vital Signs (Most Recent):  Temp: 97.8 °F (36.6 °C) (02/14/25 0500)  Pulse: 105 (02/14/25 0500)  Resp: 18 (02/14/25 0500)  BP: 113/77 (02/14/25 0500)  SpO2: 97 % (02/14/25 0500) Vital Signs (24h Range):  Temp:  [96.3 °F (35.7 °C)-97.8 °F (36.6 °C)] 97.8 °F (36.6 °C)  Pulse:  [] 105  Resp:  [18-20] 18  SpO2:  [96 %-98 %] 97 %  BP: ()/(65-77) 113/77     Weight: 94.1 kg (207 lb 7.3 oz)  Body mass index is 28.93 kg/m².    Intake/Output Summary (Last 24 hours) at 2/14/2025 0722  Last data filed at 2/14/2025 0502  Gross per 24 hour   Intake 360 ml   Output 7500 ml   Net -7140 ml         Physical Exam  Vitals and nursing note reviewed.   Constitutional:       General: He is not in acute distress.  HENT:      Head: Normocephalic and atraumatic.      Right Ear: External ear normal.      Left Ear: External ear normal.      Nose: Nose normal.      Mouth/Throat:      Mouth: Mucous membranes are moist.   Eyes:      Extraocular Movements: Extraocular movements intact.      Conjunctiva/sclera: Conjunctivae normal.   Cardiovascular:      Rate and Rhythm: Tachycardia present. Rhythm irregular.      Pulses: Normal pulses.      Heart sounds: Normal heart sounds.   Pulmonary:      Effort: Pulmonary effort is normal.      Breath sounds: Rales present.      Comments: RA.  Abdominal:      General: Bowel sounds are normal. There is no distension.      Palpations: Abdomen is soft.      Tenderness: There is no abdominal tenderness.   Genitourinary:     Comments: Meredith.  Musculoskeletal:      Cervical back: Normal range of motion and neck supple.      Right lower leg: Edema present.      Left lower leg: Edema present.   Skin:     General: " Skin is warm and dry.      Comments: See pictures regarding wounds.   Neurological:      Mental Status: He is alert. Mental status is at baseline.   Psychiatric:         Behavior: Behavior normal.             Significant Labs: All pertinent labs within the past 24 hours have been reviewed.    Significant Imaging: I have reviewed all pertinent imaging results/findings within the past 24 hours.

## 2025-02-14 NOTE — TRANSFER OF CARE
"Anesthesia Transfer of Care Note    Patient: Juan C Sanchez    Procedure(s) Performed: Procedure(s) (LRB):  AMPUTATION, TOE (Left)    Patient location: PACU    Anesthesia Type: general    Transport from OR: Transported from OR on room air with adequate spontaneous ventilation    Post pain: adequate analgesia    Post assessment: no apparent anesthetic complications    Post vital signs: stable    Level of consciousness: awake and alert    Nausea/Vomiting: no nausea/vomiting    Complications: none    Transfer of care protocol was followed      Last vitals: Visit Vitals  /76 (BP Location: Right arm, Patient Position: Sitting)   Pulse 81   Temp 36.2 °C (97.2 °F) (Tympanic)   Resp 20   Ht 5' 11" (1.803 m)   Wt 87.5 kg (192 lb 14.4 oz)   SpO2 (!) 94%   BMI 26.90 kg/m²     "

## 2025-02-14 NOTE — ASSESSMENT & PLAN NOTE
Not septic. L third toe.  -Follow up MRI R foot  -ID consulted  -Podiatry consulted  -Empiric vancomycin and cefepime  -NPO for third toe amputation (L) 2/14

## 2025-02-15 LAB
ALBUMIN SERPL BCP-MCNC: 3.3 G/DL (ref 3.5–5.2)
ALP SERPL-CCNC: 80 U/L (ref 55–135)
ALT SERPL W/O P-5'-P-CCNC: 6 U/L (ref 10–44)
ANION GAP SERPL CALC-SCNC: 7 MMOL/L (ref 8–16)
AST SERPL-CCNC: 12 U/L (ref 10–40)
BACTERIA SPEC ANAEROBE CULT: NORMAL
BASOPHILS # BLD AUTO: 0.05 K/UL (ref 0–0.2)
BASOPHILS NFR BLD: 0.7 % (ref 0–1.9)
BILIRUB SERPL-MCNC: 1.1 MG/DL (ref 0.1–1)
BUN SERPL-MCNC: 22 MG/DL (ref 8–23)
CALCIUM SERPL-MCNC: 9 MG/DL (ref 8.7–10.5)
CHLORIDE SERPL-SCNC: 93 MMOL/L (ref 95–110)
CO2 SERPL-SCNC: 34 MMOL/L (ref 23–29)
CREAT SERPL-MCNC: 1.4 MG/DL (ref 0.5–1.4)
DIFFERENTIAL METHOD BLD: ABNORMAL
EOSINOPHIL # BLD AUTO: 0.2 K/UL (ref 0–0.5)
EOSINOPHIL NFR BLD: 2.6 % (ref 0–8)
ERYTHROCYTE [DISTWIDTH] IN BLOOD BY AUTOMATED COUNT: 18.4 % (ref 11.5–14.5)
EST. GFR  (NO RACE VARIABLE): 55.1 ML/MIN/1.73 M^2
GLUCOSE SERPL-MCNC: 99 MG/DL (ref 70–110)
HCT VFR BLD AUTO: 33.7 % (ref 40–54)
HGB BLD-MCNC: 10.2 G/DL (ref 14–18)
IMM GRANULOCYTES # BLD AUTO: 0.03 K/UL (ref 0–0.04)
IMM GRANULOCYTES NFR BLD AUTO: 0.4 % (ref 0–0.5)
LYMPHOCYTES # BLD AUTO: 1.5 K/UL (ref 1–4.8)
LYMPHOCYTES NFR BLD: 20.2 % (ref 18–48)
MAGNESIUM SERPL-MCNC: 2 MG/DL (ref 1.6–2.6)
MCH RBC QN AUTO: 23.7 PG (ref 27–31)
MCHC RBC AUTO-ENTMCNC: 30.3 G/DL (ref 32–36)
MCV RBC AUTO: 78 FL (ref 82–98)
MONOCYTES # BLD AUTO: 0.9 K/UL (ref 0.3–1)
MONOCYTES NFR BLD: 12.5 % (ref 4–15)
NEUTROPHILS # BLD AUTO: 4.7 K/UL (ref 1.8–7.7)
NEUTROPHILS NFR BLD: 63.6 % (ref 38–73)
NRBC BLD-RTO: 0 /100 WBC
PLATELET # BLD AUTO: 279 K/UL (ref 150–450)
PMV BLD AUTO: 9.9 FL (ref 9.2–12.9)
POTASSIUM SERPL-SCNC: 4.8 MMOL/L (ref 3.5–5.1)
PROT SERPL-MCNC: 7.6 G/DL (ref 6–8.4)
RBC # BLD AUTO: 4.3 M/UL (ref 4.6–6.2)
SODIUM SERPL-SCNC: 134 MMOL/L (ref 136–145)
VANCOMYCIN TROUGH SERPL-MCNC: 20 UG/ML
WBC # BLD AUTO: 7.42 K/UL (ref 3.9–12.7)

## 2025-02-15 PROCEDURE — 21400001 HC TELEMETRY ROOM

## 2025-02-15 PROCEDURE — 25000003 PHARM REV CODE 250: Performed by: STUDENT IN AN ORGANIZED HEALTH CARE EDUCATION/TRAINING PROGRAM

## 2025-02-15 PROCEDURE — 83735 ASSAY OF MAGNESIUM: CPT

## 2025-02-15 PROCEDURE — 36415 COLL VENOUS BLD VENIPUNCTURE: CPT | Performed by: STUDENT IN AN ORGANIZED HEALTH CARE EDUCATION/TRAINING PROGRAM

## 2025-02-15 PROCEDURE — 80202 ASSAY OF VANCOMYCIN: CPT | Performed by: STUDENT IN AN ORGANIZED HEALTH CARE EDUCATION/TRAINING PROGRAM

## 2025-02-15 PROCEDURE — 63600175 PHARM REV CODE 636 W HCPCS

## 2025-02-15 PROCEDURE — 25000003 PHARM REV CODE 250: Performed by: INTERNAL MEDICINE

## 2025-02-15 PROCEDURE — 80053 COMPREHEN METABOLIC PANEL: CPT

## 2025-02-15 PROCEDURE — 25000003 PHARM REV CODE 250

## 2025-02-15 PROCEDURE — 25000003 PHARM REV CODE 250: Performed by: PODIATRIST

## 2025-02-15 PROCEDURE — 85025 COMPLETE CBC W/AUTO DIFF WBC: CPT

## 2025-02-15 PROCEDURE — 63600175 PHARM REV CODE 636 W HCPCS: Performed by: HOSPITALIST

## 2025-02-15 PROCEDURE — 99232 SBSQ HOSP IP/OBS MODERATE 35: CPT | Mod: ,,, | Performed by: PODIATRIST

## 2025-02-15 PROCEDURE — 99233 SBSQ HOSP IP/OBS HIGH 50: CPT | Mod: ,,, | Performed by: INTERNAL MEDICINE

## 2025-02-15 PROCEDURE — 36415 COLL VENOUS BLD VENIPUNCTURE: CPT

## 2025-02-15 PROCEDURE — 25000003 PHARM REV CODE 250: Performed by: HOSPITALIST

## 2025-02-15 PROCEDURE — 27000207 HC ISOLATION

## 2025-02-15 PROCEDURE — 63600175 PHARM REV CODE 636 W HCPCS: Performed by: NURSE PRACTITIONER

## 2025-02-15 RX ORDER — OXYCODONE AND ACETAMINOPHEN 10; 325 MG/1; MG/1
1 TABLET ORAL EVERY 6 HOURS PRN
Refills: 0 | Status: DISCONTINUED | OUTPATIENT
Start: 2025-02-15 | End: 2025-02-24 | Stop reason: HOSPADM

## 2025-02-15 RX ADMIN — PHENAZOPYRIDINE HYDROCHLORIDE 100 MG: 100 TABLET ORAL at 08:02

## 2025-02-15 RX ADMIN — ENOXAPARIN SODIUM 100 MG: 100 INJECTION SUBCUTANEOUS at 04:02

## 2025-02-15 RX ADMIN — TRAMADOL HYDROCHLORIDE 50 MG: 50 TABLET, COATED ORAL at 03:02

## 2025-02-15 RX ADMIN — OXYCODONE HYDROCHLORIDE AND ACETAMINOPHEN 1 TABLET: 10; 325 TABLET ORAL at 04:02

## 2025-02-15 RX ADMIN — ENOXAPARIN SODIUM 100 MG: 100 INJECTION SUBCUTANEOUS at 05:02

## 2025-02-15 RX ADMIN — OXYCODONE HYDROCHLORIDE AND ACETAMINOPHEN 1 TABLET: 10; 325 TABLET ORAL at 11:02

## 2025-02-15 RX ADMIN — AMIODARONE HYDROCHLORIDE 200 MG: 200 TABLET ORAL at 08:02

## 2025-02-15 RX ADMIN — CEFEPIME 2 G: 2 INJECTION, POWDER, FOR SOLUTION INTRAVENOUS at 02:02

## 2025-02-15 RX ADMIN — MUPIROCIN 1 G: 20 OINTMENT TOPICAL at 08:02

## 2025-02-15 RX ADMIN — CEFEPIME 2 G: 2 INJECTION, POWDER, FOR SOLUTION INTRAVENOUS at 12:02

## 2025-02-15 RX ADMIN — DOBUTAMINE HYDROCHLORIDE 5 MCG/KG/MIN: 400 INJECTION INTRAVENOUS at 02:02

## 2025-02-15 RX ADMIN — PANTOPRAZOLE SODIUM 40 MG: 40 TABLET, DELAYED RELEASE ORAL at 05:02

## 2025-02-15 RX ADMIN — Medication 9 MG: at 08:02

## 2025-02-15 RX ADMIN — AMMONIUM LACTATE: 12 LOTION TOPICAL at 08:02

## 2025-02-15 RX ADMIN — TRAMADOL HYDROCHLORIDE 50 MG: 50 TABLET, COATED ORAL at 12:02

## 2025-02-15 RX ADMIN — MIDODRINE HYDROCHLORIDE 2.5 MG: 2.5 TABLET ORAL at 01:02

## 2025-02-15 RX ADMIN — VANCOMYCIN HYDROCHLORIDE 1750 MG: 500 INJECTION, POWDER, LYOPHILIZED, FOR SOLUTION INTRAVENOUS at 02:02

## 2025-02-15 RX ADMIN — CEFEPIME 2 G: 2 INJECTION, POWDER, FOR SOLUTION INTRAVENOUS at 06:02

## 2025-02-15 RX ADMIN — PHENAZOPYRIDINE HYDROCHLORIDE 100 MG: 100 TABLET ORAL at 12:02

## 2025-02-15 RX ADMIN — TRAMADOL HYDROCHLORIDE 50 MG: 50 TABLET, COATED ORAL at 07:02

## 2025-02-15 RX ADMIN — PHENAZOPYRIDINE HYDROCHLORIDE 100 MG: 100 TABLET ORAL at 04:02

## 2025-02-15 RX ADMIN — SPIRONOLACTONE 25 MG: 25 TABLET, FILM COATED ORAL at 08:02

## 2025-02-15 RX ADMIN — OXYCODONE HYDROCHLORIDE AND ACETAMINOPHEN 1 TABLET: 10; 325 TABLET ORAL at 08:02

## 2025-02-15 NOTE — PROGRESS NOTES
Formerly [facility]  Podiatry  Progress Note    Patient Name: Juan C Sanchez  MRN: [id number]  Admission Date: 2/11/2025  Hospital Length of Stay: 4 days  Attending Physician: Livia Alexandre MD  Primary Care Provider: Katlin Graf NP     Subjective:     Interval History: Patient resting in bed. States he has intermittent pain to the left foot, toe amputation site.   Otherwise, no new complaints      Scheduled Meds:   amiodarone  200 mg Oral BID    ammonium lactate   Topical (Top) BID    ceFEPime IV (PEDS and ADULTS)  2 g Intravenous Q8H    enoxparin  1 mg/kg Subcutaneous Q12H    mupirocin   Nasal BID    pantoprazole  40 mg Oral Daily    phenazopyridine  100 mg Oral TID WM    spironolactone  25 mg Oral Daily    vancomycin (VANCOCIN) IV (PEDS and ADULTS)  1,750 mg Intravenous Q24H     Continuous Infusions:   DOBUTamine IV infusion (non-titrating)  5 mcg/kg/min Intravenous Continuous 7.4 mL/hr at 02/15/25 1434 5 mcg/kg/min at 02/15/25 1434    furosemide (LASIX) 2 mg/mL continuous infusion (non-titrating)  5 mg/hr Intravenous Continuous 2.5 mL/hr at 02/14/25 1624 5 mg/hr at 02/14/25 1624     PRN Meds:  Current Facility-Administered Medications:     acetaminophen, 650 mg, Oral, Q4H PRN    aluminum-magnesium hydroxide-simethicone, 30 mL, Oral, QID PRN    magnesium oxide, 800 mg, Oral, PRN    magnesium oxide, 800 mg, Oral, PRN    melatonin, 9 mg, Oral, Nightly PRN    metoprolol, 5 mg, Intravenous, Q5 Min PRN    midodrine, 2.5 mg, Oral, TID PRN    naloxone, 0.02 mg, Intravenous, PRN    ondansetron, 4 mg, Intravenous, Q6H PRN    oxyCODONE-acetaminophen, 1 tablet, Oral, Q6H PRN    potassium bicarbonate, 35 mEq, Oral, PRN    potassium bicarbonate, 50 mEq, Oral, PRN    potassium bicarbonate, 60 mEq, Oral, PRN    potassium, sodium phosphates, 2 packet, Oral, PRN    potassium, sodium phosphates, 2 packet, Oral, PRN    potassium, sodium phosphates, 2 packet, Oral, PRN    senna-docusate 8.6-50 mg, 1 tablet, Oral, BID  PRN    traMADoL, 50 mg, Oral, Q4H PRN    Pharmacy to dose Vancomycin consult, , , Once **AND** vancomycin - pharmacy to dose, , Intravenous, pharmacy to manage frequency    Review of Systems  Objective:     Vital Signs (Most Recent):  Temp: 98.1 °F (36.7 °C) (02/15/25 1659)  Pulse: 90 (02/15/25 1659)  Resp: 18 (02/15/25 1659)  BP: 111/78 (02/15/25 1659)  SpO2: 95 % (02/15/25 1659) Vital Signs (24h Range):  Temp:  [97.3 °F (36.3 °C)-98.1 °F (36.7 °C)] 98.1 °F (36.7 °C)  Pulse:  [] 90  Resp:  [17-20] 18  SpO2:  [92 %-98 %] 95 %  BP: ()/(70-80) 111/78     Weight: 92.5 kg (203 lb 14.8 oz)  Body mass index is 28.44 kg/m².    Foot Exam    Surgical dressing removed  See picture below.   No signs of dehiscence.       Laboratory:  All pertinent labs reviewed within the last 24 hours.    Diagnostic Results:  I have reviewed all pertinent imaging results/findings within the past 24 hours.  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 Three times a week changes.    Patient can WBAT with camwalker boot on at all times while walking or transferring.    Infectious disease following    Carlos A and protein drinks     Counseled patient on increasing protein intake, not getting wound wet, keeping dressing clean dry and intact, following a healthy diet, elevating legs when able, removing pressure from wound          Loren España DPM  Podiatry  [facility]

## 2025-02-15 NOTE — ASSESSMENT & PLAN NOTE
Recommend xeroform to incision site, aquacel AG weaved in between digits with wounds, followed by conform, followed by lightly wrapped ace wrap. Three times a week changes.    Patient can WBAT with camwalker boot on at all times while walking or transferring.    Infectious disease following    Carlos A and protein drinks     Counseled patient on increasing protein intake, not getting wound wet, keeping dressing clean dry and intact, following a healthy diet, elevating legs when able, removing pressure from wound

## 2025-02-15 NOTE — SUBJECTIVE & OBJECTIVE
Subjective:     Interval History: Patient resting in bed. States he has intermittent pain to the left foot, toe amputation site.   Otherwise, no new complaints      Scheduled Meds:   amiodarone  200 mg Oral BID    ammonium lactate   Topical (Top) BID    ceFEPime IV (PEDS and ADULTS)  2 g Intravenous Q8H    enoxparin  1 mg/kg Subcutaneous Q12H    mupirocin   Nasal BID    pantoprazole  40 mg Oral Daily    phenazopyridine  100 mg Oral TID WM    spironolactone  25 mg Oral Daily    vancomycin (VANCOCIN) IV (PEDS and ADULTS)  1,750 mg Intravenous Q24H     Continuous Infusions:   DOBUTamine IV infusion (non-titrating)  5 mcg/kg/min Intravenous Continuous 7.4 mL/hr at 02/15/25 1434 5 mcg/kg/min at 02/15/25 1434    furosemide (LASIX) 2 mg/mL continuous infusion (non-titrating)  5 mg/hr Intravenous Continuous 2.5 mL/hr at 02/14/25 1624 5 mg/hr at 02/14/25 1624     PRN Meds:  Current Facility-Administered Medications:     acetaminophen, 650 mg, Oral, Q4H PRN    aluminum-magnesium hydroxide-simethicone, 30 mL, Oral, QID PRN    magnesium oxide, 800 mg, Oral, PRN    magnesium oxide, 800 mg, Oral, PRN    melatonin, 9 mg, Oral, Nightly PRN    metoprolol, 5 mg, Intravenous, Q5 Min PRN    midodrine, 2.5 mg, Oral, TID PRN    naloxone, 0.02 mg, Intravenous, PRN    ondansetron, 4 mg, Intravenous, Q6H PRN    oxyCODONE-acetaminophen, 1 tablet, Oral, Q6H PRN    potassium bicarbonate, 35 mEq, Oral, PRN    potassium bicarbonate, 50 mEq, Oral, PRN    potassium bicarbonate, 60 mEq, Oral, PRN    potassium, sodium phosphates, 2 packet, Oral, PRN    potassium, sodium phosphates, 2 packet, Oral, PRN    potassium, sodium phosphates, 2 packet, Oral, PRN    senna-docusate 8.6-50 mg, 1 tablet, Oral, BID PRN    traMADoL, 50 mg, Oral, Q4H PRN    Pharmacy to dose Vancomycin consult, , , Once **AND** vancomycin - pharmacy to dose, , Intravenous, pharmacy to manage frequency    Review of Systems  Objective:     Vital Signs (Most Recent):  Temp: 98.1 °F  (36.7 °C) (02/15/25 1659)  Pulse: 90 (02/15/25 1659)  Resp: 18 (02/15/25 1659)  BP: 111/78 (02/15/25 1659)  SpO2: 95 % (02/15/25 1659) Vital Signs (24h Range):  Temp:  [97.3 °F (36.3 °C)-98.1 °F (36.7 °C)] 98.1 °F (36.7 °C)  Pulse:  [] 90  Resp:  [17-20] 18  SpO2:  [92 %-98 %] 95 %  BP: ()/(70-80) 111/78     Weight: 92.5 kg (203 lb 14.8 oz)  Body mass index is 28.44 kg/m².    Foot Exam    Surgical dressing removed  See picture below.   No signs of dehiscence.       Laboratory:  All pertinent labs reviewed within the last 24 hours.    Diagnostic Results:  I have reviewed all pertinent imaging results/findings within the past 24 hours.

## 2025-02-15 NOTE — PROGRESS NOTES
"Consult Note  Infectious Disease    Reason for Consult:  Left 3rd toe osteomyelitis    HPI: Juan C Sanchez is a 69 y.o. male with PMHx CAD, HFrEF, EF 20-25%, mitral regurgitation, PAF, HTN, GERD, anemia, CAD, oa, was sent by his home health nurse to emergency room for shortness of breath, bilateral leg swelling and hypotension.      He is seen per cardiologist who performed ANGEL and cardioversion on 02/14/2025, and  gave  dobutamine + Lasix drip.  Patient feels that his legs are less swollen since he was admitted.  They are still pink, that is his baseline due to stasis dermatitis.  Shortness of breath has improved as well.     Podiatrist saw patient and performed left 3rd toe amputation on 02/14/2025.  Of note " Sharp dissection was then carried down to the metatarsophalangeal joint in the 3rd toe was sharply disarticulated and passed from the operating field. The tissue surrounding the 3rd metatarsophalangeal joint were inspected and appeared healthy.... At this time attention was directed to the 4th toe where utilizing a metal curette excisional debridement of dermis epidermis and subcutaneous tissue was performed. Total area debrided measured 1 cm x 1 cm x 0.2 cm. There was noted be a red granular base following debridement. Attention was then directed to the 5th toe and intermetatarsal space where again utilizing a metal curette excisional debridement of dermis epidermis subcutaneous tissue was performed. Total area debrided measured 1.2 cm x 1.2 cm x 0.1 cm"  Cultures are showing Proteus and Enterococcus.  Patient is seen and examined.  He feels improved.    Antibiotics (From admission, onward)      Start     Stop Route Frequency Ordered    02/14/25 0900  mupirocin 2 % ointment         02/19/25 0859 Nasl 2 times daily 02/14/25 0725    02/13/25 1100  vancomycin (VANCOCIN) 2,000 mg in 0.9% NaCl 500 mL IVPB         -- IV Every 24 hours (non-standard times) 02/13/25 0950    02/13/25 1030  ceFEPIme injection 2 g " "        -- IV Every 8 hours (non-standard times) 02/13/25 0924    02/13/25 1021  vancomycin - pharmacy to dose  (vancomycin IVPB (PEDS and ADULTS))        Placed in "And" Linked Group    -- IV pharmacy to manage frequency 02/13/25 0924          Antifungals (From admission, onward)      None          Antivirals (From admission, onward)      None             Review of patient's allergies indicates:   Allergen Reactions    Gabapentin Other (See Comments)     Hypersomnia, nightmares    Morphine Other (See Comments)     Pt states systemic cramping     Past Medical History:   Diagnosis Date    Hypertension      Past Surgical History:   Procedure Laterality Date    ORIF closed comminuted displaced intra-articular fx distal left radius & application of external fixator Left 05/28/2018     Social History     Tobacco Use    Smoking status: Never    Smokeless tobacco: Never   Substance Use Topics    Alcohol use: Yes     Comment: social     Social History[1]  No family history on file.      Review of Systems   Constitutional:  Positive for fatigue and unexpected weight change (Weight gain when retaining fluids than weight loss when receiving Lasix while in-house and cardioverted.). Negative for appetite change, chills, diaphoresis and fever.   HENT:  Negative for congestion, dental problem, ear pain, hearing loss, mouth sores, postnasal drip, rhinorrhea, sinus pain, sore throat and trouble swallowing.         Poor dental condition.  Aware of importance of treatment   Eyes:  Negative for photophobia, pain and visual disturbance.   Respiratory:  Positive for cough (improved since he came in) and shortness of breath (improved since he came in). Negative for choking and chest tightness.    Cardiovascular:  Negative for chest pain, palpitations and leg swelling.   Gastrointestinal:  Negative for abdominal pain, anal bleeding, blood in stool, constipation, diarrhea, nausea, rectal pain and vomiting.   Endocrine: Negative for " "polydipsia and polyuria.   Genitourinary:  Negative for difficulty urinating, dysuria, flank pain, frequency, genital sores, hematuria, penile discharge, scrotal swelling and urgency.   Musculoskeletal:  Negative for arthralgias, back pain, gait problem, joint swelling and myalgias.        As above   Skin:  Negative for rash and wound.        As above.  Status post amputation of the 3rd toe   Allergic/Immunologic: Negative for environmental allergies, food allergies and immunocompromised state.   Neurological:  Negative for dizziness, syncope, weakness, numbness and headaches.   Hematological:  Negative for adenopathy. Does not bruise/bleed easily.   Psychiatric/Behavioral:  Negative for dysphoric mood and sleep disturbance. The patient is not nervous/anxious.         Pertinent medications noted:     EXAM & DIAGNOSTICS REVIEWED:   Vitals:     Temp:  [97.2 °F (36.2 °C)-98 °F (36.7 °C)]   Temp: 97.3 °F (36.3 °C) (02/15/25 0339)  Pulse: 84 (02/15/25 0339)  Resp: 17 (02/15/25 0339)  BP: 108/78 (02/15/25 0339)  SpO2: 98 % (02/15/25 0339)    Intake/Output Summary (Last 24 hours) at 2/15/2025 0816  Last data filed at 2/15/2025 0435  Gross per 24 hour   Intake 1355 ml   Output 3100 ml   Net -1745 ml        Blood pressure 108/78, pulse 84, temperature 97.3 °F (36.3 °C), temperature source Temporal, resp. rate 17, height 5' 11" (1.803 m), weight 92.5 kg (203 lb 14.8 oz), SpO2 98%.  Body mass index is 28.44 kg/m².  Physical Exam  Constitutional:       General: He is not in acute distress.     Appearance: He is not diaphoretic.   HENT:      Head: Normocephalic and atraumatic.      Comments: Poor teeth     Right Ear: External ear normal.      Left Ear: External ear normal.      Nose: Nose normal.      Mouth/Throat:      Mouth: Mucous membranes are moist.   Eyes:      General: No scleral icterus.        Right eye: No discharge.         Left eye: No discharge.      Conjunctiva/sclera: Conjunctivae normal.      Pupils: Pupils are " equal, round, and reactive to light.   Neck:      Vascular: No JVD.   Cardiovascular:      Rate and Rhythm: Normal rate and regular rhythm.      Heart sounds: Normal heart sounds.   Pulmonary:      Effort: Pulmonary effort is normal.      Breath sounds: Normal breath sounds. No wheezing or rales.   Chest:      Chest wall: No tenderness.   Abdominal:      General: Bowel sounds are normal. There is no distension.      Palpations: Abdomen is soft. There is no mass.      Tenderness: There is no abdominal tenderness. There is no guarding or rebound.   Musculoskeletal:         General: No swelling or tenderness. Normal range of motion.      Cervical back: Normal range of motion and neck supple.   Lymphadenopathy:      Cervical: No cervical adenopathy.   Skin:     General: Skin is warm and dry.      Findings: No erythema or rash.      Comments: See pictures   Neurological:      Mental Status: He is alert and oriented to person, place, and time.      Cranial Nerves: No cranial nerve deficit.   Psychiatric:         Mood and Affect: Mood normal.         Behavior: Behavior normal.         Thought Content: Thought content normal.         VAD:                                     \                               General Labs reviewed:  Recent Labs   Lab 02/13/25 0346 02/14/25 0411 02/15/25  0418   WBC 6.66 5.56 7.42   HGB 9.7* 10.0* 10.2*   HCT 32.0* 32.5* 33.7*    253 279       Recent Labs   Lab 02/13/25 0346 02/14/25 0411 02/15/25  0418   * 137 134*   K 3.5 3.7 4.8   CL 98 97 93*   CO2 28 32* 34*   BUN 18 19 22   CREATININE 1.4 1.2 1.4   CALCIUM 8.1* 8.8 9.0   PROT 6.6 7.2 7.6   BILITOT 0.7 0.8 1.1*   ALKPHOS 73 79 80   ALT 6* 6* 6*   AST 11 11 12         Micro:  Microbiology Results (last 7 days)       Procedure Component Value Units Date/Time    Blood culture x two cultures. Draw prior to antibiotics. [[id number]] Collected: 02/11/25 1428    Order Status: Completed Specimen: Blood from Peripheral, Antecubital,  Right Updated: 02/14/25 2032     Blood Culture, Routine No Growth to date      No Growth to date      No Growth to date      No Growth to date    Narrative:      Aerobic and anaerobic    Blood culture x two cultures. Draw prior to antibiotics. [[id number]] Collected: 02/11/25 1744    Order Status: Completed Specimen: Blood from Peripheral, Antecubital, Left Updated: 02/14/25 2032     Blood Culture, Routine No Growth to date      No Growth to date      No Growth to date      No Growth to date    Narrative:      Aerobic and anaerobic    Tissue culture [[id number]] Collected: 02/13/25 0748    Order Status: Completed Specimen: Bone from Toe, Left Foot Updated: 02/14/25 0845     Aerobic Culture - Tissue Further report to follow     Gram Stain Result No WBC's      Rare Gram positive cocci    Narrative:      Toe, Left Foot    MRSA Screen by PCR [[id number]]  (Abnormal) Collected: 02/13/25 1054    Order Status: Completed Specimen: Nasal Swab Updated: 02/13/25 1410     MRSA SCREEN BY PCR Detected     Comment: MRSA  critical result(s) called and verbal readback obtained from   Luanne Stanley RN on [facility] by [facility] 02/13/2025 14:05         Narrative:       MRSA  critical result(s) called and verbal readback obtained from   Luanne Stanley RN on [facility] by [facility] 02/13/2025 14:05    Culture, Anaerobic [[id number]] Collected: 02/13/25 0748    Order Status: Sent Specimen: Bone from Toe, Left Foot Updated: 02/13/25 0757    Aerobic culture [[id number]] Collected: 02/13/25 0748    Order Status: Canceled Specimen: Bone from Toe, Left Foot             No results found for the last 90 days.      Imaging Reviewed:       IMPRESSION & PLAN   Left 3rd toe osteomyelitis status post amputation of left 3rd toe on 02/14/2025.  Diabetic wounds at the left 2nd and left 4th--I am speculating it is not reaching to the bone but we will have to see wound tomorrow and the next day    Cultures are showing Proteus and Enterococcus.  MRSA screen positive      Admitted for acute on chronic CHF and PAF.  Improving.  As per cardiologist  received the with cardioversion, dobutamine, Lasix drip.       PMHx CAD, HFrEF, EF 20-25%, mitral regurgitation, PAF, HTN, GERD, anemia, CAD, oOA    This patient is high risk for life-threatening deterioration and death secondary to above comorbidities and need for IV treatment    RECOMMENDATIONS  Follow cultures and change antibiotics accordingly.    Monitor wound.  The 3rd toe osteomyelitis surgically cured, but I am interested to see how the nearby toes are looking, if bone is exposed.        I spent a total of 45 minutes on the day of the visit.This includes face to face time and non-face to face time preparing to see the patient (eg, review of tests), obtaining and/or reviewing separately obtained history, documenting clinical information in the electronic or other health record, independently interpreting results and communicating results to the patient/family/caregiver, or care coordinator.  This note has been used by [employer] dictation software, which may have errors in typing         [1]

## 2025-02-15 NOTE — SUBJECTIVE & OBJECTIVE
Interval History:  Patient seen and examined on multidisciplinary rounds with nurse.  He underwent ANGEL cardioversion yesterday and has been in normal sinus rhythm since.  Also underwent left 3rd toe amputation yesterday.  Plan for angio of lower extremities on 02/17 with vascular.  He remains on Lasix and dobutamine infusions.  Review of Systems   Constitutional:  Positive for activity change.   Respiratory:  Positive for shortness of breath.    Cardiovascular:  Positive for leg swelling. Negative for chest pain and palpitations.   Skin:  Positive for wound.     Objective:     Vital Signs (Most Recent):  Temp: 98.1 °F (36.7 °C) (02/15/25 0819)  Pulse: 84 (02/15/25 0819)  Resp: 19 (02/15/25 0840)  BP: 105/80 (02/15/25 0819)  SpO2: 98 % (02/15/25 0339) Vital Signs (24h Range):  Temp:  [97.2 °F (36.2 °C)-98.1 °F (36.7 °C)] 98.1 °F (36.7 °C)  Pulse:  [] 84  Resp:  [12-20] 19  SpO2:  [92 %-100 %] 98 %  BP: ()/(52-82) 105/80     Weight: 92.5 kg (203 lb 14.8 oz)  Body mass index is 28.44 kg/m².    Intake/Output Summary (Last 24 hours) at 2/15/2025 1141  Last data filed at 2/15/2025 0435  Gross per 24 hour   Intake 1155 ml   Output 1800 ml   Net -645 ml         Physical Exam  Vitals and nursing note reviewed.   Constitutional:       General: He is not in acute distress.  HENT:      Head: Normocephalic and atraumatic.      Right Ear: External ear normal.      Left Ear: External ear normal.      Nose: Nose normal.      Mouth/Throat:      Mouth: Mucous membranes are moist.   Eyes:      Extraocular Movements: Extraocular movements intact.      Conjunctiva/sclera: Conjunctivae normal.   Cardiovascular:      Rate and Rhythm: Normal rate and regular rhythm.      Pulses: Normal pulses.      Heart sounds: Normal heart sounds.   Pulmonary:      Effort: Pulmonary effort is normal.      Breath sounds: Rales present.      Comments: RA.  Abdominal:      General: Bowel sounds are normal. There is no distension.       Palpations: Abdomen is soft.      Tenderness: There is no abdominal tenderness.   Genitourinary:     Comments: Meredith.  Musculoskeletal:      Cervical back: Normal range of motion and neck supple.      Right lower leg: Edema present.      Left lower leg: Edema present.   Skin:     General: Skin is warm and dry.      Comments: See pictures regarding wounds.  Postoperative dressing not removed   Neurological:      Mental Status: He is alert. Mental status is at baseline.   Psychiatric:         Behavior: Behavior normal.             Significant Labs: All pertinent labs within the past 24 hours have been reviewed.    Significant Imaging: I have reviewed all pertinent imaging results/findings within the past 24 hours.

## 2025-02-15 NOTE — ASSESSMENT & PLAN NOTE
-Lasix and dobutamine infusions  -Hold metoprolol  -spironolactone 25 daily  -Telemetry  -Strict I's and O's  -Keep K > 4 and Mg > 2  -Cardiology consulted

## 2025-02-15 NOTE — PLAN OF CARE
Problem: Adult Inpatient Plan of Care  Goal: Plan of Care Review  Outcome: Progressing  Goal: Patient-Specific Goal (Individualized)  Outcome: Progressing  Goal: Absence of Hospital-Acquired Illness or Injury  Outcome: Progressing  Goal: Optimal Comfort and Wellbeing  Outcome: Progressing  Goal: Readiness for Transition of Care  Outcome: Progressing     Problem: Wound  Goal: Optimal Coping  Outcome: Progressing  Goal: Optimal Functional Ability  Outcome: Progressing  Goal: Absence of Infection Signs and Symptoms  Outcome: Progressing  Goal: Improved Oral Intake  Outcome: Progressing  Goal: Optimal Pain Control and Function  Outcome: Progressing  Goal: Skin Health and Integrity  Outcome: Progressing  Goal: Optimal Wound Healing  Outcome: Progressing     Problem: Fall Injury Risk  Goal: Absence of Fall and Fall-Related Injury  Outcome: Progressing     Problem: Infection  Goal: Absence of Infection Signs and Symptoms  Outcome: Progressing     Problem: Skin Injury Risk Increased  Goal: Skin Health and Integrity  Outcome: Progressing

## 2025-02-15 NOTE — PROGRESS NOTES
[facility] Medicine  Progress Note    Patient Name: Juan C Sanchez  MRN: [id number]  Patient Class: IP- Inpatient   Admission Date: 2/11/2025  Length of Stay: 4 days  Attending Physician: Livia Alexandre MD  Primary Care Provider: Katlin Graf NP        Subjective     Principal Problem:Acute on chronic congestive heart failure        HPI:  Mr. Sanchez is a 69 yr old male with a hx of HTN, MR, PAF, chronic systolic CHF with EF of 25, OA, GERD, nonischemic cardiomyopathy, anemia who presented to the ED with a chief complaint of shortness of breath, leg swelling and hypotension.  Patient states that he had PT come to his home today and he was found to be hypotensive and advised to come to the emergency room.  Patient endorses shortness of breath as well as leg swelling, malaise, fatigue, occasional chills, productive cough with milky sputum, upper abdominal pain, occasional nausea, decreased urine output, and positional lightheadedness.  He states that these symptoms have been progressively worsening over the last 1-2 months possibly even longer  He states that his symptoms are worse when lying flat as well as with exertion and better with rest and while sitting up.  He states that he has been taking his home medications without improvement of his symptoms.  He does endorse that most of all of his contacts recently have been sick. He denies any home oxygen use.  He does endorse occasional PND and states that he has been having to sit to sleep.  He denies tobacco and recreational drug use and states he seldomly drinks alcohol.  He denies fever, chest pain, vomiting, diarrhea, dysuria, hematuria, dizziness, and syncope.    Upon arrival to ED, patient afebrile, HR of 109, RR of 24, BP of 95/62, satting 98% on RA.  Workup in the ED revealed RBC of 4.19, H/H of 10/32.8, sodium of 134, pCO2 of 31, GFR of 55.1, corrected calcium of 9, albumin of 3.1, BNP of 3163, troponin of 21.3, lactic acid  of 2.1.  Blood cultures pending.  CXR shows enlarged cardiac silhouette with pulmonary vascular congestion.  Bilateral pleural effusions.  Mild bibasilar airspace disease and or atelectasis.  Left foot x-ray shows osteopenia.  No overt erosions or osseous destruction.  No acute fracture or dislocation.  Mild multifocal midfoot arthritis.  Forefoot swelling.  Patient was given Lasix 20 mg IV, Zosyn 4.5 g IV, 500 mL NS bolus, vancomycin 20 mg/kg while in the ED. discussed case with ED provider and patient will be admitted under hospital medicine services for further management.    Overview/Hospital Course:  Juan C Sanchez is a 69 year old male with a past medical history of HFrEF, Afib, PAD, anemia, obesity, and GERD who presented with an acute on chronic HFrEF (EF 20-25%) exacerbation as well as Afib with RVR. He is being diuresed with a Lasix infusion as well as dobutamine. Cardiology has been consulted.  Underwent ANGEL cardioversion 2/14. His course was also complicated by a third L toe acute osteomyelitis seen on MRI. Podiatry and ID have been consulted.  He underwent L 3rd toe amputation 2/14. He is on empiric vancomycin and cefepime. Vascular Surgery has been consulted as well and will plan for angiogram of the lower extremities 2/17.    Interval History:  Patient seen and examined on multidisciplinary rounds with nurse.  He underwent ANGEL cardioversion yesterday and has been in normal sinus rhythm since.  Also underwent left 3rd toe amputation yesterday.  Plan for angio of lower extremities on 02/17 with vascular.  He remains on Lasix and dobutamine infusions.  Review of Systems   Constitutional:  Positive for activity change.   Respiratory:  Positive for shortness of breath.    Cardiovascular:  Positive for leg swelling. Negative for chest pain and palpitations.   Skin:  Positive for wound.     Objective:     Vital Signs (Most Recent):  Temp: 98.1 °F (36.7 °C) (02/15/25 0819)  Pulse: 84 (02/15/25 0819)  Resp:  19 (02/15/25 0840)  BP: 105/80 (02/15/25 0819)  SpO2: 98 % (02/15/25 0339) Vital Signs (24h Range):  Temp:  [97.2 °F (36.2 °C)-98.1 °F (36.7 °C)] 98.1 °F (36.7 °C)  Pulse:  [] 84  Resp:  [12-20] 19  SpO2:  [92 %-100 %] 98 %  BP: ()/(52-82) 105/80     Weight: 92.5 kg (203 lb 14.8 oz)  Body mass index is 28.44 kg/m².    Intake/Output Summary (Last 24 hours) at 2/15/2025 1141  Last data filed at 2/15/2025 0435  Gross per 24 hour   Intake 1155 ml   Output 1800 ml   Net -645 ml         Physical Exam  Vitals and nursing note reviewed.   Constitutional:       General: He is not in acute distress.  HENT:      Head: Normocephalic and atraumatic.      Right Ear: External ear normal.      Left Ear: External ear normal.      Nose: Nose normal.      Mouth/Throat:      Mouth: Mucous membranes are moist.   Eyes:      Extraocular Movements: Extraocular movements intact.      Conjunctiva/sclera: Conjunctivae normal.   Cardiovascular:      Rate and Rhythm: Normal rate and regular rhythm.      Pulses: Normal pulses.      Heart sounds: Normal heart sounds.   Pulmonary:      Effort: Pulmonary effort is normal.      Breath sounds: Rales present.      Comments: RA.  Abdominal:      General: Bowel sounds are normal. There is no distension.      Palpations: Abdomen is soft.      Tenderness: There is no abdominal tenderness.   Genitourinary:     Comments: Meredith.  Musculoskeletal:      Cervical back: Normal range of motion and neck supple.      Right lower leg: Edema present.      Left lower leg: Edema present.   Skin:     General: Skin is warm and dry.      Comments: See pictures regarding wounds.  Postoperative dressing not removed   Neurological:      Mental Status: He is alert. Mental status is at baseline.   Psychiatric:         Behavior: Behavior normal.             Significant Labs: All pertinent labs within the past 24 hours have been reviewed.    Significant Imaging: I have reviewed all pertinent imaging results/findings  within the past 24 hours.    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 L third toe.  -ID consulted  -Podiatry consulted  -Empiric vancomycin and cefepime  -status post third toe amputation (L) 2/14      PAD (peripheral artery disease)  -No intervention indicated per Vascular Surgery at this time  -Follow up angiogram 2/17      Anemia  Ferritin at lower limit of normal.  -Trend Hgb with CBC    Paroxysmal atrial fibrillation  -Telemetry  -Lovenox  -Hold metoprolol while on dobutamine  -Continue home amiodarone  -ANGEL cardioversion per Cardiology 2/14    MR (mitral regurgitation)  Seen on TTE at OSH.  -Cardiology consulted      Hypertension  -Home medications held  -Continue to monitor      GERD (gastroesophageal reflux disease)  -PPI        VTE Risk Mitigation (From admission, onward)           Ordered     enoxaparin injection 100 mg  Every 12 hours (non-standard times)         02/12/25 1622     IP VTE HIGH RISK PATIENT  Once         02/11/25 2331     Place sequential compression device  Until discontinued         02/11/25 2331                    Discharge Planning   PHILIP: 2/18/2025     Code Status: Full Code   Medical Readiness for Discharge Date:   Discharge Plan A: Skilled Nursing Facility   Discharge Delays: None known at this time            Please place Justification for DME        Livia Alexandre MD  Department of Hospital Medicine   [facility]

## 2025-02-15 NOTE — ASSESSMENT & PLAN NOTE
Not septic. L third toe.  -ID consulted  -Podiatry consulted  -Empiric vancomycin and cefepime  -status post third toe amputation (L) 2/14

## 2025-02-15 NOTE — PROGRESS NOTES
"[facility]  Department of Cardiology  Progress Note      PATIENT NAME: Juan C Sanchez  MRN: [id number]  TODAY'S DATE: 02/15/2025  ADMIT DATE: 2/11/2025                          CONSULT REQUESTED BY: Livia Alexandre MD    SUBJECTIVE     PRINCIPAL PROBLEM: Acute on chronic congestive heart failure      02/15/2025    Patient seen resting in bed with no distress noted. Breathing stable.     2/14/25    Patient seen resting in bed with no distress noted. Breathing is stable. S/p ANGEL/CV today.     2/13/25  Patient seen sitting up in bed with no acute distress noted.  Denies any chest discomfort.  Breathing is stable.  He has been diuresing very well, and his weight is down significantly.    REASON FOR CONSULT:  CHF exacerbation     HPI:  Mr. Sanchez is a 69 yr old male with pmh of known HFrEF, HTN, mitral regurg, OA, GERD, and anemia who originally come into the ER yesterday evening after home PT encouraged him to come be evaluated after noticing some worsening swelling in BLE, hypotension, and generalized weakness. It appears he has been in and out of the ER about 8x since the start of this new year. BNP 3163. Trops 21.3 then 16.8. Most recent ECHO uploaded in care everywhere shows EF 25%. Pt also states he had a recent angiogram done in [place] (the last dated one I could see was 2022) showed trivial nonobstructive CAD. He states he follows with Dr. Alegria but has not been seen "in a while." He states compliance with medications but feels PO lasix is not working for him.     Per hospital medicine notes:  Mr. Sanchez is a 69 yr old male with a hx of HTN, MR, PAF, chronic systolic CHF with EF of 25, OA, GERD, nonischemic cardiomyopathy, anemia who presented to the ED with a chief complaint of shortness of breath, leg swelling and hypotension.  Patient states that he had PT come to his home today and he was found to be hypotensive and advised to come to the emergency room.  Patient endorses " shortness of breath as well as leg swelling, malaise, fatigue, occasional chills, productive cough with milky sputum, upper abdominal pain, occasional nausea, decreased urine output, and positional lightheadedness.  He states that these symptoms have been progressively worsening over the last 1-2 months possibly even longer  He states that his symptoms are worse when lying flat as well as with exertion and better with rest and while sitting up.  He states that he has been taking his home medications without improvement of his symptoms.  He does endorse that most of all of his contacts recently have been sick. He denies any home oxygen use.  He does endorse occasional PND and states that he has been having to sit to sleep.  He denies tobacco and recreational drug use and states he seldomly drinks alcohol.  He denies fever, chest pain, vomiting, diarrhea, dysuria, hematuria, dizziness, and syncope.     Upon arrival to ED, patient afebrile, HR of 109, RR of 24, BP of 95/62, satting 98% on RA.  Workup in the ED revealed RBC of 4.19, H/H of 10/32.8, sodium of 134, pCO2 of 31, GFR of 55.1, corrected calcium of 9, albumin of 3.1, BNP of 3163, troponin of 21.3, lactic acid of 2.1.  Blood cultures pending.  CXR shows enlarged cardiac silhouette with pulmonary vascular congestion.  Bilateral pleural effusions.  Mild bibasilar airspace disease and or atelectasis.  Left foot x-ray shows osteopenia.  No overt erosions or osseous destruction.  No acute fracture or dislocation.  Mild multifocal midfoot arthritis.  Forefoot swelling.  Patient was given Lasix 20 mg IV, Zosyn 4.5 g IV, 500 mL NS bolus, vancomycin 20 mg/kg while in the ED. discussed case with ED provider and patient will be admitted under hospital medicine services for further management.        Review of patient's allergies indicates:   Allergen Reactions    Gabapentin Other (See Comments)     Hypersomnia, nightmares    Morphine Other (See Comments)     Pt states  systemic cramping       Past Medical History:   Diagnosis Date    Hypertension      Past Surgical History:   Procedure Laterality Date    ORIF closed comminuted displaced intra-articular fx distal left radius & application of external fixator Left 05/28/2018     Social History     Tobacco Use    Smoking status: Never    Smokeless tobacco: Never   Substance Use Topics    Alcohol use: Yes     Comment: social    Drug use: No        REVIEW OF SYSTEMS    As mentioned in HPI    OBJECTIVE     VITAL SIGNS (Most Recent)  Temp: 97.5 °F (36.4 °C) (02/15/25 1254)  Pulse: 82 (02/15/25 1254)  Resp: 18 (02/15/25 1257)  BP: 97/76 (02/15/25 1254)  SpO2: (!) 94 % (02/15/25 1254)    VENTILATION STATUS  Resp: 18 (02/15/25 1257)  SpO2: (!) 94 % (02/15/25 1254)           I & O (Last 24H):  Intake/Output Summary (Last 24 hours) at 2/15/2025 1408  Last data filed at 2/15/2025 1327  Gross per 24 hour   Intake 1280 ml   Output 2250 ml   Net -970 ml       WEIGHTS  Wt Readings from Last 3 Encounters:   02/15/25 0339 92.5 kg (203 lb 14.8 oz)   02/14/25 0732 87.5 kg (192 lb 14.4 oz)   02/14/25 0500 94.1 kg (207 lb 7.3 oz)   02/13/25 0604 93.9 kg (207 lb 0.2 oz)   02/12/25 0050 98.6 kg (217 lb 6 oz)   02/11/25 1727 92.5 kg (204 lb)   02/14/25 0940 87.5 kg (192 lb 14.4 oz)   10/23/18 1426 92.5 kg (204 lb)       PHYSICAL EXAM    CONSTITUTIONAL: NAD, elderly male lying in bed with HOB elevated   HEENT: Normocephalic. No pallor  NECK: no JVD  LUNGS: crackles in the bases, mild tachypnea   HEART: irregular rate and rhythm -afib 80-90s, S1, S2 normal, no murmur   ABDOMEN: soft, mild tenderness+, bowel sounds normal  EXTREMITIES: BLE edema +2 persists, redness noted to left lower extremity, left foot with dressing clean dry and intact  SKIN: No rash  NEURO: AAO X 3  PSYCH: normal affect     HOME MEDICATIONS:  No current facility-administered medications on file prior to encounter.     Current Outpatient Medications on File Prior to Encounter    Medication Sig Dispense Refill    acetaminophen (TYLENOL EXTRA STRENGTH) 500 MG tablet Take 1,000 mg by mouth every 6 (six) hours as needed for Pain.      amiodarone (PACERONE) 200 MG Tab Take 200 mg by mouth 2 (two) times daily.      doxycycline (MONODOX) 100 MG capsule Take 100 mg by mouth 2 (two) times daily.      empagliflozin (JARDIANCE) 10 mg tablet Take 10 mg by mouth once daily.      ENTRESTO 24-26 mg per tablet Take 1 tablet by mouth 2 (two) times daily.      furosemide (LASIX) 20 MG tablet Take 40 mg by mouth 0900, 1800.      gabapentin (NEURONTIN) 300 MG capsule Take 300 mg by mouth 2 (two) times daily.      [] levoFLOXacin (LEVAQUIN) 500 MG tablet Take 500 mg by mouth once daily.      metoprolol tartrate (LOPRESSOR) 25 MG tablet Take 25 mg by mouth 2 (two) times daily.      nitroGLYCERIN (NITROSTAT) 0.4 MG SL tablet Place 0.4 mg under the tongue every 5 (five) minutes as needed for Chest pain.      oxyCODONE-acetaminophen (PERCOCET)  mg per tablet Take 1 tablet by mouth every 8 (eight) hours as needed for Pain.      pantoprazole (PROTONIX) 40 MG tablet Take 40 mg by mouth once daily.      [] promethazine-dextromethorphan (PROMETHAZINE-DM) 6.25-15 mg/5 mL Syrp Take 5 mLs by mouth every 6 (six) hours as needed.      carvediloL (COREG) 3.125 MG tablet Take 3.125 mg by mouth 2 (two) times daily. (Patient not taking: Reported on 2025)         SCHEDULED MEDS:   amiodarone  200 mg Oral BID    ammonium lactate   Topical (Top) BID    ceFEPime IV (PEDS and ADULTS)  2 g Intravenous Q8H    enoxparin  1 mg/kg Subcutaneous Q12H    mupirocin   Nasal BID    pantoprazole  40 mg Oral Daily    phenazopyridine  100 mg Oral TID WM    spironolactone  25 mg Oral Daily    vancomycin (VANCOCIN) IV (PEDS and ADULTS)  1,750 mg Intravenous Q24H       CONTINUOUS INFUSIONS:   DOBUTamine IV infusion (non-titrating)  5 mcg/kg/min Intravenous Continuous 7.395 mL/hr at 25 1306 5 mcg/kg/min at 25  "1306    furosemide (LASIX) 2 mg/mL continuous infusion (non-titrating)  5 mg/hr Intravenous Continuous 2.5 mL/hr at 02/14/25 1624 5 mg/hr at 02/14/25 1624       PRN MEDS:  Current Facility-Administered Medications:     acetaminophen, 650 mg, Oral, Q4H PRN    aluminum-magnesium hydroxide-simethicone, 30 mL, Oral, QID PRN    magnesium oxide, 800 mg, Oral, PRN    magnesium oxide, 800 mg, Oral, PRN    melatonin, 9 mg, Oral, Nightly PRN    metoprolol, 5 mg, Intravenous, Q5 Min PRN    midodrine, 2.5 mg, Oral, TID PRN    naloxone, 0.02 mg, Intravenous, PRN    ondansetron, 4 mg, Intravenous, Q6H PRN    oxyCODONE-acetaminophen, 1 tablet, Oral, Q8H PRN    potassium bicarbonate, 35 mEq, Oral, PRN    potassium bicarbonate, 50 mEq, Oral, PRN    potassium bicarbonate, 60 mEq, Oral, PRN    potassium, sodium phosphates, 2 packet, Oral, PRN    potassium, sodium phosphates, 2 packet, Oral, PRN    potassium, sodium phosphates, 2 packet, Oral, PRN    senna-docusate 8.6-50 mg, 1 tablet, Oral, BID PRN    traMADoL, 50 mg, Oral, Q4H PRN    Pharmacy to dose Vancomycin consult, , , Once **AND** vancomycin - pharmacy to dose, , Intravenous, pharmacy to manage frequency    LABS AND DIAGNOSTICS     CBC LAST 3 DAYS  Recent Labs   Lab 02/13/25  0346 02/14/25  0411 02/15/25  0418   WBC 6.66 5.56 7.42   RBC 4.12* 4.26* 4.30*   HGB 9.7* 10.0* 10.2*   HCT 32.0* 32.5* 33.7*   MCV 78* 76* 78*   MCH 23.5* 23.5* 23.7*   MCHC 30.3* 30.8* 30.3*   RDW 18.4* 18.4* 18.4*    253 279   MPV 10.1 9.6 9.9   GRAN 58.0  3.9 53.2  3.0 63.6  4.7   LYMPH 25.5  1.7 29.1  1.6 20.2  1.5   MONO 13.2  0.9 12.1  0.7 12.5  0.9   BASO 0.05 0.05 0.05   NRBC 0 0 0       COAGULATION LAST 3 DAYS  No results for input(s): "LABPT", "INR", "APTT" in the last 168 hours.    CHEMISTRY LAST 3 DAYS  Recent Labs   Lab 02/13/25  0346 02/14/25  0411 02/15/25  0418   * 137 134*   K 3.5 3.7 4.8   CL 98 97 93*   CO2 28 32* 34*   ANIONGAP 8 8 7*   BUN 18 19 22   CREATININE " "1.4 1.2 1.4   * 87 99   CALCIUM 8.1* 8.8 9.0   MG 2.0 2.0 2.0   ALBUMIN 3.0* 3.1* 3.3*   PROT 6.6 7.2 7.6   ALKPHOS 73 79 80   ALT 6* 6* 6*   AST 11 11 12   BILITOT 0.7 0.8 1.1*       CARDIAC PROFILE LAST 3 DAYS  Recent Labs   Lab 02/11/25  1818 02/12/25  0158   BNP 3,163*  --    TROPONINIHS 21.3* 16.8*       ENDOCRINE LAST 3 DAYS  Recent Labs   Lab 02/12/25  0158 02/13/25  1041   TSH 7.487*  --    PROCAL  --  <0.050       LAST ARTERIAL BLOOD GAS  ABG  No results for input(s): "PH", "PO2", "PCO2", "HCO3", "BE" in the last 168 hours.    LAST 7 DAYS MICROBIOLOGY   Microbiology Results (last 7 days)       Procedure Component Value Units Date/Time    Culture, Anaerobic [[id number]] Collected: 02/13/25 0748    Order Status: Completed Specimen: Bone from Toe, Left Foot Updated: 02/15/25 0933     Anaerobic Culture No anaerobes isolated    Narrative:      Toe, Left Foot    Tissue culture [[id number]]  (Abnormal) Collected: 02/13/25 0748    Order Status: Completed Specimen: Bone from Toe, Left Foot Updated: 02/15/25 0927     Aerobic Culture - Tissue PRESUMPTIVE PROTEUS SPECIES  From broth only  Identification and susceptibility pending        ENTEROCOCCUS SPECIES  From broth only  Identification and susceptibility pending       Gram Stain Result No WBC's      Rare Gram positive cocci    Narrative:      Toe, Left Foot    Blood culture x two cultures. Draw prior to antibiotics. [[id number]] Collected: 02/11/25 1749    Order Status: Completed Specimen: Blood from Peripheral, Antecubital, Right Updated: 02/14/25 2032     Blood Culture, Routine No Growth to date      No Growth to date      No Growth to date      No Growth to date    Narrative:      Aerobic and anaerobic    Blood culture x two cultures. Draw prior to antibiotics. [[id number]] Collected: 02/11/25 1744    Order Status: Completed Specimen: Blood from Peripheral, Antecubital, Left Updated: 02/14/25 2032     Blood Culture, Routine No Growth to date      No " Growth to date      No Growth to date      No Growth to date    Narrative:      Aerobic and anaerobic    MRSA Screen by PCR [[id number]]  (Abnormal) Collected: 02/13/25 1054    Order Status: Completed Specimen: Nasal Swab Updated: 02/13/25 1410     MRSA SCREEN BY PCR Detected     Comment: MRSA  critical result(s) called and verbal readback obtained from   Luanne Stanley RN on [facility] by [facility] 02/13/2025 14:05         Narrative:       MRSA  critical result(s) called and verbal readback obtained from   Luanne Stanley RN on [facility] by [facility] 02/13/2025 14:05    Aerobic culture [[id number]] Collected: 02/13/25 0748    Order Status: Canceled Specimen: Bone from Toe, Left Foot             MOST RECENT IMAGING  X-Ray Foot 2 View Right  Narrative: EXAMINATION:  XR FOOT 2 VIEW RIGHT    CLINICAL HISTORY:  possible 5th toe fracture seen on MRI;    COMPARISON:  None.    FINDINGS:  Patient positioning is suboptimal.  Grossly, no acute fracture, dislocation or osseous destruction is identified.  No focal soft tissue abnormality is observed.  Impression: Limited examination demonstrating no gross acute abnormality.  Consider dedicated radiographs of the 5th digit to further investigate questioned abnormality on recent MRI when clinically able.    Electronically signed by: Bakari Hawley  Date:    02/14/2025  Time:    15:45  Transesophageal echo (ANGEL) with possible cardioversion  Addendum:   Left Ventricle: The left ventricle is normal in size. Normal wall  thickness. Severe global hypokinesis present. There is severely reduced  systolic function with a visually estimated ejection fraction of less than  30%. There is normal diastolic function.     Right Ventricle: Normal right ventricular cavity size. Systolic  function is moderately reduced.     Left Atrium: Left atrium is dilated. Agitated saline study of the  atrial septum is negative after vasalva maneuver, suggesting absence of  intracardiac shunt at the atrial level. No patent foramen  ovale confirmed  by agitated saline contrast. The left atrial appendage has a chicken wing  morphology. Appendage velocity is reduced at less than 40 cm/sec. There is  no thrombus in the left atrial appendage.     Right Atrium: Right atrium is dilated.     Mitral Valve: There is moderate regurgitation.     Tricuspid Valve: There is mild to moderate regurgitation.     Successful electrical cardioversion with restoration of normal sinus  rhythm  Narrative:   Left Ventricle: The left ventricle is normal in size. Normal wall   thickness. Severe global hypokinesis present. There is severely reduced   systolic function with a visually estimated ejection fraction of less than   30%. There is normal diastolic function.    Right Ventricle: Normal right ventricular cavity size. Systolic   function is moderately reduced.    Left Atrium: Left atrium is dilated. Agitated saline study of the   atrial septum is negative after vasalva maneuver, suggesting absence of   intracardiac shunt at the atrial level. No patent foramen ovale confirmed   by agitated saline contrast. The left atrial appendage has a chicken wing   morphology. Appendage velocity is reduced at less than 40 cm/sec. There is   no thrombus in the left atrial appendage.    Right Atrium: Right atrium is dilated.    Mitral Valve: There is moderate regurgitation.    Tricuspid Valve: There is mild to moderate regurgitation.  MRI Forefoot WO Contrast RT  Narrative: EXAMINATION:  MRI FOREFOOT WO CONTRAST RT    CLINICAL HISTORY:  Foot swelling, diabetic, osteomyelitis suspected, xray done;Foot swelling, nondiabetic, osteomyelitis suspected;    TECHNIQUE:  Multisequence, multiplanar imaging of the right foot obtained without contrast.    COMPARISON:  No prior imaging of the right foot is available for comparison.    FINDINGS:  Focal bone marrow edema with associated cortical offset/linear low signal intensity involving the proximal aspect of the 5th digit proximal phalanx,  which is suspicious for acute or subacute fracture.  No overlying soft tissue defect/ulceration is evident.    No other bone marrow signal abnormality.  Osteoarthritic changes of the great toe MTP and IP joints.  Diffuse soft tissue swelling about the foot, most pronounced dorsally.  Intrinsic muscles of the foot are edematous.    Lisfranc ligament is intact.  Visualized flexor and extensor tendons appear intact.  Impression: MRI findings suspicious for acute/subacute fracture involving the proximal phalanx of the 5th digit.  Please correlate with right foot radiography.  If there is an overlying soft tissue defect in this region, superimposed osteomyelitis is possible, but unlikely if skin is intact.    Diffuse soft tissue swelling about the foot, most pronounced dorsally.    Edema involving the intrinsic foot musculature.    Osteoarthritic changes of the great toe.    Electronically signed by: Alejandro Carney  Date:    02/14/2025  Time:    07:59  X-Ray Chest AP Single View  Narrative: EXAMINATION:  XR CHEST 1 VIEW    CLINICAL HISTORY:  arrhythmia; Paroxysmal atrial fibrillation    COMPARISON:  02/11/2025    FINDINGS:  Cardiac silhouette size is enlarged and similar to prior.  Prominent central pulmonary vascularity and mild increased interstitial markings bilaterally suggestive of pulmonary edema in the setting of CHF.  No large pleural effusion.  No pneumothorax.  Impression: Cardiomegaly with findings of interstitial pulmonary edema/volume overload.    Electronically signed by: Alejandro Carney  Date:    02/14/2025  Time:    06:57      ECHOCARDIOGRAM RESULTS (last 5)  No results found for this or any previous visit.    Echo done locally    Impressions   -----------     1.The estimated LV ejection fraction is 25 %     2.There is moderate to severe global hypokinesis     3.Diastolic function is indeterminate.     4.There is mild aortic valve sclerosis without significant stenosis     5.There is severe mitral  regurgitation     6.Estimated RVSP systolic pressure is 37.13 mmHg     7.Compared to the report from prior study dated 04/04/2022, the   severity of mitral regurgitation has increased     Findings   --------     Left Ventricle   The estimated LV ejection fraction is 25 %. The calculated LV ejection   fraction (MOD, Biplane) is 29.99 %. LV chamber is mildly dilated.   There is mild concentric LV hypertrophy. LV systolic function is   normal. There is moderate to severe global hypokinesis. Diastolic   function is indeterminate.     Right Ventricle   RV size is moderately dilated. The RV systolic function is normal.     Septum   There is no atrial septal defect (ASD). There is no ventricular septal   defect (VSD).     Left Atrium   LA chamber size is normal. LA diameter is 3.81 cm.     Right Atrium   RA chamber size is normal.     Aortic Valve   There is a trileaflet aortic valve. There is mild aortic valve   sclerosis without significant stenosis. There is no aortic   regurgitation. Aortic valve area by VMax: 1.02 cm2. Aortic valve area   by VTI: 0.93 cm2. AV Mean gradient: 7.37 mmHg. AV Peak gradient: 13.48   mmHg.     Mitral Valve   Mitral valve is not well visualized. There is severe mitral   regurgitation. There is no mitral stenosis.     Tricuspid Valve   Tricuspid valve is structurally normal. There is mild tricuspid   regurgitation. The estimated RV systolic pressure is normal. Estimated   RVSP systolic pressure is 37.13 mmHg. There is no tricuspid valve   stenosis.     Pulmonary Valve   Pulmonic valve is not well visualized.     Pericardium   The pericardium is normal. There is no pericardial effusion present.     Aorta   The size of the visualized portion of aortic root is within normal   limits. The thoracic aorta is not well visualized. The ascending aorta   is not well visualized.     Venous   The inferior vena cava dimension is normal.     Thrombus/Mass   There is no intracardiac mass or thrombus  identified.     Echo Dimensions   --------------     LA Dimen (2D): 3.81 cm   IVS(D) (2D): 1.16 cm   LVPW(D) (2D): 1.16 cm   LV(D) (2D): 6.14 cm   LV(S) (2D): 4.56 cm   RV(D (2D): 3.7 cm   LVOT (2D): 2 cm   EF Teich (2D): 50 %   EF Teich (M-Mode): 25 %   FS (2D): 26 %   RVSP (Doppler): 37.13 mmHg   Doppler   -------   AVvel: 1.84 m/s   Pk Grad: 13.48 mmHg   LUCAS(pk): 1.02 cm2   PV Lee: 0.69 m/s   LVOTvel: 0.6 m/s   Mn Grad: 7.37 mmHg   LUCAS(VTI): 0.93 cm2   Ao Stenosis Dimen Idx: 0.33   MV PHT: 51.71 ms   Diastology   ----------   MV E: 1.27 m/s   MV A: 0.42 m/s   MV E/A: 3.01   MV Dec T: 175.64 ms   E' Lat: 9.29 cm/s   E' Med: 3.93 cm/s   E/Lat E': 13.66   E/Med E': 32.29   TR Lee: 1.72 m/s     Electronically signed by: Sudheer Lee DO   01/08/2025 11:49 AM   Impressions   -----------     1.The estimated LV ejection fraction is 25 %     2.There is moderate to severe global hypokinesis     3.Diastolic function is indeterminate.     4.There is mild aortic valve sclerosis without significant stenosis     5.There is severe mitral regurgitation     6.Estimated RVSP systolic pressure is 37.13 mmHg     7.Compared to the report from prior study dated 04/04/2022, the   severity of mitral regurgitation has increased     Findings   --------     Left Ventricle   The estimated LV ejection fraction is 25 %. The calculated LV ejection   fraction (MOD, Biplane) is 29.99 %. LV chamber is mildly dilated.   There is mild concentric LV hypertrophy. LV systolic function is   normal. There is moderate to severe global hypokinesis. Diastolic   function is indeterminate.     Right Ventricle   RV size is moderately dilated. The RV systolic function is normal.     Septum   There is no atrial septal defect (ASD). There is no ventricular septal   defect (VSD).     Left Atrium   LA chamber size is normal. LA diameter is 3.81 cm.     Right Atrium   RA chamber size is normal.     Aortic Valve   There is a trileaflet aortic valve. There is mild  aortic valve   sclerosis without significant stenosis. There is no aortic   regurgitation. Aortic valve area by VMax: 1.02 cm2. Aortic valve area   by VTI: 0.93 cm2. AV Mean gradient: 7.37 mmHg. AV Peak gradient: 13.48   mmHg.     Mitral Valve   Mitral valve is not well visualized. There is severe mitral   regurgitation. There is no mitral stenosis.     Tricuspid Valve   Tricuspid valve is structurally normal. There is mild tricuspid   regurgitation. The estimated RV systolic pressure is normal. Estimated   RVSP systolic pressure is 37.13 mmHg. There is no tricuspid valve   stenosis.     Pulmonary Valve   Pulmonic valve is not well visualized.     Pericardium   The pericardium is normal. There is no pericardial effusion present.     Aorta   The size of the visualized portion of aortic root is within normal   limits. The thoracic aorta is not well visualized. The ascending aorta   is not well visualized.     Venous   The inferior vena cava dimension is normal.     Thrombus/Mass   There is no intracardiac mass or thrombus identified.     Echo Dimensions   --------------     LA Dimen (2D): 3.81 cm   IVS(D) (2D): 1.16 cm   LVPW(D) (2D): 1.16 cm   LV(D) (2D): 6.14 cm   LV(S) (2D): 4.56 cm   RV(D (2D): 3.7 cm   LVOT (2D): 2 cm   EF Teich (2D): 50 %   EF Teich (M-Mode): 25 %   FS (2D): 26 %   RVSP (Doppler): 37.13 mmHg   Doppler   -------   AVvel: 1.84 m/s   Pk Grad: 13.48 mmHg   LUCAS(pk): 1.02 cm2   PV Lee: 0.69 m/s   LVOTvel: 0.6 m/s   Mn Grad: 7.37 mmHg   LUCAS(VTI): 0.93 cm2   Ao Stenosis Dimen Idx: 0.33   MV PHT: 51.71 ms   Diastology   ----------   MV E: 1.27 m/s   MV A: 0.42 m/s   MV E/A: 3.01   MV Dec T: 175.64 ms   E' Lat: 9.29 cm/s   E' Med: 3.93 cm/s   E/Lat E': 13.66   E/Med E': 32.29   TR Lee: 1.72 m/s     Electronically signed by: Sudheer Lee DO   01/08/2025 11:49 AM     CURRENT/PREVIOUS VISIT EKG  Results for orders placed or performed during the hospital encounter of 02/11/25   EKG 12-lead    Collection  Time: 02/14/25 10:19 AM   Result Value Ref Range    QRS Duration 108 ms    [facility] QTC Calculation 504 ms    Narrative    Test Reason : Z13.6,    Vent. Rate :  93 BPM     Atrial Rate :  93 BPM     P-R Int : 186 ms          QRS Dur : 108 ms      QT Int : 406 ms       P-R-T Axes :  64  41  63 degrees    QTcB Int : 504 ms    Sinus rhythm with Premature atrial complexes  Nonspecific T wave abnormality  Prolonged QT  Abnormal ECG  When compared with ECG of 11-Feb-2025 17:14,  Sinus rhythm has replaced Atrial fibrillation  Questionable change in The axis  Nonspecific T wave abnormality, improved in Inferior leads  Nonspecific T wave abnormality, improved in Anterior leads    Referred By: AAAREFERRAL SELF           Confirmed By:            ASSESSMENT/PLAN:     Active Hospital Problems    Diagnosis    *Acute on chronic congestive heart failure    PAD (peripheral artery disease)    Acute osteomyelitis    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 Continue furosemide 5 mg/hr IV.  Strict I&o. Trend labs.   Continue to check and replace potassium and magnesium. Goal for potassium is 4.0, and goal for magnesium is 2.0.   Continue amiodarone 200 mg po BID.     Thelma Perez NP  Department of Cardiology  Date of Service: 02/15/2025

## 2025-02-15 NOTE — PROGRESS NOTES
Pharmacokinetic Assessment Follow Up: IV Vancomycin    Vancomycin serum concentration assessment(s):    The trough level was drawn correctly and can be used to guide therapy at this time. The measurement is within the desired definitive target range of 15 to 20 mcg/mL.    Vancomycin Regimen Plan:    Change regimen to Vancomycin 1750 mg IV every 24 hours with next serum trough concentration measured at 1400 prior to next dose on 02/14.    Drug levels (last 3 results):  Recent Labs   Lab Result Units 02/15/25  1045   Vancomycin-Trough ug/mL 20.0       Pharmacy will continue to follow and monitor vancomycin.    Please contact pharmacy at extension [phone] for questions regarding this assessment.    Thank you for the consult,   Marko Esposito       Patient brief summary:  Juan C Sanchez is a 69 y.o. male initiated on antimicrobial therapy with IV Vancomycin for treatment of bone/joint infection    Drug Allergies:   Review of patient's allergies indicates:   Allergen Reactions    Gabapentin Other (See Comments)     Hypersomnia, nightmares    Morphine Other (See Comments)     Pt states systemic cramping       Actual Body Weight:   92.5 kg    Renal Function:   Estimated Creatinine Clearance: 59.5 mL/min (based on SCr of 1.4 mg/dL).,     Dialysis Method (if applicable):  N/A    CBC (last 72 hours):  Recent Labs   Lab Result Units 02/13/25  0346 02/14/25  0411 02/15/25  0418   WBC K/uL 6.66 5.56 7.42   Hemoglobin g/dL 9.7* 10.0* 10.2*   Hematocrit % 32.0* 32.5* 33.7*   Platelets K/uL 260 253 279   Gran % % 58.0 53.2 63.6   Lymph % % 25.5 29.1 20.2   Mono % % 13.2 12.1 12.5   Eosinophil % % 2.0 4.3 2.6   Basophil % % 0.8 0.9 0.7   Differential Method  Automated Automated Automated       Metabolic Panel (last 72 hours):  Recent Labs   Lab Result Units 02/13/25  0346 02/14/25  0411 02/15/25  0418   Sodium mmol/L 134* 137 134*   Potassium mmol/L 3.5 3.7 4.8   Chloride mmol/L 98 97 93*   CO2 mmol/L 28 32* 34*   Glucose mg/dL  120* 87 99   BUN mg/dL 18 19 22   Creatinine mg/dL 1.4 1.2 1.4   Albumin g/dL 3.0* 3.1* 3.3*   Total Bilirubin mg/dL 0.7 0.8 1.1*   Alkaline Phosphatase U/L 73 79 80   AST U/L 11 11 12   ALT U/L 6* 6* 6*   Magnesium mg/dL 2.0 2.0 2.0       Vancomycin Administrations:  vancomycin given in the last 96 hours                     vancomycin (VANCOCIN) 2,000 mg in 0.9% NaCl 500 mL IVPB (mg) 2,000 mg Bolus 02/14/25 1252     2,000 mg New Bag 02/13/25 1506    vancomycin (VANCOCIN) 1,750 mg in 0.9% NaCl 500 mL IVPB (mg) 1,750 mg New Bag 02/11/25 2002                    Microbiologic Results:  Microbiology Results (last 7 days)       Procedure Component Value Units Date/Time    Culture, Anaerobic [[id number]] Collected: 02/13/25 0748    Order Status: Completed Specimen: Bone from Toe, Left Foot Updated: 02/15/25 0933     Anaerobic Culture No anaerobes isolated    Narrative:      Toe, Left Foot    Tissue culture [[id number]]  (Abnormal) Collected: 02/13/25 0748    Order Status: Completed Specimen: Bone from Toe, Left Foot Updated: 02/15/25 0927     Aerobic Culture - Tissue PRESUMPTIVE PROTEUS SPECIES  From broth only  Identification and susceptibility pending        ENTEROCOCCUS SPECIES  From broth only  Identification and susceptibility pending       Gram Stain Result No WBC's      Rare Gram positive cocci    Narrative:      Toe, Left Foot    Blood culture x two cultures. Draw prior to antibiotics. [[id number]] Collected: 02/11/25 1749    Order Status: Completed Specimen: Blood from Peripheral, Antecubital, Right Updated: 02/14/25 2032     Blood Culture, Routine No Growth to date      No Growth to date      No Growth to date      No Growth to date    Narrative:      Aerobic and anaerobic    Blood culture x two cultures. Draw prior to antibiotics. [[id number]] Collected: 02/11/25 1744    Order Status: Completed Specimen: Blood from Peripheral, Antecubital, Left Updated: 02/14/25 2032     Blood Culture, Routine No Growth to date       No Growth to date      No Growth to date      No Growth to date    Narrative:      Aerobic and anaerobic    MRSA Screen by PCR [[id number]]  (Abnormal) Collected: 02/13/25 1054    Order Status: Completed Specimen: Nasal Swab Updated: 02/13/25 1410     MRSA SCREEN BY PCR Detected     Comment: MRSA  critical result(s) called and verbal readback obtained from   Luanne Stanley RN on Wing B by [facility] 02/13/2025 14:05         Narrative:       MRSA  critical result(s) called and verbal readback obtained from   Luanne Stanley RN on [facility] by [facility] 02/13/2025 14:05    Aerobic culture [[id number]] Collected: 02/13/25 0748    Order Status: Canceled Specimen: Bone from Toe, Left Foot

## 2025-02-16 LAB
ALBUMIN SERPL BCP-MCNC: 3.3 G/DL (ref 3.5–5.2)
ALP SERPL-CCNC: 75 U/L (ref 55–135)
ALT SERPL W/O P-5'-P-CCNC: 6 U/L (ref 10–44)
ANION GAP SERPL CALC-SCNC: 10 MMOL/L (ref 8–16)
AST SERPL-CCNC: 12 U/L (ref 10–40)
BACTERIA BLD CULT: NORMAL
BACTERIA BLD CULT: NORMAL
BACTERIA TISS AEROBE CULT: ABNORMAL
BACTERIA TISS AEROBE CULT: ABNORMAL
BASOPHILS # BLD AUTO: 0.05 K/UL (ref 0–0.2)
BASOPHILS NFR BLD: 0.7 % (ref 0–1.9)
BILIRUB SERPL-MCNC: 0.9 MG/DL (ref 0.1–1)
BUN SERPL-MCNC: 26 MG/DL (ref 8–23)
CALCIUM SERPL-MCNC: 8.7 MG/DL (ref 8.7–10.5)
CHLORIDE SERPL-SCNC: 92 MMOL/L (ref 95–110)
CO2 SERPL-SCNC: 30 MMOL/L (ref 23–29)
CREAT SERPL-MCNC: 1.2 MG/DL (ref 0.5–1.4)
DIFFERENTIAL METHOD BLD: ABNORMAL
EOSINOPHIL # BLD AUTO: 0.3 K/UL (ref 0–0.5)
EOSINOPHIL NFR BLD: 3.6 % (ref 0–8)
ERYTHROCYTE [DISTWIDTH] IN BLOOD BY AUTOMATED COUNT: 18.1 % (ref 11.5–14.5)
EST. GFR  (NO RACE VARIABLE): >60 ML/MIN/1.73 M^2
GLUCOSE SERPL-MCNC: 87 MG/DL (ref 70–110)
GRAM STN SPEC: ABNORMAL
GRAM STN SPEC: ABNORMAL
HCT VFR BLD AUTO: 32.2 % (ref 40–54)
HGB BLD-MCNC: 9.7 G/DL (ref 14–18)
IMM GRANULOCYTES # BLD AUTO: 0.03 K/UL (ref 0–0.04)
IMM GRANULOCYTES NFR BLD AUTO: 0.4 % (ref 0–0.5)
LYMPHOCYTES # BLD AUTO: 1.5 K/UL (ref 1–4.8)
LYMPHOCYTES NFR BLD: 19.6 % (ref 18–48)
MAGNESIUM SERPL-MCNC: 2 MG/DL (ref 1.6–2.6)
MCH RBC QN AUTO: 23.2 PG (ref 27–31)
MCHC RBC AUTO-ENTMCNC: 30.1 G/DL (ref 32–36)
MCV RBC AUTO: 77 FL (ref 82–98)
MONOCYTES # BLD AUTO: 1 K/UL (ref 0.3–1)
MONOCYTES NFR BLD: 13.9 % (ref 4–15)
NEUTROPHILS # BLD AUTO: 4.6 K/UL (ref 1.8–7.7)
NEUTROPHILS NFR BLD: 61.8 % (ref 38–73)
NRBC BLD-RTO: 0 /100 WBC
PLATELET # BLD AUTO: 273 K/UL (ref 150–450)
PMV BLD AUTO: 10.3 FL (ref 9.2–12.9)
POTASSIUM SERPL-SCNC: 4 MMOL/L (ref 3.5–5.1)
PROT SERPL-MCNC: 7.3 G/DL (ref 6–8.4)
RBC # BLD AUTO: 4.18 M/UL (ref 4.6–6.2)
SODIUM SERPL-SCNC: 132 MMOL/L (ref 136–145)
VANCOMYCIN SERPL-MCNC: 20.2 UG/ML
WBC # BLD AUTO: 7.4 K/UL (ref 3.9–12.7)

## 2025-02-16 PROCEDURE — 25000003 PHARM REV CODE 250: Performed by: HOSPITALIST

## 2025-02-16 PROCEDURE — 80202 ASSAY OF VANCOMYCIN: CPT | Performed by: HOSPITALIST

## 2025-02-16 PROCEDURE — 25000003 PHARM REV CODE 250: Performed by: PODIATRIST

## 2025-02-16 PROCEDURE — 25000003 PHARM REV CODE 250: Performed by: INTERNAL MEDICINE

## 2025-02-16 PROCEDURE — 36415 COLL VENOUS BLD VENIPUNCTURE: CPT

## 2025-02-16 PROCEDURE — 63600175 PHARM REV CODE 636 W HCPCS

## 2025-02-16 PROCEDURE — 63600150 PHARM REV CODE 636: Performed by: NURSE PRACTITIONER

## 2025-02-16 PROCEDURE — 99233 SBSQ HOSP IP/OBS HIGH 50: CPT | Mod: ,,, | Performed by: INTERNAL MEDICINE

## 2025-02-16 PROCEDURE — 25000003 PHARM REV CODE 250: Performed by: STUDENT IN AN ORGANIZED HEALTH CARE EDUCATION/TRAINING PROGRAM

## 2025-02-16 PROCEDURE — 21400001 HC TELEMETRY ROOM

## 2025-02-16 PROCEDURE — 36415 COLL VENOUS BLD VENIPUNCTURE: CPT | Performed by: HOSPITALIST

## 2025-02-16 PROCEDURE — 63600175 PHARM REV CODE 636 W HCPCS: Performed by: INTERNAL MEDICINE

## 2025-02-16 PROCEDURE — 25000003 PHARM REV CODE 250: Performed by: NURSE PRACTITIONER

## 2025-02-16 PROCEDURE — 63600175 PHARM REV CODE 636 W HCPCS: Performed by: NURSE PRACTITIONER

## 2025-02-16 PROCEDURE — 80053 COMPREHEN METABOLIC PANEL: CPT

## 2025-02-16 PROCEDURE — 63600175 PHARM REV CODE 636 W HCPCS: Performed by: HOSPITALIST

## 2025-02-16 PROCEDURE — 83735 ASSAY OF MAGNESIUM: CPT

## 2025-02-16 PROCEDURE — 27000207 HC ISOLATION

## 2025-02-16 PROCEDURE — 85025 COMPLETE CBC W/AUTO DIFF WBC: CPT

## 2025-02-16 PROCEDURE — 25000003 PHARM REV CODE 250

## 2025-02-16 RX ADMIN — OXYCODONE HYDROCHLORIDE AND ACETAMINOPHEN 1 TABLET: 10; 325 TABLET ORAL at 08:02

## 2025-02-16 RX ADMIN — OXYCODONE HYDROCHLORIDE AND ACETAMINOPHEN 1 TABLET: 10; 325 TABLET ORAL at 03:02

## 2025-02-16 RX ADMIN — AMMONIUM LACTATE: 12 LOTION TOPICAL at 08:02

## 2025-02-16 RX ADMIN — TRAMADOL HYDROCHLORIDE 50 MG: 50 TABLET, COATED ORAL at 03:02

## 2025-02-16 RX ADMIN — PHENAZOPYRIDINE HYDROCHLORIDE 100 MG: 100 TABLET ORAL at 01:02

## 2025-02-16 RX ADMIN — ENOXAPARIN SODIUM 100 MG: 100 INJECTION SUBCUTANEOUS at 06:02

## 2025-02-16 RX ADMIN — MUPIROCIN 1 G: 20 OINTMENT TOPICAL at 08:02

## 2025-02-16 RX ADMIN — AMMONIUM LACTATE: 12 LOTION TOPICAL at 09:02

## 2025-02-16 RX ADMIN — DOBUTAMINE HYDROCHLORIDE 2.5 MCG/KG/MIN: 400 INJECTION INTRAVENOUS at 01:02

## 2025-02-16 RX ADMIN — TRAMADOL HYDROCHLORIDE 50 MG: 50 TABLET, COATED ORAL at 01:02

## 2025-02-16 RX ADMIN — TRAMADOL HYDROCHLORIDE 50 MG: 50 TABLET, COATED ORAL at 09:02

## 2025-02-16 RX ADMIN — AMIODARONE HYDROCHLORIDE 200 MG: 200 TABLET ORAL at 09:02

## 2025-02-16 RX ADMIN — OXYCODONE HYDROCHLORIDE AND ACETAMINOPHEN 1 TABLET: 10; 325 TABLET ORAL at 10:02

## 2025-02-16 RX ADMIN — CEFEPIME 2 G: 2 INJECTION, POWDER, FOR SOLUTION INTRAVENOUS at 10:02

## 2025-02-16 RX ADMIN — PHENAZOPYRIDINE HYDROCHLORIDE 100 MG: 100 TABLET ORAL at 05:02

## 2025-02-16 RX ADMIN — AMIODARONE HYDROCHLORIDE 200 MG: 200 TABLET ORAL at 08:02

## 2025-02-16 RX ADMIN — MUPIROCIN 1 G: 20 OINTMENT TOPICAL at 09:02

## 2025-02-16 RX ADMIN — SODIUM CHLORIDE 2.5 MG/HR: 9 INJECTION, SOLUTION INTRAVENOUS at 01:02

## 2025-02-16 RX ADMIN — AMPICILLIN SODIUM AND SULBACTAM SODIUM 3 G: 2; 1 INJECTION, POWDER, FOR SOLUTION INTRAMUSCULAR; INTRAVENOUS at 09:02

## 2025-02-16 RX ADMIN — CEFEPIME 2 G: 2 INJECTION, POWDER, FOR SOLUTION INTRAVENOUS at 03:02

## 2025-02-16 RX ADMIN — ENOXAPARIN SODIUM 100 MG: 100 INJECTION SUBCUTANEOUS at 05:02

## 2025-02-16 RX ADMIN — SPIRONOLACTONE 25 MG: 25 TABLET, FILM COATED ORAL at 08:02

## 2025-02-16 RX ADMIN — VANCOMYCIN HYDROCHLORIDE 1750 MG: 500 INJECTION, POWDER, LYOPHILIZED, FOR SOLUTION INTRAVENOUS at 03:02

## 2025-02-16 RX ADMIN — PANTOPRAZOLE SODIUM 40 MG: 40 TABLET, DELAYED RELEASE ORAL at 06:02

## 2025-02-16 RX ADMIN — SODIUM CHLORIDE 2.5 MG/HR: 9 INJECTION, SOLUTION INTRAVENOUS at 10:02

## 2025-02-16 RX ADMIN — PHENAZOPYRIDINE HYDROCHLORIDE 100 MG: 100 TABLET ORAL at 08:02

## 2025-02-16 RX ADMIN — AMPICILLIN SODIUM AND SULBACTAM SODIUM 3 G: 2; 1 INJECTION, POWDER, FOR SOLUTION INTRAMUSCULAR; INTRAVENOUS at 06:02

## 2025-02-16 NOTE — SUBJECTIVE & OBJECTIVE
Interval History:  Patient seen and examined on multidisciplinary rounds with nurse.  Was sleeping with head on breakfast plate on arrival to room.  States he feels fatigued.  He continues on Lasix and dobutamine infusions.  Surgical culture growing presumptive Proteus and Enterococcus.  Angiogram planned for 2/17.  Review of Systems   Constitutional:  Positive for activity change.   Respiratory:  Positive for shortness of breath.    Cardiovascular:  Positive for leg swelling. Negative for chest pain and palpitations.   Skin:  Positive for wound.     Objective:     Vital Signs (Most Recent):  Temp: 97.7 °F (36.5 °C) (02/16/25 0826)  Pulse: 83 (02/16/25 0826)  Resp: 18 (02/16/25 0838)  BP: 117/75 (02/16/25 0826)  SpO2: (!) 93 % (02/16/25 0826) Vital Signs (24h Range):  Temp:  [97.5 °F (36.4 °C)-98.1 °F (36.7 °C)] 97.7 °F (36.5 °C)  Pulse:  [] 83  Resp:  [18-19] 18  SpO2:  [93 %-96 %] 93 %  BP: ()/(72-78) 117/75     Weight: 90.5 kg (199 lb 8.3 oz)  Body mass index is 27.83 kg/m².    Intake/Output Summary (Last 24 hours) at 2/16/2025 1128  Last data filed at 2/16/2025 0827  Gross per 24 hour   Intake 1100 ml   Output 3975 ml   Net -2875 ml         Physical Exam  Vitals and nursing note reviewed.   Constitutional:       General: He is not in acute distress.  HENT:      Head: Normocephalic and atraumatic.      Right Ear: External ear normal.      Left Ear: External ear normal.      Nose: Nose normal.      Mouth/Throat:      Mouth: Mucous membranes are moist.   Eyes:      Extraocular Movements: Extraocular movements intact.      Conjunctiva/sclera: Conjunctivae normal.   Cardiovascular:      Rate and Rhythm: Normal rate and regular rhythm.      Pulses: Normal pulses.      Heart sounds: Normal heart sounds.   Pulmonary:      Effort: Pulmonary effort is normal.      Breath sounds: Rales present.      Comments: RA.  Abdominal:      General: Bowel sounds are normal. There is no distension.      Palpations: Abdomen  is soft.      Tenderness: There is no abdominal tenderness.   Genitourinary:     Comments: Meredith.  Musculoskeletal:      Cervical back: Normal range of motion and neck supple.      Right lower leg: Edema present.      Left lower leg: Edema present.   Skin:     General: Skin is warm and dry.      Comments: See pictures regarding wounds.  Postoperative dressing not removed   Neurological:      Mental Status: He is alert. Mental status is at baseline.   Psychiatric:         Behavior: Behavior normal.             Significant Labs: All pertinent labs within the past 24 hours have been reviewed.    Significant Imaging: I have reviewed all pertinent imaging results/findings within the past 24 hours.

## 2025-02-16 NOTE — PROGRESS NOTES
[facility] Medicine  Progress Note    Patient Name: Juan C Sanchez  MRN: [id number]  Patient Class: IP- Inpatient   Admission Date: 2/11/2025  Length of Stay: 5 days  Attending Physician: Livia Alexandre MD  Primary Care Provider: Katlin Garf NP        Subjective     Principal Problem:Acute on chronic congestive heart failure        HPI:  Mr. Sanchez is a 69 yr old male with a hx of HTN, MR, PAF, chronic systolic CHF with EF of 25, OA, GERD, nonischemic cardiomyopathy, anemia who presented to the ED with a chief complaint of shortness of breath, leg swelling and hypotension.  Patient states that he had PT come to his home today and he was found to be hypotensive and advised to come to the emergency room.  Patient endorses shortness of breath as well as leg swelling, malaise, fatigue, occasional chills, productive cough with milky sputum, upper abdominal pain, occasional nausea, decreased urine output, and positional lightheadedness.  He states that these symptoms have been progressively worsening over the last 1-2 months possibly even longer  He states that his symptoms are worse when lying flat as well as with exertion and better with rest and while sitting up.  He states that he has been taking his home medications without improvement of his symptoms.  He does endorse that most of all of his contacts recently have been sick. He denies any home oxygen use.  He does endorse occasional PND and states that he has been having to sit to sleep.  He denies tobacco and recreational drug use and states he seldomly drinks alcohol.  He denies fever, chest pain, vomiting, diarrhea, dysuria, hematuria, dizziness, and syncope.    Upon arrival to ED, patient afebrile, HR of 109, RR of 24, BP of 95/62, satting 98% on RA.  Workup in the ED revealed RBC of 4.19, H/H of 10/32.8, sodium of 134, pCO2 of 31, GFR of 55.1, corrected calcium of 9, albumin of 3.1, BNP of 3163, troponin of 21.3, lactic acid  of 2.1.  Blood cultures pending.  CXR shows enlarged cardiac silhouette with pulmonary vascular congestion.  Bilateral pleural effusions.  Mild bibasilar airspace disease and or atelectasis.  Left foot x-ray shows osteopenia.  No overt erosions or osseous destruction.  No acute fracture or dislocation.  Mild multifocal midfoot arthritis.  Forefoot swelling.  Patient was given Lasix 20 mg IV, Zosyn 4.5 g IV, 500 mL NS bolus, vancomycin 20 mg/kg while in the ED. discussed case with ED provider and patient will be admitted under hospital medicine services for further management.    Overview/Hospital Course:  Juan C Sanchez is a 69 year old male with a past medical history of HFrEF, Afib, PAD, anemia, obesity, and GERD who presented with an acute on chronic HFrEF (EF 20-25%) exacerbation as well as Afib with RVR. He is being diuresed with a Lasix infusion as well as dobutamine. Cardiology has been consulted.  Underwent ANGEL cardioversion 2/14. His course was also complicated by a third L toe acute osteomyelitis seen on MRI. Podiatry and ID have been consulted.  He underwent L 3rd toe amputation 2/14. He is on empiric vancomycin and cefepime. Vascular Surgery has been consulted as well and will plan for angiogram of the lower extremities 2/17.    Interval History:  Patient seen and examined on multidisciplinary rounds with nurse.  Was sleeping with head on breakfast plate on arrival to room.  States he feels fatigued.  He continues on Lasix and dobutamine infusions.  Surgical culture growing presumptive Proteus and Enterococcus.  Angiogram planned for 2/17.  Review of Systems   Constitutional:  Positive for activity change.   Respiratory:  Positive for shortness of breath.    Cardiovascular:  Positive for leg swelling. Negative for chest pain and palpitations.   Skin:  Positive for wound.     Objective:     Vital Signs (Most Recent):  Temp: 97.7 °F (36.5 °C) (02/16/25 0826)  Pulse: 83 (02/16/25 0826)  Resp: 18 (02/16/25  0838)  BP: 117/75 (02/16/25 0826)  SpO2: (!) 93 % (02/16/25 0826) Vital Signs (24h Range):  Temp:  [97.5 °F (36.4 °C)-98.1 °F (36.7 °C)] 97.7 °F (36.5 °C)  Pulse:  [] 83  Resp:  [18-19] 18  SpO2:  [93 %-96 %] 93 %  BP: ()/(72-78) 117/75     Weight: 90.5 kg (199 lb 8.3 oz)  Body mass index is 27.83 kg/m².    Intake/Output Summary (Last 24 hours) at 2/16/2025 1128  Last data filed at 2/16/2025 0827  Gross per 24 hour   Intake 1100 ml   Output 3975 ml   Net -2875 ml         Physical Exam  Vitals and nursing note reviewed.   Constitutional:       General: He is not in acute distress.  HENT:      Head: Normocephalic and atraumatic.      Right Ear: External ear normal.      Left Ear: External ear normal.      Nose: Nose normal.      Mouth/Throat:      Mouth: Mucous membranes are moist.   Eyes:      Extraocular Movements: Extraocular movements intact.      Conjunctiva/sclera: Conjunctivae normal.   Cardiovascular:      Rate and Rhythm: Normal rate and regular rhythm.      Pulses: Normal pulses.      Heart sounds: Normal heart sounds.   Pulmonary:      Effort: Pulmonary effort is normal.      Breath sounds: Rales present.      Comments: RA.  Abdominal:      General: Bowel sounds are normal. There is no distension.      Palpations: Abdomen is soft.      Tenderness: There is no abdominal tenderness.   Genitourinary:     Comments: Meredith.  Musculoskeletal:      Cervical back: Normal range of motion and neck supple.      Right lower leg: Edema present.      Left lower leg: Edema present.   Skin:     General: Skin is warm and dry.      Comments: See pictures regarding wounds.  Postoperative dressing not removed   Neurological:      Mental Status: He is alert. Mental status is at baseline.   Psychiatric:         Behavior: Behavior normal.             Significant Labs: All pertinent labs within the past 24 hours have been reviewed.    Significant Imaging: I have reviewed all pertinent imaging results/findings within the  past 24 hours.    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 L third toe.  -ID consulted  -Podiatry consulted  -Empiric vancomycin and cefepime  -status post third toe amputation (L) 2/14  -bone culture with a presumptive Proteus and Enterococcus      PAD (peripheral artery disease)  -No intervention indicated per Vascular Surgery at this time  -Follow up angiogram 2/17      Anemia  Ferritin at lower limit of normal.  -Trend Hgb with CBC    Paroxysmal atrial fibrillation  -Telemetry  -Lovenox  -Hold metoprolol while on dobutamine  -Continue home amiodarone  -ANGEL cardioversion per Cardiology 2/14    MR (mitral regurgitation)  Seen on TTE at OSH.  -Cardiology consulted      Hypertension  -Home medications held  -Continue to monitor      GERD (gastroesophageal reflux disease)  -PPI        VTE Risk Mitigation (From admission, onward)           Ordered     enoxaparin injection 100 mg  Every 12 hours (non-standard times)         02/12/25 1622     IP VTE HIGH RISK PATIENT  Once         02/11/25 2331     Place sequential compression device  Until discontinued         02/11/25 2331                    Discharge Planning   PHILIP: 2/18/2025     Code Status: Full Code   Medical Readiness for Discharge Date:   Discharge Plan A: Skilled Nursing Facility   Discharge Delays: None known at this time            Please place Justification for DME        Livia Alexandre MD  Department of Hospital Medicine   [facility]

## 2025-02-16 NOTE — PROGRESS NOTES
Pharmacokinetic Assessment Follow Up: IV Vancomycin    Vancomycin serum concentration assessment(s):    The trough level was drawn correctly and can be used to guide therapy at this time. The measurement is within the desired definitive target range of 15 to 20 mcg/mL.    Vancomycin Regimen Plan:    Continue regimen to Vancomycin 1750 mg IV every 24 hours with next serum trough concentration measured at 1400 prior to 3rd dose on 2/18    Drug levels (last 3 results):  Recent Labs   Lab Result Units 02/15/25  1045 02/16/25  1245   Vancomycin, Random ug/mL  --  20.2   Vancomycin-Trough ug/mL 20.0  --        Pharmacy will continue to follow and monitor vancomycin.    Please contact pharmacy at extension [phone] for questions regarding this assessment.    Thank you for the consult,   Rao Loving       Patient brief summary:  Juan C Sanchez is a 69 y.o. male initiated on antimicrobial therapy with IV Vancomycin for treatment of bone/joint infection    The patient's current regimen is vancomycin 1750 mg Q24H    Drug Allergies:   Review of patient's allergies indicates:   Allergen Reactions    Gabapentin Other (See Comments)     Hypersomnia, nightmares    Morphine Other (See Comments)     Pt states systemic cramping       Actual Body Weight:   90.5 kg    Renal Function:   Estimated Creatinine Clearance: 68.8 mL/min (based on SCr of 1.2 mg/dL).,     Dialysis Method (if applicable):  N/A    CBC (last 72 hours):  Recent Labs   Lab Result Units 02/14/25  0411 02/15/25  0418 02/16/25  0351   WBC K/uL 5.56 7.42 7.40   Hemoglobin g/dL 10.0* 10.2* 9.7*   Hematocrit % 32.5* 33.7* 32.2*   Platelets K/uL 253 279 273   Gran % % 53.2 63.6 61.8   Lymph % % 29.1 20.2 19.6   Mono % % 12.1 12.5 13.9   Eosinophil % % 4.3 2.6 3.6   Basophil % % 0.9 0.7 0.7   Differential Method  Automated Automated Automated       Metabolic Panel (last 72 hours):  Recent Labs   Lab Result Units 02/14/25  0411 02/15/25  0418 02/16/25  0351   Sodium mmol/L  137 134* 132*   Potassium mmol/L 3.7 4.8 4.0   Chloride mmol/L 97 93* 92*   CO2 mmol/L 32* 34* 30*   Glucose mg/dL 87 99 87   BUN mg/dL 19 22 26*   Creatinine mg/dL 1.2 1.4 1.2   Albumin g/dL 3.1* 3.3* 3.3*   Total Bilirubin mg/dL 0.8 1.1* 0.9   Alkaline Phosphatase U/L 79 80 75   AST U/L 11 12 12   ALT U/L 6* 6* 6*   Magnesium mg/dL 2.0 2.0 2.0       Vancomycin Administrations:  vancomycin given in the last 96 hours                     vancomycin (VANCOCIN) 1,750 mg in 0.9% NaCl 500 mL IVPB (mg) 1,750 mg New Bag 02/15/25 1430    vancomycin (VANCOCIN) 2,000 mg in 0.9% NaCl 500 mL IVPB (mg) 2,000 mg Bolus 02/14/25 1252     2,000 mg New Bag 02/13/25 1506                    Microbiologic Results:  Microbiology Results (last 7 days)       Procedure Component Value Units Date/Time    Tissue culture [[id number]]  (Abnormal)  (Susceptibility) Collected: 02/13/25 0748    Order Status: Completed Specimen: Bone from Toe, Left Foot Updated: 02/16/25 0840     Aerobic Culture - Tissue PROTEUS MIRABILIS  From broth only        ENTEROCOCCUS FAECALIS  From broth only       Gram Stain Result No WBC's      Rare Gram positive cocci    Narrative:      Toe, Left Foot    Blood culture x two cultures. Draw prior to antibiotics. [[id number]] Collected: 02/11/25 1749    Order Status: Completed Specimen: Blood from Peripheral, Antecubital, Right Updated: 02/15/25 2032     Blood Culture, Routine No Growth to date      No Growth to date      No Growth to date      No Growth to date      No Growth to date    Narrative:      Aerobic and anaerobic    Blood culture x two cultures. Draw prior to antibiotics. [[id number]] Collected: 02/11/25 1744    Order Status: Completed Specimen: Blood from Peripheral, Antecubital, Left Updated: 02/15/25 2032     Blood Culture, Routine No Growth to date      No Growth to date      No Growth to date      No Growth to date      No Growth to date    Narrative:      Aerobic and anaerobic    Culture, Anaerobic  [[id number]] Collected: 02/13/25 0748    Order Status: Completed Specimen: Bone from Toe, Left Foot Updated: 02/15/25 0933     Anaerobic Culture No anaerobes isolated    Narrative:      Toe, Left Foot    MRSA Screen by PCR [[id number]]  (Abnormal) Collected: 02/13/25 1054    Order Status: Completed Specimen: Nasal Swab Updated: 02/13/25 1410     MRSA SCREEN BY PCR Detected     Comment: MRSA  critical result(s) called and verbal readback obtained from   Luanne Stanley RN on Wing B by [facility] 02/13/2025 14:05         Narrative:       MRSA  critical result(s) called and verbal readback obtained from   Luanne Stanley RN on Wing [facility] by [facility] 02/13/2025 14:05    Aerobic culture [[id number]] Collected: 02/13/25 0748    Order Status: Canceled Specimen: Bone from Toe, Left Foot

## 2025-02-16 NOTE — ASSESSMENT & PLAN NOTE
Not septic. L third toe.  -ID consulted  -Podiatry consulted  -Empiric vancomycin and cefepime  -status post third toe amputation (L) 2/14  -bone culture with a presumptive Proteus and Enterococcus

## 2025-02-16 NOTE — PROGRESS NOTES
"Consult Note  Infectious Disease    Reason for Consult:  Left 3rd toe osteomyelitis    HPI: Juan C Sanchez is a 69 y.o. male with PMHx CAD, HFrEF, EF 20-25%, mitral regurgitation, PAF, HTN, GERD, anemia, CAD, oa, was sent by his home health nurse to emergency room for shortness of breath, bilateral leg swelling and hypotension.      He is seen per cardiologist who performed ANGEL and cardioversion on 02/14/2025, and  gave  dobutamine + Lasix drip.  Patient feels that his legs are less swollen since he was admitted.  They are still pink, that is his baseline due to stasis dermatitis.  Shortness of breath has improved as well.     Podiatrist saw patient and performed left 3rd toe amputation on 02/14/2025.  Of note " Sharp dissection was then carried down to the metatarsophalangeal joint in the 3rd toe was sharply disarticulated and passed from the operating field. The tissue surrounding the 3rd metatarsophalangeal joint were inspected and appeared healthy.... At this time attention was directed to the 4th toe where utilizing a metal curette excisional debridement of dermis epidermis and subcutaneous tissue was performed. Total area debrided measured 1 cm x 1 cm x 0.2 cm. There was noted be a red granular base following debridement. Attention was then directed to the 5th toe and intermetatarsal space where again utilizing a metal curette excisional debridement of dermis epidermis subcutaneous tissue was performed. Total area debrided measured 1.2 cm x 1.2 cm x 0.1 cm"  Cultures are showing Proteus and Enterococcus.  Patient is seen and examined.  He feels improved.  ----------------------------------    02/16/2025.  Patient was in severe throbbing left foot pain, 8/10.  Nurse brought him oxy oxycodone/acetaminophen and he felt much better in 5 minutes.  No fever no chills. WBC is stable .  MRSA screen is positive  Left bone toe cultures are positive for Enterococcus and Proteus mirabilis    Antibiotics (From " "admission, onward)      Start     Stop Route Frequency Ordered    02/15/25 1500  vancomycin (VANCOCIN) 1,750 mg in 0.9% NaCl 500 mL IVPB         -- IV Every 24 hours (non-standard times) 02/15/25 1206    02/14/25 0900  mupirocin 2 % ointment         02/19/25 0859 Nasl 2 times daily 02/14/25 0725    02/13/25 1030  ceFEPIme injection 2 g         -- IV Every 8 hours (non-standard times) 02/13/25 0924    02/13/25 1021  vancomycin - pharmacy to dose  (vancomycin IVPB (PEDS and ADULTS))        Placed in "And" Linked Group    -- IV pharmacy to manage frequency 02/13/25 0924          Antifungals (From admission, onward)      None          Antivirals (From admission, onward)      None             Review of patient's allergies indicates:   Allergen Reactions    Gabapentin Other (See Comments)     Hypersomnia, nightmares    Morphine Other (See Comments)     Pt states systemic cramping     Past Medical History:   Diagnosis Date    Hypertension      Past Surgical History:   Procedure Laterality Date    ORIF closed comminuted displaced intra-articular fx distal left radius & application of external fixator Left 05/28/2018     Social History     Tobacco Use    Smoking status: Never    Smokeless tobacco: Never   Substance Use Topics    Alcohol use: Yes     Comment: social     Social History[1]  No family history on file.      Review of Systems   Constitutional:  Positive for fatigue and unexpected weight change (Weight gain when retaining fluids than weight loss when receiving Lasix while in-house and cardioverted.). Negative for appetite change, chills, diaphoresis and fever.   HENT:  Negative for congestion, dental problem, ear pain, hearing loss, mouth sores, postnasal drip, rhinorrhea, sinus pain, sore throat and trouble swallowing.         Poor dental condition.  Aware of importance of treatment   Eyes:  Negative for photophobia, pain and visual disturbance.   Respiratory:  Positive for cough (improved since he came in) and " "shortness of breath (improved since he came in). Negative for choking and chest tightness.    Cardiovascular:  Negative for chest pain, palpitations and leg swelling.   Gastrointestinal:  Negative for abdominal pain, anal bleeding, blood in stool, constipation, diarrhea, nausea, rectal pain and vomiting.   Endocrine: Negative for polydipsia and polyuria.   Genitourinary:  Negative for difficulty urinating, dysuria, flank pain, frequency, genital sores, hematuria, penile discharge, scrotal swelling and urgency.   Musculoskeletal:  Negative for arthralgias, back pain, gait problem, joint swelling and myalgias.        As above   Skin:  Negative for rash and wound.        As above.  Status post amputation of the 3rd toe   Allergic/Immunologic: Negative for environmental allergies, food allergies and immunocompromised state.   Neurological:  Negative for dizziness, syncope, weakness, numbness and headaches.   Hematological:  Negative for adenopathy. Does not bruise/bleed easily.   Psychiatric/Behavioral:  Negative for dysphoric mood and sleep disturbance. The patient is not nervous/anxious.         Pertinent medications noted:     EXAM & DIAGNOSTICS REVIEWED:   Vitals:     Temp:  [97.5 °F (36.4 °C)-98.1 °F (36.7 °C)]   Temp: 97.5 °F (36.4 °C) (02/16/25 1131)  Pulse: 79 (02/16/25 1131)  Resp: 18 (02/16/25 1332)  BP: 107/71 (02/16/25 1131)  SpO2: (!) 92 % (02/16/25 1131)    Intake/Output Summary (Last 24 hours) at 2/16/2025 1351  Last data filed at 2/16/2025 1131  Gross per 24 hour   Intake 900 ml   Output 2975 ml   Net -2075 ml        Blood pressure 107/71, pulse 79, temperature 97.5 °F (36.4 °C), temperature source Oral, resp. rate 18, height 5' 11" (1.803 m), weight 90.5 kg (199 lb 8.3 oz), SpO2 (!) 92%.  Body mass index is 27.83 kg/m².  Physical Exam  Constitutional:       General: He is not in acute distress.     Appearance: He is not diaphoretic.   HENT:      Head: Normocephalic and atraumatic.      Comments: Poor " teeth     Right Ear: External ear normal.      Left Ear: External ear normal.      Nose: Nose normal.      Mouth/Throat:      Mouth: Mucous membranes are moist.      Comments: Need some dental work.  Aware  Eyes:      General: No scleral icterus.        Right eye: No discharge.         Left eye: No discharge.      Conjunctiva/sclera: Conjunctivae normal.   Neck:      Vascular: No JVD.   Cardiovascular:      Rate and Rhythm: Normal rate and regular rhythm.      Heart sounds: Normal heart sounds.   Pulmonary:      Effort: Pulmonary effort is normal.      Breath sounds: Normal breath sounds. No wheezing or rales.   Chest:      Chest wall: No tenderness.   Abdominal:      General: Bowel sounds are normal. There is no distension.      Palpations: Abdomen is soft. There is no mass.      Tenderness: There is no abdominal tenderness. There is no guarding or rebound.   Musculoskeletal:         General: No swelling or tenderness. Normal range of motion.      Cervical back: Normal range of motion and neck supple.      Comments: See pictures.  Status post amputation   Lymphadenopathy:      Cervical: No cervical adenopathy.   Skin:     General: Skin is warm and dry.      Findings: No erythema or rash.      Comments: See pictures   Neurological:      Mental Status: He is alert and oriented to person, place, and time. Mental status is at baseline.      Cranial Nerves: No cranial nerve deficit.   Psychiatric:         Mood and Affect: Mood normal.         Behavior: Behavior normal.         Thought Content: Thought content normal.         VAD:     02/16/2025.  Legs are less swollen.  Less red.  LEs stasis dermatitis.    Surgical left 3rd toe area has no pus.  Minimal thin blood when squeezing.  There are ulcers in nearby toes                                    \                               General Labs reviewed:  Recent Labs   Lab 02/14/25  0411 02/15/25  0418 02/16/25  0351   WBC 5.56 7.42 7.40   HGB 10.0* 10.2* 9.7*   HCT 32.5*  33.7* 32.2*    279 273       Recent Labs   Lab 02/14/25  0411 02/15/25  0418 02/16/25  0351    134* 132*   K 3.7 4.8 4.0   CL 97 93* 92*   CO2 32* 34* 30*   BUN 19 22 26*   CREATININE 1.2 1.4 1.2   CALCIUM 8.8 9.0 8.7   PROT 7.2 7.6 7.3   BILITOT 0.8 1.1* 0.9   ALKPHOS 79 80 75   ALT 6* 6* 6*   AST 11 12 12         Micro:  Microbiology Results (last 7 days)       Procedure Component Value Units Date/Time    Tissue culture [[id number]]  (Abnormal)  (Susceptibility) Collected: 02/13/25 0748    Order Status: Completed Specimen: Bone from Toe, Left Foot Updated: 02/16/25 0840     Aerobic Culture - Tissue PROTEUS MIRABILIS  From broth only        ENTEROCOCCUS FAECALIS  From broth only       Gram Stain Result No WBC's      Rare Gram positive cocci    Narrative:      Toe, Left Foot    Blood culture x two cultures. Draw prior to antibiotics. [[id number]] Collected: 02/11/25 1749    Order Status: Completed Specimen: Blood from Peripheral, Antecubital, Right Updated: 02/15/25 2032     Blood Culture, Routine No Growth to date      No Growth to date      No Growth to date      No Growth to date      No Growth to date    Narrative:      Aerobic and anaerobic    Blood culture x two cultures. Draw prior to antibiotics. [[id number]] Collected: 02/11/25 1744    Order Status: Completed Specimen: Blood from Peripheral, Antecubital, Left Updated: 02/15/25 2032     Blood Culture, Routine No Growth to date      No Growth to date      No Growth to date      No Growth to date      No Growth to date    Narrative:      Aerobic and anaerobic    Culture, Anaerobic [[id number]] Collected: 02/13/25 0748    Order Status: Completed Specimen: Bone from Toe, Left Foot Updated: 02/15/25 0933     Anaerobic Culture No anaerobes isolated    Narrative:      Toe, Left Foot    MRSA Screen by PCR [[id number]]  (Abnormal) Collected: 02/13/25 1054    Order Status: Completed Specimen: Nasal Swab Updated: 02/13/25 1410     MRSA SCREEN BY PCR  Detected     Comment: MRSA  critical result(s) called and verbal readback obtained from   Luanne Stanley RN on [facility] by [facility] 02/13/2025 14:05         Narrative:       MRSA  critical result(s) called and verbal readback obtained from   Luanne Stanley RN on [facility] by [facility] 02/13/2025 14:05    Aerobic culture [[id number]] Collected: 02/13/25 0748    Order Status: Canceled Specimen: Bone from Toe, Left Foot             Susceptibility data from last 90 days.  Collected Specimen Info Organism Amp/Sulbactam Ampicillin Cefazolin Cefepime Ceftriaxone Ciprofloxacin Ertapenem Gentamicin GENTAMICIN SYNERGY SCREEN Levofloxacin Meropenem PIPERACILLIN/TAZO SUSCEPTIBILITY Tobramycin   02/13/25 Bone from Toe, Left Foot Proteus mirabilis  S  S  S  S  S  S  S  S   S  S  S  S     Enterococcus faecalis   S        S         Collected Specimen Info Organism Trimeth/Sulfa Vancomycin   02/13/25 Bone from Toe, Left Foot Proteus mirabilis  S      Enterococcus faecalis   S       Imaging Reviewed:    02/12/2025 MRI left  1. MR findings suspicious for acute osteomyelitis of the left 3rd toe.  2. Subcutaneous and intramuscular edema throughout the midfoot and forefoot.  No evidence of abscess formation.    02/13/2025 MRI right   MRI findings suspicious for acute/subacute fracture involving the proximal phalanx of the 5th digit.  Please correlate with right foot radiography.  If there is an overlying soft tissue defect in this region, superimposed osteomyelitis is possible, but unlikely if skin is intact.  Diffuse soft tissue swelling about the foot, most pronounced dorsally.  Edema involving the intrinsic foot musculature.  Osteoarthritic changes of the great toe.    02/13/2025 x-ray chest   02/14/2025 x-ray foot Limited examination demonstrating no gross acute abnormality.  Consider dedicated radiographs of the 5th digit to further investigate questioned abnormality on recent MRI when clinically able.            IMPRESSION & PLAN   Left 3rd toe  osteomyelitis status post amputation of left 3rd toe on 02/14/2025.  Diabetic wounds at the left 2nd and left 4th   Cultures are showing Proteus and Enterococcus.  MRSA screen positive     MRI of the right foot was suspicious for acute/subacute fracture of proximal phalanx of the 5th digit shown right 5th metatarsal     Admitted for acute on chronic CHF and PAF.  Improving.  As per cardiologist  received the with cardioversion, dobutamine, Lasix drip.       PMHx CAD, HFrEF, EF 20-25%, mitral regurgitation, PAF, HTN, GERD, anemia, CAD, oOA    This patient is high risk for life-threatening deterioration and death secondary to above comorbidities and need for IV treatment    RECOMMENDATIONS    Discontinue vancomycin     Discontinue cefepime     Start Unasyn, which will cover both Proteus and Enterococcus.   Monitor carefully, because Proteus may develop resistance to Unasyn, and even if cultures showed no MRSA, patient has a positive MRSA screen    Continue wound care to all affected areas.  Place PICC.    Look for placement for IV antibiotics and wound care.  Weekly labs ESR, CRP, CMP, CBC  Duration of IV antibiotics 4- 6 weeks         I spent a total of 45 minutes on the day of the visit.This includes face to face time and non-face to face time preparing to see the patient (eg, review of tests), obtaining and/or reviewing separately obtained history, documenting clinical information in the electronic or other health record, independently interpreting results and communicating results to the patient/family/caregiver, or care coordinator.  This note has been used by [employer] dictation software, which may have errors in typing         [1]

## 2025-02-16 NOTE — PROGRESS NOTES
Therapy with Vancomycin complete and / or consult / order discontinued by Alia on 02/16 @ 8719   Pharmacy will sign off, please re-consult as needed.  Thank you for allowing us to participate in this patient's care.  Brice Escobar 2/16/2025 5:31 PM  Dept of Pharmacy  Ext [phone]

## 2025-02-16 NOTE — PLAN OF CARE
Problem: Adult Inpatient Plan of Care  Goal: Plan of Care Review  Outcome: Progressing  Goal: Patient-Specific Goal (Individualized)  Outcome: Progressing  Goal: Absence of Hospital-Acquired Illness or Injury  Outcome: Progressing  Goal: Readiness for Transition of Care  Outcome: Progressing     Problem: Wound  Goal: Optimal Functional Ability  Outcome: Progressing  Goal: Absence of Infection Signs and Symptoms  Outcome: Progressing  Goal: Improved Oral Intake  Outcome: Progressing  Goal: Skin Health and Integrity  Outcome: Progressing  Goal: Optimal Wound Healing  Outcome: Progressing     Problem: Fall Injury Risk  Goal: Absence of Fall and Fall-Related Injury  Outcome: Progressing     Problem: Infection  Goal: Absence of Infection Signs and Symptoms  Outcome: Progressing     Problem: Skin Injury Risk Increased  Goal: Skin Health and Integrity  Outcome: Progressing

## 2025-02-16 NOTE — PROGRESS NOTES
"[facility]  Department of Cardiology  Progress Note      PATIENT NAME: Juan C Sanchez  MRN: [id number]  TODAY'S DATE: 02/16/2025  ADMIT DATE: 2/11/2025                          CONSULT REQUESTED BY: Livia Alexandre MD    SUBJECTIVE     PRINCIPAL PROBLEM: Acute on chronic congestive heart failure      02/16/2025    Patient seen resting in bed. States he is not feeling as well today and has had more pain in his foot from toe amputation. He is not sleeping as well and is tired today. Overall he is still optimistic and his breathing is good.     2/15/25    Patient seen resting in bed with no distress noted. Breathing stable.     2/14/25    Patient seen resting in bed with no distress noted. Breathing is stable. S/p ANGEL/CV today.     2/13/25  Patient seen sitting up in bed with no acute distress noted.  Denies any chest discomfort.  Breathing is stable.  He has been diuresing very well, and his weight is down significantly.    REASON FOR CONSULT:  CHF exacerbation     HPI:  Mr. Sanchez is a 69 yr old male with pmh of known HFrEF, HTN, mitral regurg, OA, GERD, and anemia who originally come into the ER yesterday evening after home PT encouraged him to come be evaluated after noticing some worsening swelling in BLE, hypotension, and generalized weakness. It appears he has been in and out of the ER about 8x since the start of this new year. BNP 3163. Trops 21.3 then 16.8. Most recent ECHO uploaded in care everywhere shows EF 25%. Pt also states he had a recent angiogram done in [place] (the last dated one I could see was 2022) showed trivial nonobstructive CAD. He states he follows with Dr. Alegria but has not been seen "in a while." He states compliance with medications but feels PO lasix is not working for him.     Per hospital medicine notes:  Mr. Sanchez is a 69 yr old male with a hx of HTN, MR, PAF, chronic systolic CHF with EF of 25, OA, GERD, nonischemic cardiomyopathy, anemia who presented to " the ED with a chief complaint of shortness of breath, leg swelling and hypotension.  Patient states that he had PT come to his home today and he was found to be hypotensive and advised to come to the emergency room.  Patient endorses shortness of breath as well as leg swelling, malaise, fatigue, occasional chills, productive cough with milky sputum, upper abdominal pain, occasional nausea, decreased urine output, and positional lightheadedness.  He states that these symptoms have been progressively worsening over the last 1-2 months possibly even longer  He states that his symptoms are worse when lying flat as well as with exertion and better with rest and while sitting up.  He states that he has been taking his home medications without improvement of his symptoms.  He does endorse that most of all of his contacts recently have been sick. He denies any home oxygen use.  He does endorse occasional PND and states that he has been having to sit to sleep.  He denies tobacco and recreational drug use and states he seldomly drinks alcohol.  He denies fever, chest pain, vomiting, diarrhea, dysuria, hematuria, dizziness, and syncope.     Upon arrival to ED, patient afebrile, HR of 109, RR of 24, BP of 95/62, satting 98% on RA.  Workup in the ED revealed RBC of 4.19, H/H of 10/32.8, sodium of 134, pCO2 of 31, GFR of 55.1, corrected calcium of 9, albumin of 3.1, BNP of 3163, troponin of 21.3, lactic acid of 2.1.  Blood cultures pending.  CXR shows enlarged cardiac silhouette with pulmonary vascular congestion.  Bilateral pleural effusions.  Mild bibasilar airspace disease and or atelectasis.  Left foot x-ray shows osteopenia.  No overt erosions or osseous destruction.  No acute fracture or dislocation.  Mild multifocal midfoot arthritis.  Forefoot swelling.  Patient was given Lasix 20 mg IV, Zosyn 4.5 g IV, 500 mL NS bolus, vancomycin 20 mg/kg while in the ED. discussed case with ED provider and patient will be admitted under  hospital medicine services for further management.        Review of patient's allergies indicates:   Allergen Reactions    Gabapentin Other (See Comments)     Hypersomnia, nightmares    Morphine Other (See Comments)     Pt states systemic cramping       Past Medical History:   Diagnosis Date    Hypertension      Past Surgical History:   Procedure Laterality Date    ORIF closed comminuted displaced intra-articular fx distal left radius & application of external fixator Left 05/28/2018     Social History     Tobacco Use    Smoking status: Never    Smokeless tobacco: Never   Substance Use Topics    Alcohol use: Yes     Comment: social    Drug use: No        REVIEW OF SYSTEMS    As mentioned in HPI    OBJECTIVE     VITAL SIGNS (Most Recent)  Temp: 97.7 °F (36.5 °C) (02/16/25 0826)  Pulse: 83 (02/16/25 0826)  Resp: 18 (02/16/25 0838)  BP: 117/75 (02/16/25 0826)  SpO2: (!) 93 % (02/16/25 0826)    VENTILATION STATUS  Resp: 18 (02/16/25 0838)  SpO2: (!) 93 % (02/16/25 0826)           I & O (Last 24H):  Intake/Output Summary (Last 24 hours) at 2/16/2025 0955  Last data filed at 2/16/2025 0827  Gross per 24 hour   Intake 1100 ml   Output 3975 ml   Net -2875 ml       WEIGHTS  Wt Readings from Last 3 Encounters:   02/16/25 0453 90.5 kg (199 lb 8.3 oz)   02/15/25 0339 92.5 kg (203 lb 14.8 oz)   02/14/25 0732 87.5 kg (192 lb 14.4 oz)   02/14/25 0500 94.1 kg (207 lb 7.3 oz)   02/13/25 0604 93.9 kg (207 lb 0.2 oz)   02/12/25 0050 98.6 kg (217 lb 6 oz)   02/11/25 1727 92.5 kg (204 lb)   02/14/25 0940 87.5 kg (192 lb 14.4 oz)   10/23/18 1426 92.5 kg (204 lb)       PHYSICAL EXAM    CONSTITUTIONAL: NAD, elderly male lying in bed with HOB elevated   HEENT: Normocephalic. No pallor  NECK: no JVD  LUNGS: crackles in the bases, mild tachypnea   HEART: irregular rate and rhythm -afib 80-90s, S1, S2 normal, no murmur   ABDOMEN: soft, mild tenderness+, bowel sounds normal  EXTREMITIES: BLE edema +2 persists, redness noted to left lower  extremity, left foot with dressing clean dry and intact  SKIN: No rash  NEURO: AAO X 3  PSYCH: normal affect     HOME MEDICATIONS:  No current facility-administered medications on file prior to encounter.     Current Outpatient Medications on File Prior to Encounter   Medication Sig Dispense Refill    acetaminophen (TYLENOL EXTRA STRENGTH) 500 MG tablet Take 1,000 mg by mouth every 6 (six) hours as needed for Pain.      amiodarone (PACERONE) 200 MG Tab Take 200 mg by mouth 2 (two) times daily.      doxycycline (MONODOX) 100 MG capsule Take 100 mg by mouth 2 (two) times daily.      empagliflozin (JARDIANCE) 10 mg tablet Take 10 mg by mouth once daily.      ENTRESTO 24-26 mg per tablet Take 1 tablet by mouth 2 (two) times daily.      furosemide (LASIX) 20 MG tablet Take 40 mg by mouth 0900, 1800.      gabapentin (NEURONTIN) 300 MG capsule Take 300 mg by mouth 2 (two) times daily.      metoprolol tartrate (LOPRESSOR) 25 MG tablet Take 25 mg by mouth 2 (two) times daily.      nitroGLYCERIN (NITROSTAT) 0.4 MG SL tablet Place 0.4 mg under the tongue every 5 (five) minutes as needed for Chest pain.      oxyCODONE-acetaminophen (PERCOCET)  mg per tablet Take 1 tablet by mouth every 8 (eight) hours as needed for Pain.      pantoprazole (PROTONIX) 40 MG tablet Take 40 mg by mouth once daily.      carvediloL (COREG) 3.125 MG tablet Take 3.125 mg by mouth 2 (two) times daily. (Patient not taking: Reported on 2/11/2025)         SCHEDULED MEDS:   amiodarone  200 mg Oral BID    ammonium lactate   Topical (Top) BID    ceFEPime IV (PEDS and ADULTS)  2 g Intravenous Q8H    enoxparin  1 mg/kg Subcutaneous Q12H    mupirocin   Nasal BID    pantoprazole  40 mg Oral Daily    phenazopyridine  100 mg Oral TID WM    spironolactone  25 mg Oral Daily    vancomycin (VANCOCIN) IV (PEDS and ADULTS)  1,750 mg Intravenous Q24H       CONTINUOUS INFUSIONS:   DOBUTamine IV infusion (non-titrating)  5 mcg/kg/min Intravenous Continuous 7.4 mL/hr  "at 02/15/25 1434 5 mcg/kg/min at 02/15/25 1434    furosemide (LASIX) 2 mg/mL continuous infusion (non-titrating)  5 mg/hr Intravenous Continuous 2.5 mL/hr at 02/14/25 1624 5 mg/hr at 02/14/25 1624       PRN MEDS:  Current Facility-Administered Medications:     acetaminophen, 650 mg, Oral, Q4H PRN    aluminum-magnesium hydroxide-simethicone, 30 mL, Oral, QID PRN    magnesium oxide, 800 mg, Oral, PRN    magnesium oxide, 800 mg, Oral, PRN    melatonin, 9 mg, Oral, Nightly PRN    metoprolol, 5 mg, Intravenous, Q5 Min PRN    midodrine, 2.5 mg, Oral, TID PRN    naloxone, 0.02 mg, Intravenous, PRN    ondansetron, 4 mg, Intravenous, Q6H PRN    oxyCODONE-acetaminophen, 1 tablet, Oral, Q6H PRN    potassium bicarbonate, 35 mEq, Oral, PRN    potassium bicarbonate, 50 mEq, Oral, PRN    potassium bicarbonate, 60 mEq, Oral, PRN    potassium, sodium phosphates, 2 packet, Oral, PRN    potassium, sodium phosphates, 2 packet, Oral, PRN    potassium, sodium phosphates, 2 packet, Oral, PRN    senna-docusate 8.6-50 mg, 1 tablet, Oral, BID PRN    traMADoL, 50 mg, Oral, Q4H PRN    Pharmacy to dose Vancomycin consult, , , Once **AND** vancomycin - pharmacy to dose, , Intravenous, pharmacy to manage frequency    LABS AND DIAGNOSTICS     CBC LAST 3 DAYS  Recent Labs   Lab 02/14/25  0411 02/15/25  0418 02/16/25  0351   WBC 5.56 7.42 7.40   RBC 4.26* 4.30* 4.18*   HGB 10.0* 10.2* 9.7*   HCT 32.5* 33.7* 32.2*   MCV 76* 78* 77*   MCH 23.5* 23.7* 23.2*   MCHC 30.8* 30.3* 30.1*   RDW 18.4* 18.4* 18.1*    279 273   MPV 9.6 9.9 10.3   GRAN 53.2  3.0 63.6  4.7 61.8  4.6   LYMPH 29.1  1.6 20.2  1.5 19.6  1.5   MONO 12.1  0.7 12.5  0.9 13.9  1.0   BASO 0.05 0.05 0.05   NRBC 0 0 0       COAGULATION LAST 3 DAYS  No results for input(s): "LABPT", "INR", "APTT" in the last 168 hours.    CHEMISTRY LAST 3 DAYS  Recent Labs   Lab 02/14/25  0411 02/15/25  0418 02/16/25  0351    134* 132*   K 3.7 4.8 4.0   CL 97 93* 92*   CO2 32* 34* 30* " "  ANIONGAP 8 7* 10   BUN 19 22 26*   CREATININE 1.2 1.4 1.2   GLU 87 99 87   CALCIUM 8.8 9.0 8.7   MG 2.0 2.0 2.0   ALBUMIN 3.1* 3.3* 3.3*   PROT 7.2 7.6 7.3   ALKPHOS 79 80 75   ALT 6* 6* 6*   AST 11 12 12   BILITOT 0.8 1.1* 0.9       CARDIAC PROFILE LAST 3 DAYS  Recent Labs   Lab 02/11/25  1818 02/12/25  0158   BNP 3,163*  --    TROPONINIHS 21.3* 16.8*       ENDOCRINE LAST 3 DAYS  Recent Labs   Lab 02/12/25  0158 02/13/25  1041   TSH 7.487*  --    PROCAL  --  <0.050       LAST ARTERIAL BLOOD GAS  ABG  No results for input(s): "PH", "PO2", "PCO2", "HCO3", "BE" in the last 168 hours.    LAST 7 DAYS MICROBIOLOGY   Microbiology Results (last 7 days)       Procedure Component Value Units Date/Time    Tissue culture [[id number]]  (Abnormal)  (Susceptibility) Collected: 02/13/25 0748    Order Status: Completed Specimen: Bone from Toe, Left Foot Updated: 02/16/25 0840     Aerobic Culture - Tissue PROTEUS MIRABILIS  From broth only        ENTEROCOCCUS FAECALIS  From broth only       Gram Stain Result No WBC's      Rare Gram positive cocci    Narrative:      Toe, Left Foot    Blood culture x two cultures. Draw prior to antibiotics. [[id number]] Collected: 02/11/25 1749    Order Status: Completed Specimen: Blood from Peripheral, Antecubital, Right Updated: 02/15/25 2032     Blood Culture, Routine No Growth to date      No Growth to date      No Growth to date      No Growth to date      No Growth to date    Narrative:      Aerobic and anaerobic    Blood culture x two cultures. Draw prior to antibiotics. [[id number]] Collected: 02/11/25 1744    Order Status: Completed Specimen: Blood from Peripheral, Antecubital, Left Updated: 02/15/25 2032     Blood Culture, Routine No Growth to date      No Growth to date      No Growth to date      No Growth to date      No Growth to date    Narrative:      Aerobic and anaerobic    Culture, Anaerobic [[id number]] Collected: 02/13/25 0748    Order Status: Completed Specimen: Bone from Toe, " Left Foot Updated: 02/15/25 0933     Anaerobic Culture No anaerobes isolated    Narrative:      Toe, Left Foot    MRSA Screen by PCR [[id number]]  (Abnormal) Collected: 02/13/25 1054    Order Status: Completed Specimen: Nasal Swab Updated: 02/13/25 1410     MRSA SCREEN BY PCR Detected     Comment: MRSA  critical result(s) called and verbal readback obtained from   Luanne Stanley RN on [facility] by [facility] 02/13/2025 14:05         Narrative:       MRSA  critical result(s) called and verbal readback obtained from   Luanne Stanley RN on [facility] by [facility] 02/13/2025 14:05    Aerobic culture [[id number]] Collected: 02/13/25 0748    Order Status: Canceled Specimen: Bone from Toe, Left Foot             MOST RECENT IMAGING  X-Ray Foot 2 View Right  Narrative: EXAMINATION:  XR FOOT 2 VIEW RIGHT    CLINICAL HISTORY:  possible 5th toe fracture seen on MRI;    COMPARISON:  None.    FINDINGS:  Patient positioning is suboptimal.  Grossly, no acute fracture, dislocation or osseous destruction is identified.  No focal soft tissue abnormality is observed.  Impression: Limited examination demonstrating no gross acute abnormality.  Consider dedicated radiographs of the 5th digit to further investigate questioned abnormality on recent MRI when clinically able.    Electronically signed by: Bakari Hawley  Date:    02/14/2025  Time:    15:45  Transesophageal echo (ANGEL) with possible cardioversion  Addendum:   Left Ventricle: The left ventricle is normal in size. Normal wall  thickness. Severe global hypokinesis present. There is severely reduced  systolic function with a visually estimated ejection fraction of less than  30%. There is normal diastolic function.     Right Ventricle: Normal right ventricular cavity size. Systolic  function is moderately reduced.     Left Atrium: Left atrium is dilated. Agitated saline study of the  atrial septum is negative after vasalva maneuver, suggesting absence of  intracardiac shunt at the atrial level. No patent  foramen ovale confirmed  by agitated saline contrast. The left atrial appendage has a chicken wing  morphology. Appendage velocity is reduced at less than 40 cm/sec. There is  no thrombus in the left atrial appendage.     Right Atrium: Right atrium is dilated.     Mitral Valve: There is moderate regurgitation.     Tricuspid Valve: There is mild to moderate regurgitation.     Successful electrical cardioversion with restoration of normal sinus  rhythm  Narrative:   Left Ventricle: The left ventricle is normal in size. Normal wall   thickness. Severe global hypokinesis present. There is severely reduced   systolic function with a visually estimated ejection fraction of less than   30%. There is normal diastolic function.    Right Ventricle: Normal right ventricular cavity size. Systolic   function is moderately reduced.    Left Atrium: Left atrium is dilated. Agitated saline study of the   atrial septum is negative after vasalva maneuver, suggesting absence of   intracardiac shunt at the atrial level. No patent foramen ovale confirmed   by agitated saline contrast. The left atrial appendage has a chicken wing   morphology. Appendage velocity is reduced at less than 40 cm/sec. There is   no thrombus in the left atrial appendage.    Right Atrium: Right atrium is dilated.    Mitral Valve: There is moderate regurgitation.    Tricuspid Valve: There is mild to moderate regurgitation.  MRI Forefoot WO Contrast RT  Narrative: EXAMINATION:  MRI FOREFOOT WO CONTRAST RT    CLINICAL HISTORY:  Foot swelling, diabetic, osteomyelitis suspected, xray done;Foot swelling, nondiabetic, osteomyelitis suspected;    TECHNIQUE:  Multisequence, multiplanar imaging of the right foot obtained without contrast.    COMPARISON:  No prior imaging of the right foot is available for comparison.    FINDINGS:  Focal bone marrow edema with associated cortical offset/linear low signal intensity involving the proximal aspect of the 5th digit proximal  phalanx, which is suspicious for acute or subacute fracture.  No overlying soft tissue defect/ulceration is evident.    No other bone marrow signal abnormality.  Osteoarthritic changes of the great toe MTP and IP joints.  Diffuse soft tissue swelling about the foot, most pronounced dorsally.  Intrinsic muscles of the foot are edematous.    Lisfranc ligament is intact.  Visualized flexor and extensor tendons appear intact.  Impression: MRI findings suspicious for acute/subacute fracture involving the proximal phalanx of the 5th digit.  Please correlate with right foot radiography.  If there is an overlying soft tissue defect in this region, superimposed osteomyelitis is possible, but unlikely if skin is intact.    Diffuse soft tissue swelling about the foot, most pronounced dorsally.    Edema involving the intrinsic foot musculature.    Osteoarthritic changes of the great toe.    Electronically signed by: Alejandro Carney  Date:    02/14/2025  Time:    07:59  X-Ray Chest AP Single View  Narrative: EXAMINATION:  XR CHEST 1 VIEW    CLINICAL HISTORY:  arrhythmia; Paroxysmal atrial fibrillation    COMPARISON:  02/11/2025    FINDINGS:  Cardiac silhouette size is enlarged and similar to prior.  Prominent central pulmonary vascularity and mild increased interstitial markings bilaterally suggestive of pulmonary edema in the setting of CHF.  No large pleural effusion.  No pneumothorax.  Impression: Cardiomegaly with findings of interstitial pulmonary edema/volume overload.    Electronically signed by: Alejandro Carney  Date:    02/14/2025  Time:    06:57      ECHOCARDIOGRAM RESULTS (last 5)  No results found for this or any previous visit.    Echo done locally    Impressions   -----------     1.The estimated LV ejection fraction is 25 %     2.There is moderate to severe global hypokinesis     3.Diastolic function is indeterminate.     4.There is mild aortic valve sclerosis without significant stenosis     5.There is severe mitral  regurgitation     6.Estimated RVSP systolic pressure is 37.13 mmHg     7.Compared to the report from prior study dated 04/04/2022, the   severity of mitral regurgitation has increased     Findings   --------     Left Ventricle   The estimated LV ejection fraction is 25 %. The calculated LV ejection   fraction (MOD, Biplane) is 29.99 %. LV chamber is mildly dilated.   There is mild concentric LV hypertrophy. LV systolic function is   normal. There is moderate to severe global hypokinesis. Diastolic   function is indeterminate.     Right Ventricle   RV size is moderately dilated. The RV systolic function is normal.     Septum   There is no atrial septal defect (ASD). There is no ventricular septal   defect (VSD).     Left Atrium   LA chamber size is normal. LA diameter is 3.81 cm.     Right Atrium   RA chamber size is normal.     Aortic Valve   There is a trileaflet aortic valve. There is mild aortic valve   sclerosis without significant stenosis. There is no aortic   regurgitation. Aortic valve area by VMax: 1.02 cm2. Aortic valve area   by VTI: 0.93 cm2. AV Mean gradient: 7.37 mmHg. AV Peak gradient: 13.48   mmHg.     Mitral Valve   Mitral valve is not well visualized. There is severe mitral   regurgitation. There is no mitral stenosis.     Tricuspid Valve   Tricuspid valve is structurally normal. There is mild tricuspid   regurgitation. The estimated RV systolic pressure is normal. Estimated   RVSP systolic pressure is 37.13 mmHg. There is no tricuspid valve   stenosis.     Pulmonary Valve   Pulmonic valve is not well visualized.     Pericardium   The pericardium is normal. There is no pericardial effusion present.     Aorta   The size of the visualized portion of aortic root is within normal   limits. The thoracic aorta is not well visualized. The ascending aorta   is not well visualized.     Venous   The inferior vena cava dimension is normal.     Thrombus/Mass   There is no intracardiac mass or thrombus  identified.     Echo Dimensions   --------------     LA Dimen (2D): 3.81 cm   IVS(D) (2D): 1.16 cm   LVPW(D) (2D): 1.16 cm   LV(D) (2D): 6.14 cm   LV(S) (2D): 4.56 cm   RV(D (2D): 3.7 cm   LVOT (2D): 2 cm   EF Teich (2D): 50 %   EF Teich (M-Mode): 25 %   FS (2D): 26 %   RVSP (Doppler): 37.13 mmHg   Doppler   -------   AVvel: 1.84 m/s   Pk Grad: 13.48 mmHg   LUCAS(pk): 1.02 cm2   PV Lee: 0.69 m/s   LVOTvel: 0.6 m/s   Mn Grad: 7.37 mmHg   LUCAS(VTI): 0.93 cm2   Ao Stenosis Dimen Idx: 0.33   MV PHT: 51.71 ms   Diastology   ----------   MV E: 1.27 m/s   MV A: 0.42 m/s   MV E/A: 3.01   MV Dec T: 175.64 ms   E' Lat: 9.29 cm/s   E' Med: 3.93 cm/s   E/Lat E': 13.66   E/Med E': 32.29   TR Lee: 1.72 m/s     Electronically signed by: Sudheer Lee DO   01/08/2025 11:49 AM   Impressions   -----------     1.The estimated LV ejection fraction is 25 %     2.There is moderate to severe global hypokinesis     3.Diastolic function is indeterminate.     4.There is mild aortic valve sclerosis without significant stenosis     5.There is severe mitral regurgitation     6.Estimated RVSP systolic pressure is 37.13 mmHg     7.Compared to the report from prior study dated 04/04/2022, the   severity of mitral regurgitation has increased     Findings   --------     Left Ventricle   The estimated LV ejection fraction is 25 %. The calculated LV ejection   fraction (MOD, Biplane) is 29.99 %. LV chamber is mildly dilated.   There is mild concentric LV hypertrophy. LV systolic function is   normal. There is moderate to severe global hypokinesis. Diastolic   function is indeterminate.     Right Ventricle   RV size is moderately dilated. The RV systolic function is normal.     Septum   There is no atrial septal defect (ASD). There is no ventricular septal   defect (VSD).     Left Atrium   LA chamber size is normal. LA diameter is 3.81 cm.     Right Atrium   RA chamber size is normal.     Aortic Valve   There is a trileaflet aortic valve. There is mild  aortic valve   sclerosis without significant stenosis. There is no aortic   regurgitation. Aortic valve area by VMax: 1.02 cm2. Aortic valve area   by VTI: 0.93 cm2. AV Mean gradient: 7.37 mmHg. AV Peak gradient: 13.48   mmHg.     Mitral Valve   Mitral valve is not well visualized. There is severe mitral   regurgitation. There is no mitral stenosis.     Tricuspid Valve   Tricuspid valve is structurally normal. There is mild tricuspid   regurgitation. The estimated RV systolic pressure is normal. Estimated   RVSP systolic pressure is 37.13 mmHg. There is no tricuspid valve   stenosis.     Pulmonary Valve   Pulmonic valve is not well visualized.     Pericardium   The pericardium is normal. There is no pericardial effusion present.     Aorta   The size of the visualized portion of aortic root is within normal   limits. The thoracic aorta is not well visualized. The ascending aorta   is not well visualized.     Venous   The inferior vena cava dimension is normal.     Thrombus/Mass   There is no intracardiac mass or thrombus identified.     Echo Dimensions   --------------     LA Dimen (2D): 3.81 cm   IVS(D) (2D): 1.16 cm   LVPW(D) (2D): 1.16 cm   LV(D) (2D): 6.14 cm   LV(S) (2D): 4.56 cm   RV(D (2D): 3.7 cm   LVOT (2D): 2 cm   EF Teich (2D): 50 %   EF Teich (M-Mode): 25 %   FS (2D): 26 %   RVSP (Doppler): 37.13 mmHg   Doppler   -------   AVvel: 1.84 m/s   Pk Grad: 13.48 mmHg   LUCAS(pk): 1.02 cm2   PV Lee: 0.69 m/s   LVOTvel: 0.6 m/s   Mn Grad: 7.37 mmHg   LUCAS(VTI): 0.93 cm2   Ao Stenosis Dimen Idx: 0.33   MV PHT: 51.71 ms   Diastology   ----------   MV E: 1.27 m/s   MV A: 0.42 m/s   MV E/A: 3.01   MV Dec T: 175.64 ms   E' Lat: 9.29 cm/s   E' Med: 3.93 cm/s   E/Lat E': 13.66   E/Med E': 32.29   TR Lee: 1.72 m/s     Electronically signed by: Sudheer Lee DO   01/08/2025 11:49 AM     CURRENT/PREVIOUS VISIT EKG  Results for orders placed or performed during the hospital encounter of 02/11/25   EKG 12-lead    Collection  Time: 02/14/25 10:19 AM   Result Value Ref Range    QRS Duration 108 ms    [facility] QTC Calculation 504 ms    Narrative    Test Reason : Z13.6,    Vent. Rate :  93 BPM     Atrial Rate :  93 BPM     P-R Int : 186 ms          QRS Dur : 108 ms      QT Int : 406 ms       P-R-T Axes :  64  41  63 degrees    QTcB Int : 504 ms    Sinus rhythm with Premature atrial complexes  Nonspecific T wave abnormality  Prolonged QT  Abnormal ECG  When compared with ECG of 11-Feb-2025 17:14,  Sinus rhythm has replaced Atrial fibrillation  Questionable change in The axis  Nonspecific T wave abnormality, improved in Inferior leads  Nonspecific T wave abnormality, improved in Anterior leads    Referred By: AAAREFERRAL SELF           Confirmed By:            ASSESSMENT/PLAN:     Active Hospital Problems    Diagnosis    *Acute on chronic congestive heart failure    PAD (peripheral artery disease)    Acute osteomyelitis    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   Reduce dobutamine drip to 2.5 mcg/kg/min.   Strict I&O.   Trend labs.   Our team will follow up tomorrow.     Thelma Perez NP  Department of Cardiology  Date of Service: 02/16/2025

## 2025-02-17 LAB
ALBUMIN SERPL BCP-MCNC: 3.1 G/DL (ref 3.5–5.2)
ALP SERPL-CCNC: 80 U/L (ref 55–135)
ALT SERPL W/O P-5'-P-CCNC: 5 U/L (ref 10–44)
ANION GAP SERPL CALC-SCNC: 6 MMOL/L (ref 8–16)
AST SERPL-CCNC: 12 U/L (ref 10–40)
BASOPHILS # BLD AUTO: 0.04 K/UL (ref 0–0.2)
BASOPHILS NFR BLD: 0.5 % (ref 0–1.9)
BILIRUB SERPL-MCNC: 0.6 MG/DL (ref 0.1–1)
BUN SERPL-MCNC: 27 MG/DL (ref 8–23)
CALCIUM SERPL-MCNC: 8.8 MG/DL (ref 8.7–10.5)
CHLORIDE SERPL-SCNC: 92 MMOL/L (ref 95–110)
CO2 SERPL-SCNC: 34 MMOL/L (ref 23–29)
CREAT SERPL-MCNC: 1.1 MG/DL (ref 0.5–1.4)
DIFFERENTIAL METHOD BLD: ABNORMAL
EOSINOPHIL # BLD AUTO: 0.3 K/UL (ref 0–0.5)
EOSINOPHIL NFR BLD: 4.2 % (ref 0–8)
ERYTHROCYTE [DISTWIDTH] IN BLOOD BY AUTOMATED COUNT: 18 % (ref 11.5–14.5)
EST. GFR  (NO RACE VARIABLE): >60 ML/MIN/1.73 M^2
GLUCOSE SERPL-MCNC: 89 MG/DL (ref 70–110)
HCT VFR BLD AUTO: 31.8 % (ref 40–54)
HCV AB SERPL QL IA: NEGATIVE
HGB BLD-MCNC: 9.5 G/DL (ref 14–18)
HIV 1+2 AB+HIV1 P24 AG SERPL QL IA: NEGATIVE
IMM GRANULOCYTES # BLD AUTO: 0.04 K/UL (ref 0–0.04)
IMM GRANULOCYTES NFR BLD AUTO: 0.5 % (ref 0–0.5)
LYMPHOCYTES # BLD AUTO: 1.2 K/UL (ref 1–4.8)
LYMPHOCYTES NFR BLD: 16.3 % (ref 18–48)
MAGNESIUM SERPL-MCNC: 2.1 MG/DL (ref 1.6–2.6)
MCH RBC QN AUTO: 22.9 PG (ref 27–31)
MCHC RBC AUTO-ENTMCNC: 29.9 G/DL (ref 32–36)
MCV RBC AUTO: 77 FL (ref 82–98)
MONOCYTES # BLD AUTO: 1.1 K/UL (ref 0.3–1)
MONOCYTES NFR BLD: 14.7 % (ref 4–15)
NEUTROPHILS # BLD AUTO: 4.8 K/UL (ref 1.8–7.7)
NEUTROPHILS NFR BLD: 63.8 % (ref 38–73)
NRBC BLD-RTO: 0 /100 WBC
OHS QRS DURATION: 108 MS
OHS QRS DURATION: 110 MS
OHS QTC CALCULATION: 504 MS
OHS QTC CALCULATION: 534 MS
PLATELET # BLD AUTO: 272 K/UL (ref 150–450)
PMV BLD AUTO: 10.1 FL (ref 9.2–12.9)
POTASSIUM SERPL-SCNC: 3.8 MMOL/L (ref 3.5–5.1)
PROT SERPL-MCNC: 7.2 G/DL (ref 6–8.4)
RBC # BLD AUTO: 4.14 M/UL (ref 4.6–6.2)
SODIUM SERPL-SCNC: 132 MMOL/L (ref 136–145)
WBC # BLD AUTO: 7.56 K/UL (ref 3.9–12.7)

## 2025-02-17 PROCEDURE — 25000003 PHARM REV CODE 250: Performed by: GENERAL PRACTICE

## 2025-02-17 PROCEDURE — 36246 INS CATH ABD/L-EXT ART 2ND: CPT | Mod: LT,,, | Performed by: STUDENT IN AN ORGANIZED HEALTH CARE EDUCATION/TRAINING PROGRAM

## 2025-02-17 PROCEDURE — C1760 CLOSURE DEV, VASC: HCPCS | Performed by: STUDENT IN AN ORGANIZED HEALTH CARE EDUCATION/TRAINING PROGRAM

## 2025-02-17 PROCEDURE — 25000003 PHARM REV CODE 250: Performed by: STUDENT IN AN ORGANIZED HEALTH CARE EDUCATION/TRAINING PROGRAM

## 2025-02-17 PROCEDURE — 86704 HEP B CORE ANTIBODY TOTAL: CPT | Performed by: INTERNAL MEDICINE

## 2025-02-17 PROCEDURE — 63600175 PHARM REV CODE 636 W HCPCS: Performed by: NURSE PRACTITIONER

## 2025-02-17 PROCEDURE — 99232 SBSQ HOSP IP/OBS MODERATE 35: CPT | Mod: 25,,, | Performed by: STUDENT IN AN ORGANIZED HEALTH CARE EDUCATION/TRAINING PROGRAM

## 2025-02-17 PROCEDURE — 21400001 HC TELEMETRY ROOM

## 2025-02-17 PROCEDURE — 25000003 PHARM REV CODE 250

## 2025-02-17 PROCEDURE — 25500020 PHARM REV CODE 255: Performed by: STUDENT IN AN ORGANIZED HEALTH CARE EDUCATION/TRAINING PROGRAM

## 2025-02-17 PROCEDURE — 25000003 PHARM REV CODE 250: Performed by: PODIATRIST

## 2025-02-17 PROCEDURE — 99152 MOD SED SAME PHYS/QHP 5/>YRS: CPT | Performed by: STUDENT IN AN ORGANIZED HEALTH CARE EDUCATION/TRAINING PROGRAM

## 2025-02-17 PROCEDURE — 25000003 PHARM REV CODE 250: Performed by: HOSPITALIST

## 2025-02-17 PROCEDURE — C1769 GUIDE WIRE: HCPCS | Performed by: STUDENT IN AN ORGANIZED HEALTH CARE EDUCATION/TRAINING PROGRAM

## 2025-02-17 PROCEDURE — 36415 COLL VENOUS BLD VENIPUNCTURE: CPT

## 2025-02-17 PROCEDURE — 75710 ARTERY X-RAYS ARM/LEG: CPT | Mod: 26,,, | Performed by: STUDENT IN AN ORGANIZED HEALTH CARE EDUCATION/TRAINING PROGRAM

## 2025-02-17 PROCEDURE — 86706 HEP B SURFACE ANTIBODY: CPT | Performed by: INTERNAL MEDICINE

## 2025-02-17 PROCEDURE — 63600175 PHARM REV CODE 636 W HCPCS

## 2025-02-17 PROCEDURE — C1894 INTRO/SHEATH, NON-LASER: HCPCS | Performed by: STUDENT IN AN ORGANIZED HEALTH CARE EDUCATION/TRAINING PROGRAM

## 2025-02-17 PROCEDURE — 36246 INS CATH ABD/L-EXT ART 2ND: CPT | Performed by: STUDENT IN AN ORGANIZED HEALTH CARE EDUCATION/TRAINING PROGRAM

## 2025-02-17 PROCEDURE — 99233 SBSQ HOSP IP/OBS HIGH 50: CPT | Mod: ,,, | Performed by: INTERNAL MEDICINE

## 2025-02-17 PROCEDURE — 25000003 PHARM REV CODE 250: Performed by: INTERNAL MEDICINE

## 2025-02-17 PROCEDURE — 36415 COLL VENOUS BLD VENIPUNCTURE: CPT | Performed by: INTERNAL MEDICINE

## 2025-02-17 PROCEDURE — 27000207 HC ISOLATION

## 2025-02-17 PROCEDURE — 75710 ARTERY X-RAYS ARM/LEG: CPT | Performed by: STUDENT IN AN ORGANIZED HEALTH CARE EDUCATION/TRAINING PROGRAM

## 2025-02-17 PROCEDURE — 63600175 PHARM REV CODE 636 W HCPCS: Performed by: INTERNAL MEDICINE

## 2025-02-17 PROCEDURE — 86803 HEPATITIS C AB TEST: CPT | Performed by: INTERNAL MEDICINE

## 2025-02-17 PROCEDURE — B41D1ZZ FLUOROSCOPY OF AORTA AND BILATERAL LOWER EXTREMITY ARTERIES USING LOW OSMOLAR CONTRAST: ICD-10-PCS | Performed by: STUDENT IN AN ORGANIZED HEALTH CARE EDUCATION/TRAINING PROGRAM

## 2025-02-17 PROCEDURE — 85025 COMPLETE CBC W/AUTO DIFF WBC: CPT

## 2025-02-17 PROCEDURE — C1887 CATHETER, GUIDING: HCPCS | Performed by: STUDENT IN AN ORGANIZED HEALTH CARE EDUCATION/TRAINING PROGRAM

## 2025-02-17 PROCEDURE — 63600175 PHARM REV CODE 636 W HCPCS: Performed by: STUDENT IN AN ORGANIZED HEALTH CARE EDUCATION/TRAINING PROGRAM

## 2025-02-17 PROCEDURE — 80053 COMPREHEN METABOLIC PANEL: CPT

## 2025-02-17 PROCEDURE — P9047 ALBUMIN (HUMAN), 25%, 50ML: HCPCS

## 2025-02-17 PROCEDURE — 87340 HEPATITIS B SURFACE AG IA: CPT | Performed by: INTERNAL MEDICINE

## 2025-02-17 PROCEDURE — 87389 HIV-1 AG W/HIV-1&-2 AB AG IA: CPT | Performed by: INTERNAL MEDICINE

## 2025-02-17 PROCEDURE — 83735 ASSAY OF MAGNESIUM: CPT

## 2025-02-17 DEVICE — ANGIO-SEAL VIP VASCULAR CLOSURE DEVICE
Type: IMPLANTABLE DEVICE | Site: GROIN | Status: FUNCTIONAL
Brand: ANGIO-SEAL

## 2025-02-17 RX ORDER — ALBUMIN HUMAN 250 G/1000ML
12.5 SOLUTION INTRAVENOUS ONCE
Status: COMPLETED | OUTPATIENT
Start: 2025-02-17 | End: 2025-02-17

## 2025-02-17 RX ORDER — IODIXANOL 320 MG/ML
INJECTION, SOLUTION INTRAVASCULAR
Status: DISCONTINUED | OUTPATIENT
Start: 2025-02-17 | End: 2025-02-17 | Stop reason: HOSPADM

## 2025-02-17 RX ORDER — FENTANYL CITRATE 50 UG/ML
INJECTION, SOLUTION INTRAMUSCULAR; INTRAVENOUS
Status: DISCONTINUED | OUTPATIENT
Start: 2025-02-17 | End: 2025-02-17 | Stop reason: HOSPADM

## 2025-02-17 RX ORDER — TAMSULOSIN HYDROCHLORIDE 0.4 MG/1
0.4 CAPSULE ORAL DAILY
Status: DISCONTINUED | OUTPATIENT
Start: 2025-02-17 | End: 2025-02-24 | Stop reason: HOSPADM

## 2025-02-17 RX ORDER — LANOLIN ALCOHOL/MO/W.PET/CERES
1 CREAM (GRAM) TOPICAL DAILY
Status: DISCONTINUED | OUTPATIENT
Start: 2025-02-17 | End: 2025-02-24 | Stop reason: HOSPADM

## 2025-02-17 RX ORDER — OXYCODONE AND ACETAMINOPHEN 10; 325 MG/1; MG/1
TABLET ORAL
Status: DISPENSED
Start: 2025-02-17 | End: 2025-02-18

## 2025-02-17 RX ORDER — LIDOCAINE HYDROCHLORIDE 10 MG/ML
INJECTION, SOLUTION INFILTRATION; PERINEURAL
Status: DISCONTINUED | OUTPATIENT
Start: 2025-02-17 | End: 2025-02-17 | Stop reason: HOSPADM

## 2025-02-17 RX ORDER — ONDANSETRON HYDROCHLORIDE 2 MG/ML
INJECTION, SOLUTION INTRAVENOUS
Status: DISCONTINUED | OUTPATIENT
Start: 2025-02-17 | End: 2025-02-17 | Stop reason: HOSPADM

## 2025-02-17 RX ORDER — ACETAZOLAMIDE 250 MG/1
250 TABLET ORAL 3 TIMES DAILY
Status: COMPLETED | OUTPATIENT
Start: 2025-02-17 | End: 2025-02-18

## 2025-02-17 RX ORDER — MIDAZOLAM HYDROCHLORIDE 1 MG/ML
INJECTION INTRAMUSCULAR; INTRAVENOUS
Status: DISCONTINUED | OUTPATIENT
Start: 2025-02-17 | End: 2025-02-17 | Stop reason: HOSPADM

## 2025-02-17 RX ADMIN — OXYCODONE HYDROCHLORIDE AND ACETAMINOPHEN 1 TABLET: 10; 325 TABLET ORAL at 09:02

## 2025-02-17 RX ADMIN — AMPICILLIN SODIUM AND SULBACTAM SODIUM 3 G: 2; 1 INJECTION, POWDER, FOR SOLUTION INTRAMUSCULAR; INTRAVENOUS at 05:02

## 2025-02-17 RX ADMIN — TAMSULOSIN HYDROCHLORIDE 0.4 MG: 0.4 CAPSULE ORAL at 04:02

## 2025-02-17 RX ADMIN — DOBUTAMINE HYDROCHLORIDE 2.5 MCG/KG/MIN: 400 INJECTION INTRAVENOUS at 07:02

## 2025-02-17 RX ADMIN — AMMONIUM LACTATE: 12 LOTION TOPICAL at 09:02

## 2025-02-17 RX ADMIN — AMPICILLIN SODIUM AND SULBACTAM SODIUM 3 G: 2; 1 INJECTION, POWDER, FOR SOLUTION INTRAMUSCULAR; INTRAVENOUS at 02:02

## 2025-02-17 RX ADMIN — PANTOPRAZOLE SODIUM 40 MG: 40 TABLET, DELAYED RELEASE ORAL at 05:02

## 2025-02-17 RX ADMIN — AMPICILLIN SODIUM AND SULBACTAM SODIUM 3 G: 2; 1 INJECTION, POWDER, FOR SOLUTION INTRAMUSCULAR; INTRAVENOUS at 09:02

## 2025-02-17 RX ADMIN — Medication 9 MG: at 07:02

## 2025-02-17 RX ADMIN — FERROUS SULFATE TAB 325 MG (65 MG ELEMENTAL FE) 1 EACH: 325 (65 FE) TAB at 04:02

## 2025-02-17 RX ADMIN — POTASSIUM BICARBONATE 50 MEQ: 978 TABLET, EFFERVESCENT ORAL at 09:02

## 2025-02-17 RX ADMIN — ACETAZOLAMIDE 250 MG: 250 TABLET ORAL at 08:02

## 2025-02-17 RX ADMIN — OXYCODONE HYDROCHLORIDE AND ACETAMINOPHEN 1 TABLET: 10; 325 TABLET ORAL at 08:02

## 2025-02-17 RX ADMIN — AMIODARONE HYDROCHLORIDE 200 MG: 200 TABLET ORAL at 09:02

## 2025-02-17 RX ADMIN — OXYCODONE HYDROCHLORIDE AND ACETAMINOPHEN 1 TABLET: 10; 325 TABLET ORAL at 02:02

## 2025-02-17 RX ADMIN — PHENAZOPYRIDINE HYDROCHLORIDE 100 MG: 100 TABLET ORAL at 04:02

## 2025-02-17 RX ADMIN — PHENAZOPYRIDINE HYDROCHLORIDE 100 MG: 100 TABLET ORAL at 09:02

## 2025-02-17 RX ADMIN — ENOXAPARIN SODIUM 100 MG: 100 INJECTION SUBCUTANEOUS at 05:02

## 2025-02-17 RX ADMIN — ALBUMIN (HUMAN) 12.5 G: 12.5 SOLUTION INTRAVENOUS at 04:02

## 2025-02-17 RX ADMIN — SPIRONOLACTONE 25 MG: 25 TABLET, FILM COATED ORAL at 09:02

## 2025-02-17 RX ADMIN — MUPIROCIN 1 G: 20 OINTMENT TOPICAL at 08:02

## 2025-02-17 RX ADMIN — TRAMADOL HYDROCHLORIDE 50 MG: 50 TABLET, COATED ORAL at 07:02

## 2025-02-17 RX ADMIN — AMMONIUM LACTATE: 12 LOTION TOPICAL at 08:02

## 2025-02-17 NOTE — NURSING TRANSFER
Nursing Transfer Note      2/17/2025   4:49 PM    Nurse giving handoff:Chio  Nurse receiving handoff:Lin    Reason patient is being transferred: return to room    Transfer To: 2503    Transfer via stretcher    Transfer with cardiac monitoring    Transported by Chio     Transfer Vital Signs:  Blood Pressure:  Heart Rate:  O2:  Temperature:  Respirations:    Telemetry: Box Number [id number]  Order for Tele Monitor?     Additional Lines:     Medicines sent: none    Any special needs or follow-up needed: assess right groin for swelling or bleeding    Patient belongings transferred with patient: Yes    Chart send with patient: Yes    Notified:     Patient reassessed at: 02/17/25 1600  Upon arrival to floor: cardiac monitor applied, patient oriented to room, call bell in reach, and bed in lowest position

## 2025-02-17 NOTE — SUBJECTIVE & OBJECTIVE
Interval History:  P patient seen and examined on morning multidisciplinary rounds.  Had episode of nausea and vomiting prior to my rounds.  Otherwise denied shortness a breath.  Plan for angiogram with vascular today.  Remains on dobutamine and Lasix drips  Review of Systems   Constitutional:  Positive for activity change.   Respiratory:  Negative for shortness of breath.    Cardiovascular:  Positive for leg swelling. Negative for chest pain and palpitations.   Gastrointestinal:  Positive for nausea and vomiting.   Skin:  Positive for wound.     Objective:     Vital Signs (Most Recent):  Temp: 97.7 °F (36.5 °C) (02/17/25 1118)  Pulse: 71 (02/17/25 1118)  Resp: 14 (02/17/25 1118)  BP: (!) 145/81 (02/17/25 1118)  SpO2: 95 % (02/17/25 1118) Vital Signs (24h Range):  Temp:  [97.5 °F (36.4 °C)-98.2 °F (36.8 °C)] 97.7 °F (36.5 °C)  Pulse:  [71-89] 71  Resp:  [14-18] 14  SpO2:  [92 %-96 %] 95 %  BP: (100-162)/(62-95) 145/81     Weight: 90.5 kg (199 lb 8.3 oz)  Body mass index is 27.83 kg/m².    Intake/Output Summary (Last 24 hours) at 2/17/2025 1316  Last data filed at 2/17/2025 1024  Gross per 24 hour   Intake 600 ml   Output 2225 ml   Net -1625 ml         Physical Exam  Vitals and nursing note reviewed.   Constitutional:       General: He is not in acute distress.  HENT:      Head: Normocephalic and atraumatic.      Right Ear: External ear normal.      Left Ear: External ear normal.      Nose: Nose normal.      Mouth/Throat:      Mouth: Mucous membranes are moist.   Eyes:      Extraocular Movements: Extraocular movements intact.      Conjunctiva/sclera: Conjunctivae normal.   Cardiovascular:      Rate and Rhythm: Normal rate and regular rhythm.      Pulses: Normal pulses.      Heart sounds: Normal heart sounds.   Pulmonary:      Effort: Pulmonary effort is normal.      Breath sounds: Rales present.      Comments: RA.  Abdominal:      General: Bowel sounds are normal. There is no distension.      Palpations: Abdomen is  soft.      Tenderness: There is no abdominal tenderness.   Genitourinary:     Comments: Meredith.  Musculoskeletal:      Cervical back: Normal range of motion and neck supple.      Right lower leg: Edema present.      Left lower leg: Edema present.   Skin:     General: Skin is warm and dry.      Comments: See pictures regarding wounds.  Postoperative dressing not removed   Neurological:      Mental Status: He is alert. Mental status is at baseline.   Psychiatric:         Behavior: Behavior normal.             Significant Labs: All pertinent labs within the past 24 hours have been reviewed.    Significant Imaging: I have reviewed all pertinent imaging results/findings within the past 24 hours.

## 2025-02-17 NOTE — PT/OT/SLP PROGRESS
Physical Therapy      Patient Name:  Juan C Sanchez   MRN:  [id number]    Patient not seen today secondary to Other (Comment) (angio today). Will follow-up 2/18/25.

## 2025-02-17 NOTE — PROGRESS NOTES
[facility]  Progress Note  PATIENT NAME: Juan C Sanchez  MRN: [id number]  TODAY'S DATE: 02/17/2025  ADMIT DATE: 2/11/2025    Patient's Chief Complaint:  Acute on chronic congestive heart failure    History of Present Illness:  Juan C Sanchez is a 69 y.o. male with a history of hypertension, mitral regurgitation, HFrEF (EF 25%), atrial fibrillation (on home amiodarone and Eliquis), PAD, anemia, obesity, and GERD who presented to the ED on 2/11/2025 with complaints of shortness of breath and bilateral lower extremity edema. He was admitted for an acute on chronic HFrEF exacerbation and afib with RVR. Cardiology has been consulted and he is currently being treated with Lasix 40mg IVP BID and metoprolol 25mg PO 4 times daily and IV PRN. Per the patient, he has been experiencing severe lower extremity edema for the past 3-4 months that has been worked up outpatient. The workup included a lower extremity CTA which illustrated right lower extremity with high-grade stenosis anterior tibial artery nonostial origin tapering to occlusion mid calf with posterior tibial artery dominance and small caliber peroneal artery to near the ankle. This was noticed during chart review at admit and vascular surgery was consulted.  The patient denies lower extremity pain, just tightness and itching secondary to the swelling. He reports that his leg swelling is slightly improved with leg elevation but quickly returns when they are dependent again. He denies use of compression stockings.     Last 24 hour interval:  2/17/2025           No issues overnight                           Past Medical History:   Diagnosis Date    Hypertension      Past Surgical History:   Procedure Laterality Date    ORIF closed comminuted displaced intra-articular fx distal left radius & application of external fixator Left 05/28/2018     Review of patient's allergies indicates:   Allergen Reactions    Gabapentin Other  (See Comments)     Hypersomnia, nightmares    Morphine Other (See Comments)     Pt states systemic cramping     No family history on file.  Social History     Tobacco Use    Smoking status: Never    Smokeless tobacco: Never   Substance Use Topics    Alcohol use: Yes     Comment: social    Drug use: No        Review of Systems:  Constitutional:  Negative for chills and fever.   Respiratory:  Positive for cough, sputum production and shortness of breath. Negative for wheezing.    Cardiovascular:  Positive for palpitations and leg swelling. Negative for chest pain.   Gastrointestinal:  Negative for diarrhea, nausea and vomiting.   Skin:  Positive for itching (LE itching).   Neurological:  Positive for weakness. Negative for dizziness and headaches.      OBJECTIVE   VITAL SIGNS (Most Recent)  Temp: 97.7 °F (36.5 °C) (02/17/25 1118)  Pulse: 71 (02/17/25 1118)  Resp: 14 (02/17/25 1118)  BP: (!) 145/81 (02/17/25 1118)  SpO2: 95 % (02/17/25 1118)    I & O (Last 24H):  Intake/Output Summary (Last 24 hours) at 2/17/2025 1132  Last data filed at 2/17/2025 1024  Gross per 24 hour   Intake 600 ml   Output 2225 ml   Net -1625 ml       PHYSICAL EXAM  Constitutional: Awake and oriented to person, place, and time. Appears well-developed and well-nourished. In no apparent distress.  HEENT: Normocephalic. Pupils normal and conjunctivae normal. Mucous membranes normal, no cyanosis or icterus, trachea central, no pallor or icterus is noted.  Neck: Normal range of motion. Neck supple. No JVD present. No bruit present.   Cardiovascular: Afib 100s-110s. Normal heart sounds. S1, S2.   Pulmonary/Chest: Effort normal. No respiratory distress. Dyspnea after repositioning in bed. Coarse breath sounds.   Abdominal: Obese. Soft. Bowel sounds are normal.   Urinary: No livingston catheter present  Skin: Skin is warm and dry. Bilateral lower extremity erythema calf down.  Extremities: Left foot dressing in place, wound between 3rd and 4th digits per  patient. Bilateral LE pitting edema +4.   Peripheral vascular system: Palpable bilateral DP pulses.     INPATIENT MEDS:    DOBUTamine IV infusion (non-titrating)  2.5 mcg/kg/min Intravenous Continuous 3.7 mL/hr at 02/16/25 1335 2.5 mcg/kg/min at 02/16/25 1335    furosemide (LASIX) 2 mg/mL continuous infusion (non-titrating)  2.5 mg/hr Intravenous Continuous 1.25 mL/hr at 02/16/25 2212 2.5 mg/hr at 02/16/25 2212      amiodarone  200 mg Oral BID    ammonium lactate   Topical (Top) BID    ampicillin-sulbactam  3 g Intravenous Q4H    enoxparin  1 mg/kg Subcutaneous Q12H    mupirocin   Nasal BID    pantoprazole  40 mg Oral Daily    phenazopyridine  100 mg Oral TID WM    spironolactone  25 mg Oral Daily       Current Facility-Administered Medications:     acetaminophen, 650 mg, Oral, Q4H PRN    aluminum-magnesium hydroxide-simethicone, 30 mL, Oral, QID PRN    magnesium oxide, 800 mg, Oral, PRN    magnesium oxide, 800 mg, Oral, PRN    melatonin, 9 mg, Oral, Nightly PRN    metoprolol, 5 mg, Intravenous, Q5 Min PRN    midodrine, 2.5 mg, Oral, TID PRN    naloxone, 0.02 mg, Intravenous, PRN    ondansetron, 4 mg, Intravenous, Q6H PRN    oxyCODONE-acetaminophen, 1 tablet, Oral, Q6H PRN    potassium bicarbonate, 35 mEq, Oral, PRN    potassium bicarbonate, 50 mEq, Oral, PRN    potassium bicarbonate, 60 mEq, Oral, PRN    potassium, sodium phosphates, 2 packet, Oral, PRN    potassium, sodium phosphates, 2 packet, Oral, PRN    potassium, sodium phosphates, 2 packet, Oral, PRN    senna-docusate 8.6-50 mg, 1 tablet, Oral, BID PRN    traMADoL, 50 mg, Oral, Q4H PRN    GI Prophylaxis:  PPI:proton pump inhibitor per orders  DVT Prophylaxis:  Anticoagulants   Medication Route Frequency    enoxaparin injection 100 mg Subcutaneous Q12H       CURRENT CONSULTS:  IP CONSULT TO SOCIAL WORK/CASE MANAGEMENT  IP CONSULT TO REGISTERED DIETITIAN/NUTRITIONIST  IP CONSULT TO CARDIOLOGY  WOUND CARE CONSULT  IP CONSULT TO VASCULAR SURGERY  IP CONSULT TO  "PODIATRY  IP CONSULT TO VASCULAR SURGERY  IP CONSULT TO INFECTIOUS DISEASES  IP CONSULT TO INFECTIOUS DISEASES  WOUND CARE CONSULT  IP CONSULT TO MIDLINE TEAM  WOUND CARE CONSULT  IP CONSULT TO PICC TEAM (NIAS)  IP CONSULT TO ANESTHESIOLOGY    LABS AND DIAGNOSTICS   CBC LAST 3 DAYS  Recent Labs   Lab 02/15/25  0418 02/16/25  0351 02/17/25 0449   WBC 7.42 7.40 7.56   HGB 10.2* 9.7* 9.5*   HCT 33.7* 32.2* 31.8*    273 272       COAGULATION LAST 3 DAYS  No results for input(s): "LABPT", "INR", "APTT" in the last 168 hours.    CHEMISTRY LAST 3 DAYS  Recent Labs   Lab 02/15/25  0418 02/16/25  0351 02/17/25  0449   * 132* 132*   K 4.8 4.0 3.8   CO2 34* 30* 34*   ANIONGAP 7* 10 6*   BUN 22 26* 27*   CREATININE 1.4 1.2 1.1   GLU 99 87 89   CALCIUM 9.0 8.7 8.8   MG 2.0 2.0 2.1   ALBUMIN 3.3* 3.3* 3.1*   PROT 7.6 7.3 7.2   ALKPHOS 80 75 80   ALT 6* 6* 5*   AST 12 12 12   BILITOT 1.1* 0.9 0.6       CARDIAC PROFILE LAST 3 DAYS  Recent Labs   Lab 02/11/25  1818   BNP 3,163*       MOST RECENT IMAGING  X-Ray Toe 2 or More Views Right  Narrative: EXAMINATION:  XR TOE 2 OR MORE VIEWS RIGHT    CLINICAL HISTORY:  Right 5th toe x-ray.  Follow up on MRI abnormality;    FINDINGS:  Three views of the right 5th toe show acute nondisplaced fracture through the base of the proximal phalanx, as seen on MRI.  No other fractures or destructive osseous lesions.  Impression: Please see findings.    Electronically signed by: Shemar Martines  Date:    02/17/2025  Time:    10:36    Results for orders placed or performed during the hospital encounter of 02/11/25 (from the past 2160 hours)   CT Abdomen Pelvis  Without Contrast    Narrative    EXAMINATION:  CT ABDOMEN PELVIS WITHOUT CONTRAST    CLINICAL HISTORY:  Abdominal pain, acute, nonlocalized;    TECHNIQUE:  Axial CT imaging obtained through the abdomen and pelvis without contrast, coronal and sagittal reformatted images    CMS Mandated Quality Data-CT Radiation Dose-436    All CT scans " at this facility dose modulation, iterative reconstruction, and or weight-based dosing when appropriate to reduce radiation dose to as low as reasonably achievable.    COMPARISON:  None    FINDINGS:  Imaging through the lower thorax shows interlobular septal thickening and small volume pleural fluid bilaterally, potentially on the basis of pulmonary edema.  There is diffuse body wall edema.  Bone window images show no acute or aggressive osseous abnormality.  Thoracolumbar degenerative changes and scoliosis.    Low-attenuation of blood pool relative to myocardium suggesting anemia.    On this noncontrast exam, no focal hepatic or splenic lesion.  Somewhat nodular hepatic contour raising the possibility of cirrhosis.  Cholelithiasis.  No intrahepatic or extrahepatic bile duct dilation.  Small volume perihepatic fluid and perisplenic fluid.  Mesenteric edema.  No focal pancreatic lesion.  No adrenal lesion.  Left upper pole renal cysts.  No renal calculi.  Ureters are normal in caliber.  Urinary bladder is unremarkable.    Stomach is unremarkable.  No evidence of small-bowel obstruction.  No evidence of colitis.  Distal colonic diverticula.    Aortoiliac atherosclerotic calcification.  Prominent retroperitoneal and mesenteric lymph nodes.  Prominent linn hepatis and portacaval lymph nodes.  Small volume free fluid along the pericolic gutters and in the pelvis.  Fluid in the left inguinal canal.      Impression    Small bilateral pleural effusions, pulmonary edema, as well as diffuse body wall/mesenteric edema.    Small volume free fluid in the abdomen and pelvis.    Nodular hepatic contour suggestive of cirrhosis.    Cholelithiasis.    Diverticulosis of the distal colon.    CT findings suggestive of anemia.    Atherosclerosis, fluid in the left inguinal canal, and other incidental findings as above.      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 Left leg spectral Doppler waveforms and color flow interrogation normal. Right leg spectral Doppler wave forms and color flow interrogation normal. Some mild atherosclerosis noted.   - CTA lower extremity 1/14/2025: Right lower extremity with high-grade stenosis anterior tibial artery nonostial origin tapering to occlusion mid calf with posterior tibial artery dominance and small caliber peroneal artery to near the ankle. Faint opacification of PTA at the ankle. Slow flow could be partial etiology of nonvisualization of distal vessels. Clinical correlation needed. Left lower extremity without significant stenosis with three-vessel opacification to the ankle.     - Compression stockings and leg elevation  - Consider outpatient venous insufficiency ultrasound. Unfortunately, this study cannot be completed while inpatient.  - Continue diuretics per cardiology   - Continue antibiotics per ID   - Palpable bilateral DP pulses  - Plan for OR Friday, February 14th for amputation of left 3rd toe with podiatry   - Per imaging and the presence of palpable pulse, Dr. Faith does believe that the patient has adequate blood flow to heal amputation but will perform a LLE angiogram on Monday to ensure considering the patient's history of PVD  - Plan for LLE angiogram  today    MD Vianney  [facility]   Date of Service: 02/17/2025

## 2025-02-17 NOTE — PROGRESS NOTES
[facility]  Progress Note  PATIENT NAME: Juan C Sanchez  MRN: [id number]  TODAY'S DATE: 02/17/2025  ADMIT DATE: 2/11/2025    Patient's Chief Complaint:  Acute on chronic congestive heart failure    History of Present Illness:  Juan C Sanchez is a 69 y.o. male with a history of hypertension, mitral regurgitation, HFrEF (EF 25%), atrial fibrillation (on home amiodarone and Eliquis), PAD, anemia, obesity, and GERD who presented to the ED on 2/11/2025 with complaints of shortness of breath and bilateral lower extremity edema. He was admitted for an acute on chronic HFrEF exacerbation and afib with RVR. Cardiology has been consulted and he is currently being treated with Lasix 40mg IVP BID and metoprolol 25mg PO 4 times daily and IV PRN. Per the patient, he has been experiencing severe lower extremity edema for the past 3-4 months that has been worked up outpatient. The workup included a lower extremity CTA which illustrated right lower extremity with high-grade stenosis anterior tibial artery nonostial origin tapering to occlusion mid calf with posterior tibial artery dominance and small caliber peroneal artery to near the ankle. This was noticed during chart review at admit and vascular surgery was consulted.  The patient denies lower extremity pain, just tightness and itching secondary to the swelling. He reports that his leg swelling is slightly improved with leg elevation but quickly returns when they are dependent again. He denies use of compression stockings.     Last 24 hour interval:  2/17/2025      NPO for LLE angiogram with Dr. Faith today. Reports he has been having LLE pain but that it is relieved by pain medication.                                   Past Medical History:   Diagnosis Date    Hypertension      Past Surgical History:   Procedure Laterality Date    ORIF closed comminuted displaced intra-articular fx distal left radius & application of external  fixator Left 05/28/2018     Review of patient's allergies indicates:   Allergen Reactions    Gabapentin Other (See Comments)     Hypersomnia, nightmares    Morphine Other (See Comments)     Pt states systemic cramping     No family history on file.  Social History     Tobacco Use    Smoking status: Never    Smokeless tobacco: Never   Substance Use Topics    Alcohol use: Yes     Comment: social    Drug use: No        Review of Systems:  Constitutional:  Negative for chills and fever.   Respiratory:  Positive for cough, sputum production and shortness of breath. Negative for wheezing.    Cardiovascular:  Positive for palpitations and leg swelling. Negative for chest pain.   Gastrointestinal:  Negative for diarrhea, nausea and vomiting.   Skin:  Positive for itching (LE itching).   Neurological:  Positive for weakness. Negative for dizziness and headaches.      OBJECTIVE   VITAL SIGNS (Most Recent)  Temp: 97.7 °F (36.5 °C) (02/17/25 1118)  Pulse: 71 (02/17/25 1118)  Resp: 14 (02/17/25 1118)  BP: (!) 145/81 (02/17/25 1118)  SpO2: 95 % (02/17/25 1118)    I & O (Last 24H):  Intake/Output Summary (Last 24 hours) at 2/17/2025 1129  Last data filed at 2/17/2025 1024  Gross per 24 hour   Intake 1200 ml   Output 2225 ml   Net -1025 ml       PHYSICAL EXAM  Constitutional: Awake and oriented to person, place, and time. Appears well-developed and well-nourished. In no apparent distress.  HEENT: Normocephalic. Pupils normal and conjunctivae normal. Mucous membranes normal, no cyanosis or icterus, trachea central, no pallor or icterus is noted.  Neck: Normal range of motion. Neck supple. No JVD present. No bruit present.   Cardiovascular: Afib. Normal heart sounds. S1, S2.   Pulmonary/Chest: Effort normal. No respiratory distress. Dyspnea after repositioning in bed. Coarse breath sounds.   Abdominal: Obese. Soft. Bowel sounds are normal.   Urinary: No livingston catheter present  Skin: Skin is warm and dry. Bilateral lower extremity  erythema calf down.  Extremities: Left foot dressing in place. Bilateral LE pitting edema  Peripheral vascular system: Palpable bilateral DP pulses.     INPATIENT MEDS:    DOBUTamine IV infusion (non-titrating)  2.5 mcg/kg/min Intravenous Continuous 3.7 mL/hr at 02/16/25 1335 2.5 mcg/kg/min at 02/16/25 1335    furosemide (LASIX) 2 mg/mL continuous infusion (non-titrating)  2.5 mg/hr Intravenous Continuous 1.25 mL/hr at 02/16/25 2212 2.5 mg/hr at 02/16/25 2212      amiodarone  200 mg Oral BID    ammonium lactate   Topical (Top) BID    ampicillin-sulbactam  3 g Intravenous Q4H    enoxparin  1 mg/kg Subcutaneous Q12H    mupirocin   Nasal BID    pantoprazole  40 mg Oral Daily    phenazopyridine  100 mg Oral TID WM    spironolactone  25 mg Oral Daily       Current Facility-Administered Medications:     acetaminophen, 650 mg, Oral, Q4H PRN    aluminum-magnesium hydroxide-simethicone, 30 mL, Oral, QID PRN    magnesium oxide, 800 mg, Oral, PRN    magnesium oxide, 800 mg, Oral, PRN    melatonin, 9 mg, Oral, Nightly PRN    metoprolol, 5 mg, Intravenous, Q5 Min PRN    midodrine, 2.5 mg, Oral, TID PRN    naloxone, 0.02 mg, Intravenous, PRN    ondansetron, 4 mg, Intravenous, Q6H PRN    oxyCODONE-acetaminophen, 1 tablet, Oral, Q6H PRN    potassium bicarbonate, 35 mEq, Oral, PRN    potassium bicarbonate, 50 mEq, Oral, PRN    potassium bicarbonate, 60 mEq, Oral, PRN    potassium, sodium phosphates, 2 packet, Oral, PRN    potassium, sodium phosphates, 2 packet, Oral, PRN    potassium, sodium phosphates, 2 packet, Oral, PRN    senna-docusate 8.6-50 mg, 1 tablet, Oral, BID PRN    traMADoL, 50 mg, Oral, Q4H PRN    GI Prophylaxis:  PPI:proton pump inhibitor per orders  DVT Prophylaxis:  Anticoagulants   Medication Route Frequency    enoxaparin injection 100 mg Subcutaneous Q12H       CURRENT CONSULTS:  IP CONSULT TO SOCIAL WORK/CASE MANAGEMENT  IP CONSULT TO REGISTERED DIETITIAN/NUTRITIONIST  IP CONSULT TO CARDIOLOGY  WOUND CARE  "CONSULT  IP CONSULT TO VASCULAR SURGERY  IP CONSULT TO PODIATRY  IP CONSULT TO VASCULAR SURGERY  IP CONSULT TO INFECTIOUS DISEASES  IP CONSULT TO INFECTIOUS DISEASES  WOUND CARE CONSULT  IP CONSULT TO MIDLINE TEAM  WOUND CARE CONSULT  IP CONSULT TO PICC TEAM (NIAS)  IP CONSULT TO ANESTHESIOLOGY    LABS AND DIAGNOSTICS   CBC LAST 3 DAYS  Recent Labs   Lab 02/15/25  0418 02/16/25  0351 02/17/25  0449   WBC 7.42 7.40 7.56   HGB 10.2* 9.7* 9.5*   HCT 33.7* 32.2* 31.8*    273 272       COAGULATION LAST 3 DAYS  No results for input(s): "LABPT", "INR", "APTT" in the last 168 hours.    CHEMISTRY LAST 3 DAYS  Recent Labs   Lab 02/15/25  0418 02/16/25  0351 02/17/25  0449   * 132* 132*   K 4.8 4.0 3.8   CO2 34* 30* 34*   ANIONGAP 7* 10 6*   BUN 22 26* 27*   CREATININE 1.4 1.2 1.1   GLU 99 87 89   CALCIUM 9.0 8.7 8.8   MG 2.0 2.0 2.1   ALBUMIN 3.3* 3.3* 3.1*   PROT 7.6 7.3 7.2   ALKPHOS 80 75 80   ALT 6* 6* 5*   AST 12 12 12   BILITOT 1.1* 0.9 0.6       CARDIAC PROFILE LAST 3 DAYS  Recent Labs   Lab 02/11/25  1818   BNP 3,163*       MOST RECENT IMAGING  X-Ray Toe 2 or More Views Right  Narrative: EXAMINATION:  XR TOE 2 OR MORE VIEWS RIGHT    CLINICAL HISTORY:  Right 5th toe x-ray.  Follow up on MRI abnormality;    FINDINGS:  Three views of the right 5th toe show acute nondisplaced fracture through the base of the proximal phalanx, as seen on MRI.  No other fractures or destructive osseous lesions.  Impression: Please see findings.    Electronically signed by: Shemar Martines  Date:    02/17/2025  Time:    10:36    Results for orders placed or performed during the hospital encounter of 02/11/25 (from the past 2160 hours)   CT Abdomen Pelvis  Without Contrast    Narrative    EXAMINATION:  CT ABDOMEN PELVIS WITHOUT CONTRAST    CLINICAL HISTORY:  Abdominal pain, acute, nonlocalized;    TECHNIQUE:  Axial CT imaging obtained through the abdomen and pelvis without contrast, coronal and sagittal reformatted images    CMS " Mandated Quality Data-CT Radiation Dose-436    All CT scans at this facility dose modulation, iterative reconstruction, and or weight-based dosing when appropriate to reduce radiation dose to as low as reasonably achievable.    COMPARISON:  None    FINDINGS:  Imaging through the lower thorax shows interlobular septal thickening and small volume pleural fluid bilaterally, potentially on the basis of pulmonary edema.  There is diffuse body wall edema.  Bone window images show no acute or aggressive osseous abnormality.  Thoracolumbar degenerative changes and scoliosis.    Low-attenuation of blood pool relative to myocardium suggesting anemia.    On this noncontrast exam, no focal hepatic or splenic lesion.  Somewhat nodular hepatic contour raising the possibility of cirrhosis.  Cholelithiasis.  No intrahepatic or extrahepatic bile duct dilation.  Small volume perihepatic fluid and perisplenic fluid.  Mesenteric edema.  No focal pancreatic lesion.  No adrenal lesion.  Left upper pole renal cysts.  No renal calculi.  Ureters are normal in caliber.  Urinary bladder is unremarkable.    Stomach is unremarkable.  No evidence of small-bowel obstruction.  No evidence of colitis.  Distal colonic diverticula.    Aortoiliac atherosclerotic calcification.  Prominent retroperitoneal and mesenteric lymph nodes.  Prominent linn hepatis and portacaval lymph nodes.  Small volume free fluid along the pericolic gutters and in the pelvis.  Fluid in the left inguinal canal.      Impression    Small bilateral pleural effusions, pulmonary edema, as well as diffuse body wall/mesenteric edema.    Small volume free fluid in the abdomen and pelvis.    Nodular hepatic contour suggestive of cirrhosis.    Cholelithiasis.    Diverticulosis of the distal colon.    CT findings suggestive of anemia.    Atherosclerosis, fluid in the left inguinal canal, and other incidental findings as above.      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 Left leg spectral Doppler waveforms and color flow interrogation normal. Right leg spectral Doppler wave forms and color flow interrogation normal. Some mild atherosclerosis noted.   - CTA lower extremity 1/14/2025: Right lower extremity with high-grade stenosis anterior tibial artery nonostial origin tapering to occlusion mid calf with posterior tibial artery dominance and small caliber peroneal artery to near the ankle. Faint opacification of PTA at the ankle. Slow flow could be partial etiology of nonvisualization of distal vessels. Clinical correlation needed. Left lower extremity without significant stenosis with three-vessel opacification to the ankle.     - Compression stockings and leg elevation  - Consider outpatient venous insufficiency ultrasound. Unfortunately, this study cannot be completed while inpatient.  - Continue diuretics per cardiology   - Continue antibiotics per ID   - Palpable bilateral DP pulses  - OR on 2/14/2025 for amputation of left 3rd toe with podiatry   - Plan for LLE angiogram today with Dr. Faith to ensure patient has adequate blood flow to heal amputation. Has been NPO after midnight.     Case and plan of care discussed with MD. Additional  recommendations from Dr. Faith to follow.     Vianca Medina NP  [facility]   Date of Service: 02/17/2025

## 2025-02-17 NOTE — PROGRESS NOTES
[facility]  Progress Note    Patient Name: Juan C Sanchez  MRN: [id number]  Patient Class: IP- Inpatient   Admission Date: 2/11/2025  Length of Stay: 6 days  Attending Physician: Livia Alexandre MD  Primary Care Provider: Katlin Graf NP        Subjective     Principal Problem:Acute on chronic congestive heart failure        HPI:  Mr. Sanchez is a 69 yr old male with a hx of HTN, MR, PAF, chronic systolic CHF with EF of 25, OA, GERD, nonischemic cardiomyopathy, anemia who presented to the ED with a chief complaint of shortness of breath, leg swelling and hypotension.  Patient states that he had PT come to his home today and he was found to be hypotensive and advised to come to the emergency room.  Patient endorses shortness of breath as well as leg swelling, malaise, fatigue, occasional chills, productive cough with milky sputum, upper abdominal pain, occasional nausea, decreased urine output, and positional lightheadedness.  He states that these symptoms have been progressively worsening over the last 1-2 months possibly even longer  He states that his symptoms are worse when lying flat as well as with exertion and better with rest and while sitting up.  He states that he has been taking his home medications without improvement of his symptoms.  He does endorse that most of all of his contacts recently have been sick. He denies any home oxygen use.  He does endorse occasional PND and states that he has been having to sit to sleep.  He denies tobacco and recreational drug use and states he seldomly drinks alcohol.  He denies fever, chest pain, vomiting, diarrhea, dysuria, hematuria, dizziness, and syncope.    Upon arrival to ED, patient afebrile, HR of 109, RR of 24, BP of 95/62, satting 98% on RA.  Workup in the ED revealed RBC of 4.19, H/H of 10/32.8, sodium of 134, pCO2 of 31, GFR of 55.1, corrected calcium of 9, albumin of 3.1, BNP of 3163, troponin of 21.3, lactic acid  of 2.1.  Blood cultures pending.  CXR shows enlarged cardiac silhouette with pulmonary vascular congestion.  Bilateral pleural effusions.  Mild bibasilar airspace disease and or atelectasis.  Left foot x-ray shows osteopenia.  No overt erosions or osseous destruction.  No acute fracture or dislocation.  Mild multifocal midfoot arthritis.  Forefoot swelling.  Patient was given Lasix 20 mg IV, Zosyn 4.5 g IV, 500 mL NS bolus, vancomycin 20 mg/kg while in the ED. discussed case with ED provider and patient will be admitted under hospital medicine services for further management.    Overview/Hospital Course:  Juan C Sanchez is a 69 year old male with a past medical history of HFrEF, Afib, PAD, anemia, obesity, and GERD who presented with an acute on chronic HFrEF (EF 20-25%) exacerbation as well as Afib with RVR. He is being diuresed with a Lasix infusion as well as dobutamine. Cardiology has been consulted.  Underwent ANGEL cardioversion 2/14. His course was also complicated by a third L toe acute osteomyelitis seen on MRI. Podiatry and ID have been consulted.  He underwent L 3rd toe amputation 2/14. He is on empiric vancomycin and cefepime. Vascular Surgery has been consulted as well and will plan for angiogram of the lower extremities 2/17.    Interval History:  P patient seen and examined on morning multidisciplinary rounds.  Had episode of nausea and vomiting prior to my rounds.  Otherwise denied shortness a breath.  Plan for angiogram with vascular today.  Remains on dobutamine and Lasix drips  Review of Systems   Constitutional:  Positive for activity change.   Respiratory:  Negative for shortness of breath.    Cardiovascular:  Positive for leg swelling. Negative for chest pain and palpitations.   Gastrointestinal:  Positive for nausea and vomiting.   Skin:  Positive for wound.     Objective:     Vital Signs (Most Recent):  Temp: 97.7 °F (36.5 °C) (02/17/25 1118)  Pulse: 71 (02/17/25 1118)  Resp: 14 (02/17/25  1118)  BP: (!) 145/81 (02/17/25 1118)  SpO2: 95 % (02/17/25 1118) Vital Signs (24h Range):  Temp:  [97.5 °F (36.4 °C)-98.2 °F (36.8 °C)] 97.7 °F (36.5 °C)  Pulse:  [71-89] 71  Resp:  [14-18] 14  SpO2:  [92 %-96 %] 95 %  BP: (100-162)/(62-95) 145/81     Weight: 90.5 kg (199 lb 8.3 oz)  Body mass index is 27.83 kg/m².    Intake/Output Summary (Last 24 hours) at 2/17/2025 1316  Last data filed at 2/17/2025 1024  Gross per 24 hour   Intake 600 ml   Output 2225 ml   Net -1625 ml         Physical Exam  Vitals and nursing note reviewed.   Constitutional:       General: He is not in acute distress.  HENT:      Head: Normocephalic and atraumatic.      Right Ear: External ear normal.      Left Ear: External ear normal.      Nose: Nose normal.      Mouth/Throat:      Mouth: Mucous membranes are moist.   Eyes:      Extraocular Movements: Extraocular movements intact.      Conjunctiva/sclera: Conjunctivae normal.   Cardiovascular:      Rate and Rhythm: Normal rate and regular rhythm.      Pulses: Normal pulses.      Heart sounds: Normal heart sounds.   Pulmonary:      Effort: Pulmonary effort is normal.      Breath sounds: Rales present.      Comments: RA.  Abdominal:      General: Bowel sounds are normal. There is no distension.      Palpations: Abdomen is soft.      Tenderness: There is no abdominal tenderness.   Genitourinary:     Comments: Meredith.  Musculoskeletal:      Cervical back: Normal range of motion and neck supple.      Right lower leg: Edema present.      Left lower leg: Edema present.   Skin:     General: Skin is warm and dry.      Comments: See pictures regarding wounds.  Postoperative dressing not removed   Neurological:      Mental Status: He is alert. Mental status is at baseline.   Psychiatric:         Behavior: Behavior normal.             Significant Labs: All pertinent labs within the past 24 hours have been reviewed.    Significant Imaging: I have reviewed all pertinent imaging results/findings within the  past 24 hours.    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 L third toe.  -ID consulted  -Podiatry consulted  -Empiric vancomycin and cefepime  -status post third toe amputation (L) 2/14  -bone culture with a presumptive Proteus and Enterococcus      PAD (peripheral artery disease)  -No intervention indicated per Vascular Surgery at this time  -Follow up angiogram 2/17      Anemia  Ferritin at lower limit of normal.  -Trend Hgb with CBC    Paroxysmal atrial fibrillation  -Telemetry  -Lovenox  -Hold metoprolol while on dobutamine  -Continue home amiodarone  -ANGEL cardioversion per Cardiology 2/14    MR (mitral regurgitation)  Seen on TTE at OSH.  -Cardiology consulted      Hypertension  -Home medications held  -Continue to monitor      GERD (gastroesophageal reflux disease)  -PPI        VTE Risk Mitigation (From admission, onward)           Ordered     enoxaparin injection 100 mg  Every 12 hours (non-standard times)         02/12/25 1622     IP VTE HIGH RISK PATIENT  Once         02/11/25 2331     Place sequential compression device  Until discontinued         02/11/25 2331                    Discharge Planning   PHILIP: 2/19/2025     Code Status: Full Code   Medical Readiness for Discharge Date:   Discharge Plan A: Skilled Nursing Facility   Discharge Delays: None known at this time            Please place Justification for DME        Livia Alexandre MD  Department of Hospital Medicine   [facility]

## 2025-02-17 NOTE — PROGRESS NOTES
[facility]  Department of Cardiology  Progress Note      PATIENT NAME: Juan C Sanchez  MRN: [id number]  TODAY'S DATE: 02/17/2025  ADMIT DATE: 2/11/2025                          CONSULT REQUESTED BY: Livia Alexandre MD    SUBJECTIVE     PRINCIPAL PROBLEM: Acute on chronic congestive heart failure      02/17/2025  Denies chest pain or shortness of breath. Reports taking lasix at home but states it was not working. Johnny compression sock use. Does not wear home O2. Denies any hx of smoking.  On lasix gtt and dobutamine. Net negative 16.7L since admit, out ~1.4 L overnight.  sCr 1.1  Underwent LLE angiogram with vascular surgery today.    02/16/2025  Patient seen resting in bed. States he is not feeling as well today and has had more pain in his foot from toe amputation. He is not sleeping as well and is tired today. Overall he is still optimistic and his breathing is good.     2/15/25  Patient seen resting in bed with no distress noted. Breathing stable.     2/14/25  Patient seen resting in bed with no distress noted. Breathing is stable. S/p ANGEL/CV today.     2/13/25  Patient seen sitting up in bed with no acute distress noted.  Denies any chest discomfort.  Breathing is stable.  He has been diuresing very well, and his weight is down significantly.    REASON FOR CONSULT:  CHF exacerbation     HPI:  Mr. Sanchez is a 69 yr old male with pmh of known HFrEF, HTN, mitral regurg, OA, GERD, and anemia who originally come into the ER yesterday evening after home PT encouraged him to come be evaluated after noticing some worsening swelling in BLE, hypotension, and generalized weakness. It appears he has been in and out of the ER about 8x since the start of this new year. BNP 3163. Trops 21.3 then 16.8. Most recent ECHO uploaded in care everywhere shows EF 25%. Pt also states he had a recent angiogram done in [place] (the last dated one I could see was 2022) showed trivial nonobstructive CAD. He states  "he follows with Dr. Alegria but has not been seen "in a while." He states compliance with medications but feels PO lasix is not working for him.     Per hospital medicine notes:  Mr. Sanchez is a 69 yr old male with a hx of HTN, MR, PAF, chronic systolic CHF with EF of 25, OA, GERD, nonischemic cardiomyopathy, anemia who presented to the ED with a chief complaint of shortness of breath, leg swelling and hypotension.  Patient states that he had PT come to his home today and he was found to be hypotensive and advised to come to the emergency room.  Patient endorses shortness of breath as well as leg swelling, malaise, fatigue, occasional chills, productive cough with milky sputum, upper abdominal pain, occasional nausea, decreased urine output, and positional lightheadedness.  He states that these symptoms have been progressively worsening over the last 1-2 months possibly even longer  He states that his symptoms are worse when lying flat as well as with exertion and better with rest and while sitting up.  He states that he has been taking his home medications without improvement of his symptoms.  He does endorse that most of all of his contacts recently have been sick. He denies any home oxygen use.  He does endorse occasional PND and states that he has been having to sit to sleep.  He denies tobacco and recreational drug use and states he seldomly drinks alcohol.  He denies fever, chest pain, vomiting, diarrhea, dysuria, hematuria, dizziness, and syncope.     Upon arrival to ED, patient afebrile, HR of 109, RR of 24, BP of 95/62, satting 98% on RA.  Workup in the ED revealed RBC of 4.19, H/H of 10/32.8, sodium of 134, pCO2 of 31, GFR of 55.1, corrected calcium of 9, albumin of 3.1, BNP of 3163, troponin of 21.3, lactic acid of 2.1.  Blood cultures pending.  CXR shows enlarged cardiac silhouette with pulmonary vascular congestion.  Bilateral pleural effusions.  Mild bibasilar airspace disease and or atelectasis.  " Left foot x-ray shows osteopenia.  No overt erosions or osseous destruction.  No acute fracture or dislocation.  Mild multifocal midfoot arthritis.  Forefoot swelling.  Patient was given Lasix 20 mg IV, Zosyn 4.5 g IV, 500 mL NS bolus, vancomycin 20 mg/kg while in the ED. discussed case with ED provider and patient will be admitted under hospital medicine services for further management.        Review of patient's allergies indicates:   Allergen Reactions    Gabapentin Other (See Comments)     Hypersomnia, nightmares    Morphine Other (See Comments)     Pt states systemic cramping       Past Medical History:   Diagnosis Date    Hypertension      Past Surgical History:   Procedure Laterality Date    ORIF closed comminuted displaced intra-articular fx distal left radius & application of external fixator Left 05/28/2018     Social History     Tobacco Use    Smoking status: Never    Smokeless tobacco: Never   Substance Use Topics    Alcohol use: Yes     Comment: social    Drug use: No        REVIEW OF SYSTEMS    As mentioned in HPI    OBJECTIVE     VITAL SIGNS (Most Recent)  Temp: 97.7 °F (36.5 °C) (02/17/25 1118)  Pulse: 86 (02/17/25 1530)  Resp: 18 (02/17/25 1530)  BP: 100/79 (02/17/25 1530)  SpO2: (!) 92 % (02/17/25 1530)    VENTILATION STATUS  Resp: 18 (02/17/25 1530)  SpO2: (!) 92 % (02/17/25 1530)           I & O (Last 24H):  Intake/Output Summary (Last 24 hours) at 2/17/2025 1543  Last data filed at 2/17/2025 1024  Gross per 24 hour   Intake 600 ml   Output 2225 ml   Net -1625 ml       WEIGHTS  Wt Readings from Last 3 Encounters:   02/16/25 0453 90.5 kg (199 lb 8.3 oz)   02/15/25 0339 92.5 kg (203 lb 14.8 oz)   02/14/25 0732 87.5 kg (192 lb 14.4 oz)   02/14/25 0500 94.1 kg (207 lb 7.3 oz)   02/13/25 0604 93.9 kg (207 lb 0.2 oz)   02/12/25 0050 98.6 kg (217 lb 6 oz)   02/11/25 1727 92.5 kg (204 lb)   02/14/25 0940 87.5 kg (192 lb 14.4 oz)   10/23/18 1426 92.5 kg (204 lb)       PHYSICAL EXAM    CONSTITUTIONAL: NAD,  seen s/p LLE angiogram  HEENT: Normocephalic. No pallor  NECK: no JVD  LUNGS: faint bibasilar crackles, normal WOB. On 3 L NC  HEART: regular rate and irregular rhythm, PVCs correlating on bedside telemetry, S1, S2 normal, no murmur   ABDOMEN: soft, mild tenderness+, bowel sounds normal  EXTREMITIES: +2 BLE edema, LLE with dressing intact, minimal blood noted  SKIN: No rash  NEURO: AAO X 3  PSYCH: normal affect     HOME MEDICATIONS:  No current facility-administered medications on file prior to encounter.     Current Outpatient Medications on File Prior to Encounter   Medication Sig Dispense Refill    acetaminophen (TYLENOL EXTRA STRENGTH) 500 MG tablet Take 1,000 mg by mouth every 6 (six) hours as needed for Pain.      amiodarone (PACERONE) 200 MG Tab Take 200 mg by mouth 2 (two) times daily.      doxycycline (MONODOX) 100 MG capsule Take 100 mg by mouth 2 (two) times daily.      empagliflozin (JARDIANCE) 10 mg tablet Take 10 mg by mouth once daily.      ENTRESTO 24-26 mg per tablet Take 1 tablet by mouth 2 (two) times daily.      furosemide (LASIX) 20 MG tablet Take 40 mg by mouth 0900, 1800.      gabapentin (NEURONTIN) 300 MG capsule Take 300 mg by mouth 2 (two) times daily.      metoprolol tartrate (LOPRESSOR) 25 MG tablet Take 25 mg by mouth 2 (two) times daily.      nitroGLYCERIN (NITROSTAT) 0.4 MG SL tablet Place 0.4 mg under the tongue every 5 (five) minutes as needed for Chest pain.      oxyCODONE-acetaminophen (PERCOCET)  mg per tablet Take 1 tablet by mouth every 8 (eight) hours as needed for Pain.      pantoprazole (PROTONIX) 40 MG tablet Take 40 mg by mouth once daily.      carvediloL (COREG) 3.125 MG tablet Take 3.125 mg by mouth 2 (two) times daily. (Patient not taking: Reported on 2/11/2025)         SCHEDULED MEDS:   amiodarone  200 mg Oral BID    ammonium lactate   Topical (Top) BID    ampicillin-sulbactam  3 g Intravenous Q4H    enoxparin  1 mg/kg Subcutaneous Q12H    mupirocin   Nasal BID     oxyCODONE-acetaminophen        pantoprazole  40 mg Oral Daily    phenazopyridine  100 mg Oral TID WM    spironolactone  25 mg Oral Daily    tamsulosin  0.4 mg Oral Daily       CONTINUOUS INFUSIONS:   DOBUTamine IV infusion (non-titrating)  2.5 mcg/kg/min Intravenous Continuous 3.7 mL/hr at 02/16/25 1335 2.5 mcg/kg/min at 02/16/25 1335    furosemide (LASIX) 2 mg/mL continuous infusion (non-titrating)  2.5 mg/hr Intravenous Continuous 1.25 mL/hr at 02/16/25 2212 2.5 mg/hr at 02/16/25 2212       PRN MEDS:  Current Facility-Administered Medications:     acetaminophen, 650 mg, Oral, Q4H PRN    aluminum-magnesium hydroxide-simethicone, 30 mL, Oral, QID PRN    fentaNYL, , , PRN    iodixanoL, , , PRN    LIDOcaine HCL 10 mg/ml (1%), , , PRN    magnesium oxide, 800 mg, Oral, PRN    magnesium oxide, 800 mg, Oral, PRN    melatonin, 9 mg, Oral, Nightly PRN    metoprolol, 5 mg, Intravenous, Q5 Min PRN    midazolam, , , PRN    midodrine, 2.5 mg, Oral, TID PRN    naloxone, 0.02 mg, Intravenous, PRN    ondansetron, 4 mg, Intravenous, Q6H PRN    ondansetron, , , PRN    oxyCODONE-acetaminophen, , ,     oxyCODONE-acetaminophen, 1 tablet, Oral, Q6H PRN    potassium bicarbonate, 35 mEq, Oral, PRN    potassium bicarbonate, 50 mEq, Oral, PRN    potassium bicarbonate, 60 mEq, Oral, PRN    potassium, sodium phosphates, 2 packet, Oral, PRN    potassium, sodium phosphates, 2 packet, Oral, PRN    potassium, sodium phosphates, 2 packet, Oral, PRN    senna-docusate 8.6-50 mg, 1 tablet, Oral, BID PRN    traMADoL, 50 mg, Oral, Q4H PRN    LABS AND DIAGNOSTICS     CBC LAST 3 DAYS  Recent Labs   Lab 02/15/25  0418 02/16/25  0351 02/17/25  0449   WBC 7.42 7.40 7.56   RBC 4.30* 4.18* 4.14*   HGB 10.2* 9.7* 9.5*   HCT 33.7* 32.2* 31.8*   MCV 78* 77* 77*   MCH 23.7* 23.2* 22.9*   MCHC 30.3* 30.1* 29.9*   RDW 18.4* 18.1* 18.0*    273 272   MPV 9.9 10.3 10.1   GRAN 63.6  4.7 61.8  4.6 63.8  4.8   LYMPH 20.2  1.5 19.6  1.5 16.3*  1.2   MONO 12.5  " 0.9 13.9  1.0 14.7  1.1*   BASO 0.05 0.05 0.04   NRBC 0 0 0       COAGULATION LAST 3 DAYS  No results for input(s): "LABPT", "INR", "APTT" in the last 168 hours.    CHEMISTRY LAST 3 DAYS  Recent Labs   Lab 02/15/25  0418 02/16/25  0351 02/17/25  0449   * 132* 132*   K 4.8 4.0 3.8   CL 93* 92* 92*   CO2 34* 30* 34*   ANIONGAP 7* 10 6*   BUN 22 26* 27*   CREATININE 1.4 1.2 1.1   GLU 99 87 89   CALCIUM 9.0 8.7 8.8   MG 2.0 2.0 2.1   ALBUMIN 3.3* 3.3* 3.1*   PROT 7.6 7.3 7.2   ALKPHOS 80 75 80   ALT 6* 6* 5*   AST 12 12 12   BILITOT 1.1* 0.9 0.6       CARDIAC PROFILE LAST 3 DAYS  Recent Labs   Lab 02/11/25  1818 02/12/25  0158   BNP 3,163*  --    TROPONINIHS 21.3* 16.8*       ENDOCRINE LAST 3 DAYS  Recent Labs   Lab 02/12/25  0158 02/13/25  1041   TSH 7.487*  --    PROCAL  --  <0.050       LAST ARTERIAL BLOOD GAS  ABG  No results for input(s): "PH", "PO2", "PCO2", "HCO3", "BE" in the last 168 hours.    LAST 7 DAYS MICROBIOLOGY   Microbiology Results (last 7 days)       Procedure Component Value Units Date/Time    Blood culture x two cultures. Draw prior to antibiotics. [[id number]] Collected: 02/11/25 1749    Order Status: Completed Specimen: Blood from Peripheral, Antecubital, Right Updated: 02/16/25 2032     Blood Culture, Routine No growth after 5 days.    Narrative:      Aerobic and anaerobic    Blood culture x two cultures. Draw prior to antibiotics. [[id number]] Collected: 02/11/25 1744    Order Status: Completed Specimen: Blood from Peripheral, Antecubital, Left Updated: 02/16/25 2032     Blood Culture, Routine No growth after 5 days.    Narrative:      Aerobic and anaerobic    Tissue culture [[id number]]  (Abnormal)  (Susceptibility) Collected: 02/13/25 0748    Order Status: Completed Specimen: Bone from Toe, Left Foot Updated: 02/16/25 0840     Aerobic Culture - Tissue PROTEUS MIRABILIS  From broth only        ENTEROCOCCUS FAECALIS  From broth only       Gram Stain Result No WBC's      Rare Gram " positive cocci    Narrative:      Toe, Left Foot    Culture, Anaerobic [[id number]] Collected: 02/13/25 0748    Order Status: Completed Specimen: Bone from Toe, Left Foot Updated: 02/15/25 0933     Anaerobic Culture No anaerobes isolated    Narrative:      Toe, Left Foot    MRSA Screen by PCR [[id number]]  (Abnormal) Collected: 02/13/25 1054    Order Status: Completed Specimen: Nasal Swab Updated: 02/13/25 1410     MRSA SCREEN BY PCR Detected     Comment: MRSA  critical result(s) called and verbal readback obtained from   Luanne Stanley RN on [facility] B by [facility] 02/13/2025 14:05         Narrative:       MRSA  critical result(s) called and verbal readback obtained from   Luanne Stanley RN on [facility] by [facility] 02/13/2025 14:05    Aerobic culture [[id number]] Collected: 02/13/25 0748    Order Status: Canceled Specimen: Bone from Toe, Left Foot             MOST RECENT IMAGING  Cardiac catheterization  Procedure performed in the Invasive Lab    - See Procedure Log link below for nursing documentation    - See OpNote on Surgeries Tab for physician findings    - See Imaging Tab for radiologist dictation  X-Ray Toe 2 or More Views Right  Narrative: EXAMINATION:  XR TOE 2 OR MORE VIEWS RIGHT    CLINICAL HISTORY:  Right 5th toe x-ray.  Follow up on MRI abnormality;    FINDINGS:  Three views of the right 5th toe show acute nondisplaced fracture through the base of the proximal phalanx, as seen on MRI.  No other fractures or destructive osseous lesions.  Impression: Please see findings.    Electronically signed by: Shemar Martines  Date:    02/17/2025  Time:    10:36      ECHOCARDIOGRAM RESULTS (last 5)  No results found for this or any previous visit.    Echo done locally    Impressions   -----------     1.The estimated LV ejection fraction is 25 %     2.There is moderate to severe global hypokinesis     3.Diastolic function is indeterminate.     4.There is mild aortic valve sclerosis without significant stenosis     5.There is severe  mitral regurgitation     6.Estimated RVSP systolic pressure is 37.13 mmHg     7.Compared to the report from prior study dated 04/04/2022, the   severity of mitral regurgitation has increased     Findings   --------     Left Ventricle   The estimated LV ejection fraction is 25 %. The calculated LV ejection   fraction (MOD, Biplane) is 29.99 %. LV chamber is mildly dilated.   There is mild concentric LV hypertrophy. LV systolic function is   normal. There is moderate to severe global hypokinesis. Diastolic   function is indeterminate.     Right Ventricle   RV size is moderately dilated. The RV systolic function is normal.     Septum   There is no atrial septal defect (ASD). There is no ventricular septal   defect (VSD).     Left Atrium   LA chamber size is normal. LA diameter is 3.81 cm.     Right Atrium   RA chamber size is normal.     Aortic Valve   There is a trileaflet aortic valve. There is mild aortic valve   sclerosis without significant stenosis. There is no aortic   regurgitation. Aortic valve area by VMax: 1.02 cm2. Aortic valve area   by VTI: 0.93 cm2. AV Mean gradient: 7.37 mmHg. AV Peak gradient: 13.48   mmHg.     Mitral Valve   Mitral valve is not well visualized. There is severe mitral   regurgitation. There is no mitral stenosis.     Tricuspid Valve   Tricuspid valve is structurally normal. There is mild tricuspid   regurgitation. The estimated RV systolic pressure is normal. Estimated   RVSP systolic pressure is 37.13 mmHg. There is no tricuspid valve   stenosis.     Pulmonary Valve   Pulmonic valve is not well visualized.     Pericardium   The pericardium is normal. There is no pericardial effusion present.     Aorta   The size of the visualized portion of aortic root is within normal   limits. The thoracic aorta is not well visualized. The ascending aorta   is not well visualized.     Venous   The inferior vena cava dimension is normal.     Thrombus/Mass   There is no intracardiac mass or thrombus  identified.     Echo Dimensions   --------------     LA Dimen (2D): 3.81 cm   IVS(D) (2D): 1.16 cm   LVPW(D) (2D): 1.16 cm   LV(D) (2D): 6.14 cm   LV(S) (2D): 4.56 cm   RV(D (2D): 3.7 cm   LVOT (2D): 2 cm   EF Teich (2D): 50 %   EF Teich (M-Mode): 25 %   FS (2D): 26 %   RVSP (Doppler): 37.13 mmHg   Doppler   -------   AVvel: 1.84 m/s   Pk Grad: 13.48 mmHg   LUCAS(pk): 1.02 cm2   PV Lee: 0.69 m/s   LVOTvel: 0.6 m/s   Mn Grad: 7.37 mmHg   LUCAS(VTI): 0.93 cm2   Ao Stenosis Dimen Idx: 0.33   MV PHT: 51.71 ms   Diastology   ----------   MV E: 1.27 m/s   MV A: 0.42 m/s   MV E/A: 3.01   MV Dec T: 175.64 ms   E' Lat: 9.29 cm/s   E' Med: 3.93 cm/s   E/Lat E': 13.66   E/Med E': 32.29   TR Lee: 1.72 m/s     Electronically signed by: Sudheer Lee DO   01/08/2025 11:49 AM   Impressions   -----------     1.The estimated LV ejection fraction is 25 %     2.There is moderate to severe global hypokinesis     3.Diastolic function is indeterminate.     4.There is mild aortic valve sclerosis without significant stenosis     5.There is severe mitral regurgitation     6.Estimated RVSP systolic pressure is 37.13 mmHg     7.Compared to the report from prior study dated 04/04/2022, the   severity of mitral regurgitation has increased     Findings   --------     Left Ventricle   The estimated LV ejection fraction is 25 %. The calculated LV ejection   fraction (MOD, Biplane) is 29.99 %. LV chamber is mildly dilated.   There is mild concentric LV hypertrophy. LV systolic function is   normal. There is moderate to severe global hypokinesis. Diastolic   function is indeterminate.     Right Ventricle   RV size is moderately dilated. The RV systolic function is normal.     Septum   There is no atrial septal defect (ASD). There is no ventricular septal   defect (VSD).     Left Atrium   LA chamber size is normal. LA diameter is 3.81 cm.     Right Atrium   RA chamber size is normal.     Aortic Valve   There is a trileaflet aortic valve. There is mild  aortic valve   sclerosis without significant stenosis. There is no aortic   regurgitation. Aortic valve area by VMax: 1.02 cm2. Aortic valve area   by VTI: 0.93 cm2. AV Mean gradient: 7.37 mmHg. AV Peak gradient: 13.48   mmHg.     Mitral Valve   Mitral valve is not well visualized. There is severe mitral   regurgitation. There is no mitral stenosis.     Tricuspid Valve   Tricuspid valve is structurally normal. There is mild tricuspid   regurgitation. The estimated RV systolic pressure is normal. Estimated   RVSP systolic pressure is 37.13 mmHg. There is no tricuspid valve   stenosis.     Pulmonary Valve   Pulmonic valve is not well visualized.     Pericardium   The pericardium is normal. There is no pericardial effusion present.     Aorta   The size of the visualized portion of aortic root is within normal   limits. The thoracic aorta is not well visualized. The ascending aorta   is not well visualized.     Venous   The inferior vena cava dimension is normal.     Thrombus/Mass   There is no intracardiac mass or thrombus identified.     Echo Dimensions   --------------     LA Dimen (2D): 3.81 cm   IVS(D) (2D): 1.16 cm   LVPW(D) (2D): 1.16 cm   LV(D) (2D): 6.14 cm   LV(S) (2D): 4.56 cm   RV(D (2D): 3.7 cm   LVOT (2D): 2 cm   EF Teich (2D): 50 %   EF Teich (M-Mode): 25 %   FS (2D): 26 %   RVSP (Doppler): 37.13 mmHg   Doppler   -------   AVvel: 1.84 m/s   Pk Grad: 13.48 mmHg   LUCAS(pk): 1.02 cm2   PV Lee: 0.69 m/s   LVOTvel: 0.6 m/s   Mn Grad: 7.37 mmHg   LUCAS(VTI): 0.93 cm2   Ao Stenosis Dimen Idx: 0.33   MV PHT: 51.71 ms   Diastology   ----------   MV E: 1.27 m/s   MV A: 0.42 m/s   MV E/A: 3.01   MV Dec T: 175.64 ms   E' Lat: 9.29 cm/s   E' Med: 3.93 cm/s   E/Lat E': 13.66   E/Med E': 32.29   TR Lee: 1.72 m/s     Electronically signed by: Sudheer Lee DO   01/08/2025 11:49 AM     CURRENT/PREVIOUS VISIT EKG  Results for orders placed or performed during the hospital encounter of 02/11/25   EKG 12-lead    Collection  Time: 02/14/25 10:19 AM   Result Value Ref Range    QRS Duration 108 ms    OHS QTC Calculation 504 ms    Narrative    Test Reason : Z13.6,    Vent. Rate :  93 BPM     Atrial Rate :  93 BPM     P-R Int : 186 ms          QRS Dur : 108 ms      QT Int : 406 ms       P-R-T Axes :  64  41  63 degrees    QTcB Int : 504 ms    Sinus rhythm with Premature atrial complexes  Nonspecific T wave abnormality  Prolonged QT  Abnormal ECG  When compared with ECG of 11-Feb-2025 17:14,  Sinus rhythm has replaced Atrial fibrillation  Questionable change in The axis  Nonspecific T wave abnormality, improved in Inferior leads  Nonspecific T wave abnormality, improved in Anterior leads  Confirmed by Sergio Baker ([id number]) on 2/17/2025 11:00:12 AM    Referred By: GONZÁLEZREFERRAL SELF           Confirmed By: Sergio Baker           ASSESSMENT/PLAN:     Active Hospital Problems    Diagnosis    *Acute on chronic congestive heart failure    PAD (peripheral artery disease)    Acute osteomyelitis    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  Continue furosemide drip to 2.5 mg/hr. Would like to transition to IVP vs oral tomorrow.  Ordered oral Diamox 250 mg TID and one time albumin 12.5 g to assist with diuresis  Strict I/Os. Daily weights. Low sodium diet.  Continue dobutamine drip to 2.5 mcg/kg/min for now.  Continue spironolactone 25 mg. Will need other GDMT as BP tolerates  Iron and saturated iron previously low, starting oral ferrous sulfate tablet   Trend labs.  Continue therapeutic Lovenox      MARINE PriceCNP-BC  Department of  Cardiology  Date of Service: 02/17/2025      I have personally interviewed and examined the patient, I have reviewed the Nurse Practitioner's history and physical, assessment, and plan. I have personally evaluated the patient at bedside and agree with the findings and made appropriate changes as necessary in recommendations.    Patient doing well resting comfortably after the angiogram.  In sinus rhythm on dobutamine drip.  His CVP is elevated to the jaw at 45°  Decreased breath sound is mild edema lower extremities    Continue treatment as above will re-evaluate tomorrow  INA Baker MD  Department of Cardiology  [facility]  02/17/2025

## 2025-02-17 NOTE — PT/OT/SLP PROGRESS
Occupational Therapy      Patient Name:  Juan C Sanchez   MRN:  [id number]    Patient not seen today secondary to Off the floor for procedure/surgery (angiogram). Will follow-up next available date.    2/17/2025

## 2025-02-17 NOTE — PROGRESS NOTES
"[facility]   Department of Infectious Disease  Progress Note        PATIENT NAME: Juan C Sanchez  MRN: [id number]  TODAY'S DATE: 02/17/2025  ADMIT DATE: 2/11/2025  LOS: 6 days    CHIEF COMPLAINT: Hypotension (Patient had PT come into the home today; found BP to be low and referred to ER.  Pt "feels bad"  since this morning.  +SOB; increased swelling in both legs.  Recently hospitalized for CHF )      PRINCIPLE PROBLEM: Acute on chronic congestive heart failure    INTERVAL HISTORY      02/17/2025: Seen and evaluated at bedside.  No acute issues overnight.      Antibiotics (From admission, onward)      Start     Stop Route Frequency Ordered    02/16/25 1730  ampicillin-sulbactam (UNASYN) 3 g in 0.9% NaCl 100 mL IVPB (MB+)         -- IV Every 4 hours (non-standard times) 02/16/25 1724    02/14/25 0900  mupirocin 2 % ointment         02/19/25 0859 Nasl 2 times daily 02/14/25 0725          Antifungals (From admission, onward)      None           Antivirals (From admission, onward)      None            ASSESSMENT and PLAN      1. Left diabetic foot ulcers with 3rd toe osteomyelitis.  He has had amputation of the 3rd toe and debridement of 4th and 5th toe ulcers.  Osteomyelitis at this site has been surgically dealt with.  He will need about 2-3 weeks of antibiotics for associated wound infection.    2. Abnormal right foot MRI suggesting 5th toe fracture.  No fracture documented on x-ray.  Spot x-ray of the 5th toe has been recommended by radiologist.  We will schedule this.    3. Acute exacerbation of CHF.  Management as per hospitalist and cardiologist.      4. Cirrhosis documented on CT abdomen and pelvis.  As per hospitalist.  Check HCV and HBV screen.    RECOMMENDATIONS:    Check x-ray right 5th toe   Check HCV and HBV screen    Please send Epic secure chat with any questions.      SUBJECTIVE    He has a history of CAD, bone CHF with EF 20-25%, PAF, hypertension, GERD and CAD.  Recently had CTA " bilateral lower extremities 01/14/2025 that demonstrated a high-grade CLARISA occlusion but no abnormality on the left lower extremity.  Seen in the ED on 02/06/2025 and diagnosed with acute decompensated CHF.  Plan was to admit him for Candida evaluation by patient left ER against medical advice.    Sent back to the ED 02/11/2025 by home PT OT when he was noted to be hypotensive.  Admitted for management of CHF.  Noted to have ulcer left 3rd and 5th interdigital spaces.  X-ray foot unremarkable.  MRI left foot documented osteomyelitis of the 3rd toe.  Seen by podiatrist and cultures were done 02/13/2025.  Went to OR 02/14/2025 and had amputation of 3rd toe and debridement of the 5th 4th toe wounds.    MRI right foot read as possible 5th toe fracture.  Follow up X-ray right foot foot did not show any fracture.  CRP 2.2 mg/dL, ESR 9    Antimicrobials   Vancomycin:  02/11/2025-02/16/2025   Zosyn:  02/11/2025 x1 day   Cefepime:  02/13/2025-02/16/2025   Unasyn: 02/16/2025-    Microbiology  Blood culture 02/07/2024: NGTD   Left foot wound culture 02/10/2025:  Sensitive E faecalis and Proteus mirabilis in broth only    Review of Systems  Negative except as stated above in Interval History     OBJECTIVE   Temp:  [97.5 °F (36.4 °C)-98.2 °F (36.8 °C)] 97.5 °F (36.4 °C)  Pulse:  [70-89] 81  Resp:  [14-18] 18  SpO2:  [92 %-96 %] 93 %  BP: (100-162)/(62-95) 162/89  Temp:  [97.5 °F (36.4 °C)-98.2 °F (36.8 °C)]   Temp: 97.5 °F (36.4 °C) (02/17/25 0742)  Pulse: 81 (02/17/25 0746)  Resp: 18 (02/17/25 0859)  BP: (!) 162/89 (02/17/25 0746)  SpO2: (!) 93 % (02/17/25 0746)    Intake/Output Summary (Last 24 hours) at 2/17/2025 0931  Last data filed at 2/17/2025 0544  Gross per 24 hour   Intake 1200 ml   Output 1750 ml   Net -550 ml       Physical Exam  General:  Elderly man lying quietly in bed.  He looks chronically ill but in no acute distress   Left foot: Surgical dressing in place.  Not taken down   Right lower extremity foot:  Small  dry scabs.  No ulcer.  No erythema.  He does have evidence of resolving edema and erythema of leg   CVS: S1 and 2 heard  Respiratory:  Decreased breath sounds both mid and lower zones posteriorly with minimal cramps   Abdomen: Full, soft, nontender, no palpable organomegaly   Skin:  No rash appreciated   CNS: No focal deficits   Musculoskeletal: No joint effusions appreciated    VAD:  PIV  ISOLATION:  None    WOUNDS:  Left foot wound    Significant Labs: All pertinent labs within the past 24 hours have been reviewed.    CBC LAST 7 DAYS  Recent Labs   Lab 02/11/25  1818 02/12/25  0158 02/13/25  0346 02/14/25  0411 02/15/25  0418 02/16/25  0351 02/17/25  0449   WBC 7.45 8.15 6.66 5.56 7.42 7.40 7.56   RBC 4.19* 4.30* 4.12* 4.26* 4.30* 4.18* 4.14*   HGB 10.0* 10.2* 9.7* 10.0* 10.2* 9.7* 9.5*   HCT 32.8* 33.8* 32.0* 32.5* 33.7* 32.2* 31.8*   MCV 78* 79* 78* 76* 78* 77* 77*   MCH 23.9* 23.7* 23.5* 23.5* 23.7* 23.2* 22.9*   MCHC 30.5* 30.2* 30.3* 30.8* 30.3* 30.1* 29.9*   RDW 18.5* 18.4* 18.4* 18.4* 18.4* 18.1* 18.0*    249 260 253 279 273 272   MPV 10.0 9.9 10.1 9.6 9.9 10.3 10.1   GRAN 64.3  4.8 68.8  5.6 58.0  3.9 53.2  3.0 63.6  4.7 61.8  4.6 63.8  4.8   LYMPH 20.3  1.5 18.8  1.5 25.5  1.7 29.1  1.6 20.2  1.5 19.6  1.5 16.3*  1.2   MONO 11.4  0.9 9.3  0.8 13.2  0.9 12.1  0.7 12.5  0.9 13.9  1.0 14.7  1.1*   BASO 0.06 0.06 0.05 0.05 0.05 0.05 0.04   NRBC 0 0 0 0 0 0 0       CHEMISTRY LAST 7 DAYS  Recent Labs   Lab 02/11/25  1818 02/12/25  0158 02/13/25  0346 02/14/25  0411 02/15/25  0418 02/16/25  0351 02/17/25  0449   * 135* 134* 137 134* 132* 132*   K 3.8 4.2 3.5 3.7 4.8 4.0 3.8   CL 97 97 98 97 93* 92* 92*   CO2 31* 31* 28 32* 34* 30* 34*   ANIONGAP 6* 7* 8 8 7* 10 6*   BUN 17 18 18 19 22 26* 27*   CREATININE 1.4 1.4 1.4 1.2 1.4 1.2 1.1   GLU 86 120* 120* 87 99 87 89   CALCIUM 8.3* 8.2* 8.1* 8.8 9.0 8.7 8.8   MG  --  2.0 2.0 2.0 2.0 2.0 2.1   ALBUMIN 3.1* 2.9* 3.0* 3.1* 3.3* 3.3*  "3.1*   PROT 6.8 6.9 6.6 7.2 7.6 7.3 7.2   ALKPHOS 76 76 73 79 80 75 80   ALT 8* 8* 6* 6* 6* 6* 5*   AST 12 17 11 11 12 12 12   BILITOT 0.6 0.6 0.7 0.8 1.1* 0.9 0.6       Estimated Creatinine Clearance: 75 mL/min (based on SCr of 1.1 mg/dL).    INFLAMMATORY/PROCAL  LAST 7 DAYS  Recent Labs   Lab 02/13/25  1041   PROCAL <0.050   CRP 2.20*     No results found for: "ESR"  CRP   Date Value Ref Range Status   02/13/2025 2.20 (H) <1.00 mg/dL Final     Comment:     CRP-Normal Application expected values:   <1.0        mg/dL   Normal Range  1.0 - 5.0  mg/dL   Indicates mild inflammation  5.0 - 10.0 mg/dL   Indicates severe inflammation  >10.0        mg/dL   Represents serious processes and   frequently         indicates the presence of a bacterial   infection.          PRIOR  MICROBIOLOGY:    Susceptibility data from last 90 days.  Collected Specimen Info Organism Amp/Sulbactam Ampicillin Cefazolin Cefepime Ceftriaxone Ciprofloxacin Ertapenem Gentamicin GENTAMICIN SYNERGY SCREEN Levofloxacin Meropenem PIPERACILLIN/TAZO SUSCEPTIBILITY Tobramycin   02/13/25 Bone from Toe, Left Foot Proteus mirabilis  S  S  S  S  S  S  S  S   S  S  S  S     Enterococcus faecalis   S        S       02/11/25 Blood from Peripheral, Antecubital, Right No growth after 5 days.                02/11/25 Blood from Peripheral, Antecubital, Left No growth after 5 days.                  Collected Specimen Info Organism Trimeth/Sulfa Vancomycin   02/13/25 Bone from Toe, Left Foot Proteus mirabilis  S      Enterococcus faecalis   S   02/11/25 Blood from Peripheral, Antecubital, Right No growth after 5 days.     02/11/25 Blood from Peripheral, Antecubital, Left No growth after 5 days.         LAST 7 DAYS MICROBIOLOGY   Microbiology Results (last 7 days)       Procedure Component Value Units Date/Time    Blood culture x two cultures. Draw prior to antibiotics. [[id number]] Collected: 02/11/25 6361    Order Status: Completed Specimen: Blood from Peripheral, " Antecubital, Right Updated: 02/16/25 2032     Blood Culture, Routine No growth after 5 days.    Narrative:      Aerobic and anaerobic    Blood culture x two cultures. Draw prior to antibiotics. [[id number]] Collected: 02/11/25 1744    Order Status: Completed Specimen: Blood from Peripheral, Antecubital, Left Updated: 02/16/25 2032     Blood Culture, Routine No growth after 5 days.    Narrative:      Aerobic and anaerobic    Tissue culture [[id number]]  (Abnormal)  (Susceptibility) Collected: 02/13/25 0748    Order Status: Completed Specimen: Bone from Toe, Left Foot Updated: 02/16/25 0840     Aerobic Culture - Tissue PROTEUS MIRABILIS  From broth only        ENTEROCOCCUS FAECALIS  From broth only       Gram Stain Result No WBC's      Rare Gram positive cocci    Narrative:      Toe, Left Foot    Culture, Anaerobic [[id number]] Collected: 02/13/25 0748    Order Status: Completed Specimen: Bone from Toe, Left Foot Updated: 02/15/25 0933     Anaerobic Culture No anaerobes isolated    Narrative:      Toe, Left Foot    MRSA Screen by PCR [[id number]]  (Abnormal) Collected: 02/13/25 1054    Order Status: Completed Specimen: Nasal Swab Updated: 02/13/25 1410     MRSA SCREEN BY PCR Detected     Comment: MRSA  critical result(s) called and verbal readback obtained from   Luanne Stanley RN on [facility] by [facility] 02/13/2025 14:05         Narrative:       MRSA  critical result(s) called and verbal readback obtained from   Luanne Stanley RN on [facility] by [facility] 02/13/2025 14:05    Aerobic culture [[id number]] Collected: 02/13/25 0748    Order Status: Canceled Specimen: Bone from Toe, Left Foot           CURRENT/PREVIOUS VISIT EKG  Results for orders placed or performed during the hospital encounter of 02/11/25   EKG 12-lead    Collection Time: 02/14/25 10:19 AM   Result Value Ref Range    QRS Duration 108 ms    [facility] QTC Calculation 504 ms    Narrative    Test Reason : Z13.6,    Vent. Rate :  93 BPM     Atrial Rate :  93 BPM     P-R Int :  186 ms          QRS Dur : 108 ms      QT Int : 406 ms       P-R-T Axes :  64  41  63 degrees    QTcB Int : 504 ms    Sinus rhythm with Premature atrial complexes  Nonspecific T wave abnormality  Prolonged QT  Abnormal ECG  When compared with ECG of 11-Feb-2025 17:14,  Sinus rhythm has replaced Atrial fibrillation  Questionable change in The axis  Nonspecific T wave abnormality, improved in Inferior leads  Nonspecific T wave abnormality, improved in Anterior leads    Referred By: AAAREFERRAL SELF           Confirmed By:        Significant Imaging: I have reviewed all relevant and available imaging results/findings within the past 24 hours.    I spent a total of 50 minutes on the day of the visit.This includes face to face time and non-face to face time preparing to see the patient (eg, review of tests), obtaining and/or reviewing separately obtained history, documenting clinical information in the electronic or other health record, independently interpreting results and communicating results to the patient/family/caregiver, or care coordinator.    Spencer Metzger MD  Date of Service: 02/17/2025      This note was created using M Modal voice recognition software that occasionally misinterpreted phrases or words.

## 2025-02-17 NOTE — CONSULTS
Stage 2 left buttock, pink wound bed, 0.5x0.5x0.1cm tender to touch, clean and dried and applied Triad, turned to left side using pillow for positioning.     Right foot dry scabs edema, elevated on pillows            Nurse called to let me know patient had oozing thru dressing while in procedure.  Patient's dressing had strike thru blood drainage, removed dressing blood clots on old dressing, cleaned and dried, patient complaining of pain when cleaning, nurse said had already had pain meds, cleaned and dried, weaved aquacel ag thru toes, weaved gauze thru toes, wrapped light with kerlix and ace, notified Dr. España of above, also sent him photos. He was ok with above dressing.  Instructed nurse to notify him if bleeding continues. Cleaned and dried bilateral groin, blood from procedure, dressing right groin.

## 2025-02-18 LAB
ALBUMIN SERPL BCP-MCNC: 3.2 G/DL (ref 3.5–5.2)
ALP SERPL-CCNC: 73 U/L (ref 55–135)
ALT SERPL W/O P-5'-P-CCNC: 5 U/L (ref 10–44)
ANION GAP SERPL CALC-SCNC: 7 MMOL/L (ref 8–16)
AST SERPL-CCNC: 12 U/L (ref 10–40)
BASOPHILS # BLD AUTO: 0.04 K/UL (ref 0–0.2)
BASOPHILS NFR BLD: 0.6 % (ref 0–1.9)
BILIRUB SERPL-MCNC: 0.7 MG/DL (ref 0.1–1)
BUN SERPL-MCNC: 23 MG/DL (ref 8–23)
CALCIUM SERPL-MCNC: 9.1 MG/DL (ref 8.7–10.5)
CHLORIDE SERPL-SCNC: 93 MMOL/L (ref 95–110)
CO2 SERPL-SCNC: 34 MMOL/L (ref 23–29)
CREAT SERPL-MCNC: 1.2 MG/DL (ref 0.5–1.4)
DIFFERENTIAL METHOD BLD: ABNORMAL
EOSINOPHIL # BLD AUTO: 0.3 K/UL (ref 0–0.5)
EOSINOPHIL NFR BLD: 4.1 % (ref 0–8)
ERYTHROCYTE [DISTWIDTH] IN BLOOD BY AUTOMATED COUNT: 18.6 % (ref 11.5–14.5)
EST. GFR  (NO RACE VARIABLE): >60 ML/MIN/1.73 M^2
GLUCOSE SERPL-MCNC: 86 MG/DL (ref 70–110)
HBV CORE AB SERPL QL IA: POSITIVE
HBV SURFACE AB SER QL: REACTIVE
HBV SURFACE AG SERPL QL IA: NEGATIVE
HCT VFR BLD AUTO: 32.7 % (ref 40–54)
HGB BLD-MCNC: 9.7 G/DL (ref 14–18)
IMM GRANULOCYTES # BLD AUTO: 0.03 K/UL (ref 0–0.04)
IMM GRANULOCYTES NFR BLD AUTO: 0.5 % (ref 0–0.5)
LYMPHOCYTES # BLD AUTO: 1.1 K/UL (ref 1–4.8)
LYMPHOCYTES NFR BLD: 17.4 % (ref 18–48)
MAGNESIUM SERPL-MCNC: 2.2 MG/DL (ref 1.6–2.6)
MCH RBC QN AUTO: 23.1 PG (ref 27–31)
MCHC RBC AUTO-ENTMCNC: 29.7 G/DL (ref 32–36)
MCV RBC AUTO: 78 FL (ref 82–98)
MONOCYTES # BLD AUTO: 1 K/UL (ref 0.3–1)
MONOCYTES NFR BLD: 15.6 % (ref 4–15)
NEUTROPHILS # BLD AUTO: 3.9 K/UL (ref 1.8–7.7)
NEUTROPHILS NFR BLD: 61.8 % (ref 38–73)
NRBC BLD-RTO: 0 /100 WBC
PLATELET # BLD AUTO: 308 K/UL (ref 150–450)
PMV BLD AUTO: 10.3 FL (ref 9.2–12.9)
POTASSIUM SERPL-SCNC: 3.9 MMOL/L (ref 3.5–5.1)
PROT SERPL-MCNC: 7.4 G/DL (ref 6–8.4)
RBC # BLD AUTO: 4.2 M/UL (ref 4.6–6.2)
SODIUM SERPL-SCNC: 134 MMOL/L (ref 136–145)
WBC # BLD AUTO: 6.27 K/UL (ref 3.9–12.7)

## 2025-02-18 PROCEDURE — 25000003 PHARM REV CODE 250: Performed by: GENERAL PRACTICE

## 2025-02-18 PROCEDURE — 99232 SBSQ HOSP IP/OBS MODERATE 35: CPT | Mod: ,,, | Performed by: PODIATRIST

## 2025-02-18 PROCEDURE — 25000003 PHARM REV CODE 250

## 2025-02-18 PROCEDURE — 25000003 PHARM REV CODE 250: Performed by: PODIATRIST

## 2025-02-18 PROCEDURE — 97116 GAIT TRAINING THERAPY: CPT

## 2025-02-18 PROCEDURE — 36415 COLL VENOUS BLD VENIPUNCTURE: CPT

## 2025-02-18 PROCEDURE — 25000003 PHARM REV CODE 250: Performed by: STUDENT IN AN ORGANIZED HEALTH CARE EDUCATION/TRAINING PROGRAM

## 2025-02-18 PROCEDURE — 80053 COMPREHEN METABOLIC PANEL: CPT

## 2025-02-18 PROCEDURE — 25000003 PHARM REV CODE 250: Performed by: HOSPITALIST

## 2025-02-18 PROCEDURE — 63600175 PHARM REV CODE 636 W HCPCS: Performed by: GENERAL PRACTICE

## 2025-02-18 PROCEDURE — 21400001 HC TELEMETRY ROOM

## 2025-02-18 PROCEDURE — 97535 SELF CARE MNGMENT TRAINING: CPT | Mod: CO

## 2025-02-18 PROCEDURE — 99233 SBSQ HOSP IP/OBS HIGH 50: CPT | Mod: ,,, | Performed by: NURSE PRACTITIONER

## 2025-02-18 PROCEDURE — 25000003 PHARM REV CODE 250: Performed by: INTERNAL MEDICINE

## 2025-02-18 PROCEDURE — 63600175 PHARM REV CODE 636 W HCPCS: Performed by: INTERNAL MEDICINE

## 2025-02-18 PROCEDURE — 27000207 HC ISOLATION

## 2025-02-18 PROCEDURE — 99233 SBSQ HOSP IP/OBS HIGH 50: CPT | Mod: ,,, | Performed by: INTERNAL MEDICINE

## 2025-02-18 PROCEDURE — 85025 COMPLETE CBC W/AUTO DIFF WBC: CPT

## 2025-02-18 PROCEDURE — 63600175 PHARM REV CODE 636 W HCPCS

## 2025-02-18 PROCEDURE — 97530 THERAPEUTIC ACTIVITIES: CPT

## 2025-02-18 PROCEDURE — 83735 ASSAY OF MAGNESIUM: CPT

## 2025-02-18 RX ORDER — SODIUM FERRIC GLUCONATE COMPLEX IN SUCROSE 12.5 MG/ML
125 INJECTION INTRAVENOUS ONCE
Status: COMPLETED | OUTPATIENT
Start: 2025-02-18 | End: 2025-02-18

## 2025-02-18 RX ADMIN — ACETAZOLAMIDE 250 MG: 250 TABLET ORAL at 02:02

## 2025-02-18 RX ADMIN — PHENAZOPYRIDINE HYDROCHLORIDE 100 MG: 100 TABLET ORAL at 08:02

## 2025-02-18 RX ADMIN — PHENAZOPYRIDINE HYDROCHLORIDE 100 MG: 100 TABLET ORAL at 04:02

## 2025-02-18 RX ADMIN — AMIODARONE HYDROCHLORIDE 200 MG: 200 TABLET ORAL at 09:02

## 2025-02-18 RX ADMIN — PANTOPRAZOLE SODIUM 40 MG: 40 TABLET, DELAYED RELEASE ORAL at 05:02

## 2025-02-18 RX ADMIN — TAMSULOSIN HYDROCHLORIDE 0.4 MG: 0.4 CAPSULE ORAL at 09:02

## 2025-02-18 RX ADMIN — PHENAZOPYRIDINE HYDROCHLORIDE 100 MG: 100 TABLET ORAL at 11:02

## 2025-02-18 RX ADMIN — SODIUM FERRIC GLUCONATE COMPLEX IN SUCROSE 125 MG: 12.5 INJECTION INTRAVENOUS at 11:02

## 2025-02-18 RX ADMIN — AMPICILLIN SODIUM AND SULBACTAM SODIUM 3 G: 2; 1 INJECTION, POWDER, FOR SOLUTION INTRAMUSCULAR; INTRAVENOUS at 09:02

## 2025-02-18 RX ADMIN — FERROUS SULFATE TAB 325 MG (65 MG ELEMENTAL FE) 1 EACH: 325 (65 FE) TAB at 09:02

## 2025-02-18 RX ADMIN — TRAMADOL HYDROCHLORIDE 50 MG: 50 TABLET, COATED ORAL at 09:02

## 2025-02-18 RX ADMIN — OXYCODONE HYDROCHLORIDE AND ACETAMINOPHEN 1 TABLET: 10; 325 TABLET ORAL at 10:02

## 2025-02-18 RX ADMIN — MUPIROCIN 1 G: 20 OINTMENT TOPICAL at 09:02

## 2025-02-18 RX ADMIN — SPIRONOLACTONE 25 MG: 25 TABLET, FILM COATED ORAL at 09:02

## 2025-02-18 RX ADMIN — AMPICILLIN SODIUM AND SULBACTAM SODIUM 3 G: 2; 1 INJECTION, POWDER, FOR SOLUTION INTRAMUSCULAR; INTRAVENOUS at 12:02

## 2025-02-18 RX ADMIN — AMMONIUM LACTATE: 12 LOTION TOPICAL at 09:02

## 2025-02-18 RX ADMIN — ACETAZOLAMIDE 250 MG: 250 TABLET ORAL at 09:02

## 2025-02-18 RX ADMIN — AMPICILLIN SODIUM AND SULBACTAM SODIUM 3 G: 2; 1 INJECTION, POWDER, FOR SOLUTION INTRAMUSCULAR; INTRAVENOUS at 04:02

## 2025-02-18 RX ADMIN — ENOXAPARIN SODIUM 100 MG: 100 INJECTION SUBCUTANEOUS at 05:02

## 2025-02-18 RX ADMIN — ENOXAPARIN SODIUM 100 MG: 100 INJECTION SUBCUTANEOUS at 04:02

## 2025-02-18 RX ADMIN — AMPICILLIN SODIUM AND SULBACTAM SODIUM 3 G: 2; 1 INJECTION, POWDER, FOR SOLUTION INTRAMUSCULAR; INTRAVENOUS at 02:02

## 2025-02-18 RX ADMIN — AMPICILLIN SODIUM AND SULBACTAM SODIUM 3 G: 2; 1 INJECTION, POWDER, FOR SOLUTION INTRAMUSCULAR; INTRAVENOUS at 05:02

## 2025-02-18 RX ADMIN — OXYCODONE HYDROCHLORIDE AND ACETAMINOPHEN 1 TABLET: 10; 325 TABLET ORAL at 08:02

## 2025-02-18 RX ADMIN — OXYCODONE HYDROCHLORIDE AND ACETAMINOPHEN 1 TABLET: 10; 325 TABLET ORAL at 04:02

## 2025-02-18 NOTE — PLAN OF CARE
Problem: Occupational Therapy  Goal: Occupational Therapy Goal  Description: Goals to be met by: 3/12/2025     Patient will increase functional independence with ADLs by performing:    UE Dressing with Supervision.  LE Dressing with Supervision.  Grooming while standing at sink with Supervision.- with HOB raised to max and set up on tray table.  Toileting from toilet with Supervision for hygiene and clothing management.   Toilet transfer to toilet with Supervision.- engaged in convo regarding this ADL and pt is certain that he will be able to transfer to it when he needs to void BM (just as he did prior to his 3rd toe amputation).  Perform sitting/standing ADL activity with no LOB.  Outcome: Progressing

## 2025-02-18 NOTE — PROGRESS NOTES
[facility]  Department of Cardiology  Progress Note      PATIENT NAME: Juan C Sanchez  MRN: [id number]  TODAY'S DATE: 02/18/2025  ADMIT DATE: 2/11/2025                          CONSULT REQUESTED BY: Livia Alexandre MD    SUBJECTIVE     PRINCIPAL PROBLEM: Acute on chronic congestive heart failure      02/18/2025  Denies complaints.  Net negative 18.8L since admit. Output -2.2L yesterday.  sCr 1.2    02/17/2025  Denies chest pain or shortness of breath. Reports taking lasix at home but states it was not working. Johnny compression sock use. Does not wear home O2. Denies any hx of smoking.  On lasix gtt and dobutamine. Net negative 16.7L since admit, out ~1.4 L overnight.  sCr 1.1  Underwent LLE angiogram with vascular surgery today.    02/16/2025  Patient seen resting in bed. States he is not feeling as well today and has had more pain in his foot from toe amputation. He is not sleeping as well and is tired today. Overall he is still optimistic and his breathing is good.     2/15/25  Patient seen resting in bed with no distress noted. Breathing stable.     2/14/25  Patient seen resting in bed with no distress noted. Breathing is stable. S/p ANGEL/CV today.     2/13/25  Patient seen sitting up in bed with no acute distress noted.  Denies any chest discomfort.  Breathing is stable.  He has been diuresing very well, and his weight is down significantly.    REASON FOR CONSULT:  CHF exacerbation     HPI:  Mr. Sanchez is a 69 yr old male with pmh of known HFrEF, HTN, mitral regurg, OA, GERD, and anemia who originally come into the ER yesterday evening after home PT encouraged him to come be evaluated after noticing some worsening swelling in BLE, hypotension, and generalized weakness. It appears he has been in and out of the ER about 8x since the start of this new year. BNP 3163. Trops 21.3 then 16.8. Most recent ECHO uploaded in care everywhere shows EF 25%. Pt also states he had a recent angiogram done in  [place] (the last dated one I could see was 2022) showed trivial nonobstructive CAD. He states he follows with Dr. Alegria but has not been seen "in a while." He states compliance with medications but feels PO lasix is not working for him.     Per hospital medicine notes:  Mr. Sanchez is a 69 yr old male with a hx of HTN, MR, PAF, chronic systolic CHF with EF of 25, OA, GERD, nonischemic cardiomyopathy, anemia who presented to the ED with a chief complaint of shortness of breath, leg swelling and hypotension.  Patient states that he had PT come to his home today and he was found to be hypotensive and advised to come to the emergency room.  Patient endorses shortness of breath as well as leg swelling, malaise, fatigue, occasional chills, productive cough with milky sputum, upper abdominal pain, occasional nausea, decreased urine output, and positional lightheadedness.  He states that these symptoms have been progressively worsening over the last 1-2 months possibly even longer  He states that his symptoms are worse when lying flat as well as with exertion and better with rest and while sitting up.  He states that he has been taking his home medications without improvement of his symptoms.  He does endorse that most of all of his contacts recently have been sick. He denies any home oxygen use.  He does endorse occasional PND and states that he has been having to sit to sleep.  He denies tobacco and recreational drug use and states he seldomly drinks alcohol.  He denies fever, chest pain, vomiting, diarrhea, dysuria, hematuria, dizziness, and syncope.     Upon arrival to ED, patient afebrile, HR of 109, RR of 24, BP of 95/62, satting 98% on RA.  Workup in the ED revealed RBC of 4.19, H/H of 10/32.8, sodium of 134, pCO2 of 31, GFR of 55.1, corrected calcium of 9, albumin of 3.1, BNP of 3163, troponin of 21.3, lactic acid of 2.1.  Blood cultures pending.  CXR shows enlarged cardiac silhouette with pulmonary vascular " congestion.  Bilateral pleural effusions.  Mild bibasilar airspace disease and or atelectasis.  Left foot x-ray shows osteopenia.  No overt erosions or osseous destruction.  No acute fracture or dislocation.  Mild multifocal midfoot arthritis.  Forefoot swelling.  Patient was given Lasix 20 mg IV, Zosyn 4.5 g IV, 500 mL NS bolus, vancomycin 20 mg/kg while in the ED. discussed case with ED provider and patient will be admitted under hospital medicine services for further management.        Review of patient's allergies indicates:   Allergen Reactions    Gabapentin Other (See Comments)     Hypersomnia, nightmares    Morphine Other (See Comments)     Pt states systemic cramping       Past Medical History:   Diagnosis Date    Hypertension      Past Surgical History:   Procedure Laterality Date    ORIF closed comminuted displaced intra-articular fx distal left radius & application of external fixator Left 05/28/2018    TOE AMPUTATION Left 2/14/2025    Procedure: AMPUTATION, TOE;  Surgeon: Jerry España DPM;  Location: [facility];  Service: Podiatry;  Laterality: Left;     Social History     Tobacco Use    Smoking status: Never    Smokeless tobacco: Never   Substance Use Topics    Alcohol use: Yes     Comment: social    Drug use: No        REVIEW OF SYSTEMS    As mentioned in HPI    OBJECTIVE     VITAL SIGNS (Most Recent)  Temp: 97.9 °F (36.6 °C) (02/18/25 0800)  Pulse: 88 (02/18/25 0800)  Resp: 18 (02/18/25 0835)  BP: 128/83 (02/18/25 0800)  SpO2: 95 % (02/18/25 0800)    VENTILATION STATUS  Resp: 18 (02/18/25 0835)  SpO2: 95 % (02/18/25 0800)           I & O (Last 24H):  Intake/Output Summary (Last 24 hours) at 2/18/2025 1038  Last data filed at 2/18/2025 0239  Gross per 24 hour   Intake 120 ml   Output 1750 ml   Net -1630 ml       WEIGHTS  Wt Readings from Last 3 Encounters:   02/18/25 0445 87.9 kg (193 lb 12.6 oz)   02/16/25 0453 90.5 kg (199 lb 8.3 oz)   02/15/25 0339 92.5 kg (203 lb 14.8 oz)   02/14/25 0732 87.5 kg  (192 lb 14.4 oz)   25 0500 94.1 kg (207 lb 7.3 oz)   25 0604 93.9 kg (207 lb 0.2 oz)   25 0050 98.6 kg (217 lb 6 oz)   25 1727 92.5 kg (204 lb)   25 0940 87.5 kg (192 lb 14.4 oz)   10/23/18 1426 92.5 kg (204 lb)       PHYSICAL EXAM    CONSTITUTIONAL: NAD, sitting upright in bed, smiling  HEENT: Normocephalic. No pallor  NECK: JVD improving  LUNGS: faint bibasilar crackles, normal WOB. On room air  HEART: regular rate and irregular rhythm, PVCs correlating on bedside telemetry, S1, S2 normal, no murmur   ABDOMEN: soft, bowel sounds normal  EXTREMITIES: +2 BLE edema, LLE with dressing c/d/i  SKIN: No rash  NEURO: AAO X 3  PSYCH: normal affect     HOME MEDICATIONS:  No current facility-administered medications on file prior to encounter.     Current Outpatient Medications on File Prior to Encounter   Medication Sig Dispense Refill    acetaminophen (TYLENOL EXTRA STRENGTH) 500 MG tablet Take 1,000 mg by mouth every 6 (six) hours as needed for Pain.      amiodarone (PACERONE) 200 MG Tab Take 200 mg by mouth 2 (two) times daily.      doxycycline (MONODOX) 100 MG capsule Take 100 mg by mouth 2 (two) times daily.      empagliflozin (JARDIANCE) 10 mg tablet Take 10 mg by mouth once daily.      ENTRESTO 24-26 mg per tablet Take 1 tablet by mouth 2 (two) times daily.      furosemide (LASIX) 20 MG tablet Take 40 mg by mouth 0900, 1800.      gabapentin (NEURONTIN) 300 MG capsule Take 300 mg by mouth 2 (two) times daily.      metoprolol tartrate (LOPRESSOR) 25 MG tablet Take 25 mg by mouth 2 (two) times daily.      nitroGLYCERIN (NITROSTAT) 0.4 MG SL tablet Place 0.4 mg under the tongue every 5 (five) minutes as needed for Chest pain.      [] oxyCODONE-acetaminophen (PERCOCET)  mg per tablet Take 1 tablet by mouth every 8 (eight) hours as needed for Pain.      pantoprazole (PROTONIX) 40 MG tablet Take 40 mg by mouth once daily.      carvediloL (COREG) 3.125 MG tablet Take 3.125 mg by  mouth 2 (two) times daily. (Patient not taking: Reported on 2/11/2025)         SCHEDULED MEDS:   acetaZOLAMIDE  250 mg Oral TID    amiodarone  200 mg Oral BID    ammonium lactate   Topical (Top) BID    ampicillin-sulbactam  3 g Intravenous Q4H    enoxparin  1 mg/kg Subcutaneous Q12H    ferrous sulfate  1 tablet Oral Daily    mupirocin   Nasal BID    pantoprazole  40 mg Oral Daily    phenazopyridine  100 mg Oral TID WM    spironolactone  25 mg Oral Daily    tamsulosin  0.4 mg Oral Daily       CONTINUOUS INFUSIONS:   DOBUTamine IV infusion (non-titrating)  2.5 mcg/kg/min Intravenous Continuous 3.7 mL/hr at 02/17/25 1912 2.5 mcg/kg/min at 02/17/25 1912    furosemide (LASIX) 2 mg/mL continuous infusion (non-titrating)  2.5 mg/hr Intravenous Continuous 1.25 mL/hr at 02/16/25 2212 2.5 mg/hr at 02/16/25 2212       PRN MEDS:  Current Facility-Administered Medications:     acetaminophen, 650 mg, Oral, Q4H PRN    aluminum-magnesium hydroxide-simethicone, 30 mL, Oral, QID PRN    magnesium oxide, 800 mg, Oral, PRN    magnesium oxide, 800 mg, Oral, PRN    melatonin, 9 mg, Oral, Nightly PRN    metoprolol, 5 mg, Intravenous, Q5 Min PRN    midodrine, 2.5 mg, Oral, TID PRN    naloxone, 0.02 mg, Intravenous, PRN    ondansetron, 4 mg, Intravenous, Q6H PRN    oxyCODONE-acetaminophen, 1 tablet, Oral, Q6H PRN    potassium bicarbonate, 35 mEq, Oral, PRN    potassium bicarbonate, 50 mEq, Oral, PRN    potassium bicarbonate, 60 mEq, Oral, PRN    potassium, sodium phosphates, 2 packet, Oral, PRN    potassium, sodium phosphates, 2 packet, Oral, PRN    potassium, sodium phosphates, 2 packet, Oral, PRN    senna-docusate 8.6-50 mg, 1 tablet, Oral, BID PRN    traMADoL, 50 mg, Oral, Q4H PRN    LABS AND DIAGNOSTICS     CBC LAST 3 DAYS  Recent Labs   Lab 02/16/25  0351 02/17/25  0449 02/18/25  0433   WBC 7.40 7.56 6.27   RBC 4.18* 4.14* 4.20*   HGB 9.7* 9.5* 9.7*   HCT 32.2* 31.8* 32.7*   MCV 77* 77* 78*   MCH 23.2* 22.9* 23.1*   MCHC 30.1* 29.9*  "29.7*   RDW 18.1* 18.0* 18.6*    272 308   MPV 10.3 10.1 10.3   GRAN 61.8  4.6 63.8  4.8 61.8  3.9   LYMPH 19.6  1.5 16.3*  1.2 17.4*  1.1   MONO 13.9  1.0 14.7  1.1* 15.6*  1.0   BASO 0.05 0.04 0.04   NRBC 0 0 0       COAGULATION LAST 3 DAYS  No results for input(s): "LABPT", "INR", "APTT" in the last 168 hours.    CHEMISTRY LAST 3 DAYS  Recent Labs   Lab 02/16/25  0351 02/17/25  0449 02/18/25  0433   * 132* 134*   K 4.0 3.8 3.9   CL 92* 92* 93*   CO2 30* 34* 34*   ANIONGAP 10 6* 7*   BUN 26* 27* 23   CREATININE 1.2 1.1 1.2   GLU 87 89 86   CALCIUM 8.7 8.8 9.1   MG 2.0 2.1 2.2   ALBUMIN 3.3* 3.1* 3.2*   PROT 7.3 7.2 7.4   ALKPHOS 75 80 73   ALT 6* 5* 5*   AST 12 12 12   BILITOT 0.9 0.6 0.7       CARDIAC PROFILE LAST 3 DAYS  Recent Labs   Lab 02/11/25  1818 02/12/25  0158   BNP 3,163*  --    TROPONINIHS 21.3* 16.8*       ENDOCRINE LAST 3 DAYS  Recent Labs   Lab 02/12/25  0158 02/13/25  1041   TSH 7.487*  --    PROCAL  --  <0.050       LAST ARTERIAL BLOOD GAS  ABG  No results for input(s): "PH", "PO2", "PCO2", "HCO3", "BE" in the last 168 hours.    LAST 7 DAYS MICROBIOLOGY   Microbiology Results (last 7 days)       Procedure Component Value Units Date/Time    Blood culture x two cultures. Draw prior to antibiotics. [[id number]] Collected: 02/11/25 1749    Order Status: Completed Specimen: Blood from Peripheral, Antecubital, Right Updated: 02/16/25 2032     Blood Culture, Routine No growth after 5 days.    Narrative:      Aerobic and anaerobic    Blood culture x two cultures. Draw prior to antibiotics. [[id number]] Collected: 02/11/25 1744    Order Status: Completed Specimen: Blood from Peripheral, Antecubital, Left Updated: 02/16/25 2032     Blood Culture, Routine No growth after 5 days.    Narrative:      Aerobic and anaerobic    Tissue culture [[id number]]  (Abnormal)  (Susceptibility) Collected: 02/13/25 0748    Order Status: Completed Specimen: Bone from Toe, Left Foot Updated: 02/16/25 0840 "     Aerobic Culture - Tissue PROTEUS MIRABILIS  From broth only        ENTEROCOCCUS FAECALIS  From broth only       Gram Stain Result No WBC's      Rare Gram positive cocci    Narrative:      Toe, Left Foot    Culture, Anaerobic [[id number]] Collected: 02/13/25 0748    Order Status: Completed Specimen: Bone from Toe, Left Foot Updated: 02/15/25 0933     Anaerobic Culture No anaerobes isolated    Narrative:      Toe, Left Foot    MRSA Screen by PCR [[id number]]  (Abnormal) Collected: 02/13/25 1054    Order Status: Completed Specimen: Nasal Swab Updated: 02/13/25 1410     MRSA SCREEN BY PCR Detected     Comment: MRSA  critical result(s) called and verbal readback obtained from   Luanne Stanley RN on [facility] by [facility] 02/13/2025 14:05         Narrative:       MRSA  critical result(s) called and verbal readback obtained from   Luanne Stanley RN on [facility] by [facility] 02/13/2025 14:05    Aerobic culture [[id number]] Collected: 02/13/25 0748    Order Status: Canceled Specimen: Bone from Toe, Left Foot             MOST RECENT IMAGING  Cardiac catheterization  Procedure performed in the Invasive Lab    - See Procedure Log link below for nursing documentation    - See OpNote on Surgeries Tab for physician findings    - See Imaging Tab for radiologist dictation  X-Ray Toe 2 or More Views Right  Narrative: EXAMINATION:  XR TOE 2 OR MORE VIEWS RIGHT    CLINICAL HISTORY:  Right 5th toe x-ray.  Follow up on MRI abnormality;    FINDINGS:  Three views of the right 5th toe show acute nondisplaced fracture through the base of the proximal phalanx, as seen on MRI.  No other fractures or destructive osseous lesions.  Impression: Please see findings.    Electronically signed by: Shemar Martines  Date:    02/17/2025  Time:    10:36      ECHOCARDIOGRAM RESULTS (last 5)  No results found for this or any previous visit.    Echo done locally    Impressions   -----------     1.The estimated LV ejection fraction is 25 %     2.There is moderate to severe  global hypokinesis     3.Diastolic function is indeterminate.     4.There is mild aortic valve sclerosis without significant stenosis     5.There is severe mitral regurgitation     6.Estimated RVSP systolic pressure is 37.13 mmHg     7.Compared to the report from prior study dated 04/04/2022, the   severity of mitral regurgitation has increased     Findings   --------     Left Ventricle   The estimated LV ejection fraction is 25 %. The calculated LV ejection   fraction (MOD, Biplane) is 29.99 %. LV chamber is mildly dilated.   There is mild concentric LV hypertrophy. LV systolic function is   normal. There is moderate to severe global hypokinesis. Diastolic   function is indeterminate.     Right Ventricle   RV size is moderately dilated. The RV systolic function is normal.     Septum   There is no atrial septal defect (ASD). There is no ventricular septal   defect (VSD).     Left Atrium   LA chamber size is normal. LA diameter is 3.81 cm.     Right Atrium   RA chamber size is normal.     Aortic Valve   There is a trileaflet aortic valve. There is mild aortic valve   sclerosis without significant stenosis. There is no aortic   regurgitation. Aortic valve area by VMax: 1.02 cm2. Aortic valve area   by VTI: 0.93 cm2. AV Mean gradient: 7.37 mmHg. AV Peak gradient: 13.48   mmHg.     Mitral Valve   Mitral valve is not well visualized. There is severe mitral   regurgitation. There is no mitral stenosis.     Tricuspid Valve   Tricuspid valve is structurally normal. There is mild tricuspid   regurgitation. The estimated RV systolic pressure is normal. Estimated   RVSP systolic pressure is 37.13 mmHg. There is no tricuspid valve   stenosis.     Pulmonary Valve   Pulmonic valve is not well visualized.     Pericardium   The pericardium is normal. There is no pericardial effusion present.     Aorta   The size of the visualized portion of aortic root is within normal   limits. The thoracic aorta is not well visualized. The  ascending aorta   is not well visualized.     Venous   The inferior vena cava dimension is normal.     Thrombus/Mass   There is no intracardiac mass or thrombus identified.     Echo Dimensions   --------------     LA Dimen (2D): 3.81 cm   IVS(D) (2D): 1.16 cm   LVPW(D) (2D): 1.16 cm   LV(D) (2D): 6.14 cm   LV(S) (2D): 4.56 cm   RV(D (2D): 3.7 cm   LVOT (2D): 2 cm   EF Teich (2D): 50 %   EF Teich (M-Mode): 25 %   FS (2D): 26 %   RVSP (Doppler): 37.13 mmHg   Doppler   -------   AVvel: 1.84 m/s   Pk Grad: 13.48 mmHg   LUCAS(pk): 1.02 cm2   PV Lee: 0.69 m/s   LVOTvel: 0.6 m/s   Mn Grad: 7.37 mmHg   LUCAS(VTI): 0.93 cm2   Ao Stenosis Dimen Idx: 0.33   MV PHT: 51.71 ms   Diastology   ----------   MV E: 1.27 m/s   MV A: 0.42 m/s   MV E/A: 3.01   MV Dec T: 175.64 ms   E' Lat: 9.29 cm/s   E' Med: 3.93 cm/s   E/Lat E': 13.66   E/Med E': 32.29   TR Lee: 1.72 m/s     Electronically signed by: Sudheer Lee DO   01/08/2025 11:49 AM   Impressions   -----------     1.The estimated LV ejection fraction is 25 %     2.There is moderate to severe global hypokinesis     3.Diastolic function is indeterminate.     4.There is mild aortic valve sclerosis without significant stenosis     5.There is severe mitral regurgitation     6.Estimated RVSP systolic pressure is 37.13 mmHg     7.Compared to the report from prior study dated 04/04/2022, the   severity of mitral regurgitation has increased     Findings   --------     Left Ventricle   The estimated LV ejection fraction is 25 %. The calculated LV ejection   fraction (MOD, Biplane) is 29.99 %. LV chamber is mildly dilated.   There is mild concentric LV hypertrophy. LV systolic function is   normal. There is moderate to severe global hypokinesis. Diastolic   function is indeterminate.     Right Ventricle   RV size is moderately dilated. The RV systolic function is normal.     Septum   There is no atrial septal defect (ASD). There is no ventricular septal   defect (VSD).     Left Atrium   LA  chamber size is normal. LA diameter is 3.81 cm.     Right Atrium   RA chamber size is normal.     Aortic Valve   There is a trileaflet aortic valve. There is mild aortic valve   sclerosis without significant stenosis. There is no aortic   regurgitation. Aortic valve area by VMax: 1.02 cm2. Aortic valve area   by VTI: 0.93 cm2. AV Mean gradient: 7.37 mmHg. AV Peak gradient: 13.48   mmHg.     Mitral Valve   Mitral valve is not well visualized. There is severe mitral   regurgitation. There is no mitral stenosis.     Tricuspid Valve   Tricuspid valve is structurally normal. There is mild tricuspid   regurgitation. The estimated RV systolic pressure is normal. Estimated   RVSP systolic pressure is 37.13 mmHg. There is no tricuspid valve   stenosis.     Pulmonary Valve   Pulmonic valve is not well visualized.     Pericardium   The pericardium is normal. There is no pericardial effusion present.     Aorta   The size of the visualized portion of aortic root is within normal   limits. The thoracic aorta is not well visualized. The ascending aorta   is not well visualized.     Venous   The inferior vena cava dimension is normal.     Thrombus/Mass   There is no intracardiac mass or thrombus identified.     Echo Dimensions   --------------     LA Dimen (2D): 3.81 cm   IVS(D) (2D): 1.16 cm   LVPW(D) (2D): 1.16 cm   LV(D) (2D): 6.14 cm   LV(S) (2D): 4.56 cm   RV(D (2D): 3.7 cm   LVOT (2D): 2 cm   EF Teich (2D): 50 %   EF Teich (M-Mode): 25 %   FS (2D): 26 %   RVSP (Doppler): 37.13 mmHg   Doppler   -------   AVvel: 1.84 m/s   Pk Grad: 13.48 mmHg   LUCAS(pk): 1.02 cm2   PV Lee: 0.69 m/s   LVOTvel: 0.6 m/s   Mn Grad: 7.37 mmHg   LUCAS(VTI): 0.93 cm2   Ao Stenosis Dimen Idx: 0.33   MV PHT: 51.71 ms   Diastology   ----------   MV E: 1.27 m/s   MV A: 0.42 m/s   MV E/A: 3.01   MV Dec T: 175.64 ms   E' Lat: 9.29 cm/s   E' Med: 3.93 cm/s   E/Lat E': 13.66   E/Med E': 32.29   TR Lee: 1.72 m/s     Electronically signed by: Sudheer Lee DO    01/08/2025 11:49 AM     CURRENT/PREVIOUS VISIT EKG  Results for orders placed or performed during the hospital encounter of 02/11/25   EKG 12-lead    Collection Time: 02/14/25 10:19 AM   Result Value Ref Range    QRS Duration 108 ms    OHS QTC Calculation 504 ms    Narrative    Test Reason : Z13.6,    Vent. Rate :  93 BPM     Atrial Rate :  93 BPM     P-R Int : 186 ms          QRS Dur : 108 ms      QT Int : 406 ms       P-R-T Axes :  64  41  63 degrees    QTcB Int : 504 ms    Sinus rhythm with Premature atrial complexes  Nonspecific T wave abnormality  Prolonged QT  Abnormal ECG  When compared with ECG of 11-Feb-2025 17:14,  Sinus rhythm has replaced Atrial fibrillation  Questionable change in The axis  Nonspecific T wave abnormality, improved in Inferior leads  Nonspecific T wave abnormality, improved in Anterior leads  Confirmed by Sergio Baker ([id number]) on 2/17/2025 11:00:12 AM    Referred By: GONZÁLEZREFERRAL SELF           Confirmed By: Sergio Baker           ASSESSMENT/PLAN:     Active Hospital Problems    Diagnosis    *Acute on chronic congestive heart failure    PAD (peripheral artery disease)    Acute osteomyelitis    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  CXR in the morning will assist with decision to continue dobutamine gtt and/ or Lasix gtt. Continue both for now.  Strict I/Os. Daily weights. Low sodium diet.  Continue spironolactone 25 mg. Will need other GDMT as BP tolerates after dobutamine is off.  Continue amiodarone 200 mg BID and therapeutic Lovenox  Continue oral ferrous sulfate   Trend labs.      Modesta Erickson,  AGACNP-BC  Department of Cardiology  Date of Service: 02/18/2025      I have personally interviewed and examined the patient, I have reviewed the Nurse Practitioner's history and physical, assessment, and plan. I have personally evaluated the patient at bedside and agree with the findings and made appropriate changes as necessary in recommendations.    PATIENT'S FLUID STATUS IS MUCH IMPROVED TODAY HE IS CURRENTLY RESTING COMFORTABLY ON ROOM AIR  SHE IS IS CVP IS ABOUT 4 CM AT 45°  LUNGS MUCH IMPROVED  IS MINIMAL EXTREMITY EDEMA    RECHECK CHEST X-RAY LABS IN THE MORNING.  WEAN OFF IV TREATMENT AS TOLERATED  INTRAVENOUS IRON GIVEN FOR ANEMIA  INA Baker MD  Department of Cardiology  [facility]  02/18/2025

## 2025-02-18 NOTE — PROGRESS NOTES
[facility]  Progress Note    Patient Name: Juan C Sanchez  MRN: [id number]  Patient Class: IP- Inpatient   Admission Date: 2/11/2025  Length of Stay: 7 days  Attending Physician: Livia Alexandre MD  Primary Care Provider: Katlin Graf NP        Subjective     Principal Problem:Acute on chronic congestive heart failure        HPI:  Mr. Sanchez is a 69 yr old male with a hx of HTN, MR, PAF, chronic systolic CHF with EF of 25, OA, GERD, nonischemic cardiomyopathy, anemia who presented to the ED with a chief complaint of shortness of breath, leg swelling and hypotension.  Patient states that he had PT come to his home today and he was found to be hypotensive and advised to come to the emergency room.  Patient endorses shortness of breath as well as leg swelling, malaise, fatigue, occasional chills, productive cough with milky sputum, upper abdominal pain, occasional nausea, decreased urine output, and positional lightheadedness.  He states that these symptoms have been progressively worsening over the last 1-2 months possibly even longer  He states that his symptoms are worse when lying flat as well as with exertion and better with rest and while sitting up.  He states that he has been taking his home medications without improvement of his symptoms.  He does endorse that most of all of his contacts recently have been sick. He denies any home oxygen use.  He does endorse occasional PND and states that he has been having to sit to sleep.  He denies tobacco and recreational drug use and states he seldomly drinks alcohol.  He denies fever, chest pain, vomiting, diarrhea, dysuria, hematuria, dizziness, and syncope.    Upon arrival to ED, patient afebrile, HR of 109, RR of 24, BP of 95/62, satting 98% on RA.  Workup in the ED revealed RBC of 4.19, H/H of 10/32.8, sodium of 134, pCO2 of 31, GFR of 55.1, corrected calcium of 9, albumin of 3.1, BNP of 3163, troponin of 21.3, lactic acid  of 2.1.  Blood cultures pending.  CXR shows enlarged cardiac silhouette with pulmonary vascular congestion.  Bilateral pleural effusions.  Mild bibasilar airspace disease and or atelectasis.  Left foot x-ray shows osteopenia.  No overt erosions or osseous destruction.  No acute fracture or dislocation.  Mild multifocal midfoot arthritis.  Forefoot swelling.  Patient was given Lasix 20 mg IV, Zosyn 4.5 g IV, 500 mL NS bolus, vancomycin 20 mg/kg while in the ED. discussed case with ED provider and patient will be admitted under hospital medicine services for further management.    Overview/Hospital Course:  Juan C Sanchez is a 69 year old male with a past medical history of HFrEF, Afib, PAD, anemia, obesity, and GERD who presented with an acute on chronic HFrEF (EF 20-25%) exacerbation as well as Afib with RVR. He is being diuresed with a Lasix infusion as well as dobutamine. Cardiology has been consulted.  Underwent ANGEL cardioversion 2/14. His course was also complicated by a third L toe acute osteomyelitis seen on MRI. Podiatry and ID have been consulted.  He underwent L 3rd toe amputation 2/14. He is on empiric vancomycin and cefepime. Vascular Surgery has been consulted as well and will plan for angiogram of the lower extremities 2/17.    Interval History:  P patient seen and examined on morning multidisciplinary rounds.  Feeling well today and is alert and interactive on rounds.  Remains on Lasix and dobutamine drips.  Podiatry note reviewed      Review of Systems   Constitutional:  Positive for activity change.   Respiratory:  Negative for shortness of breath.    Cardiovascular:  Positive for leg swelling. Negative for chest pain and palpitations.   Gastrointestinal:  Negative for nausea and vomiting.   Skin:  Positive for wound.     Objective:     Vital Signs (Most Recent):  Temp: 97.9 °F (36.6 °C) (02/18/25 0800)  Pulse: 88 (02/18/25 0800)  Resp: 18 (02/18/25 0835)  BP: 128/83 (02/18/25 0800)  SpO2: 95 %  (02/18/25 0800) Vital Signs (24h Range):  Temp:  [97.5 °F (36.4 °C)-98.5 °F (36.9 °C)] 97.9 °F (36.6 °C)  Pulse:  [71-88] 88  Resp:  [9-23] 18  SpO2:  [86 %-100 %] 95 %  BP: ()/(59-88) 128/83     Weight: 87.9 kg (193 lb 12.6 oz)  Body mass index is 27.03 kg/m².    Intake/Output Summary (Last 24 hours) at 2/18/2025 1027  Last data filed at 2/18/2025 0239  Gross per 24 hour   Intake 120 ml   Output 1750 ml   Net -1630 ml         Physical Exam  Vitals and nursing note reviewed.   Constitutional:       General: He is not in acute distress.  HENT:      Head: Normocephalic and atraumatic.      Right Ear: External ear normal.      Left Ear: External ear normal.      Nose: Nose normal.      Mouth/Throat:      Mouth: Mucous membranes are moist.   Eyes:      Extraocular Movements: Extraocular movements intact.      Conjunctiva/sclera: Conjunctivae normal.   Cardiovascular:      Rate and Rhythm: Normal rate and regular rhythm.      Pulses: Normal pulses.      Heart sounds: Normal heart sounds.   Pulmonary:      Effort: Pulmonary effort is normal.      Breath sounds: Rales present.      Comments: RA.  Abdominal:      General: Bowel sounds are normal. There is no distension.      Palpations: Abdomen is soft.      Tenderness: There is no abdominal tenderness.   Genitourinary:     Comments: Meredith.  Musculoskeletal:      Cervical back: Normal range of motion and neck supple.      Right lower leg: Edema present.      Left lower leg: Edema present.   Skin:     General: Skin is warm and dry.      Comments: See pictures regarding wounds.  Postoperative dressing not removed   Neurological:      Mental Status: He is alert. Mental status is at baseline.   Psychiatric:         Behavior: Behavior normal.             Significant Labs: All pertinent labs within the past 24 hours have been reviewed.    Significant Imaging: I have reviewed all pertinent imaging results/findings within the past 24 hours.    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 L third toe.  -ID consulted  -Podiatry consulted  -Empiric vancomycin and cefepime  -status post third toe amputation (L) 2/14  -bone culture with a presumptive Proteus and Enterococcus      PAD (peripheral artery disease)  -No intervention indicated per Vascular Surgery at this time  -underwent angiogram 2/17      Anemia  Ferritin at lower limit of normal.  -Trend Hgb with CBC    Paroxysmal atrial fibrillation  -Telemetry  -Lovenox  -Hold metoprolol while on dobutamine  -Continue home amiodarone  -ANGEL cardioversion per Cardiology 2/14    MR (mitral regurgitation)  Seen on TTE at OSH.  -Cardiology consulted      Hypertension  -Home medications held  -Continue to monitor      GERD (gastroesophageal reflux disease)  -PPI        VTE Risk Mitigation (From admission, onward)           Ordered     enoxaparin injection 100 mg  Every 12 hours (non-standard times)         02/12/25 1622     IP VTE HIGH RISK PATIENT  Once         02/11/25 2331     Place sequential compression device  Until discontinued         02/11/25 2331                    Discharge Planning   PHILIP: 2/21/2025     Code Status: Full Code   Medical Readiness for Discharge Date:   Discharge Plan A: Skilled Nursing Facility   Discharge Delays: None known at this time            Please place Justification for DME        Livia Alexandre MD  Department of Hospital Medicine   [facility]

## 2025-02-18 NOTE — PLAN OF CARE
Patient continues with Lasix and dobutamine IV, had angiogram yesterday.  DC planning for SNF.       02/18/25 1516   Discharge Reassessment   Assessment Type Discharge Planning Reassessment   Did the patient's condition or plan change since previous assessment? Yes

## 2025-02-18 NOTE — ASSESSMENT & PLAN NOTE
continue xeroform to incision site, aquacel AG weaved in between digits with wounds, followed by conform, followed by lightly wrapped ace wrap. Three times a week changes.    Patient can WBAT with camwalker boot on at all times while walking or transferring.    Infectious disease following    Carlos A and protein drinks     Counseled patient on increasing protein intake, not getting wound wet, keeping dressing clean dry and intact, following a healthy diet, elevating legs when able, removing pressure from wound

## 2025-02-18 NOTE — PLAN OF CARE
Problem: Physical Therapy  Goal: Physical Therapy Goal  Description: Goals to be met by: 3/12/25     Patient will increase functional independence with mobility by performin. Supine to sit with Modified Harper  2. Sit to supine with Modified Harper  3. Sit to stand transfer with Supervision and using Rolling Walker  4. Gait  x 100 feet with Stand-by Assistance using Rolling Walker.   5. Ascend/descend 3 stair with bilateral Handrails Contact Guard Assistance using No Assistive Device.     Outcome: Progressing

## 2025-02-18 NOTE — PROGRESS NOTES
[facility]  Podiatry  Progress Note    Patient Name: Juan C Sanchez  MRN: [id number]  Admission Date: 2/11/2025  Hospital Length of Stay: 7 days  Attending Physician: Livia Alexandre MD  Primary Care Provider: Katlin Graf NP     Subjective:     Interval History:  Patient resting in bed.  Reviewed pictures from dressing change yesterday in chart.  Patient had vascular intervention yesterday.    Patient states he does have pain to his foot.  He is receiving pain pills.    Scheduled Meds:   acetaZOLAMIDE  250 mg Oral TID    amiodarone  200 mg Oral BID    ammonium lactate   Topical (Top) BID    ampicillin-sulbactam  3 g Intravenous Q4H    enoxparin  1 mg/kg Subcutaneous Q12H    ferrous sulfate  1 tablet Oral Daily    mupirocin   Nasal BID    pantoprazole  40 mg Oral Daily    phenazopyridine  100 mg Oral TID WM    spironolactone  25 mg Oral Daily    tamsulosin  0.4 mg Oral Daily     Continuous Infusions:   DOBUTamine IV infusion (non-titrating)  2.5 mcg/kg/min Intravenous Continuous 3.7 mL/hr at 02/17/25 1912 2.5 mcg/kg/min at 02/17/25 1912    furosemide (LASIX) 2 mg/mL continuous infusion (non-titrating)  2.5 mg/hr Intravenous Continuous 1.25 mL/hr at 02/16/25 2212 2.5 mg/hr at 02/16/25 2212     PRN Meds:  Current Facility-Administered Medications:     acetaminophen, 650 mg, Oral, Q4H PRN    aluminum-magnesium hydroxide-simethicone, 30 mL, Oral, QID PRN    magnesium oxide, 800 mg, Oral, PRN    magnesium oxide, 800 mg, Oral, PRN    melatonin, 9 mg, Oral, Nightly PRN    metoprolol, 5 mg, Intravenous, Q5 Min PRN    midodrine, 2.5 mg, Oral, TID PRN    naloxone, 0.02 mg, Intravenous, PRN    ondansetron, 4 mg, Intravenous, Q6H PRN    oxyCODONE-acetaminophen, 1 tablet, Oral, Q6H PRN    potassium bicarbonate, 35 mEq, Oral, PRN    potassium bicarbonate, 50 mEq, Oral, PRN    potassium bicarbonate, 60 mEq, Oral, PRN    potassium, sodium phosphates, 2 packet, Oral, PRN    potassium, sodium phosphates, 2  packet, Oral, PRN    potassium, sodium phosphates, 2 packet, Oral, PRN    senna-docusate 8.6-50 mg, 1 tablet, Oral, BID PRN    traMADoL, 50 mg, Oral, Q4H PRN    Review of Systems  Objective:     Vital Signs (Most Recent):  Temp: 97.9 °F (36.6 °C) (02/18/25 0800)  Pulse: 88 (02/18/25 0800)  Resp: 18 (02/18/25 0835)  BP: 128/83 (02/18/25 0800)  SpO2: 95 % (02/18/25 0800) Vital Signs (24h Range):  Temp:  [97.5 °F (36.4 °C)-98.5 °F (36.9 °C)] 97.9 °F (36.6 °C)  Pulse:  [71-88] 88  Resp:  [9-23] 18  SpO2:  [86 %-100 %] 95 %  BP: ()/(59-88) 128/83     Weight: 87.9 kg (193 lb 12.6 oz)  Body mass index is 27.03 kg/m².    Foot Exam              Laboratory:  All pertinent labs reviewed within the last 24 hours.    Diagnostic Results:  I have reviewed all pertinent imaging results/findings within the past 24 hours.    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 Three times a week changes.    Patient can WBAT with camwalker boot on at all times while walking or transferring.    Infectious disease following    Carlos A and protein drinks     Counseled patient on increasing protein intake, not getting wound wet, keeping dressing clean dry and intact, following a healthy diet, elevating legs when able, removing pressure from wound          Loren España DPM  Podiatry  [facility]

## 2025-02-18 NOTE — PROGRESS NOTES
[facility]  Progress Note  PATIENT NAME: Juan C Sanchez  MRN: [id number]  TODAY'S DATE: 02/18/2025  ADMIT DATE: 2/11/2025    Patient's Chief Complaint:  Acute on chronic congestive heart failure    History of Present Illness:  Juan C Sanchez is a 69 y.o. male with a history of hypertension, mitral regurgitation, HFrEF (EF 25%), atrial fibrillation (on home amiodarone and Eliquis), PAD, anemia, obesity, and GERD who presented to the ED on 2/11/2025 with complaints of shortness of breath and bilateral lower extremity edema. He was admitted for an acute on chronic HFrEF exacerbation and afib with RVR. Cardiology has been consulted and he is currently being treated with Lasix 40mg IVP BID and metoprolol 25mg PO 4 times daily and IV PRN. Per the patient, he has been experiencing severe lower extremity edema for the past 3-4 months that has been worked up outpatient. The workup included a lower extremity CTA which illustrated right lower extremity with high-grade stenosis anterior tibial artery nonostial origin tapering to occlusion mid calf with posterior tibial artery dominance and small caliber peroneal artery to near the ankle. This was noticed during chart review at admit and vascular surgery was consulted.  The patient denies lower extremity pain, just tightness and itching secondary to the swelling. He reports that his leg swelling is slightly improved with leg elevation but quickly returns when they are dependent again. He denies use of compression stockings.     Last 24 hour interval:  2/18/2025      Status post LLE angiogram with Dr. Faith yesterday, no interventions completed. Right groin site CDI with no hematoma.                             Past Medical History:   Diagnosis Date    Hypertension      Past Surgical History:   Procedure Laterality Date    ANGIOGRAPHY OF LOWER EXTREMITY Left 2/17/2025    Procedure: Angiogram Extremity Unilateral;  Surgeon: Vianney  MD Valeri;  Location: [facility] CATH/EP LAB;  Service: Vascular;  Laterality: Left;    ORIF closed comminuted displaced intra-articular fx distal left radius & application of external fixator Left 05/28/2018    TOE AMPUTATION Left 2/14/2025    Procedure: AMPUTATION, TOE;  Surgeon: Jerry España DPM;  Location: [facility] OR;  Service: Podiatry;  Laterality: Left;     Review of patient's allergies indicates:   Allergen Reactions    Gabapentin Other (See Comments)     Hypersomnia, nightmares    Morphine Other (See Comments)     Pt states systemic cramping     No family history on file.  Social History     Tobacco Use    Smoking status: Never    Smokeless tobacco: Never   Substance Use Topics    Alcohol use: Yes     Comment: social    Drug use: No        Review of Systems:  Constitutional:  Negative for chills and fever.   Respiratory:  Positive for cough, sputum production and shortness of breath. Negative for wheezing.    Cardiovascular:  Positive for palpitations and leg swelling. Negative for chest pain.   Gastrointestinal:  Negative for diarrhea, nausea and vomiting.   Skin:  Positive for itching (LE itching).   Neurological:  Positive for weakness. Negative for dizziness and headaches.      OBJECTIVE   VITAL SIGNS (Most Recent)  Temp: 97.7 °F (36.5 °C) (02/18/25 1223)  Pulse: 98 (02/18/25 1223)  Resp: 18 (02/18/25 1223)  BP: 131/81 (02/18/25 1223)  SpO2: 100 % (02/18/25 1223)    I & O (Last 24H):  Intake/Output Summary (Last 24 hours) at 2/18/2025 1347  Last data filed at 2/18/2025 1130  Gross per 24 hour   Intake 240 ml   Output 2400 ml   Net -2160 ml       PHYSICAL EXAM  Constitutional: Awake and oriented to person, place, and time. Appears well-developed and well-nourished. In no apparent distress.  HEENT: Normocephalic. Pupils normal and conjunctivae normal. Mucous membranes normal, no cyanosis or icterus, trachea central, no pallor or icterus is noted.  Neck: Normal range of motion. Neck supple. No JVD present. No  bruit present.   Cardiovascular: Afib. Normal heart sounds. S1, S2.   Pulmonary/Chest: Effort normal. No respiratory distress. Dyspnea after repositioning in bed. Coarse breath sounds.   Abdominal: Obese. Soft. Bowel sounds are normal.   Urinary: No livingston catheter present  Skin: Skin is warm and dry. Bilateral lower extremity erythema calf down.  Extremities: Left foot dressing in place. Bilateral LE pitting edema. Gauze and tegaderm to right groin with no hematoma or pulsatile bulge.   Peripheral vascular system: Palpable bilateral DP pulses.     INPATIENT MEDS:    DOBUTamine IV infusion (non-titrating)  2.5 mcg/kg/min Intravenous Continuous 3.7 mL/hr at 02/17/25 1912 2.5 mcg/kg/min at 02/17/25 1912    furosemide (LASIX) 2 mg/mL continuous infusion (non-titrating)  2.5 mg/hr Intravenous Continuous 1.25 mL/hr at 02/16/25 2212 2.5 mg/hr at 02/16/25 2212      acetaZOLAMIDE  250 mg Oral TID    amiodarone  200 mg Oral BID    ammonium lactate   Topical (Top) BID    ampicillin-sulbactam  3 g Intravenous Q4H    enoxparin  1 mg/kg Subcutaneous Q12H    ferrous sulfate  1 tablet Oral Daily    mupirocin   Nasal BID    pantoprazole  40 mg Oral Daily    phenazopyridine  100 mg Oral TID WM    spironolactone  25 mg Oral Daily    tamsulosin  0.4 mg Oral Daily       Current Facility-Administered Medications:     acetaminophen, 650 mg, Oral, Q4H PRN    aluminum-magnesium hydroxide-simethicone, 30 mL, Oral, QID PRN    magnesium oxide, 800 mg, Oral, PRN    magnesium oxide, 800 mg, Oral, PRN    melatonin, 9 mg, Oral, Nightly PRN    metoprolol, 5 mg, Intravenous, Q5 Min PRN    midodrine, 2.5 mg, Oral, TID PRN    naloxone, 0.02 mg, Intravenous, PRN    ondansetron, 4 mg, Intravenous, Q6H PRN    oxyCODONE-acetaminophen, 1 tablet, Oral, Q6H PRN    potassium bicarbonate, 35 mEq, Oral, PRN    potassium bicarbonate, 50 mEq, Oral, PRN    potassium bicarbonate, 60 mEq, Oral, PRN    potassium, sodium phosphates, 2 packet, Oral, PRN    potassium,  "sodium phosphates, 2 packet, Oral, PRN    potassium, sodium phosphates, 2 packet, Oral, PRN    senna-docusate 8.6-50 mg, 1 tablet, Oral, BID PRN    traMADoL, 50 mg, Oral, Q4H PRN    GI Prophylaxis:  PPI:proton pump inhibitor per orders  DVT Prophylaxis:  Anticoagulants   Medication Route Frequency    enoxaparin injection 100 mg Subcutaneous Q12H       CURRENT CONSULTS:  IP CONSULT TO SOCIAL WORK/CASE MANAGEMENT  IP CONSULT TO REGISTERED DIETITIAN/NUTRITIONIST  IP CONSULT TO CARDIOLOGY  WOUND CARE CONSULT  IP CONSULT TO VASCULAR SURGERY  IP CONSULT TO PODIATRY  IP CONSULT TO VASCULAR SURGERY  IP CONSULT TO INFECTIOUS DISEASES  IP CONSULT TO INFECTIOUS DISEASES  WOUND CARE CONSULT  IP CONSULT TO MIDLINE TEAM  WOUND CARE CONSULT  IP CONSULT TO PICC TEAM (NIAS)  IP CONSULT TO ANESTHESIOLOGY    LABS AND DIAGNOSTICS   CBC LAST 3 DAYS  Recent Labs   Lab 02/16/25  0351 02/17/25 0449 02/18/25  0433   WBC 7.40 7.56 6.27   HGB 9.7* 9.5* 9.7*   HCT 32.2* 31.8* 32.7*    272 308       COAGULATION LAST 3 DAYS  No results for input(s): "LABPT", "INR", "APTT" in the last 168 hours.    CHEMISTRY LAST 3 DAYS  Recent Labs   Lab 02/16/25  0351 02/17/25  0449 02/18/25  0433   * 132* 134*   K 4.0 3.8 3.9   CO2 30* 34* 34*   ANIONGAP 10 6* 7*   BUN 26* 27* 23   CREATININE 1.2 1.1 1.2   GLU 87 89 86   CALCIUM 8.7 8.8 9.1   MG 2.0 2.1 2.2   ALBUMIN 3.3* 3.1* 3.2*   PROT 7.3 7.2 7.4   ALKPHOS 75 80 73   ALT 6* 5* 5*   AST 12 12 12   BILITOT 0.9 0.6 0.7       CARDIAC PROFILE LAST 3 DAYS  Recent Labs   Lab 02/11/25  1818   BNP 3,163*       MOST RECENT IMAGING  Cardiac catheterization  Procedure performed in the Invasive Lab    - See Procedure Log link below for nursing documentation    - See OpNote on Surgeries Tab for physician findings    - See Imaging Tab for radiologist dictation    Results for orders placed or performed during the hospital encounter of 02/11/25 (from the past 2160 hours)   CT Abdomen Pelvis  Without Contrast "    Narrative    EXAMINATION:  CT ABDOMEN PELVIS WITHOUT CONTRAST    CLINICAL HISTORY:  Abdominal pain, acute, nonlocalized;    TECHNIQUE:  Axial CT imaging obtained through the abdomen and pelvis without contrast, coronal and sagittal reformatted images    CMS Mandated Quality Data-CT Radiation Dose-436    All CT scans at this facility dose modulation, iterative reconstruction, and or weight-based dosing when appropriate to reduce radiation dose to as low as reasonably achievable.    COMPARISON:  None    FINDINGS:  Imaging through the lower thorax shows interlobular septal thickening and small volume pleural fluid bilaterally, potentially on the basis of pulmonary edema.  There is diffuse body wall edema.  Bone window images show no acute or aggressive osseous abnormality.  Thoracolumbar degenerative changes and scoliosis.    Low-attenuation of blood pool relative to myocardium suggesting anemia.    On this noncontrast exam, no focal hepatic or splenic lesion.  Somewhat nodular hepatic contour raising the possibility of cirrhosis.  Cholelithiasis.  No intrahepatic or extrahepatic bile duct dilation.  Small volume perihepatic fluid and perisplenic fluid.  Mesenteric edema.  No focal pancreatic lesion.  No adrenal lesion.  Left upper pole renal cysts.  No renal calculi.  Ureters are normal in caliber.  Urinary bladder is unremarkable.    Stomach is unremarkable.  No evidence of small-bowel obstruction.  No evidence of colitis.  Distal colonic diverticula.    Aortoiliac atherosclerotic calcification.  Prominent retroperitoneal and mesenteric lymph nodes.  Prominent linn hepatis and portacaval lymph nodes.  Small volume free fluid along the pericolic gutters and in the pelvis.  Fluid in the left inguinal canal.      Impression    Small bilateral pleural effusions, pulmonary edema, as well as diffuse body wall/mesenteric edema.    Small volume free fluid in the abdomen and pelvis.    Nodular hepatic contour suggestive  of cirrhosis.    Cholelithiasis.    Diverticulosis of the distal colon.    CT findings suggestive of anemia.    Atherosclerosis, fluid in the left inguinal canal, and other incidental findings as above.      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 Left leg spectral Doppler waveforms and color flow interrogation normal. Right leg spectral Doppler wave forms and color flow interrogation normal. Some mild atherosclerosis noted.   - CTA lower extremity 1/14/2025: Right lower extremity with high-grade stenosis anterior tibial artery nonostial origin tapering to occlusion mid calf with posterior tibial artery dominance and small caliber peroneal artery to near the ankle. Faint opacification of PTA at the ankle. Slow flow could be partial etiology of nonvisualization of distal vessels. Clinical correlation needed. Left lower extremity without significant stenosis with three-vessel opacification to the ankle.     - Compression stockings and leg elevation  - Consider outpatient venous insufficiency ultrasound. Unfortunately, this study cannot be completed while inpatient.  - Continue diuretics per cardiology   - Continue antibiotics per ID   - Palpable bilateral DP pulses  - OR on 2/14/2025 for  amputation of left 3rd toe with podiatry   - LLE angiogram on 2/17/2025 with Dr. Faith to ensure patient has adequate blood flow to heal amputation. No interventions done.     Vascular will sign off. Please re-consult if any additional concerns. Thank you.     Case and plan of care discussed with MD. Additional recommendations from Dr. Faith to follow.     Vianca Medina NP  [facility]   Date of Service: 02/18/2025

## 2025-02-18 NOTE — PROGRESS NOTES
"[facility]   Department of Infectious Disease  Progress Note        PATIENT NAME: Juan C Sanchez  MRN: [id number]  TODAY'S DATE: 02/18/2025  ADMIT DATE: 2/11/2025  LOS: 7 days    CHIEF COMPLAINT: Hypotension (Patient had PT come into the home today; found BP to be low and referred to ER.  Pt "feels bad"  since this morning.  +SOB; increased swelling in both legs.  Recently hospitalized for CHF )      PRINCIPLE PROBLEM: Acute on chronic congestive heart failure    INTERVAL HISTORY      02/17/2025: Seen and evaluated at bedside.  No acute issues overnight.      02/18/2024:  He had angiogram 02/17/2025.  No intervention was done.  No other acute issues overnight.  Pending disposition.  Spot x-ray right 5th toe for documented nondisplaced fracture through the base of the proximal phalanx.    Antibiotics (From admission, onward)      Start     Stop Route Frequency Ordered    02/16/25 1730  ampicillin-sulbactam (UNASYN) 3 g in 0.9% NaCl 100 mL IVPB (MB+)         -- IV Every 4 hours (non-standard times) 02/16/25 1724    02/14/25 0900  mupirocin 2 % ointment         02/19/25 0859 Nasl 2 times daily 02/14/25 0725          Antifungals (From admission, onward)      None           Antivirals (From admission, onward)      None            ASSESSMENT and PLAN      1. Left diabetic foot ulcers with 3rd toe osteomyelitis.  He has had amputation of the 3rd toe and debridement of 4th and 5th toe ulcers.  Osteomyelitis at this site has been surgically dealt with.  He will need about 3-4 weeks of antibiotics for associated wound infection.    2. Abnormal right foot MRI suggesting 5th toe fracture.  Spot 5th toe x-rays confirmed fracture.  Management as per podiatrist.    3. Acute exacerbation of CHF.  Management as per hospitalist and cardiologist.      4. Cirrhosis documented on CT abdomen and pelvis.  As per hospitalist.  Check HCV and HBV screen.    5. Health maintenance.  Immune to HBV from natural infection.  HCV " antibody and HIV screen negative.    RECOMMENDATIONS  Continue Unasyn to complete 4 week course of antibiotics through 03/03/2025.  Check CBC and BMP every Monday while on IV antibiotics  Can transition Unasyn to oral Augmentin 875 mg b.i.d. if patient discharges from rehab prior to 03/03/2025  Follow up with ID in 2-3 weeks    Thank you for involving ID in the care of this patient.  ID service will drop off today and plan to see in 2-3 weeks for follow up.  Please call with any questions.  Please send Epic secure chat with any questions.      SUBJECTIVE    He has a history of CAD, bone CHF with EF 20-25%, PAF, hypertension, GERD and CAD.  Recently had CTA bilateral lower extremities 01/14/2025 that demonstrated a high-grade CLARISA occlusion but no abnormality on the left lower extremity.  Seen in the ED on 02/06/2025 and diagnosed with acute decompensated CHF.  Plan was to admit him for Candida evaluation by patient left ER against medical advice.    Sent back to the ED 02/11/2025 by home PT OT when he was noted to be hypotensive.  Admitted for management of CHF.  Noted to have ulcer left 3rd and 5th interdigital spaces.  X-ray foot unremarkable.  MRI left foot documented osteomyelitis of the 3rd toe.  Seen by podiatrist and cultures were done 02/13/2025.  Went to OR 02/14/2025 and had amputation of 3rd toe and debridement of the 5th 4th toe wounds.    MRI right foot read as possible 5th toe fracture.  Follow up X-ray right foot foot did not show any fracture.  CRP 2.2 mg/dL, ESR 9    Antimicrobials   Vancomycin:  02/11/2025-02/16/2025   Zosyn:  02/11/2025 x1 day   Cefepime:  02/13/2025-02/16/2025   Unasyn: 02/16/2025-    Microbiology  Blood culture 02/07/2024: NGTD   Left foot wound culture 02/10/2025:  Sensitive E faecalis and Proteus mirabilis in broth only    Review of Systems  Negative except as stated above in Interval History     OBJECTIVE   Temp:  [97.5 °F (36.4 °C)-98.5 °F (36.9 °C)] 97.9 °F (36.6 °C)  Pulse:   [71-88] 88  Resp:  [9-23] 18  SpO2:  [86 %-100 %] 95 %  BP: ()/(59-88) 128/83  Temp:  [97.5 °F (36.4 °C)-98.5 °F (36.9 °C)]   Temp: 97.9 °F (36.6 °C) (02/18/25 0800)  Pulse: 88 (02/18/25 0800)  Resp: 18 (02/18/25 0800)  BP: 128/83 (02/18/25 0800)  SpO2: 95 % (02/18/25 0800)    Intake/Output Summary (Last 24 hours) at 2/18/2025 0824  Last data filed at 2/18/2025 0239  Gross per 24 hour   Intake 120 ml   Output 2225 ml   Net -2105 ml       Physical Exam  General:  Elderly man lying quietly in bed.  He looks chronically ill but in no acute distress   Left foot:  Edema of foot and leg with fading erythema.  Tenderness to touch.  Dressing over sutured wounds not taken down  Right lower extremity foot:  Small dry scabs.  No ulcer.  No erythema.  CVS: S1 and 2 heard  Respiratory:  Decreased breath sounds both mid and lower zones posteriorly with minimal cramps   Abdomen: Full, soft, nontender, no palpable organomegaly   Skin:  No rash appreciated   CNS: No focal deficits   Musculoskeletal: No joint effusions appreciated    VAD:  PIV  ISOLATION:  None    WOUNDS:  Left foot wound    Significant Labs: All pertinent labs within the past 24 hours have been reviewed.    CBC LAST 7 DAYS  Recent Labs   Lab 02/12/25  0158 02/13/25  0346 02/14/25  0411 02/15/25  0418 02/16/25  0351 02/17/25  0449 02/18/25  0433   WBC 8.15 6.66 5.56 7.42 7.40 7.56 6.27   RBC 4.30* 4.12* 4.26* 4.30* 4.18* 4.14* 4.20*   HGB 10.2* 9.7* 10.0* 10.2* 9.7* 9.5* 9.7*   HCT 33.8* 32.0* 32.5* 33.7* 32.2* 31.8* 32.7*   MCV 79* 78* 76* 78* 77* 77* 78*   MCH 23.7* 23.5* 23.5* 23.7* 23.2* 22.9* 23.1*   MCHC 30.2* 30.3* 30.8* 30.3* 30.1* 29.9* 29.7*   RDW 18.4* 18.4* 18.4* 18.4* 18.1* 18.0* 18.6*    260 253 279 273 272 308   MPV 9.9 10.1 9.6 9.9 10.3 10.1 10.3   GRAN 68.8  5.6 58.0  3.9 53.2  3.0 63.6  4.7 61.8  4.6 63.8  4.8 61.8  3.9   LYMPH 18.8  1.5 25.5  1.7 29.1  1.6 20.2  1.5 19.6  1.5 16.3*  1.2 17.4*  1.1   MONO 9.3  0.8 13.2   "0.9 12.1  0.7 12.5  0.9 13.9  1.0 14.7  1.1* 15.6*  1.0   BASO 0.06 0.05 0.05 0.05 0.05 0.04 0.04   NRBC 0 0 0 0 0 0 0       CHEMISTRY LAST 7 DAYS  Recent Labs   Lab 02/12/25  0158 02/13/25  0346 02/14/25  0411 02/15/25  0418 02/16/25  0351 02/17/25  0449 02/18/25  0433   * 134* 137 134* 132* 132* 134*   K 4.2 3.5 3.7 4.8 4.0 3.8 3.9   CL 97 98 97 93* 92* 92* 93*   CO2 31* 28 32* 34* 30* 34* 34*   ANIONGAP 7* 8 8 7* 10 6* 7*   BUN 18 18 19 22 26* 27* 23   CREATININE 1.4 1.4 1.2 1.4 1.2 1.1 1.2   * 120* 87 99 87 89 86   CALCIUM 8.2* 8.1* 8.8 9.0 8.7 8.8 9.1   MG 2.0 2.0 2.0 2.0 2.0 2.1 2.2   ALBUMIN 2.9* 3.0* 3.1* 3.3* 3.3* 3.1* 3.2*   PROT 6.9 6.6 7.2 7.6 7.3 7.2 7.4   ALKPHOS 76 73 79 80 75 80 73   ALT 8* 6* 6* 6* 6* 5* 5*   AST 17 11 11 12 12 12 12   BILITOT 0.6 0.7 0.8 1.1* 0.9 0.6 0.7       Estimated Creatinine Clearance: 63.6 mL/min (based on SCr of 1.2 mg/dL).    INFLAMMATORY/PROCAL  LAST 7 DAYS  Recent Labs   Lab 02/13/25  1041   PROCAL <0.050   CRP 2.20*     No results found for: "ESR"  CRP   Date Value Ref Range Status   02/13/2025 2.20 (H) <1.00 mg/dL Final     Comment:     CRP-Normal Application expected values:   <1.0        mg/dL   Normal Range  1.0 - 5.0  mg/dL   Indicates mild inflammation  5.0 - 10.0 mg/dL   Indicates severe inflammation  >10.0        mg/dL   Represents serious processes and   frequently         indicates the presence of a bacterial   infection.          PRIOR  MICROBIOLOGY:    Susceptibility data from last 90 days.  Collected Specimen Info Organism Amp/Sulbactam Ampicillin Cefazolin Cefepime Ceftriaxone Ciprofloxacin Ertapenem Gentamicin GENTAMICIN SYNERGY SCREEN Levofloxacin Meropenem PIPERACILLIN/TAZO SUSCEPTIBILITY Tobramycin   02/13/25 Bone from Toe, Left Foot Proteus mirabilis  S  S  S  S  S  S  S  S   S  S  S  S     Enterococcus faecalis   S        S       02/11/25 Blood from Peripheral, Antecubital, Right No growth after 5 days.                02/11/25 Blood " from Peripheral, Antecubital, Left No growth after 5 days.                  Collected Specimen Info Organism Trimeth/Sulfa Vancomycin   02/13/25 Bone from Toe, Left Foot Proteus mirabilis  S      Enterococcus faecalis   S   02/11/25 Blood from Peripheral, Antecubital, Right No growth after 5 days.     02/11/25 Blood from Peripheral, Antecubital, Left No growth after 5 days.         LAST 7 DAYS MICROBIOLOGY   Microbiology Results (last 7 days)       Procedure Component Value Units Date/Time    Blood culture x two cultures. Draw prior to antibiotics. [[id number]] Collected: 02/11/25 1749    Order Status: Completed Specimen: Blood from Peripheral, Antecubital, Right Updated: 02/16/25 2032     Blood Culture, Routine No growth after 5 days.    Narrative:      Aerobic and anaerobic    Blood culture x two cultures. Draw prior to antibiotics. [[id number]] Collected: 02/11/25 1744    Order Status: Completed Specimen: Blood from Peripheral, Antecubital, Left Updated: 02/16/25 2032     Blood Culture, Routine No growth after 5 days.    Narrative:      Aerobic and anaerobic    Tissue culture [[id number]]  (Abnormal)  (Susceptibility) Collected: 02/13/25 0748    Order Status: Completed Specimen: Bone from Toe, Left Foot Updated: 02/16/25 0840     Aerobic Culture - Tissue PROTEUS MIRABILIS  From broth only        ENTEROCOCCUS FAECALIS  From broth only       Gram Stain Result No WBC's      Rare Gram positive cocci    Narrative:      Toe, Left Foot    Culture, Anaerobic [[id number]] Collected: 02/13/25 0748    Order Status: Completed Specimen: Bone from Toe, Left Foot Updated: 02/15/25 0933     Anaerobic Culture No anaerobes isolated    Narrative:      Toe, Left Foot    MRSA Screen by PCR [[id number]]  (Abnormal) Collected: 02/13/25 1054    Order Status: Completed Specimen: Nasal Swab Updated: 02/13/25 1410     MRSA SCREEN BY PCR Detected     Comment: MRSA  critical result(s) called and verbal readback obtained from   Luanne Stanley  RN on [facility] by [facility] 02/13/2025 14:05         Narrative:       MRSA  critical result(s) called and verbal readback obtained from   Luanne Stanley RN on [facility] by [facility] 02/13/2025 14:05    Aerobic culture [[id number]] Collected: 02/13/25 0748    Order Status: Canceled Specimen: Bone from Toe, Left Foot           CURRENT/PREVIOUS VISIT EKG  Results for orders placed or performed during the hospital encounter of 02/11/25   EKG 12-lead    Collection Time: 02/14/25 10:19 AM   Result Value Ref Range    QRS Duration 108 ms    [facility] QTC Calculation 504 ms    Narrative    Test Reason : Z13.6,    Vent. Rate :  93 BPM     Atrial Rate :  93 BPM     P-R Int : 186 ms          QRS Dur : 108 ms      QT Int : 406 ms       P-R-T Axes :  64  41  63 degrees    QTcB Int : 504 ms    Sinus rhythm with Premature atrial complexes  Nonspecific T wave abnormality  Prolonged QT  Abnormal ECG  When compared with ECG of 11-Feb-2025 17:14,  Sinus rhythm has replaced Atrial fibrillation  Questionable change in The axis  Nonspecific T wave abnormality, improved in Inferior leads  Nonspecific T wave abnormality, improved in Anterior leads  Confirmed by Sergio Baker ([id number]) on 2/17/2025 11:00:12 AM    Referred By: AAAREFERRAL SELF           Confirmed By: Sergio Baker       Significant Imaging: I have reviewed all relevant and available imaging results/findings within the past 24 hours.    I spent a total of 50 minutes on the day of the visit.This includes face to face time and non-face to face time preparing to see the patient (eg, review of tests), obtaining and/or reviewing separately obtained history, documenting clinical information in the electronic or other health record, independently interpreting results and communicating results to the patient/family/caregiver, or care coordinator.    Spencer Metzger MD  Date of Service: 02/18/2025      This note was created using [email or web address] voice recognition software that occasionally misinterpreted phrases or  words.

## 2025-02-18 NOTE — SUBJECTIVE & OBJECTIVE
Subjective:     Interval History:  Patient resting in bed.  Reviewed pictures from dressing change yesterday in chart.  Patient had vascular intervention yesterday.    Patient states he does have pain to his foot.  He is receiving pain pills.    Scheduled Meds:   acetaZOLAMIDE  250 mg Oral TID    amiodarone  200 mg Oral BID    ammonium lactate   Topical (Top) BID    ampicillin-sulbactam  3 g Intravenous Q4H    enoxparin  1 mg/kg Subcutaneous Q12H    ferrous sulfate  1 tablet Oral Daily    mupirocin   Nasal BID    pantoprazole  40 mg Oral Daily    phenazopyridine  100 mg Oral TID WM    spironolactone  25 mg Oral Daily    tamsulosin  0.4 mg Oral Daily     Continuous Infusions:   DOBUTamine IV infusion (non-titrating)  2.5 mcg/kg/min Intravenous Continuous 3.7 mL/hr at 02/17/25 1912 2.5 mcg/kg/min at 02/17/25 1912    furosemide (LASIX) 2 mg/mL continuous infusion (non-titrating)  2.5 mg/hr Intravenous Continuous 1.25 mL/hr at 02/16/25 2212 2.5 mg/hr at 02/16/25 2212     PRN Meds:  Current Facility-Administered Medications:     acetaminophen, 650 mg, Oral, Q4H PRN    aluminum-magnesium hydroxide-simethicone, 30 mL, Oral, QID PRN    magnesium oxide, 800 mg, Oral, PRN    magnesium oxide, 800 mg, Oral, PRN    melatonin, 9 mg, Oral, Nightly PRN    metoprolol, 5 mg, Intravenous, Q5 Min PRN    midodrine, 2.5 mg, Oral, TID PRN    naloxone, 0.02 mg, Intravenous, PRN    ondansetron, 4 mg, Intravenous, Q6H PRN    oxyCODONE-acetaminophen, 1 tablet, Oral, Q6H PRN    potassium bicarbonate, 35 mEq, Oral, PRN    potassium bicarbonate, 50 mEq, Oral, PRN    potassium bicarbonate, 60 mEq, Oral, PRN    potassium, sodium phosphates, 2 packet, Oral, PRN    potassium, sodium phosphates, 2 packet, Oral, PRN    potassium, sodium phosphates, 2 packet, Oral, PRN    senna-docusate 8.6-50 mg, 1 tablet, Oral, BID PRN    traMADoL, 50 mg, Oral, Q4H PRN    Review of Systems  Objective:     Vital Signs (Most Recent):  Temp: 97.9 °F (36.6 °C)  (02/18/25 0800)  Pulse: 88 (02/18/25 0800)  Resp: 18 (02/18/25 0835)  BP: 128/83 (02/18/25 0800)  SpO2: 95 % (02/18/25 0800) Vital Signs (24h Range):  Temp:  [97.5 °F (36.4 °C)-98.5 °F (36.9 °C)] 97.9 °F (36.6 °C)  Pulse:  [71-88] 88  Resp:  [9-23] 18  SpO2:  [86 %-100 %] 95 %  BP: ()/(59-88) 128/83     Weight: 87.9 kg (193 lb 12.6 oz)  Body mass index is 27.03 kg/m².    Foot Exam              Laboratory:  All pertinent labs reviewed within the last 24 hours.    Diagnostic Results:  I have reviewed all pertinent imaging results/findings within the past 24 hours.

## 2025-02-18 NOTE — SUBJECTIVE & OBJECTIVE
Interval History:  P patient seen and examined on morning multidisciplinary rounds.  Feeling well today and is alert and interactive on rounds.  Remains on Lasix and dobutamine drips.  Podiatry note reviewed      Review of Systems   Constitutional:  Positive for activity change.   Respiratory:  Negative for shortness of breath.    Cardiovascular:  Positive for leg swelling. Negative for chest pain and palpitations.   Gastrointestinal:  Negative for nausea and vomiting.   Skin:  Positive for wound.     Objective:     Vital Signs (Most Recent):  Temp: 97.9 °F (36.6 °C) (02/18/25 0800)  Pulse: 88 (02/18/25 0800)  Resp: 18 (02/18/25 0835)  BP: 128/83 (02/18/25 0800)  SpO2: 95 % (02/18/25 0800) Vital Signs (24h Range):  Temp:  [97.5 °F (36.4 °C)-98.5 °F (36.9 °C)] 97.9 °F (36.6 °C)  Pulse:  [71-88] 88  Resp:  [9-23] 18  SpO2:  [86 %-100 %] 95 %  BP: ()/(59-88) 128/83     Weight: 87.9 kg (193 lb 12.6 oz)  Body mass index is 27.03 kg/m².    Intake/Output Summary (Last 24 hours) at 2/18/2025 1027  Last data filed at 2/18/2025 0239  Gross per 24 hour   Intake 120 ml   Output 1750 ml   Net -1630 ml         Physical Exam  Vitals and nursing note reviewed.   Constitutional:       General: He is not in acute distress.  HENT:      Head: Normocephalic and atraumatic.      Right Ear: External ear normal.      Left Ear: External ear normal.      Nose: Nose normal.      Mouth/Throat:      Mouth: Mucous membranes are moist.   Eyes:      Extraocular Movements: Extraocular movements intact.      Conjunctiva/sclera: Conjunctivae normal.   Cardiovascular:      Rate and Rhythm: Normal rate and regular rhythm.      Pulses: Normal pulses.      Heart sounds: Normal heart sounds.   Pulmonary:      Effort: Pulmonary effort is normal.      Breath sounds: Rales present.      Comments: RA.  Abdominal:      General: Bowel sounds are normal. There is no distension.      Palpations: Abdomen is soft.      Tenderness: There is no abdominal  tenderness.   Genitourinary:     Comments: Viri.  Musculoskeletal:      Cervical back: Normal range of motion and neck supple.      Right lower leg: Edema present.      Left lower leg: Edema present.   Skin:     General: Skin is warm and dry.      Comments: See pictures regarding wounds.  Postoperative dressing not removed   Neurological:      Mental Status: He is alert. Mental status is at baseline.   Psychiatric:         Behavior: Behavior normal.             Significant Labs: All pertinent labs within the past 24 hours have been reviewed.    Significant Imaging: I have reviewed all pertinent imaging results/findings within the past 24 hours.

## 2025-02-18 NOTE — PLAN OF CARE
SW sent updated SNF packet to [facility] letting them know pt has an updated PHILIP of 02/21/2025. SW will cont to follow.      02/18/25 1901   Post-Acute Status   Post-Acute Authorization Placement   Post-Acute Placement Status Referrals Sent   Discharge Plan   Discharge Plan A Skilled Nursing Facility

## 2025-02-18 NOTE — PT/OT/SLP PROGRESS
Physical Therapy Treatment    Patient Name:  Juan C Sanchez   MRN:  [id number]    Recommendations:     Discharge Recommendations: Moderate Intensity Therapy  Discharge Equipment Recommendations: none  Barriers to discharge: increased pain, increased need for physical assistance    Assessment:     Juan C Sanchez is a 69 y.o. male admitted with a medical diagnosis of Acute on chronic congestive heart failure.  He presents with the following impairments/functional limitations: impaired endurance, weakness, impaired self care skills, impaired functional mobility, gait instability, impaired balance, decreased lower extremity function, impaired cardiopulmonary response to activity.    Pt found supine upon arrival, pt agreeable to session. Pt now s/p L 3rd toe amputation on 2/14 and is WBAT. Pt transferred supine<>sit with SBA and sat EOB with supervision. Therapist donned cam boot on LLE with total assist. Pt transferred sit<>stand with RW/CGA and pt ambulated ~8' with RW/CGA, with therapist managing IV pole. Pt demonstrates antalgic gait pattern with decreased WB on LLE due to pain. Pt stood with RW/supervision while therapist changed reji pads in bed. Pt then returned sitting and returned to supine with SBA. Therapist doffed cam boot with total assist. Pt left in bed long sitting, bed alarm on.    Rehab Prognosis: Fair; patient would benefit from acute skilled PT services to address these deficits and reach maximum level of function.    Recent Surgery: Procedure(s) (LRB):  Angiogram Extremity Unilateral (Left) 1 Day Post-Op    Plan:     During this hospitalization, patient to be seen 6 x/week to address the identified rehab impairments via gait training, therapeutic activities, therapeutic exercises and progress toward the following goals:    Plan of Care Expires:  03/12/25    Subjective     Chief Complaint: butt sore  Patient/Family Comments/goals: to get stronger  Pain/Comfort:  Pain Rating 1: 10/10  Location -  Side 1: Left  Location 1: foot      Objective:     Communicated with RN prior to session.  Patient found supine with telemetry, peripheral IV, livingston catheter upon PT entry to room.     General Precautions: Standard, fall, contact  Orthopedic Precautions: LLE weight bearing as tolerated  Braces:  (Cam boot LLE)  Respiratory Status: Room air     Functional Mobility:  Bed Mobility:     Supine to Sit: stand by assistance  Sit to Supine: stand by assistance  Transfers:     Sit to Stand:  contact guard assistance with rolling walker and cam boot on LLE  Gait: 8' with RW/CGA and cam boot on LLE  Balance: Static standing balance with RW SBA      AM-PAC 6 CLICK MOBILITY  Turning over in bed (including adjusting bedclothes, sheets and blankets)?: 4  Sitting down on and standing up from a chair with arms (e.g., wheelchair, bedside commode, etc.): 3  Moving from lying on back to sitting on the side of the bed?: 4  Moving to and from a bed to a chair (including a wheelchair)?: 3  Need to walk in hospital room?: 3  Climbing 3-5 steps with a railing?: 2  Basic Mobility Total Score: 19       Treatment & Education:  Pt was educated on the following: call light use, importance of OOB activity and functional mobility to negate the negative effects of prolonged bed rest during this hospitalization, safe transfers/ambulation and discharge planning recommendations/options.       Patient left supine with all lines intact, call button in reach, bed alarm on, and RN notified..    GOALS:   Multidisciplinary Problems       Physical Therapy Goals          Problem: Physical Therapy    Goal Priority Disciplines Outcome Interventions   Physical Therapy Goal     PT, PT/OT     Description: Goals to be met by: 3/12/25     Patient will increase functional independence with mobility by performin. Supine to sit with Modified [place]  2. Sit to supine with Modified [place]  3. Sit to stand transfer with Supervision and using Rolling  Walker  4. Gait  x 100 feet with Stand-by Assistance using Rolling Walker.   5. Ascend/descend 3 stair with bilateral Handrails Contact Guard Assistance using No Assistive Device.                          DME Justifications:  No DME recommended requiring DME justifications    Time Tracking:     PT Received On: 02/18/25  PT Start Time: 1027     PT Stop Time: 1052  PT Total Time (min): 25 min     Billable Minutes: Gait Training 10 and Therapeutic Activity 15    Treatment Type: Treatment  PT/PTA: PT     Number of PTA visits since last PT visit: 0     02/18/2025

## 2025-02-19 LAB
ALBUMIN SERPL BCP-MCNC: 3.4 G/DL (ref 3.5–5.2)
ALP SERPL-CCNC: 76 U/L (ref 55–135)
ALT SERPL W/O P-5'-P-CCNC: 5 U/L (ref 10–44)
ANION GAP SERPL CALC-SCNC: 8 MMOL/L (ref 8–16)
AST SERPL-CCNC: 11 U/L (ref 10–40)
BASOPHILS # BLD AUTO: 0.05 K/UL (ref 0–0.2)
BASOPHILS NFR BLD: 0.6 % (ref 0–1.9)
BILIRUB SERPL-MCNC: 0.6 MG/DL (ref 0.1–1)
BNP SERPL-MCNC: 1547 PG/ML (ref 0–99)
BUN SERPL-MCNC: 18 MG/DL (ref 8–23)
CALCIUM SERPL-MCNC: 8.9 MG/DL (ref 8.7–10.5)
CHLORIDE SERPL-SCNC: 95 MMOL/L (ref 95–110)
CO2 SERPL-SCNC: 28 MMOL/L (ref 23–29)
CREAT SERPL-MCNC: 1.1 MG/DL (ref 0.5–1.4)
DIFFERENTIAL METHOD BLD: ABNORMAL
EOSINOPHIL # BLD AUTO: 0.3 K/UL (ref 0–0.5)
EOSINOPHIL NFR BLD: 3.2 % (ref 0–8)
ERYTHROCYTE [DISTWIDTH] IN BLOOD BY AUTOMATED COUNT: 18.6 % (ref 11.5–14.5)
EST. GFR  (NO RACE VARIABLE): >60 ML/MIN/1.73 M^2
GLUCOSE SERPL-MCNC: 74 MG/DL (ref 70–110)
HCT VFR BLD AUTO: 31.2 % (ref 40–54)
HGB BLD-MCNC: 9.4 G/DL (ref 14–18)
IMM GRANULOCYTES # BLD AUTO: 0.03 K/UL (ref 0–0.04)
IMM GRANULOCYTES NFR BLD AUTO: 0.4 % (ref 0–0.5)
LYMPHOCYTES # BLD AUTO: 1.7 K/UL (ref 1–4.8)
LYMPHOCYTES NFR BLD: 21.4 % (ref 18–48)
MAGNESIUM SERPL-MCNC: 2.2 MG/DL (ref 1.6–2.6)
MCH RBC QN AUTO: 23.4 PG (ref 27–31)
MCHC RBC AUTO-ENTMCNC: 30.1 G/DL (ref 32–36)
MCV RBC AUTO: 78 FL (ref 82–98)
MONOCYTES # BLD AUTO: 1.3 K/UL (ref 0.3–1)
MONOCYTES NFR BLD: 15.8 % (ref 4–15)
NEUTROPHILS # BLD AUTO: 4.7 K/UL (ref 1.8–7.7)
NEUTROPHILS NFR BLD: 58.6 % (ref 38–73)
NRBC BLD-RTO: 0 /100 WBC
PLATELET # BLD AUTO: 308 K/UL (ref 150–450)
PMV BLD AUTO: 9.7 FL (ref 9.2–12.9)
POTASSIUM SERPL-SCNC: 3.7 MMOL/L (ref 3.5–5.1)
PROT SERPL-MCNC: 6.9 G/DL (ref 6–8.4)
RBC # BLD AUTO: 4.01 M/UL (ref 4.6–6.2)
SODIUM SERPL-SCNC: 131 MMOL/L (ref 136–145)
WBC # BLD AUTO: 8.05 K/UL (ref 3.9–12.7)

## 2025-02-19 PROCEDURE — 25000003 PHARM REV CODE 250: Performed by: INTERNAL MEDICINE

## 2025-02-19 PROCEDURE — 99232 SBSQ HOSP IP/OBS MODERATE 35: CPT | Mod: ,,, | Performed by: INTERNAL MEDICINE

## 2025-02-19 PROCEDURE — 25000003 PHARM REV CODE 250: Performed by: PODIATRIST

## 2025-02-19 PROCEDURE — 25000003 PHARM REV CODE 250

## 2025-02-19 PROCEDURE — 36415 COLL VENOUS BLD VENIPUNCTURE: CPT

## 2025-02-19 PROCEDURE — 25000003 PHARM REV CODE 250: Performed by: HOSPITALIST

## 2025-02-19 PROCEDURE — 85025 COMPLETE CBC W/AUTO DIFF WBC: CPT

## 2025-02-19 PROCEDURE — 27000207 HC ISOLATION

## 2025-02-19 PROCEDURE — 80053 COMPREHEN METABOLIC PANEL: CPT

## 2025-02-19 PROCEDURE — 83735 ASSAY OF MAGNESIUM: CPT

## 2025-02-19 PROCEDURE — 83880 ASSAY OF NATRIURETIC PEPTIDE: CPT | Performed by: GENERAL PRACTICE

## 2025-02-19 PROCEDURE — 63600175 PHARM REV CODE 636 W HCPCS: Performed by: INTERNAL MEDICINE

## 2025-02-19 PROCEDURE — 21400001 HC TELEMETRY ROOM

## 2025-02-19 PROCEDURE — 63600175 PHARM REV CODE 636 W HCPCS

## 2025-02-19 RX ORDER — TORSEMIDE 20 MG/1
20 TABLET ORAL 2 TIMES DAILY
Status: DISCONTINUED | OUTPATIENT
Start: 2025-02-20 | End: 2025-02-24 | Stop reason: HOSPADM

## 2025-02-19 RX ORDER — SPIRONOLACTONE 25 MG/1
25 TABLET ORAL 2 TIMES DAILY
Status: DISCONTINUED | OUTPATIENT
Start: 2025-02-19 | End: 2025-02-24 | Stop reason: HOSPADM

## 2025-02-19 RX ORDER — LANOLIN ALCOHOL/MO/W.PET/CERES
400 CREAM (GRAM) TOPICAL DAILY
Status: DISCONTINUED | OUTPATIENT
Start: 2025-02-19 | End: 2025-02-24

## 2025-02-19 RX ADMIN — SPIRONOLACTONE 25 MG: 25 TABLET, FILM COATED ORAL at 08:02

## 2025-02-19 RX ADMIN — AMPICILLIN SODIUM AND SULBACTAM SODIUM 3 G: 2; 1 INJECTION, POWDER, FOR SOLUTION INTRAMUSCULAR; INTRAVENOUS at 02:02

## 2025-02-19 RX ADMIN — AMIODARONE HYDROCHLORIDE 200 MG: 200 TABLET ORAL at 08:02

## 2025-02-19 RX ADMIN — PANTOPRAZOLE SODIUM 40 MG: 40 TABLET, DELAYED RELEASE ORAL at 05:02

## 2025-02-19 RX ADMIN — AMPICILLIN SODIUM AND SULBACTAM SODIUM 3 G: 2; 1 INJECTION, POWDER, FOR SOLUTION INTRAMUSCULAR; INTRAVENOUS at 09:02

## 2025-02-19 RX ADMIN — PHENAZOPYRIDINE HYDROCHLORIDE 100 MG: 100 TABLET ORAL at 05:02

## 2025-02-19 RX ADMIN — AMMONIUM LACTATE: 12 LOTION TOPICAL at 08:02

## 2025-02-19 RX ADMIN — TAMSULOSIN HYDROCHLORIDE 0.4 MG: 0.4 CAPSULE ORAL at 08:02

## 2025-02-19 RX ADMIN — OXYCODONE HYDROCHLORIDE AND ACETAMINOPHEN 1 TABLET: 10; 325 TABLET ORAL at 08:02

## 2025-02-19 RX ADMIN — ENOXAPARIN SODIUM 100 MG: 100 INJECTION SUBCUTANEOUS at 05:02

## 2025-02-19 RX ADMIN — PHENAZOPYRIDINE HYDROCHLORIDE 100 MG: 100 TABLET ORAL at 02:02

## 2025-02-19 RX ADMIN — OXYCODONE HYDROCHLORIDE AND ACETAMINOPHEN 1 TABLET: 10; 325 TABLET ORAL at 05:02

## 2025-02-19 RX ADMIN — AMMONIUM LACTATE: 12 LOTION TOPICAL at 09:02

## 2025-02-19 RX ADMIN — FERROUS SULFATE TAB 325 MG (65 MG ELEMENTAL FE) 1 EACH: 325 (65 FE) TAB at 08:02

## 2025-02-19 RX ADMIN — SPIRONOLACTONE 25 MG: 25 TABLET ORAL at 08:02

## 2025-02-19 RX ADMIN — AMPICILLIN SODIUM AND SULBACTAM SODIUM 3 G: 2; 1 INJECTION, POWDER, FOR SOLUTION INTRAMUSCULAR; INTRAVENOUS at 05:02

## 2025-02-19 RX ADMIN — PHENAZOPYRIDINE HYDROCHLORIDE 100 MG: 100 TABLET ORAL at 07:02

## 2025-02-19 RX ADMIN — Medication 400 MG: at 05:02

## 2025-02-19 RX ADMIN — SENNOSIDES AND DOCUSATE SODIUM 1 TABLET: 50; 8.6 TABLET ORAL at 09:02

## 2025-02-19 RX ADMIN — POTASSIUM BICARBONATE 50 MEQ: 978 TABLET, EFFERVESCENT ORAL at 09:02

## 2025-02-19 RX ADMIN — TRAMADOL HYDROCHLORIDE 50 MG: 50 TABLET, COATED ORAL at 08:02

## 2025-02-19 RX ADMIN — SENNOSIDES AND DOCUSATE SODIUM 1 TABLET: 50; 8.6 TABLET ORAL at 05:02

## 2025-02-19 NOTE — PLAN OF CARE
[facility] referrals sent due to IV antibiotic frequency.        02/19/25 1321   Post-Acute Status   Post-Acute Authorization Placement   Post-Acute Placement Status Referrals Sent

## 2025-02-19 NOTE — PROGRESS NOTES
"[facility]   Department of Infectious Disease  Progress Note        PATIENT NAME: Juan C Sanchez  MRN: [id number]  TODAY'S DATE: 02/19/2025  ADMIT DATE: 2/11/2025  LOS: 8 days    CHIEF COMPLAINT: Hypotension (Patient had PT come into the home today; found BP to be low and referred to ER.  Pt "feels bad"  since this morning.  +SOB; increased swelling in both legs.  Recently hospitalized for CHF )      PRINCIPLE PROBLEM: Acute on chronic congestive heart failure    INTERVAL HISTORY      02/17/2025: Seen and evaluated at bedside.  No acute issues overnight.      02/18/2024:  He had angiogram 02/17/2025.  No intervention was done.  No other acute issues overnight.  Pending disposition.  Spot x-ray right 5th toe for documented nondisplaced fracture through the base of the proximal phalanx.    02/19/2025: Asked to see again.  LTAC fell through.  SNF wanting on antibiotics that can be given once daily.  Left leg edema resolving.  Erythema resolved.  Pain better.    Antibiotics (From admission, onward)      Start     Stop Route Frequency Ordered    02/16/25 1730  ampicillin-sulbactam (UNASYN) 3 g in 0.9% NaCl 100 mL IVPB (MB+)         -- IV Every 4 hours (non-standard times) 02/16/25 1724    02/14/25 0900  mupirocin 2 % ointment         02/19/25 0859 Nasl 2 times daily 02/14/25 0725          Antifungals (From admission, onward)      None           Antivirals (From admission, onward)      None            ASSESSMENT and PLAN      1. Left diabetic foot ulcers with 3rd toe osteomyelitis.  He has had amputation of the 3rd toe and debridement of 4th and 5th toe ulcers.  Osteomyelitis at this site has been surgically dealt with.  He will need about 3-4 weeks of antibiotics for associated wound infection.    2. Abnormal right foot MRI suggesting 5th toe fracture.  Spot 5th toe x-rays confirmed fracture.  Management as per podiatrist.    3. Acute exacerbation of CHF.  Management as per hospitalist and cardiologist.  "     4. Cirrhosis documented on CT abdomen and pelvis.  As per hospitalist.  Check HCV and HBV screen.    5. Health maintenance.  Immune to HBV from natural infection.  HCV antibody and HIV screen negative.    RECOMMENDATIONS  Continue Unasyn while in the hospital.  At discharge use Augmentin 875 mg b.i.d.  to complete 4 week course of antibiotics through 03/03/2025.  Check CBC and BMP every Monday while on IV antibiotics  Follow up with ID in 2-3 weeks    Thank you for involving ID in the care of this patient.  ID service will drop off today and plan to see in 2-3 weeks for follow up.  Please call with any questions.  Please send Epic secure chat with any questions.      SUBJECTIVE    He has a history of CAD, bone CHF with EF 20-25%, PAF, hypertension, GERD and CAD.  Recently had CTA bilateral lower extremities 01/14/2025 that demonstrated a high-grade CLARISA occlusion but no abnormality on the left lower extremity.  Seen in the ED on 02/06/2025 and diagnosed with acute decompensated CHF.  Plan was to admit him for Candida evaluation by patient left ER against medical advice.    Sent back to the ED 02/11/2025 by home PT OT when he was noted to be hypotensive.  Admitted for management of CHF.  Noted to have ulcer left 3rd and 5th interdigital spaces.  X-ray foot unremarkable.  MRI left foot documented osteomyelitis of the 3rd toe.  Seen by podiatrist and cultures were done 02/13/2025.  Went to OR 02/14/2025 and had amputation of 3rd toe and debridement of the 5th 4th toe wounds.    MRI right foot read as possible 5th toe fracture.  Follow up X-ray right foot foot did not show any fracture.  CRP 2.2 mg/dL, ESR 9    Antimicrobials   Vancomycin:  02/11/2025-02/16/2025   Zosyn:  02/11/2025 x1 day   Cefepime:  02/13/2025-02/16/2025   Unasyn: 02/16/2025-    Microbiology  Blood culture 02/07/2024: NGTD   Left foot wound culture 02/10/2025:  Sensitive E faecalis and Proteus mirabilis in broth only    Review of Systems  Negative  except as stated above in Interval History     OBJECTIVE   Temp:  [97.4 °F (36.3 °C)-98.3 °F (36.8 °C)] 98.3 °F (36.8 °C)  Pulse:  [73-96] 86  Resp:  [16-20] 16  SpO2:  [90 %-96 %] 96 %  BP: ()/(63-82) 98/64  Temp:  [97.4 °F (36.3 °C)-98.3 °F (36.8 °C)]   Temp: 98.3 °F (36.8 °C) (02/19/25 1115)  Pulse: 86 (02/19/25 1527)  Resp: 16 (02/19/25 1115)  BP: 98/64 (02/19/25 1527)  SpO2: 96 % (02/19/25 1527)    Intake/Output Summary (Last 24 hours) at 2/19/2025 1546  Last data filed at 2/19/2025 1140  Gross per 24 hour   Intake 559.4 ml   Output 2715 ml   Net -2155.6 ml       Physical Exam  General:  Elderly man lying quietly in bed.  He looks chronically ill but in no acute distress   Left foot:  Resolving Edema of foot and leg with fading erythema.  Tenderness to touch.  Dressing over sutured wounds not taken down  Right lower extremity foot:  Small dry scabs.  No ulcer.  No erythema.  CVS: S1 and 2 heard  Respiratory:  Decreased breath sounds both mid and lower zones posteriorly with minimal cramps   Abdomen: Full, soft, nontender, no palpable organomegaly   Skin:  No rash appreciated   CNS: No focal deficits   Musculoskeletal: No joint effusions appreciated    VAD:  PIV  ISOLATION:  None    WOUNDS:  Left foot wound    Significant Labs: All pertinent labs within the past 24 hours have been reviewed.    CBC LAST 7 DAYS  Recent Labs   Lab 02/13/25  0346 02/14/25  0411 02/15/25  0418 02/16/25  0351 02/17/25  0449 02/18/25  0433 02/19/25  0433   WBC 6.66 5.56 7.42 7.40 7.56 6.27 8.05   RBC 4.12* 4.26* 4.30* 4.18* 4.14* 4.20* 4.01*   HGB 9.7* 10.0* 10.2* 9.7* 9.5* 9.7* 9.4*   HCT 32.0* 32.5* 33.7* 32.2* 31.8* 32.7* 31.2*   MCV 78* 76* 78* 77* 77* 78* 78*   MCH 23.5* 23.5* 23.7* 23.2* 22.9* 23.1* 23.4*   MCHC 30.3* 30.8* 30.3* 30.1* 29.9* 29.7* 30.1*   RDW 18.4* 18.4* 18.4* 18.1* 18.0* 18.6* 18.6*    253 279 273 272 308 308   MPV 10.1 9.6 9.9 10.3 10.1 10.3 9.7   GRAN 58.0  3.9 53.2  3.0 63.6  4.7 61.8  4.6  "63.8  4.8 61.8  3.9 58.6  4.7   LYMPH 25.5  1.7 29.1  1.6 20.2  1.5 19.6  1.5 16.3*  1.2 17.4*  1.1 21.4  1.7   MONO 13.2  0.9 12.1  0.7 12.5  0.9 13.9  1.0 14.7  1.1* 15.6*  1.0 15.8*  1.3*   BASO 0.05 0.05 0.05 0.05 0.04 0.04 0.05   NRBC 0 0 0 0 0 0 0       CHEMISTRY LAST 7 DAYS  Recent Labs   Lab 02/13/25  0346 02/14/25  0411 02/15/25  0418 02/16/25  0351 02/17/25  0449 02/18/25  0433 02/19/25  0433   * 137 134* 132* 132* 134* 131*   K 3.5 3.7 4.8 4.0 3.8 3.9 3.7   CL 98 97 93* 92* 92* 93* 95   CO2 28 32* 34* 30* 34* 34* 28   ANIONGAP 8 8 7* 10 6* 7* 8   BUN 18 19 22 26* 27* 23 18   CREATININE 1.4 1.2 1.4 1.2 1.1 1.2 1.1   * 87 99 87 89 86 74   CALCIUM 8.1* 8.8 9.0 8.7 8.8 9.1 8.9   MG 2.0 2.0 2.0 2.0 2.1 2.2 2.2   ALBUMIN 3.0* 3.1* 3.3* 3.3* 3.1* 3.2* 3.4*   PROT 6.6 7.2 7.6 7.3 7.2 7.4 6.9   ALKPHOS 73 79 80 75 80 73 76   ALT 6* 6* 6* 6* 5* 5* 5*   AST 11 11 12 12 12 12 11   BILITOT 0.7 0.8 1.1* 0.9 0.6 0.7 0.6       Estimated Creatinine Clearance: 69.4 mL/min (based on SCr of 1.1 mg/dL).    INFLAMMATORY/PROCAL  LAST 7 DAYS  Recent Labs   Lab 02/13/25  1041   PROCAL <0.050   CRP 2.20*     No results found for: "ESR"  CRP   Date Value Ref Range Status   02/13/2025 2.20 (H) <1.00 mg/dL Final     Comment:     CRP-Normal Application expected values:   <1.0        mg/dL   Normal Range  1.0 - 5.0  mg/dL   Indicates mild inflammation  5.0 - 10.0 mg/dL   Indicates severe inflammation  >10.0        mg/dL   Represents serious processes and   frequently         indicates the presence of a bacterial   infection.          PRIOR  MICROBIOLOGY:    Susceptibility data from last 90 days.  Collected Specimen Info Organism Amp/Sulbactam Ampicillin Cefazolin Cefepime Ceftriaxone Ciprofloxacin Ertapenem Gentamicin GENTAMICIN SYNERGY SCREEN Levofloxacin Meropenem PIPERACILLIN/TAZO SUSCEPTIBILITY Tobramycin   02/13/25 Bone from Toe, Left Foot Proteus mirabilis  S  S  S  S  S  S  S  S   S  S  S  S     " Enterococcus faecalis   S        S       02/11/25 Blood from Peripheral, Antecubital, Right No growth after 5 days.                02/11/25 Blood from Peripheral, Antecubital, Left No growth after 5 days.                  Collected Specimen Info Organism Trimeth/Sulfa Vancomycin   02/13/25 Bone from Toe, Left Foot Proteus mirabilis  S      Enterococcus faecalis   S   02/11/25 Blood from Peripheral, Antecubital, Right No growth after 5 days.     02/11/25 Blood from Peripheral, Antecubital, Left No growth after 5 days.         LAST 7 DAYS MICROBIOLOGY   Microbiology Results (last 7 days)       Procedure Component Value Units Date/Time    Blood culture x two cultures. Draw prior to antibiotics. [[id number]] Collected: 02/11/25 1749    Order Status: Completed Specimen: Blood from Peripheral, Antecubital, Right Updated: 02/16/25 2032     Blood Culture, Routine No growth after 5 days.    Narrative:      Aerobic and anaerobic    Blood culture x two cultures. Draw prior to antibiotics. [[id number]] Collected: 02/11/25 1744    Order Status: Completed Specimen: Blood from Peripheral, Antecubital, Left Updated: 02/16/25 2032     Blood Culture, Routine No growth after 5 days.    Narrative:      Aerobic and anaerobic    Tissue culture [[id number]]  (Abnormal)  (Susceptibility) Collected: 02/13/25 0748    Order Status: Completed Specimen: Bone from Toe, Left Foot Updated: 02/16/25 0840     Aerobic Culture - Tissue PROTEUS MIRABILIS  From broth only        ENTEROCOCCUS FAECALIS  From broth only       Gram Stain Result No WBC's      Rare Gram positive cocci    Narrative:      Toe, Left Foot    Culture, Anaerobic [[id number]] Collected: 02/13/25 0748    Order Status: Completed Specimen: Bone from Toe, Left Foot Updated: 02/15/25 0933     Anaerobic Culture No anaerobes isolated    Narrative:      Toe, Left Foot    MRSA Screen by PCR [[id number]]  (Abnormal) Collected: 02/13/25 1054    Order Status: Completed Specimen: Nasal Swab  Updated: 02/13/25 1410     MRSA SCREEN BY PCR Detected     Comment: MRSA  critical result(s) called and verbal readback obtained from   Luanne Stanley RN on Wing B by JULIUS 02/13/2025 14:05         Narrative:       MRSA  critical result(s) called and verbal readback obtained from   Luanne Stanley RN on Wing B by [facility] 02/13/2025 14:05    Aerobic culture [[id number]] Collected: 02/13/25 0748    Order Status: Canceled Specimen: Bone from Toe, Left Foot           CURRENT/PREVIOUS VISIT EKG  Results for orders placed or performed during the hospital encounter of 02/11/25   EKG 12-lead    Collection Time: 02/14/25 10:19 AM   Result Value Ref Range    QRS Duration 108 ms    [facility] QTC Calculation 504 ms    Narrative    Test Reason : Z13.6,    Vent. Rate :  93 BPM     Atrial Rate :  93 BPM     P-R Int : 186 ms          QRS Dur : 108 ms      QT Int : 406 ms       P-R-T Axes :  64  41  63 degrees    QTcB Int : 504 ms    Sinus rhythm with Premature atrial complexes  Nonspecific T wave abnormality  Prolonged QT  Abnormal ECG  When compared with ECG of 11-Feb-2025 17:14,  Sinus rhythm has replaced Atrial fibrillation  Questionable change in The axis  Nonspecific T wave abnormality, improved in Inferior leads  Nonspecific T wave abnormality, improved in Anterior leads  Confirmed by Sergio Baker ([id number]) on 2/17/2025 11:00:12 AM    Referred By: AAAREFERRAL SELF           Confirmed By: Sergio Baker       Significant Imaging: I have reviewed all relevant and available imaging results/findings within the past 24 hours.    I spent a total of 45 minutes on the day of the visit.This includes face to face time and non-face to face time preparing to see the patient (eg, review of tests), obtaining and/or reviewing separately obtained history, documenting clinical information in the electronic or other health record, independently interpreting results and communicating results to the patient/family/caregiver, or care coordinator.    Spencer ISRAEL  MD Yassine  Date of Service: 02/19/2025      This note was created using [employer] voice recognition software that occasionally misinterpreted phrases or words.

## 2025-02-19 NOTE — PROGRESS NOTES
[facility] Medicine  Progress Note    Patient Name: Juan C Sanchez  MRN: [id number]  Patient Class: IP- Inpatient   Admission Date: 2/11/2025  Length of Stay: 8 days  Attending Physician: Livia Alexandre MD  Primary Care Provider: Katlin Graf NP        Subjective     Principal Problem:Acute on chronic congestive heart failure        HPI:  Mr. Sanchez is a 69 yr old male with a hx of HTN, MR, PAF, chronic systolic CHF with EF of 25, OA, GERD, nonischemic cardiomyopathy, anemia who presented to the ED with a chief complaint of shortness of breath, leg swelling and hypotension.  Patient states that he had PT come to his home today and he was found to be hypotensive and advised to come to the emergency room.  Patient endorses shortness of breath as well as leg swelling, malaise, fatigue, occasional chills, productive cough with milky sputum, upper abdominal pain, occasional nausea, decreased urine output, and positional lightheadedness.  He states that these symptoms have been progressively worsening over the last 1-2 months possibly even longer  He states that his symptoms are worse when lying flat as well as with exertion and better with rest and while sitting up.  He states that he has been taking his home medications without improvement of his symptoms.  He does endorse that most of all of his contacts recently have been sick. He denies any home oxygen use.  He does endorse occasional PND and states that he has been having to sit to sleep.  He denies tobacco and recreational drug use and states he seldomly drinks alcohol.  He denies fever, chest pain, vomiting, diarrhea, dysuria, hematuria, dizziness, and syncope.    Upon arrival to ED, patient afebrile, HR of 109, RR of 24, BP of 95/62, satting 98% on RA.  Workup in the ED revealed RBC of 4.19, H/H of 10/32.8, sodium of 134, pCO2 of 31, GFR of 55.1, corrected calcium of 9, albumin of 3.1, BNP of 3163, troponin of 21.3, lactic acid  of 2.1.  Blood cultures pending.  CXR shows enlarged cardiac silhouette with pulmonary vascular congestion.  Bilateral pleural effusions.  Mild bibasilar airspace disease and or atelectasis.  Left foot x-ray shows osteopenia.  No overt erosions or osseous destruction.  No acute fracture or dislocation.  Mild multifocal midfoot arthritis.  Forefoot swelling.  Patient was given Lasix 20 mg IV, Zosyn 4.5 g IV, 500 mL NS bolus, vancomycin 20 mg/kg while in the ED. discussed case with ED provider and patient will be admitted under hospital medicine services for further management.    Overview/Hospital Course:  Juan C Sanchez is a 69 year old male with a past medical history of HFrEF, Afib, PAD, anemia, obesity, and GERD who presented with an acute on chronic HFrEF (EF 20-25%) exacerbation as well as Afib with RVR. He is being diuresed with a Lasix infusion as well as dobutamine. Cardiology has been consulted.  Underwent ANGEL cardioversion 2/14. His course was also complicated by a third L toe acute osteomyelitis seen on MRI. Podiatry and ID have been consulted.  He underwent L 3rd toe amputation 2/14. He is on empiric vancomycin and cefepime. Vascular Surgery has been consulted as well and will plan for angiogram of the lower extremities 2/17.    Interval History:  P patient seen and examined on morning multidisciplinary rounds.  No new complaints.  Remains on Lasix and dobutamine drips.      Review of Systems   Constitutional:  Positive for activity change.   Respiratory:  Negative for shortness of breath.    Cardiovascular:  Positive for leg swelling. Negative for chest pain and palpitations.   Gastrointestinal:  Negative for nausea and vomiting.   Skin:  Positive for wound.     Objective:     Vital Signs (Most Recent):  Temp: 98.3 °F (36.8 °C) (02/19/25 1115)  Pulse: 78 (02/19/25 1115)  Resp: 16 (02/19/25 1115)  BP: 98/63 (02/19/25 1115)  SpO2: (!) 92 % (02/19/25 1115) Vital Signs (24h Range):  Temp:  [97.4 °F (36.3  °C)-98.3 °F (36.8 °C)] 98.3 °F (36.8 °C)  Pulse:  [73-98] 78  Resp:  [16-20] 16  SpO2:  [90 %-100 %] 92 %  BP: ()/(63-82) 98/63     Weight: 89.9 kg (198 lb 3.1 oz)  Body mass index is 27.64 kg/m².    Intake/Output Summary (Last 24 hours) at 2/19/2025 1129  Last data filed at 2/19/2025 0901  Gross per 24 hour   Intake 559.4 ml   Output 3225 ml   Net -2665.6 ml         Physical Exam  Vitals and nursing note reviewed.   Constitutional:       General: He is not in acute distress.  HENT:      Head: Normocephalic and atraumatic.      Right Ear: External ear normal.      Left Ear: External ear normal.      Nose: Nose normal.      Mouth/Throat:      Mouth: Mucous membranes are moist.   Eyes:      Extraocular Movements: Extraocular movements intact.      Conjunctiva/sclera: Conjunctivae normal.   Cardiovascular:      Rate and Rhythm: Normal rate and regular rhythm.      Pulses: Normal pulses.      Heart sounds: Normal heart sounds.   Pulmonary:      Effort: Pulmonary effort is normal.      Breath sounds: Rales present.      Comments: RA.  Abdominal:      General: Bowel sounds are normal. There is no distension.      Palpations: Abdomen is soft.      Tenderness: There is no abdominal tenderness.   Genitourinary:     Comments: Meredith.  Musculoskeletal:      Cervical back: Normal range of motion and neck supple.      Right lower leg: Edema present.      Left lower leg: Edema present.   Skin:     General: Skin is warm and dry.      Comments: See pictures regarding wounds.  Postoperative dressing not removed   Neurological:      Mental Status: He is alert. Mental status is at baseline.   Psychiatric:         Behavior: Behavior normal.             Significant Labs: All pertinent labs within the past 24 hours have been reviewed.    Significant Imaging: I have reviewed all pertinent imaging results/findings within the past 24 hours.    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 L third toe.  -ID consulted  -Podiatry consulted  -Unasyn to complete 4 week course of antibiotics through 3/03/25  -status post third toe amputation (L) 2/14  -bone culture with a presumptive Proteus and Enterococcus      PAD (peripheral artery disease)  -No intervention indicated per Vascular Surgery at this time  -underwent angiogram 2/17      Anemia  Ferritin at lower limit of normal.  -Trend Hgb with CBC    Paroxysmal atrial fibrillation  -Telemetry  -Lovenox  -Hold metoprolol while on dobutamine  -Continue home amiodarone  -ANGEL cardioversion per Cardiology 2/14    MR (mitral regurgitation)  Seen on TTE at OSH.  -Cardiology consulted      Hypertension  -Home medications held  -Continue to monitor      GERD (gastroesophageal reflux disease)  -PPI        VTE Risk Mitigation (From admission, onward)           Ordered     enoxaparin injection 100 mg  Every 12 hours (non-standard times)         02/12/25 1622     IP VTE HIGH RISK PATIENT  Once         02/11/25 2331     Place sequential compression device  Until discontinued         02/11/25 2331                    Discharge Planning   PHILIP: 2/21/2025     Code Status: Full Code   Medical Readiness for Discharge Date:   Discharge Plan A: Skilled Nursing Facility   Discharge Delays: None known at this time            Please place Justification for DME        Livia Alexandre MD  Department of Hospital Medicine   [facility]

## 2025-02-19 NOTE — PROGRESS NOTES
Formerly [facility], [facility]  Department of Cardiology  Progress Note      PATIENT NAME: Juan C Sanchez  MRN: [id number]  TODAY'S DATE: 02/19/2025  ADMIT DATE: 2/11/2025                          CONSULT REQUESTED BY: Livia Alexandre MD    SUBJECTIVE     PRINCIPAL PROBLEM: Acute on chronic congestive heart failure      02/19/2025    Patient seen sitting up in bed with no acute distress noted he seems improved significantly since admission.  Patient with minimal swelling in the lower extremities in the coloration in his legs has improved significantly as well.    2/18/25  Denies complaints.  Net negative 18.8L since admit. Output -2.2L yesterday.  sCr 1.2    02/17/2025  Denies chest pain or shortness of breath. Reports taking lasix at home but states it was not working. Johnny compression sock use. Does not wear home O2. Denies any hx of smoking.  On lasix gtt and dobutamine. Net negative 16.7L since admit, out ~1.4 L overnight.  sCr 1.1  Underwent LLE angiogram with vascular surgery today.    02/16/2025  Patient seen resting in bed. States he is not feeling as well today and has had more pain in his foot from toe amputation. He is not sleeping as well and is tired today. Overall he is still optimistic and his breathing is good.     2/15/25  Patient seen resting in bed with no distress noted. Breathing stable.     2/14/25  Patient seen resting in bed with no distress noted. Breathing is stable. S/p ANGEL/CV today.     2/13/25  Patient seen sitting up in bed with no acute distress noted.  Denies any chest discomfort.  Breathing is stable.  He has been diuresing very well, and his weight is down significantly.    REASON FOR CONSULT:  CHF exacerbation     HPI:  Mr. Sanchez is a 69 yr old male with pmh of known HFrEF, HTN, mitral regurg, OA, GERD, and anemia who originally come into the ER yesterday evening after home PT encouraged him to come be evaluated after noticing some worsening swelling in BLE, hypotension, and  "generalized weakness. It appears he has been in and out of the ER about 8x since the start of this new year. BNP 3163. Trops 21.3 then 16.8. Most recent ECHO uploaded in care everywhere shows EF 25%. Pt also states he had a recent angiogram done in [place] (the last dated one I could see was 2022) showed trivial nonobstructive CAD. He states he follows with Dr. Alegria but has not been seen "in a while." He states compliance with medications but feels PO lasix is not working for him.     Per hospital medicine notes:  Mr. Sanchez is a 69 yr old male with a hx of HTN, MR, PAF, chronic systolic CHF with EF of 25, OA, GERD, nonischemic cardiomyopathy, anemia who presented to the ED with a chief complaint of shortness of breath, leg swelling and hypotension.  Patient states that he had PT come to his home today and he was found to be hypotensive and advised to come to the emergency room.  Patient endorses shortness of breath as well as leg swelling, malaise, fatigue, occasional chills, productive cough with milky sputum, upper abdominal pain, occasional nausea, decreased urine output, and positional lightheadedness.  He states that these symptoms have been progressively worsening over the last 1-2 months possibly even longer  He states that his symptoms are worse when lying flat as well as with exertion and better with rest and while sitting up.  He states that he has been taking his home medications without improvement of his symptoms.  He does endorse that most of all of his contacts recently have been sick. He denies any home oxygen use.  He does endorse occasional PND and states that he has been having to sit to sleep.  He denies tobacco and recreational drug use and states he seldomly drinks alcohol.  He denies fever, chest pain, vomiting, diarrhea, dysuria, hematuria, dizziness, and syncope.     Upon arrival to ED, patient afebrile, HR of 109, RR of 24, BP of 95/62, satting 98% on RA.  Workup in the ED " revealed RBC of 4.19, H/H of 10/32.8, sodium of 134, pCO2 of 31, GFR of 55.1, corrected calcium of 9, albumin of 3.1, BNP of 3163, troponin of 21.3, lactic acid of 2.1.  Blood cultures pending.  CXR shows enlarged cardiac silhouette with pulmonary vascular congestion.  Bilateral pleural effusions.  Mild bibasilar airspace disease and or atelectasis.  Left foot x-ray shows osteopenia.  No overt erosions or osseous destruction.  No acute fracture or dislocation.  Mild multifocal midfoot arthritis.  Forefoot swelling.  Patient was given Lasix 20 mg IV, Zosyn 4.5 g IV, 500 mL NS bolus, vancomycin 20 mg/kg while in the ED. discussed case with ED provider and patient will be admitted under hospital medicine services for further management.        Review of patient's allergies indicates:   Allergen Reactions    Gabapentin Other (See Comments)     Hypersomnia, nightmares    Morphine Other (See Comments)     Pt states systemic cramping       Past Medical History:   Diagnosis Date    Hypertension      Past Surgical History:   Procedure Laterality Date    ANGIOGRAPHY OF LOWER EXTREMITY Left 2/17/2025    Procedure: Angiogram Extremity Unilateral;  Surgeon: Valeri Faith MD;  Location: [facility] CATH/EP LAB;  Service: Vascular;  Laterality: Left;    ECHOCARDIOGRAM,TRANSESOPHAGEAL N/A 2/14/2025    Procedure: Transesophageal echo (ANGEL) intra-procedure log documentation;  Surgeon: José Luis Arellano MD;  Location: [facility] CATH/EP LAB;  Service: Cardiology;  Laterality: N/A;    ORIF closed comminuted displaced intra-articular fx distal left radius & application of external fixator Left 05/28/2018    TOE AMPUTATION Left 2/14/2025    Procedure: AMPUTATION, TOE;  Surgeon: Jerry España DPM;  Location: [facility] OR;  Service: Podiatry;  Laterality: Left;    TRANSESOPHAGEAL ECHOCARDIOGRAM WITH POSSIBLE CARDIOVERSION (ANGEL W/ POSS CARDIOVERSION) N/A 2/14/2025    Procedure: Transesophageal echo (ANGEL) intra-procedure log documentation;  Surgeon:  José Luis Arellano MD;  Location: [facility] CATH/EP LAB;  Service: Cardiology;  Laterality: N/A;    TREATMENT OF CARDIAC ARRHYTHMIA N/A 2/14/2025    Procedure: Cardioversion or Defibrillation;  Surgeon: José Luis Arellano MD;  Location: [facility] CATH/EP LAB;  Service: Cardiology;  Laterality: N/A;     Social History     Tobacco Use    Smoking status: Never    Smokeless tobacco: Never   Substance Use Topics    Alcohol use: Yes     Comment: social    Drug use: No        REVIEW OF SYSTEMS    As mentioned in HPI    OBJECTIVE     VITAL SIGNS (Most Recent)  Temp: 98.3 °F (36.8 °C) (02/19/25 1115)  Pulse: 78 (02/19/25 1115)  Resp: 16 (02/19/25 1115)  BP: 98/63 (02/19/25 1115)  SpO2: (!) 92 % (02/19/25 1115)    VENTILATION STATUS  Resp: 16 (02/19/25 1115)  SpO2: (!) 92 % (02/19/25 1115)           I & O (Last 24H):  Intake/Output Summary (Last 24 hours) at 2/19/2025 1412  Last data filed at 2/19/2025 1140  Gross per 24 hour   Intake 559.4 ml   Output 2715 ml   Net -2155.6 ml       WEIGHTS  Wt Readings from Last 3 Encounters:   02/19/25 0445 89.9 kg (198 lb 3.1 oz)   02/18/25 0445 87.9 kg (193 lb 12.6 oz)   02/16/25 0453 90.5 kg (199 lb 8.3 oz)   02/15/25 0339 92.5 kg (203 lb 14.8 oz)   02/14/25 0732 87.5 kg (192 lb 14.4 oz)   02/14/25 0500 94.1 kg (207 lb 7.3 oz)   02/13/25 0604 93.9 kg (207 lb 0.2 oz)   02/12/25 0050 98.6 kg (217 lb 6 oz)   02/11/25 1727 92.5 kg (204 lb)   02/14/25 0940 87.5 kg (192 lb 14.4 oz)   10/23/18 1426 92.5 kg (204 lb)       PHYSICAL EXAM    CONSTITUTIONAL: NAD, sitting upright in bed, smiling  HEENT: Normocephalic. No pallor  NECK: JVD improving  LUNGS: faint bibasilar crackles, normal WOB. On room air  HEART: regular rate and irregular rhythm, PVCs correlating on bedside telemetry, S1, S2 normal, no murmur   ABDOMEN: soft, bowel sounds normal  EXTREMITIES:  Edema is improved significant, LLE with dressing c/d/i  SKIN: No rash  NEURO: AAO X 3  PSYCH: normal affect     HOME MEDICATIONS:  No current  facility-administered medications on file prior to encounter.     Current Outpatient Medications on File Prior to Encounter   Medication Sig Dispense Refill    acetaminophen (TYLENOL EXTRA STRENGTH) 500 MG tablet Take 1,000 mg by mouth every 6 (six) hours as needed for Pain.      amiodarone (PACERONE) 200 MG Tab Take 200 mg by mouth 2 (two) times daily.      doxycycline (MONODOX) 100 MG capsule Take 100 mg by mouth 2 (two) times daily.      empagliflozin (JARDIANCE) 10 mg tablet Take 10 mg by mouth once daily.      ENTRESTO 24-26 mg per tablet Take 1 tablet by mouth 2 (two) times daily.      furosemide (LASIX) 20 MG tablet Take 40 mg by mouth 0900, 1800.      gabapentin (NEURONTIN) 300 MG capsule Take 300 mg by mouth 2 (two) times daily.      metoprolol tartrate (LOPRESSOR) 25 MG tablet Take 25 mg by mouth 2 (two) times daily.      nitroGLYCERIN (NITROSTAT) 0.4 MG SL tablet Place 0.4 mg under the tongue every 5 (five) minutes as needed for Chest pain.      pantoprazole (PROTONIX) 40 MG tablet Take 40 mg by mouth once daily.      carvediloL (COREG) 3.125 MG tablet Take 3.125 mg by mouth 2 (two) times daily. (Patient not taking: Reported on 2/11/2025)         SCHEDULED MEDS:   amiodarone  200 mg Oral BID    ammonium lactate   Topical (Top) BID    ampicillin-sulbactam  3 g Intravenous Q4H    enoxparin  1 mg/kg Subcutaneous Q12H    ferrous sulfate  1 tablet Oral Daily    pantoprazole  40 mg Oral Daily    phenazopyridine  100 mg Oral TID WM    spironolactone  25 mg Oral Daily    tamsulosin  0.4 mg Oral Daily       CONTINUOUS INFUSIONS:   furosemide (LASIX) 2 mg/mL continuous infusion (non-titrating)  2.5 mg/hr Intravenous Continuous 1.25 mL/hr at 02/16/25 2212 2.5 mg/hr at 02/16/25 2212       PRN MEDS:  Current Facility-Administered Medications:     acetaminophen, 650 mg, Oral, Q4H PRN    aluminum-magnesium hydroxide-simethicone, 30 mL, Oral, QID PRN    magnesium oxide, 800 mg, Oral, PRN    magnesium oxide, 800 mg, Oral,  "PRN    melatonin, 9 mg, Oral, Nightly PRN    metoprolol, 5 mg, Intravenous, Q5 Min PRN    midodrine, 2.5 mg, Oral, TID PRN    naloxone, 0.02 mg, Intravenous, PRN    ondansetron, 4 mg, Intravenous, Q6H PRN    oxyCODONE-acetaminophen, 1 tablet, Oral, Q6H PRN    potassium bicarbonate, 35 mEq, Oral, PRN    potassium bicarbonate, 50 mEq, Oral, PRN    potassium bicarbonate, 60 mEq, Oral, PRN    potassium, sodium phosphates, 2 packet, Oral, PRN    potassium, sodium phosphates, 2 packet, Oral, PRN    potassium, sodium phosphates, 2 packet, Oral, PRN    senna-docusate 8.6-50 mg, 1 tablet, Oral, BID PRN    traMADoL, 50 mg, Oral, Q4H PRN    LABS AND DIAGNOSTICS     CBC LAST 3 DAYS  Recent Labs   Lab 02/17/25 0449 02/18/25 0433 02/19/25 0433   WBC 7.56 6.27 8.05   RBC 4.14* 4.20* 4.01*   HGB 9.5* 9.7* 9.4*   HCT 31.8* 32.7* 31.2*   MCV 77* 78* 78*   MCH 22.9* 23.1* 23.4*   MCHC 29.9* 29.7* 30.1*   RDW 18.0* 18.6* 18.6*    308 308   MPV 10.1 10.3 9.7   GRAN 63.8  4.8 61.8  3.9 58.6  4.7   LYMPH 16.3*  1.2 17.4*  1.1 21.4  1.7   MONO 14.7  1.1* 15.6*  1.0 15.8*  1.3*   BASO 0.04 0.04 0.05   NRBC 0 0 0       COAGULATION LAST 3 DAYS  No results for input(s): "LABPT", "INR", "APTT" in the last 168 hours.    CHEMISTRY LAST 3 DAYS  Recent Labs   Lab 02/17/25 0449 02/18/25 0433 02/19/25 0433   * 134* 131*   K 3.8 3.9 3.7   CL 92* 93* 95   CO2 34* 34* 28   ANIONGAP 6* 7* 8   BUN 27* 23 18   CREATININE 1.1 1.2 1.1   GLU 89 86 74   CALCIUM 8.8 9.1 8.9   MG 2.1 2.2 2.2   ALBUMIN 3.1* 3.2* 3.4*   PROT 7.2 7.4 6.9   ALKPHOS 80 73 76   ALT 5* 5* 5*   AST 12 12 11   BILITOT 0.6 0.7 0.6       CARDIAC PROFILE LAST 3 DAYS  Recent Labs   Lab 02/19/25  0433   BNP 1,547*       ENDOCRINE LAST 3 DAYS  Recent Labs   Lab 02/13/25  1041   PROCAL <0.050       LAST ARTERIAL BLOOD GAS  ABG  No results for input(s): "PH", "PO2", "PCO2", "HCO3", "BE" in the last 168 hours.    LAST 7 DAYS MICROBIOLOGY   Microbiology Results (last 7 " days)       Procedure Component Value Units Date/Time    Blood culture x two cultures. Draw prior to antibiotics. [[id number]] Collected: 02/11/25 1749    Order Status: Completed Specimen: Blood from Peripheral, Antecubital, Right Updated: 02/16/25 2032     Blood Culture, Routine No growth after 5 days.    Narrative:      Aerobic and anaerobic    Blood culture x two cultures. Draw prior to antibiotics. [[id number]] Collected: 02/11/25 1744    Order Status: Completed Specimen: Blood from Peripheral, Antecubital, Left Updated: 02/16/25 2032     Blood Culture, Routine No growth after 5 days.    Narrative:      Aerobic and anaerobic    Tissue culture [[id number]]  (Abnormal)  (Susceptibility) Collected: 02/13/25 0748    Order Status: Completed Specimen: Bone from Toe, Left Foot Updated: 02/16/25 0840     Aerobic Culture - Tissue PROTEUS MIRABILIS  From broth only        ENTEROCOCCUS FAECALIS  From broth only       Gram Stain Result No WBC's      Rare Gram positive cocci    Narrative:      Toe, Left Foot    Culture, Anaerobic [[id number]] Collected: 02/13/25 0748    Order Status: Completed Specimen: Bone from Toe, Left Foot Updated: 02/15/25 0933     Anaerobic Culture No anaerobes isolated    Narrative:      Toe, Left Foot    MRSA Screen by PCR [[id number]]  (Abnormal) Collected: 02/13/25 1054    Order Status: Completed Specimen: Nasal Swab Updated: 02/13/25 1410     MRSA SCREEN BY PCR Detected     Comment: MRSA  critical result(s) called and verbal readback obtained from   Luanne Stanley RN on [facility] by [facility] 02/13/2025 14:05         Narrative:       MRSA  critical result(s) called and verbal readback obtained from   Luanne Stanley RN on [facility] by [facility] 02/13/2025 14:05    Aerobic culture [[id number]] Collected: 02/13/25 0748    Order Status: Canceled Specimen: Bone from Toe, Left Foot             MOST RECENT IMAGING  X-Ray Chest PA And Lateral  Narrative: EXAMINATION:  XR CHEST PA AND LATERAL    CLINICAL HISTORY:  Fluid  overload; Heart failure, unspecified    FINDINGS:  PA and lateral chest with comparison chest x-ray 02/13/2025.  Borderline enlarged cardiomediastinal silhouette is unchanged.  Bilateral diffuse interstitial opacities with scattered patchy airspace opacities are observed and are unchanged from previous exam.  Pulmonary vasculature is normal. No acute osseous abnormality.  Impression: Bilateral diffuse interstitial opacities with scattered patchy airspace opacities are unchanged from previous exam.    Electronically signed by: Abdon Aguilera  Date:    02/19/2025  Time:    08:14      ECHOCARDIOGRAM RESULTS (last 5)  No results found for this or any previous visit.    Echo done locally    Impressions   -----------     1.The estimated LV ejection fraction is 25 %     2.There is moderate to severe global hypokinesis     3.Diastolic function is indeterminate.     4.There is mild aortic valve sclerosis without significant stenosis     5.There is severe mitral regurgitation     6.Estimated RVSP systolic pressure is 37.13 mmHg     7.Compared to the report from prior study dated 04/04/2022, the   severity of mitral regurgitation has increased     Findings   --------     Left Ventricle   The estimated LV ejection fraction is 25 %. The calculated LV ejection   fraction (MOD, Biplane) is 29.99 %. LV chamber is mildly dilated.   There is mild concentric LV hypertrophy. LV systolic function is   normal. There is moderate to severe global hypokinesis. Diastolic   function is indeterminate.     Right Ventricle   RV size is moderately dilated. The RV systolic function is normal.     Septum   There is no atrial septal defect (ASD). There is no ventricular septal   defect (VSD).     Left Atrium   LA chamber size is normal. LA diameter is 3.81 cm.     Right Atrium   RA chamber size is normal.     Aortic Valve   There is a trileaflet aortic valve. There is mild aortic valve   sclerosis without significant stenosis. There is no aortic    regurgitation. Aortic valve area by VMax: 1.02 cm2. Aortic valve area   by VTI: 0.93 cm2. AV Mean gradient: 7.37 mmHg. AV Peak gradient: 13.48   mmHg.     Mitral Valve   Mitral valve is not well visualized. There is severe mitral   regurgitation. There is no mitral stenosis.     Tricuspid Valve   Tricuspid valve is structurally normal. There is mild tricuspid   regurgitation. The estimated RV systolic pressure is normal. Estimated   RVSP systolic pressure is 37.13 mmHg. There is no tricuspid valve   stenosis.     Pulmonary Valve   Pulmonic valve is not well visualized.     Pericardium   The pericardium is normal. There is no pericardial effusion present.     Aorta   The size of the visualized portion of aortic root is within normal   limits. The thoracic aorta is not well visualized. The ascending aorta   is not well visualized.     Venous   The inferior vena cava dimension is normal.     Thrombus/Mass   There is no intracardiac mass or thrombus identified.     Echo Dimensions   --------------     LA Dimen (2D): 3.81 cm   IVS(D) (2D): 1.16 cm   LVPW(D) (2D): 1.16 cm   LV(D) (2D): 6.14 cm   LV(S) (2D): 4.56 cm   RV(D (2D): 3.7 cm   LVOT (2D): 2 cm   EF Teich (2D): 50 %   EF Teich (M-Mode): 25 %   FS (2D): 26 %   RVSP (Doppler): 37.13 mmHg   Doppler   -------   AVvel: 1.84 m/s   Pk Grad: 13.48 mmHg   LUCAS(pk): 1.02 cm2   PV Lee: 0.69 m/s   LVOTvel: 0.6 m/s   Mn Grad: 7.37 mmHg   LUCAS(VTI): 0.93 cm2   Ao Stenosis Dimen Idx: 0.33   MV PHT: 51.71 ms   Diastology   ----------   MV E: 1.27 m/s   MV A: 0.42 m/s   MV E/A: 3.01   MV Dec T: 175.64 ms   E' Lat: 9.29 cm/s   E' Med: 3.93 cm/s   E/Lat E': 13.66   E/Med E': 32.29   TR Lee: 1.72 m/s     Electronically signed by: Sudheer Lee DO   01/08/2025 11:49 AM   Impressions   -----------     1.The estimated LV ejection fraction is 25 %     2.There is moderate to severe global hypokinesis     3.Diastolic function is indeterminate.     4.There is mild aortic valve sclerosis  without significant stenosis     5.There is severe mitral regurgitation     6.Estimated RVSP systolic pressure is 37.13 mmHg     7.Compared to the report from prior study dated 04/04/2022, the   severity of mitral regurgitation has increased     Findings   --------     Left Ventricle   The estimated LV ejection fraction is 25 %. The calculated LV ejection   fraction (MOD, Biplane) is 29.99 %. LV chamber is mildly dilated.   There is mild concentric LV hypertrophy. LV systolic function is   normal. There is moderate to severe global hypokinesis. Diastolic   function is indeterminate.     Right Ventricle   RV size is moderately dilated. The RV systolic function is normal.     Septum   There is no atrial septal defect (ASD). There is no ventricular septal   defect (VSD).     Left Atrium   LA chamber size is normal. LA diameter is 3.81 cm.     Right Atrium   RA chamber size is normal.     Aortic Valve   There is a trileaflet aortic valve. There is mild aortic valve   sclerosis without significant stenosis. There is no aortic   regurgitation. Aortic valve area by VMax: 1.02 cm2. Aortic valve area   by VTI: 0.93 cm2. AV Mean gradient: 7.37 mmHg. AV Peak gradient: 13.48   mmHg.     Mitral Valve   Mitral valve is not well visualized. There is severe mitral   regurgitation. There is no mitral stenosis.     Tricuspid Valve   Tricuspid valve is structurally normal. There is mild tricuspid   regurgitation. The estimated RV systolic pressure is normal. Estimated   RVSP systolic pressure is 37.13 mmHg. There is no tricuspid valve   stenosis.     Pulmonary Valve   Pulmonic valve is not well visualized.     Pericardium   The pericardium is normal. There is no pericardial effusion present.     Aorta   The size of the visualized portion of aortic root is within normal   limits. The thoracic aorta is not well visualized. The ascending aorta   is not well visualized.     Venous   The inferior vena cava dimension is normal.      Thrombus/Mass   There is no intracardiac mass or thrombus identified.     Echo Dimensions   --------------     LA Dimen (2D): 3.81 cm   IVS(D) (2D): 1.16 cm   LVPW(D) (2D): 1.16 cm   LV(D) (2D): 6.14 cm   LV(S) (2D): 4.56 cm   RV(D (2D): 3.7 cm   LVOT (2D): 2 cm   EF Teich (2D): 50 %   EF Teich (M-Mode): 25 %   FS (2D): 26 %   RVSP (Doppler): 37.13 mmHg   Doppler   -------   AVvel: 1.84 m/s   Pk Grad: 13.48 mmHg   LUCAS(pk): 1.02 cm2   PV Lee: 0.69 m/s   LVOTvel: 0.6 m/s   Mn Grad: 7.37 mmHg   LUCAS(VTI): 0.93 cm2   Ao Stenosis Dimen Idx: 0.33   MV PHT: 51.71 ms   Diastology   ----------   MV E: 1.27 m/s   MV A: 0.42 m/s   MV E/A: 3.01   MV Dec T: 175.64 ms   E' Lat: 9.29 cm/s   E' Med: 3.93 cm/s   E/Lat E': 13.66   E/Med E': 32.29   TR Lee: 1.72 m/s     Electronically signed by: Sudheer Lee DO   01/08/2025 11:49 AM     CURRENT/PREVIOUS VISIT EKG  Results for orders placed or performed during the hospital encounter of 02/11/25   EKG 12-lead    Collection Time: 02/14/25 10:19 AM   Result Value Ref Range    QRS Duration 108 ms    OHS QTC Calculation 504 ms    Narrative    Test Reason : Z13.6,    Vent. Rate :  93 BPM     Atrial Rate :  93 BPM     P-R Int : 186 ms          QRS Dur : 108 ms      QT Int : 406 ms       P-R-T Axes :  64  41  63 degrees    QTcB Int : 504 ms    Sinus rhythm with Premature atrial complexes  Nonspecific T wave abnormality  Prolonged QT  Abnormal ECG  When compared with ECG of 11-Feb-2025 17:14,  Sinus rhythm has replaced Atrial fibrillation  Questionable change in The axis  Nonspecific T wave abnormality, improved in Inferior leads  Nonspecific T wave abnormality, improved in Anterior leads  Confirmed by Sergio Baker ([id number]) on 2/17/2025 11:00:12 AM    Referred By: GONZÁLEZREFERRAL SELF           Confirmed By: Sergio Baker           ASSESSMENT/PLAN:     Active Hospital Problems    Diagnosis    *Acute on chronic congestive heart failure    PAD (peripheral artery disease)    Acute osteomyelitis     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  Continue furosemide drip at 2.5 milligrams/hour.  Strict I&O.  Trend labs.  Continue spironolactone 25 mg p.o. daily.  Would like to initiate goal-directed medical therapy as tolerated for ejection fraction of 30%.  Blood pressure has been soft.  Will follow.    Thelma Perez NP  Department of Cardiology  Date of Service: 02/19/2025    Patient is still distended neck veins lungs are improved edema in the lower extremities have improved.  Gradually transition to oral therapies for congestive heart failure  May discontinue dobutamine for the time continue Lasix drip for today  Tomorrow morning mid discontinue intravenous Lasix  And start him on torsemide 20 mg p.o. b.i.d.  Continue on spironolactone 25 mg may increase to b.i.d. dosing and monitor for electrolyte imbalances  Add magnesium oxide 400 mg daily to his regimen  I have personally interviewed and examined the patient, I have reviewed the Nurse Practitioner's history and physical, assessment, and plan. I have personally evaluated the patient at bedside and agree with the findings and made appropriate changes as necessary in recommendations.    José Luis Arellano MD  Department of Cardiology  [facility]  02/19/2025 2:13 PM

## 2025-02-19 NOTE — PT/OT/SLP PROGRESS
Occupational Therapy      Patient Name:  Juan C Sanchez   MRN:  [id number]    Patient not seen today secondary to Patient unwilling to participate. Will follow-up next treatment date.    2/19/2025

## 2025-02-19 NOTE — PT/OT/SLP PROGRESS
Physical Therapy      Patient Name:  Juan C Sanchez   MRN:  [id number]    Patient not seen today secondary to Other (Comment) (first attempt wound care, second and third attempt declined). Will follow-up 2/20/25.

## 2025-02-19 NOTE — PT/OT/SLP PROGRESS
Occupational Therapy   Treatment    Name: Juan C Sanchez  MRN: [id number]  Admitting Diagnosis:  Acute on chronic congestive heart failure  1 Day Post-Op    Recommendations:     Discharge Recommendations: Moderate Intensity Therapy  Discharge Equipment Recommendations:  none  Barriers to discharge:  None    Assessment:     Juan C Sanchez is a 69 y.o. male with a medical diagnosis of Acute on chronic congestive heart failure.  He presents with severe pain of the L foot s/p 3rd toe amputation, declines OOB or EOB at this time 2° pain, requests to shave and educated that the protocol doesn't allow for those items to be given to him by the hospital and that he is allowed to have them here and use them at his own risk, without assist from the staff. He v/u. Pt was participatory with bed mobility and repositioning with bed controls, pillow support to LLE, WS and scooting this session and completed UB bathing, grooming, and oral hygiene at bed level. Performance deficits affecting function are pain, decreased ROM, decreased lower extremity function, impaired skin, edema, gait instability, impaired balance, impaired self care skills, impaired functional mobility, weakness, impaired endurance, impaired sensation.     Rehab Prognosis:  Good; patient would benefit from acute skilled OT services to address these deficits and reach maximum level of function.       Plan:     Patient to be seen 3 x/week to address the above listed problems via self-care/home management, therapeutic activities, therapeutic exercises  Plan of Care Expires: 03/12/25  Plan of Care Reviewed with: patient    Subjective     Chief Complaint: L foot pain and being stuck in bed for a month.  Patient/Family Comments/goals: get home and stronger  Pain/Comfort:  Pain Rating 1: 10/10  Location - Side 1: Left  Location 1: foot  Pain Addressed 1: Reposition  Pain Rating Post-Intervention 1: 9/10    Objective:     Communicated with: Lin villegas prior to  session.  Patient found HOB elevated with telemetry, peripheral IV, livingston catheter upon OT entry to room.    General Precautions: Standard, fall, contact    Orthopedic Precautions:LLE weight bearing as tolerated  Braces: N/A  Respiratory Status: Room air     Occupational Performance:     Bed Mobility:    Though he did not get to EOB, pt used BUE and trunk to weight shift and reposition multiple times during session, attempting to find the most comfortable position of support.    Functional Mobility/Transfers:  declined  Functional Mobility:  BUE WFL    Activities of Daily Living:  Grooming: stand by assistance to complete oral hygiene at bed level with HOB raised to max height and items set up on tray table.  Bathing: stand by assistance for UB bathing at bed level with items set up on tray table.    [facility] 6 Click ADL: 21    Treatment & Education:  -GUARDADO ed for increased EOB, OOB, and participation with all therapy. Pt v/u and will mobilize when his pain decreases.    Patient left HOB elevated with all lines intact, call button in reach, bed alarm on, and nurseLin notified    GOALS:   Multidisciplinary Problems       Occupational Therapy Goals          Problem: Occupational Therapy    Goal Priority Disciplines Outcome Interventions   Occupational Therapy Goal     OT, PT/OT Progressing    Description: Goals to be met by: 3/12/2025     Patient will increase functional independence with ADLs by performing:    UE Dressing with Supervision.  LE Dressing with Supervision.  Grooming while standing at sink with Supervision.  Toileting from toilet with Supervision for hygiene and clothing management.   Toilet transfer to toilet with Supervision.  Perform sitting/standing ADL activity with no LOB.                     Time Tracking:     OT Date of Treatment: 02/18/25  OT Start Time: 1521  OT Stop Time: 1550  OT Total Time (min): 29 min    Billable Minutes:Self Care/Home Management 29 min    OT/LESTER: LESTER     Number of LESTER  visits since last OT visit: 1 2/18/2025

## 2025-02-19 NOTE — SUBJECTIVE & OBJECTIVE
Interval History:  P patient seen and examined on morning multidisciplinary rounds.  No new complaints.  Remains on Lasix and dobutamine drips.      Review of Systems   Constitutional:  Positive for activity change.   Respiratory:  Negative for shortness of breath.    Cardiovascular:  Positive for leg swelling. Negative for chest pain and palpitations.   Gastrointestinal:  Negative for nausea and vomiting.   Skin:  Positive for wound.     Objective:     Vital Signs (Most Recent):  Temp: 98.3 °F (36.8 °C) (02/19/25 1115)  Pulse: 78 (02/19/25 1115)  Resp: 16 (02/19/25 1115)  BP: 98/63 (02/19/25 1115)  SpO2: (!) 92 % (02/19/25 1115) Vital Signs (24h Range):  Temp:  [97.4 °F (36.3 °C)-98.3 °F (36.8 °C)] 98.3 °F (36.8 °C)  Pulse:  [73-98] 78  Resp:  [16-20] 16  SpO2:  [90 %-100 %] 92 %  BP: ()/(63-82) 98/63     Weight: 89.9 kg (198 lb 3.1 oz)  Body mass index is 27.64 kg/m².    Intake/Output Summary (Last 24 hours) at 2/19/2025 1129  Last data filed at 2/19/2025 0901  Gross per 24 hour   Intake 559.4 ml   Output 3225 ml   Net -2665.6 ml         Physical Exam  Vitals and nursing note reviewed.   Constitutional:       General: He is not in acute distress.  HENT:      Head: Normocephalic and atraumatic.      Right Ear: External ear normal.      Left Ear: External ear normal.      Nose: Nose normal.      Mouth/Throat:      Mouth: Mucous membranes are moist.   Eyes:      Extraocular Movements: Extraocular movements intact.      Conjunctiva/sclera: Conjunctivae normal.   Cardiovascular:      Rate and Rhythm: Normal rate and regular rhythm.      Pulses: Normal pulses.      Heart sounds: Normal heart sounds.   Pulmonary:      Effort: Pulmonary effort is normal.      Breath sounds: Rales present.      Comments: RA.  Abdominal:      General: Bowel sounds are normal. There is no distension.      Palpations: Abdomen is soft.      Tenderness: There is no abdominal tenderness.   Genitourinary:     Comments:  Viri.  Musculoskeletal:      Cervical back: Normal range of motion and neck supple.      Right lower leg: Edema present.      Left lower leg: Edema present.   Skin:     General: Skin is warm and dry.      Comments: See pictures regarding wounds.  Postoperative dressing not removed   Neurological:      Mental Status: He is alert. Mental status is at baseline.   Psychiatric:         Behavior: Behavior normal.             Significant Labs: All pertinent labs within the past 24 hours have been reviewed.    Significant Imaging: I have reviewed all pertinent imaging results/findings within the past 24 hours.

## 2025-02-19 NOTE — PROGRESS NOTES
[facility]  Wound Care    Patient Name:  Juan C Sanchez   MRN:  [id number]  Date: 2/19/2025  Diagnosis: Acute on chronic congestive heart failure    History:     Past Medical History:   Diagnosis Date    Hypertension        Social History[1]    Precautions:     Allergies as of 02/11/2025 - Reviewed 02/11/2025   Allergen Reaction Noted    Gabapentin Other (See Comments) 06/29/2018    Morphine Other (See Comments) 01/24/2025       WOC Assessment Details/Treatment     Pt seen by Wound Care for follow up wound care to left foot.  Removed current dressing.  Removed Aquacel ag. Mild bleeding noted from 3rd toe space.  Wounds cleaned with soap and water.  Dried thoroughly.    3rd toe space noted with open wound with red, granular tissue.  Small serosanguinous and mild bleeding.  Applied Xerofoam to 3rd toe space.Applied Aquacel ag to 4th, 5th web space.  Weaved Aquacel ag rope between toes.  Covered with gauze.  Xerofoam to left heel fissure site. Applied ABD pad.  Wrapped with kerlex and ace wrap.  Sacrum/buttocks noted with redness and excoriation with a stage 2 pressure injury to left buttock.  Cleaned and applied Triad cream.       02/19/25 1020   WOCN Assessment   WOCN Total Time (mins) 40   Visit Date 02/19/25   Visit Time 1020   Consult Type Follow Up   WOCN Speciality Wound   Wound moisture;pressure;other   Intervention assessed;changed;applied;chart review;orders   Teaching on-going        Wound 02/12/25 0730 Other (comment) Right anterior Leg #1   Date First Assessed/Time First Assessed: 02/12/25 0730   Present on Original Admission: Yes  Primary Wound Type: (c) Other (comment)  Side: Right  Orientation: anterior  Location: Leg  Wound Number: #1  Is this injury device related?: No   Wound Image    Dressing Appearance Open to air   Drainage Amount None   Drainage Characteristics/Odor No odor   Appearance Intact;Red   Periwound Area Intact;Dry;Ecchymotic   Wound Edges Undefined   Care Cleansed  with:;Soap and water   Dressing Applied  (Lac-Hydrin to toes, foot, and leg)        Wound 02/12/25 0730 Other (comment) Left distal;anterior Leg #2   Date First Assessed/Time First Assessed: 02/12/25 0730   Present on Original Admission: Yes  Primary Wound Type: (c) Other (comment)  Side: Left  Orientation: distal;anterior  Location: Leg  Wound Number: #2   Dressing Appearance Open to air   Drainage Amount None   Drainage Characteristics/Odor No odor   Appearance Intact;Red;Dry   Periwound Area Intact;Dry   Wound Edges Undefined   Care Cleansed with:;Soap and water   Dressing Applied  (Lac Hydrin lotion)   Periwound Care Cleansed with pH balanced cleanser;Dry periwound area maintained;Topical treatment applied        Wound 02/12/25 0730 Other (comment) Left anterior;dorsal Foot #3   Date First Assessed/Time First Assessed: 02/12/25 0730   Present on Original Admission: Yes  Primary Wound Type: Other (comment)  Side: Left  Orientation: anterior;dorsal  Location: (c) Foot  Wound Number: #3   Wound Image     Dressing Appearance Intact;Moist drainage   Drainage Amount Small   Drainage Characteristics/Odor Serosanguineous;Bleeding controlled   Appearance Red;Maroon;Purple;Moist;Bleeding;Ecchymotic   Periwound Area Ecchymotic;Excoriated;Edematous;Macerated;Maroon;Moist;Redness   Wound Edges Irregular   Care Cleansed with:;Soap and water   Dressing Applied;Silver;Hydrofiber;Non-adherent;Gauze;Absorptive Pad;Rolled gauze;Elastic bandage  (xerofoam to 3rd toe, Aquacel ag to 4th, 5th toe, aquacel ag weaved between toes.  xerofoam to heel, ABD pad, Kerlex wrap, Ace bandage.)   Periwound Care Cleansed with pH balanced cleanser;Dry periwound area maintained;Topical treatment applied        Wound 02/12/25 0730 Pressure Injury posterior Sacral spine #4   Date First Assessed/Time First Assessed: 02/12/25 0730   Present on Original Admission: Yes  Primary Wound Type: (c) Pressure Injury  Orientation: posterior  Location: Sacral  spine  Wound Number: #4   Wound Image    Pressure Injury Stage 2   Dressing Appearance Open to air   Drainage Amount None   Drainage Characteristics/Odor No odor   Appearance Red;Maroon;Dry;Moist;Ecchymotic   Periwound Area Ecchymotic;Excoriated;Moist;Maroon;Redness   Wound Edges Undefined   Care Cleansed with:;Soap and water   Dressing Applied  (Triad cream)        Wound 02/12/25 1015 Fissure Left Heel   Date First Assessed/Time First Assessed: 02/12/25 1015   Present on Original Admission: Yes  Primary Wound Type: Fissure  Side: Left  Location: Heel   Wound Image    Dressing Appearance Open to air   Drainage Amount None   Drainage Characteristics/Odor No odor   Appearance Pink;Red;Dry   Periwound Area Intact;Dry;Excoriated   Wound Edges Defined   Care Cleansed with:;Soap and water   Dressing Non-adherent;Absorptive Pad;Rolled gauze;Elastic bandage   Periwound Care Cleansed with pH balanced cleanser;Dry periwound area maintained;Topical treatment applied        Wound 02/12/25 1015 Moisture associated dermatitis Groin   Date First Assessed/Time First Assessed: 02/12/25 1015   Present on Original Admission: Yes  Primary Wound Type: Moisture associated dermatitis  Location: Groin   Wound Image    Dressing Appearance Open to air   Drainage Amount None   Drainage Characteristics/Odor No odor   Appearance Red   Periwound Area Ecchymotic;Excoriated;Moist;Redness   Wound Edges Undefined   Care Cleansed with:;Soap and water   Dressing Applied  (Miconazole powder)   Periwound Care Cleansed with pH balanced cleanser;Dry periwound area maintained;Topical treatment applied        Wound 02/14/25 1300 Incision Left Toe, third   Date First Assessed/Time First Assessed: 02/14/25 1300   Primary Wound Type: Incision  Side: Left  Location: Toe, third  Closure Method: Sutures   Wound Image    Dressing Appearance Intact;Moist drainage   Drainage Amount Moderate   Drainage Characteristics/Odor Sanguineous;Bleeding controlled   Appearance  Red;Moist;Ecchymotic   Periwound Area Ecchymotic;Excoriated;Moist;Redness   Care Cleansed with:;Wound cleanser   Dressing Non-adherent;Silver;Hydrofiber        Wound 02/17/25 1346 Incision Right anterior Groin angiogram puncture   Date First Assessed/Time First Assessed: 02/17/25 1346   Present on Original Admission: No  Primary Wound Type: Incision  Side: Right  Orientation: anterior  Location: Groin  Incision Type: angiogram puncture  Closure Method: Closure Device   Wound Image        Recommendations made to primary team:  Sacrum--Stage 2 pressure injury-POA-clean with soap and water.  Apply Triad  cream to area BID and PRN.      Bilateral Groins--MASD/Yeast--clean with soap and water. Apply Miconazole powder BID and PRN.     Left heel-Fissure--clean with soap and water or wound cleanser. Apply Xerofoam to fissue/heel. Apply ABD pad, Wrap lightly with kerlex. Change Daily and PRN     Left Foot/3rd and 4th toes--Clean with soap and water.  Dry thoroughly.  xerofoam to 3rd toe, Aquacel ag to 4th, 5th toe, aquacel ag weaved between toes.  xerofoam to heel, ABD pad, Kerlex wrap, Ace bandage.  02/19/2025         [1]   Social History  Socioeconomic History    Marital status: Single   Tobacco Use    Smoking status: Never    Smokeless tobacco: Never   Substance and Sexual Activity    Alcohol use: Yes     Comment: social    Drug use: No     Social Drivers of Health     Financial Resource Strain: Patient Declined (2/12/2025)    Overall Financial Resource Strain (CARDIA)     Difficulty of Paying Living Expenses: Patient declined   Food Insecurity: Patient Declined (2/12/2025)    Hunger Vital Sign     Worried About Running Out of Food in the Last Year: Patient declined     Ran Out of Food in the Last Year: Patient declined   Transportation Needs: Patient Declined (2/3/2025)    Received from [facility] and [facility]     Lack of Transportation (Medical): Patient declined      Lack of Transportation (Non-Medical): Patient declined   Physical Activity: Patient Declined (2/3/2025)    Received from [facility] and [facility]    Exercise Vital Sign     Days of Exercise per Week: Patient declined     Minutes of Exercise per Session: Patient declined   Stress: Patient Declined (2/12/2025)    [employer] - Occupational Stress Questionnaire     Feeling of Stress : Patient declined   Housing Stability: Patient Declined (2/12/2025)    Housing Stability Vital Sign     Unable to Pay for Housing in the Last Year: Patient declined     Homeless in the Last Year: Patient declined

## 2025-02-19 NOTE — PLAN OF CARE
Dupixent Amount: 300 mg Midline order placed per Dr Metzger for IV antibiotics to be continued for discharge

## 2025-02-19 NOTE — PLAN OF CARE
Patient accepted by [facility] in [place], I talked to patient and explained the reason for the higher level of care. He agreed to go to this facility for the duration of the IV antibiotics. I sent a message to Daina of [facility] to go ahead with authorization.    Rev codes provided to Daina        02/19/25 1421   Post-Acute Status   Post-Acute Authorization Placement   Post-Acute Placement Status Pending payor review/awaiting authorization (if required)

## 2025-02-20 LAB
ALBUMIN SERPL BCP-MCNC: 3.6 G/DL (ref 3.5–5.2)
ALP SERPL-CCNC: 79 U/L (ref 55–135)
ALT SERPL W/O P-5'-P-CCNC: 6 U/L (ref 10–44)
ANION GAP SERPL CALC-SCNC: 11 MMOL/L (ref 8–16)
AST SERPL-CCNC: 12 U/L (ref 10–40)
BASOPHILS # BLD AUTO: 0.06 K/UL (ref 0–0.2)
BASOPHILS NFR BLD: 0.7 % (ref 0–1.9)
BILIRUB SERPL-MCNC: 0.6 MG/DL (ref 0.1–1)
BUN SERPL-MCNC: 20 MG/DL (ref 8–23)
CALCIUM SERPL-MCNC: 9.1 MG/DL (ref 8.7–10.5)
CHLORIDE SERPL-SCNC: 93 MMOL/L (ref 95–110)
CO2 SERPL-SCNC: 28 MMOL/L (ref 23–29)
CREAT SERPL-MCNC: 1.1 MG/DL (ref 0.5–1.4)
DIFFERENTIAL METHOD BLD: ABNORMAL
EOSINOPHIL # BLD AUTO: 0.2 K/UL (ref 0–0.5)
EOSINOPHIL NFR BLD: 2.4 % (ref 0–8)
ERYTHROCYTE [DISTWIDTH] IN BLOOD BY AUTOMATED COUNT: 18.6 % (ref 11.5–14.5)
EST. GFR  (NO RACE VARIABLE): >60 ML/MIN/1.73 M^2
GLUCOSE SERPL-MCNC: 99 MG/DL (ref 70–110)
HCT VFR BLD AUTO: 32.3 % (ref 40–54)
HGB BLD-MCNC: 9.7 G/DL (ref 14–18)
IMM GRANULOCYTES # BLD AUTO: 0.04 K/UL (ref 0–0.04)
IMM GRANULOCYTES NFR BLD AUTO: 0.5 % (ref 0–0.5)
LYMPHOCYTES # BLD AUTO: 1.4 K/UL (ref 1–4.8)
LYMPHOCYTES NFR BLD: 16.3 % (ref 18–48)
MAGNESIUM SERPL-MCNC: 2.3 MG/DL (ref 1.6–2.6)
MCH RBC QN AUTO: 23.3 PG (ref 27–31)
MCHC RBC AUTO-ENTMCNC: 30 G/DL (ref 32–36)
MCV RBC AUTO: 78 FL (ref 82–98)
MONOCYTES # BLD AUTO: 1 K/UL (ref 0.3–1)
MONOCYTES NFR BLD: 11.9 % (ref 4–15)
NEUTROPHILS # BLD AUTO: 5.7 K/UL (ref 1.8–7.7)
NEUTROPHILS NFR BLD: 68.2 % (ref 38–73)
NRBC BLD-RTO: 0 /100 WBC
PLATELET # BLD AUTO: 360 K/UL (ref 150–450)
PMV BLD AUTO: 10 FL (ref 9.2–12.9)
POTASSIUM SERPL-SCNC: 4 MMOL/L (ref 3.5–5.1)
PROT SERPL-MCNC: 7.3 G/DL (ref 6–8.4)
RBC # BLD AUTO: 4.16 M/UL (ref 4.6–6.2)
SODIUM SERPL-SCNC: 132 MMOL/L (ref 136–145)
WBC # BLD AUTO: 8.32 K/UL (ref 3.9–12.7)

## 2025-02-20 PROCEDURE — 83735 ASSAY OF MAGNESIUM: CPT

## 2025-02-20 PROCEDURE — 25000003 PHARM REV CODE 250: Performed by: INTERNAL MEDICINE

## 2025-02-20 PROCEDURE — 25000003 PHARM REV CODE 250: Performed by: NURSE PRACTITIONER

## 2025-02-20 PROCEDURE — 63600175 PHARM REV CODE 636 W HCPCS: Performed by: INTERNAL MEDICINE

## 2025-02-20 PROCEDURE — 63600175 PHARM REV CODE 636 W HCPCS

## 2025-02-20 PROCEDURE — 99233 SBSQ HOSP IP/OBS HIGH 50: CPT | Mod: ,,, | Performed by: INTERNAL MEDICINE

## 2025-02-20 PROCEDURE — 97535 SELF CARE MNGMENT TRAINING: CPT

## 2025-02-20 PROCEDURE — 21400001 HC TELEMETRY ROOM

## 2025-02-20 PROCEDURE — 27000207 HC ISOLATION

## 2025-02-20 PROCEDURE — 25000003 PHARM REV CODE 250: Performed by: PODIATRIST

## 2025-02-20 PROCEDURE — 85025 COMPLETE CBC W/AUTO DIFF WBC: CPT

## 2025-02-20 PROCEDURE — 25000003 PHARM REV CODE 250

## 2025-02-20 PROCEDURE — 36415 COLL VENOUS BLD VENIPUNCTURE: CPT

## 2025-02-20 PROCEDURE — 80053 COMPREHEN METABOLIC PANEL: CPT

## 2025-02-20 PROCEDURE — 25000003 PHARM REV CODE 250: Performed by: HOSPITALIST

## 2025-02-20 RX ORDER — METOPROLOL SUCCINATE 25 MG/1
25 TABLET, EXTENDED RELEASE ORAL DAILY
Status: DISCONTINUED | OUTPATIENT
Start: 2025-02-20 | End: 2025-02-24 | Stop reason: HOSPADM

## 2025-02-20 RX ADMIN — AMPICILLIN SODIUM AND SULBACTAM SODIUM 3 G: 2; 1 INJECTION, POWDER, FOR SOLUTION INTRAMUSCULAR; INTRAVENOUS at 01:02

## 2025-02-20 RX ADMIN — AMMONIUM LACTATE: 12 LOTION TOPICAL at 09:02

## 2025-02-20 RX ADMIN — AMMONIUM LACTATE: 12 LOTION TOPICAL at 08:02

## 2025-02-20 RX ADMIN — SPIRONOLACTONE 25 MG: 25 TABLET ORAL at 09:02

## 2025-02-20 RX ADMIN — FERROUS SULFATE TAB 325 MG (65 MG ELEMENTAL FE) 1 EACH: 325 (65 FE) TAB at 08:02

## 2025-02-20 RX ADMIN — PANTOPRAZOLE SODIUM 40 MG: 40 TABLET, DELAYED RELEASE ORAL at 05:02

## 2025-02-20 RX ADMIN — METOPROLOL SUCCINATE 25 MG: 25 TABLET, FILM COATED, EXTENDED RELEASE ORAL at 02:02

## 2025-02-20 RX ADMIN — OXYCODONE HYDROCHLORIDE AND ACETAMINOPHEN 1 TABLET: 10; 325 TABLET ORAL at 01:02

## 2025-02-20 RX ADMIN — AMIODARONE HYDROCHLORIDE 200 MG: 200 TABLET ORAL at 09:02

## 2025-02-20 RX ADMIN — PHENAZOPYRIDINE HYDROCHLORIDE 100 MG: 100 TABLET ORAL at 08:02

## 2025-02-20 RX ADMIN — AMPICILLIN SODIUM AND SULBACTAM SODIUM 3 G: 2; 1 INJECTION, POWDER, FOR SOLUTION INTRAMUSCULAR; INTRAVENOUS at 04:02

## 2025-02-20 RX ADMIN — OXYCODONE HYDROCHLORIDE AND ACETAMINOPHEN 1 TABLET: 10; 325 TABLET ORAL at 09:02

## 2025-02-20 RX ADMIN — AMPICILLIN SODIUM AND SULBACTAM SODIUM 3 G: 2; 1 INJECTION, POWDER, FOR SOLUTION INTRAMUSCULAR; INTRAVENOUS at 09:02

## 2025-02-20 RX ADMIN — SPIRONOLACTONE 25 MG: 25 TABLET ORAL at 08:02

## 2025-02-20 RX ADMIN — AMPICILLIN SODIUM AND SULBACTAM SODIUM 3 G: 2; 1 INJECTION, POWDER, FOR SOLUTION INTRAMUSCULAR; INTRAVENOUS at 08:02

## 2025-02-20 RX ADMIN — TAMSULOSIN HYDROCHLORIDE 0.4 MG: 0.4 CAPSULE ORAL at 08:02

## 2025-02-20 RX ADMIN — PHENAZOPYRIDINE HYDROCHLORIDE 100 MG: 100 TABLET ORAL at 04:02

## 2025-02-20 RX ADMIN — TORSEMIDE 20 MG: 20 TABLET ORAL at 05:02

## 2025-02-20 RX ADMIN — PHENAZOPYRIDINE HYDROCHLORIDE 100 MG: 100 TABLET ORAL at 01:02

## 2025-02-20 RX ADMIN — ENOXAPARIN SODIUM 100 MG: 100 INJECTION SUBCUTANEOUS at 05:02

## 2025-02-20 RX ADMIN — Medication 400 MG: at 08:02

## 2025-02-20 RX ADMIN — OXYCODONE HYDROCHLORIDE AND ACETAMINOPHEN 1 TABLET: 10; 325 TABLET ORAL at 05:02

## 2025-02-20 RX ADMIN — ENOXAPARIN SODIUM 100 MG: 100 INJECTION SUBCUTANEOUS at 04:02

## 2025-02-20 RX ADMIN — AMIODARONE HYDROCHLORIDE 200 MG: 200 TABLET ORAL at 08:02

## 2025-02-20 RX ADMIN — TRAMADOL HYDROCHLORIDE 50 MG: 50 TABLET, COATED ORAL at 08:02

## 2025-02-20 RX ADMIN — AMPICILLIN SODIUM AND SULBACTAM SODIUM 3 G: 2; 1 INJECTION, POWDER, FOR SOLUTION INTRAMUSCULAR; INTRAVENOUS at 05:02

## 2025-02-20 NOTE — PT/OT/SLP PROGRESS
Occupational Therapy   Treatment    Name: Juan C Sanchez  MRN: [id number]  Admitting Diagnosis:  Acute on chronic congestive heart failure  3 Days Post-Op    Recommendations:     Discharge Recommendations: Moderate Intensity Therapy  Discharge Equipment Recommendations:  none  Barriers to discharge:   none    Assessment:     Juan C Sanchez is a 69 y.o. male with a medical diagnosis of Acute on chronic congestive heart failure.  He presents up in the BS upon entry agreeable for OT session. Performance deficits affecting function are pain, decreased ROM, decreased lower extremity function, impaired skin, edema, gait instability, impaired balance, impaired self care skills, impaired functional mobility, weakness, impaired endurance, impaired sensation.     Rehab Prognosis:  Good; patient would benefit from acute skilled OT services to address these deficits and reach maximum level of function.       Plan:     Patient to be seen 3 x/week to address the above listed problems via self-care/home management, therapeutic activities, therapeutic exercises  Plan of Care Expires: 03/12/25  Plan of Care Reviewed with: patient    Subjective     Chief Complaint: pt relieved after BM   Patient/Family Comments/goals: none stated  Pain/Comfort:   None reported    Objective:     Communicated with: nurse prior to session.  Patient found  sittimg up in the BS  with telemetry, peripheral IV upon OT entry to room.    General Precautions: Standard, fall    Orthopedic Precautions:N/A  Braces: N/A  Respiratory Status: Room air     Occupational Performance:     Bed Mobility:    Patient completed Rolling/Turning to Right with stand by assistance  Patient completed Scooting/Bridging with stand by assistance  Patient completed Sit to Supine with stand by assistance     Functional Mobility/Transfers:  Patient completed Sit <> Stand Transfer with stand by assistance  with  no assistive device   Functional Mobility: pt stood from the BSC and  leaned on the bed     Activities of Daily Living:  Grooming: supervision for oral hygiene sitting up in the bed  Toileting: maximal assistance for pericare standing next to bed and BSC      [facility] 6 Click ADL: 16    Treatment & Education:  Pt educated on role of OT/POC, importance of OOB/EOB activity, use of call bell, and safety during ADLs, transfers, and functional mobility.     Patient left supine with all lines intact, call button in reach, and bed alarm on    GOALS:   Multidisciplinary Problems       Occupational Therapy Goals          Problem: Occupational Therapy    Goal Priority Disciplines Outcome Interventions   Occupational Therapy Goal     OT, PT/OT Progressing    Description: Goals to be met by: 3/12/2025     Patient will increase functional independence with ADLs by performing:    UE Dressing with Supervision.  LE Dressing with Supervision.  Grooming while standing at sink with Supervision.  Toileting from toilet with Supervision for hygiene and clothing management.   Toilet transfer to toilet with Supervision.  Perform sitting/standing ADL activity with no LOB.                       DME Justifications:  No DME recommended requiring DME justifications    Time Tracking:     OT Date of Treatment: 02/20/25  OT Start Time: 0926  OT Stop Time: 0950  OT Total Time (min): 24 min    Billable Minutes:Self Care/Home Management 24    OT/LESTER: OT     Number of LESTER visits since last OT visit: 1    2/20/2025

## 2025-02-20 NOTE — ASSESSMENT & PLAN NOTE
Not septic. L third toe.  -ID consulted  -Podiatry consulted  -Unasyn to complete 4 week course of antibiotics through 3/03/25.  Can use Augmentin at discharge per ID  -status post third toe amputation (L) 2/14  -bone culture with Proteus and Enterococcus

## 2025-02-20 NOTE — PROGRESS NOTES
[facility]  Department of Cardiology  Progress Note      PATIENT NAME: Juan C Sanchez  MRN: [id number]  TODAY'S DATE: 02/20/2025  ADMIT DATE: 2/11/2025                          CONSULT REQUESTED BY: Livia Alexandre MD    SUBJECTIVE     PRINCIPAL PROBLEM: Acute on chronic congestive heart failure      02/20/2025    Patient seen sitting up in bed. Doing well off dobutamine. Renal function stable and UOP has remained sufficient.     2/19/25    Patient seen sitting up in bed with no acute distress noted he seems improved significantly since admission.  Patient with minimal swelling in the lower extremities in the coloration in his legs has improved significantly as well.    2/18/25  Denies complaints.  Net negative 18.8L since admit. Output -2.2L yesterday.  sCr 1.2    02/17/2025  Denies chest pain or shortness of breath. Reports taking lasix at home but states it was not working. Johnny compression sock use. Does not wear home O2. Denies any hx of smoking.  On lasix gtt and dobutamine. Net negative 16.7L since admit, out ~1.4 L overnight.  sCr 1.1  Underwent LLE angiogram with vascular surgery today.    02/16/2025  Patient seen resting in bed. States he is not feeling as well today and has had more pain in his foot from toe amputation. He is not sleeping as well and is tired today. Overall he is still optimistic and his breathing is good.     2/15/25  Patient seen resting in bed with no distress noted. Breathing stable.     2/14/25  Patient seen resting in bed with no distress noted. Breathing is stable. S/p ANGEL/CV today.     2/13/25  Patient seen sitting up in bed with no acute distress noted.  Denies any chest discomfort.  Breathing is stable.  He has been diuresing very well, and his weight is down significantly.    REASON FOR CONSULT:  CHF exacerbation     HPI:  Mr. Sanchez is a 69 yr old male with pmh of known HFrEF, HTN, mitral regurg, OA, GERD, and anemia who originally come into the ER  "yesterday evening after home PT encouraged him to come be evaluated after noticing some worsening swelling in BLE, hypotension, and generalized weakness. It appears he has been in and out of the ER about 8x since the start of this new year. BNP 3163. Trops 21.3 then 16.8. Most recent ECHO uploaded in care everywhere shows EF 25%. Pt also states he had a recent angiogram done in [place] (the last dated one I could see was 2022) showed trivial nonobstructive CAD. He states he follows with Dr. Alegria but has not been seen "in a while." He states compliance with medications but feels PO lasix is not working for him.     Per hospital medicine notes:  Mr. Sanchez is a 69 yr old male with a hx of HTN, MR, PAF, chronic systolic CHF with EF of 25, OA, GERD, nonischemic cardiomyopathy, anemia who presented to the ED with a chief complaint of shortness of breath, leg swelling and hypotension.  Patient states that he had PT come to his home today and he was found to be hypotensive and advised to come to the emergency room.  Patient endorses shortness of breath as well as leg swelling, malaise, fatigue, occasional chills, productive cough with milky sputum, upper abdominal pain, occasional nausea, decreased urine output, and positional lightheadedness.  He states that these symptoms have been progressively worsening over the last 1-2 months possibly even longer  He states that his symptoms are worse when lying flat as well as with exertion and better with rest and while sitting up.  He states that he has been taking his home medications without improvement of his symptoms.  He does endorse that most of all of his contacts recently have been sick. He denies any home oxygen use.  He does endorse occasional PND and states that he has been having to sit to sleep.  He denies tobacco and recreational drug use and states he seldomly drinks alcohol.  He denies fever, chest pain, vomiting, diarrhea, dysuria, hematuria, dizziness, " and syncope.     Upon arrival to ED, patient afebrile, HR of 109, RR of 24, BP of 95/62, satting 98% on RA.  Workup in the ED revealed RBC of 4.19, H/H of 10/32.8, sodium of 134, pCO2 of 31, GFR of 55.1, corrected calcium of 9, albumin of 3.1, BNP of 3163, troponin of 21.3, lactic acid of 2.1.  Blood cultures pending.  CXR shows enlarged cardiac silhouette with pulmonary vascular congestion.  Bilateral pleural effusions.  Mild bibasilar airspace disease and or atelectasis.  Left foot x-ray shows osteopenia.  No overt erosions or osseous destruction.  No acute fracture or dislocation.  Mild multifocal midfoot arthritis.  Forefoot swelling.  Patient was given Lasix 20 mg IV, Zosyn 4.5 g IV, 500 mL NS bolus, vancomycin 20 mg/kg while in the ED. discussed case with ED provider and patient will be admitted under hospital medicine services for further management.        Review of patient's allergies indicates:   Allergen Reactions    Gabapentin Other (See Comments)     Hypersomnia, nightmares    Morphine Other (See Comments)     Pt states systemic cramping       Past Medical History:   Diagnosis Date    Hypertension      Past Surgical History:   Procedure Laterality Date    ANGIOGRAPHY OF LOWER EXTREMITY Left 2/17/2025    Procedure: Angiogram Extremity Unilateral;  Surgeon: Valeri Faith MD;  Location: [facility] CATH/EP LAB;  Service: Vascular;  Laterality: Left;    ECHOCARDIOGRAM,TRANSESOPHAGEAL N/A 2/14/2025    Procedure: Transesophageal echo (ANGEL) intra-procedure log documentation;  Surgeon: José Luis Arellano MD;  Location: [facility] CATH/EP LAB;  Service: Cardiology;  Laterality: N/A;    ORIF closed comminuted displaced intra-articular fx distal left radius & application of external fixator Left 05/28/2018    TOE AMPUTATION Left 2/14/2025    Procedure: AMPUTATION, TOE;  Surgeon: Jerry España DPM;  Location: [facility] OR;  Service: Podiatry;  Laterality: Left;    TRANSESOPHAGEAL ECHOCARDIOGRAM WITH POSSIBLE CARDIOVERSION (ANGEL  W/ POSS CARDIOVERSION) N/A 2/14/2025    Procedure: Transesophageal echo (ANGEL) intra-procedure log documentation;  Surgeon: José Luis Arellano MD;  Location: [facility] CATH/EP LAB;  Service: Cardiology;  Laterality: N/A;    TREATMENT OF CARDIAC ARRHYTHMIA N/A 2/14/2025    Procedure: Cardioversion or Defibrillation;  Surgeon: José Luis Arellano MD;  Location: [facility] CATH/EP LAB;  Service: Cardiology;  Laterality: N/A;     Social History     Tobacco Use    Smoking status: Never    Smokeless tobacco: Never   Substance Use Topics    Alcohol use: Yes     Comment: social    Drug use: No        REVIEW OF SYSTEMS    As mentioned in HPI    OBJECTIVE     VITAL SIGNS (Most Recent)  Temp: 97.6 °F (36.4 °C) (02/20/25 1145)  Pulse: 64 (02/20/25 1145)  Resp: 18 (02/20/25 1319)  BP: 104/70 (02/20/25 1145)  SpO2: 96 % (02/20/25 1145)    VENTILATION STATUS  Resp: 18 (02/20/25 1319)  SpO2: 96 % (02/20/25 1145)           I & O (Last 24H):  Intake/Output Summary (Last 24 hours) at 2/20/2025 1322  Last data filed at 2/20/2025 0952  Gross per 24 hour   Intake 120 ml   Output 1150 ml   Net -1030 ml       WEIGHTS  Wt Readings from Last 3 Encounters:   02/19/25 0445 89.9 kg (198 lb 3.1 oz)   02/18/25 0445 87.9 kg (193 lb 12.6 oz)   02/16/25 0453 90.5 kg (199 lb 8.3 oz)   02/15/25 0339 92.5 kg (203 lb 14.8 oz)   02/14/25 0732 87.5 kg (192 lb 14.4 oz)   02/14/25 0500 94.1 kg (207 lb 7.3 oz)   02/13/25 0604 93.9 kg (207 lb 0.2 oz)   02/12/25 0050 98.6 kg (217 lb 6 oz)   02/11/25 1727 92.5 kg (204 lb)   02/14/25 0940 87.5 kg (192 lb 14.4 oz)   10/23/18 1426 92.5 kg (204 lb)       PHYSICAL EXAM    CONSTITUTIONAL: NAD, sitting upright in bed, smiling  HEENT: Normocephalic. No pallor  NECK: JVD improving  LUNGS: faint bibasilar crackles, normal WOB. On room air  HEART: regular rate and irregular rhythm, PVCs correlating on bedside telemetry, S1, S2 normal, no murmur   ABDOMEN: soft, bowel sounds normal  EXTREMITIES:  Edema is improved significant, LLE with  dressing c/d/i  SKIN: No rash  NEURO: AAO X 3  PSYCH: normal affect     HOME MEDICATIONS:  No current facility-administered medications on file prior to encounter.     Current Outpatient Medications on File Prior to Encounter   Medication Sig Dispense Refill    acetaminophen (TYLENOL EXTRA STRENGTH) 500 MG tablet Take 1,000 mg by mouth every 6 (six) hours as needed for Pain.      amiodarone (PACERONE) 200 MG Tab Take 200 mg by mouth 2 (two) times daily.      doxycycline (MONODOX) 100 MG capsule Take 100 mg by mouth 2 (two) times daily.      empagliflozin (JARDIANCE) 10 mg tablet Take 10 mg by mouth once daily.      ENTRESTO 24-26 mg per tablet Take 1 tablet by mouth 2 (two) times daily.      furosemide (LASIX) 20 MG tablet Take 40 mg by mouth 0900, 1800.      gabapentin (NEURONTIN) 300 MG capsule Take 300 mg by mouth 2 (two) times daily.      metoprolol tartrate (LOPRESSOR) 25 MG tablet Take 25 mg by mouth 2 (two) times daily.      nitroGLYCERIN (NITROSTAT) 0.4 MG SL tablet Place 0.4 mg under the tongue every 5 (five) minutes as needed for Chest pain.      pantoprazole (PROTONIX) 40 MG tablet Take 40 mg by mouth once daily.      carvediloL (COREG) 3.125 MG tablet Take 3.125 mg by mouth 2 (two) times daily. (Patient not taking: Reported on 2/11/2025)         SCHEDULED MEDS:   amiodarone  200 mg Oral BID    ammonium lactate   Topical (Top) BID    ampicillin-sulbactam  3 g Intravenous Q4H    enoxparin  1 mg/kg Subcutaneous Q12H    ferrous sulfate  1 tablet Oral Daily    magnesium oxide  400 mg Oral Daily    pantoprazole  40 mg Oral Daily    phenazopyridine  100 mg Oral TID WM    spironolactone  25 mg Oral BID    tamsulosin  0.4 mg Oral Daily    torsemide  20 mg Oral BID loop       CONTINUOUS INFUSIONS:        PRN MEDS:  Current Facility-Administered Medications:     acetaminophen, 650 mg, Oral, Q4H PRN    aluminum-magnesium hydroxide-simethicone, 30 mL, Oral, QID PRN    magnesium oxide, 800 mg, Oral, PRN    magnesium  "oxide, 800 mg, Oral, PRN    melatonin, 9 mg, Oral, Nightly PRN    metoprolol, 5 mg, Intravenous, Q5 Min PRN    midodrine, 2.5 mg, Oral, TID PRN    naloxone, 0.02 mg, Intravenous, PRN    ondansetron, 4 mg, Intravenous, Q6H PRN    oxyCODONE-acetaminophen, 1 tablet, Oral, Q6H PRN    potassium bicarbonate, 35 mEq, Oral, PRN    potassium bicarbonate, 50 mEq, Oral, PRN    potassium bicarbonate, 60 mEq, Oral, PRN    potassium, sodium phosphates, 2 packet, Oral, PRN    potassium, sodium phosphates, 2 packet, Oral, PRN    potassium, sodium phosphates, 2 packet, Oral, PRN    senna-docusate 8.6-50 mg, 1 tablet, Oral, BID PRN    traMADoL, 50 mg, Oral, Q4H PRN    LABS AND DIAGNOSTICS     CBC LAST 3 DAYS  Recent Labs   Lab 02/18/25 0433 02/19/25 0433 02/20/25 0423   WBC 6.27 8.05 8.32   RBC 4.20* 4.01* 4.16*   HGB 9.7* 9.4* 9.7*   HCT 32.7* 31.2* 32.3*   MCV 78* 78* 78*   MCH 23.1* 23.4* 23.3*   MCHC 29.7* 30.1* 30.0*   RDW 18.6* 18.6* 18.6*    308 360   MPV 10.3 9.7 10.0   GRAN 61.8  3.9 58.6  4.7 68.2  5.7   LYMPH 17.4*  1.1 21.4  1.7 16.3*  1.4   MONO 15.6*  1.0 15.8*  1.3* 11.9  1.0   BASO 0.04 0.05 0.06   NRBC 0 0 0       COAGULATION LAST 3 DAYS  No results for input(s): "LABPT", "INR", "APTT" in the last 168 hours.    CHEMISTRY LAST 3 DAYS  Recent Labs   Lab 02/18/25 0433 02/19/25 0433 02/20/25 0423   * 131* 132*   K 3.9 3.7 4.0   CL 93* 95 93*   CO2 34* 28 28   ANIONGAP 7* 8 11   BUN 23 18 20   CREATININE 1.2 1.1 1.1   GLU 86 74 99   CALCIUM 9.1 8.9 9.1   MG 2.2 2.2 2.3   ALBUMIN 3.2* 3.4* 3.6   PROT 7.4 6.9 7.3   ALKPHOS 73 76 79   ALT 5* 5* 6*   AST 12 11 12   BILITOT 0.7 0.6 0.6       CARDIAC PROFILE LAST 3 DAYS  Recent Labs   Lab 02/19/25  0433   BNP 1,547*       ENDOCRINE LAST 3 DAYS  No results for input(s): "TSH", "PROCAL" in the last 168 hours.      LAST ARTERIAL BLOOD GAS  ABG  No results for input(s): "PH", "PO2", "PCO2", "HCO3", "BE" in the last 168 hours.    LAST 7 DAYS MICROBIOLOGY "   Microbiology Results (last 7 days)       Procedure Component Value Units Date/Time    Blood culture x two cultures. Draw prior to antibiotics. [[id number]] Collected: 02/11/25 1749    Order Status: Completed Specimen: Blood from Peripheral, Antecubital, Right Updated: 02/16/25 2032     Blood Culture, Routine No growth after 5 days.    Narrative:      Aerobic and anaerobic    Blood culture x two cultures. Draw prior to antibiotics. [[id number]] Collected: 02/11/25 1744    Order Status: Completed Specimen: Blood from Peripheral, Antecubital, Left Updated: 02/16/25 2032     Blood Culture, Routine No growth after 5 days.    Narrative:      Aerobic and anaerobic    Tissue culture [[id number]]  (Abnormal)  (Susceptibility) Collected: 02/13/25 0748    Order Status: Completed Specimen: Bone from Toe, Left Foot Updated: 02/16/25 0840     Aerobic Culture - Tissue PROTEUS MIRABILIS  From broth only        ENTEROCOCCUS FAECALIS  From broth only       Gram Stain Result No WBC's      Rare Gram positive cocci    Narrative:      Toe, Left Foot    Culture, Anaerobic [[id number]] Collected: 02/13/25 0748    Order Status: Completed Specimen: Bone from Toe, Left Foot Updated: 02/15/25 0933     Anaerobic Culture No anaerobes isolated    Narrative:      Toe, Left Foot    MRSA Screen by PCR [[id number]]  (Abnormal) Collected: 02/13/25 1054    Order Status: Completed Specimen: Nasal Swab Updated: 02/13/25 1410     MRSA SCREEN BY PCR Detected     Comment: MRSA  critical result(s) called and verbal readback obtained from   Luanne Stanley RN on Wing B by [facility] 02/13/2025 14:05         Narrative:       MRSA  critical result(s) called and verbal readback obtained from   Luanne Stanley RN on [facility] by [facility] 02/13/2025 14:05            MOST RECENT IMAGING  X-Ray Chest PA And Lateral  Narrative: EXAMINATION:  XR CHEST PA AND LATERAL    CLINICAL HISTORY:  Fluid overload; Heart failure, unspecified    FINDINGS:  PA and lateral chest with comparison  chest x-ray 02/13/2025.  Borderline enlarged cardiomediastinal silhouette is unchanged.  Bilateral diffuse interstitial opacities with scattered patchy airspace opacities are observed and are unchanged from previous exam.  Pulmonary vasculature is normal. No acute osseous abnormality.  Impression: Bilateral diffuse interstitial opacities with scattered patchy airspace opacities are unchanged from previous exam.    Electronically signed by: Abdon Aguilera  Date:    02/19/2025  Time:    08:14      ECHOCARDIOGRAM RESULTS (last 5)  No results found for this or any previous visit.    Echo done locally    Impressions   -----------     1.The estimated LV ejection fraction is 25 %     2.There is moderate to severe global hypokinesis     3.Diastolic function is indeterminate.     4.There is mild aortic valve sclerosis without significant stenosis     5.There is severe mitral regurgitation     6.Estimated RVSP systolic pressure is 37.13 mmHg     7.Compared to the report from prior study dated 04/04/2022, the   severity of mitral regurgitation has increased     Findings   --------     Left Ventricle   The estimated LV ejection fraction is 25 %. The calculated LV ejection   fraction (MOD, Biplane) is 29.99 %. LV chamber is mildly dilated.   There is mild concentric LV hypertrophy. LV systolic function is   normal. There is moderate to severe global hypokinesis. Diastolic   function is indeterminate.     Right Ventricle   RV size is moderately dilated. The RV systolic function is normal.     Septum   There is no atrial septal defect (ASD). There is no ventricular septal   defect (VSD).     Left Atrium   LA chamber size is normal. LA diameter is 3.81 cm.     Right Atrium   RA chamber size is normal.     Aortic Valve   There is a trileaflet aortic valve. There is mild aortic valve   sclerosis without significant stenosis. There is no aortic   regurgitation. Aortic valve area by VMax: 1.02 cm2. Aortic valve area   by VTI: 0.93 cm2.  AV Mean gradient: 7.37 mmHg. AV Peak gradient: 13.48   mmHg.     Mitral Valve   Mitral valve is not well visualized. There is severe mitral   regurgitation. There is no mitral stenosis.     Tricuspid Valve   Tricuspid valve is structurally normal. There is mild tricuspid   regurgitation. The estimated RV systolic pressure is normal. Estimated   RVSP systolic pressure is 37.13 mmHg. There is no tricuspid valve   stenosis.     Pulmonary Valve   Pulmonic valve is not well visualized.     Pericardium   The pericardium is normal. There is no pericardial effusion present.     Aorta   The size of the visualized portion of aortic root is within normal   limits. The thoracic aorta is not well visualized. The ascending aorta   is not well visualized.     Venous   The inferior vena cava dimension is normal.     Thrombus/Mass   There is no intracardiac mass or thrombus identified.     Echo Dimensions   --------------     LA Dimen (2D): 3.81 cm   IVS(D) (2D): 1.16 cm   LVPW(D) (2D): 1.16 cm   LV(D) (2D): 6.14 cm   LV(S) (2D): 4.56 cm   RV(D (2D): 3.7 cm   LVOT (2D): 2 cm   EF Teich (2D): 50 %   EF Teich (M-Mode): 25 %   FS (2D): 26 %   RVSP (Doppler): 37.13 mmHg   Doppler   -------   AVvel: 1.84 m/s   Pk Grad: 13.48 mmHg   LUCAS(pk): 1.02 cm2   PV Lee: 0.69 m/s   LVOTvel: 0.6 m/s   Mn Grad: 7.37 mmHg   LUCAS(VTI): 0.93 cm2   Ao Stenosis Dimen Idx: 0.33   MV PHT: 51.71 ms   Diastology   ----------   MV E: 1.27 m/s   MV A: 0.42 m/s   MV E/A: 3.01   MV Dec T: 175.64 ms   E' Lat: 9.29 cm/s   E' Med: 3.93 cm/s   E/Lat E': 13.66   E/Med E': 32.29   TR Lee: 1.72 m/s     Electronically signed by: Sudheer Lee DO   01/08/2025 11:49 AM   Impressions   -----------     1.The estimated LV ejection fraction is 25 %     2.There is moderate to severe global hypokinesis     3.Diastolic function is indeterminate.     4.There is mild aortic valve sclerosis without significant stenosis     5.There is severe mitral regurgitation     6.Estimated RVSP  systolic pressure is 37.13 mmHg     7.Compared to the report from prior study dated 04/04/2022, the   severity of mitral regurgitation has increased     Findings   --------     Left Ventricle   The estimated LV ejection fraction is 25 %. The calculated LV ejection   fraction (MOD, Biplane) is 29.99 %. LV chamber is mildly dilated.   There is mild concentric LV hypertrophy. LV systolic function is   normal. There is moderate to severe global hypokinesis. Diastolic   function is indeterminate.     Right Ventricle   RV size is moderately dilated. The RV systolic function is normal.     Septum   There is no atrial septal defect (ASD). There is no ventricular septal   defect (VSD).     Left Atrium   LA chamber size is normal. LA diameter is 3.81 cm.     Right Atrium   RA chamber size is normal.     Aortic Valve   There is a trileaflet aortic valve. There is mild aortic valve   sclerosis without significant stenosis. There is no aortic   regurgitation. Aortic valve area by VMax: 1.02 cm2. Aortic valve area   by VTI: 0.93 cm2. AV Mean gradient: 7.37 mmHg. AV Peak gradient: 13.48   mmHg.     Mitral Valve   Mitral valve is not well visualized. There is severe mitral   regurgitation. There is no mitral stenosis.     Tricuspid Valve   Tricuspid valve is structurally normal. There is mild tricuspid   regurgitation. The estimated RV systolic pressure is normal. Estimated   RVSP systolic pressure is 37.13 mmHg. There is no tricuspid valve   stenosis.     Pulmonary Valve   Pulmonic valve is not well visualized.     Pericardium   The pericardium is normal. There is no pericardial effusion present.     Aorta   The size of the visualized portion of aortic root is within normal   limits. The thoracic aorta is not well visualized. The ascending aorta   is not well visualized.     Venous   The inferior vena cava dimension is normal.     Thrombus/Mass   There is no intracardiac mass or thrombus identified.     Echo Dimensions    --------------     LA Dimen (2D): 3.81 cm   IVS(D) (2D): 1.16 cm   LVPW(D) (2D): 1.16 cm   LV(D) (2D): 6.14 cm   LV(S) (2D): 4.56 cm   RV(D (2D): 3.7 cm   LVOT (2D): 2 cm   EF Teich (2D): 50 %   EF Teich (M-Mode): 25 %   FS (2D): 26 %   RVSP (Doppler): 37.13 mmHg   Doppler   -------   AVvel: 1.84 m/s   Pk Grad: 13.48 mmHg   LUCAS(pk): 1.02 cm2   PV Lee: 0.69 m/s   LVOTvel: 0.6 m/s   Mn Grad: 7.37 mmHg   LUCAS(VTI): 0.93 cm2   Ao Stenosis Dimen Idx: 0.33   MV PHT: 51.71 ms   Diastology   ----------   MV E: 1.27 m/s   MV A: 0.42 m/s   MV E/A: 3.01   MV Dec T: 175.64 ms   E' Lat: 9.29 cm/s   E' Med: 3.93 cm/s   E/Lat E': 13.66   E/Med E': 32.29   TR Lee: 1.72 m/s     Electronically signed by: Sudheer Lee DO   01/08/2025 11:49 AM     CURRENT/PREVIOUS VISIT EKG  Results for orders placed or performed during the hospital encounter of 02/11/25   EKG 12-lead    Collection Time: 02/14/25 10:19 AM   Result Value Ref Range    QRS Duration 108 ms    OHS QTC Calculation 504 ms    Narrative    Test Reason : Z13.6,    Vent. Rate :  93 BPM     Atrial Rate :  93 BPM     P-R Int : 186 ms          QRS Dur : 108 ms      QT Int : 406 ms       P-R-T Axes :  64  41  63 degrees    QTcB Int : 504 ms    Sinus rhythm with Premature atrial complexes  Nonspecific T wave abnormality  Prolonged QT  Abnormal ECG  When compared with ECG of 11-Feb-2025 17:14,  Sinus rhythm has replaced Atrial fibrillation  Questionable change in The axis  Nonspecific T wave abnormality, improved in Inferior leads  Nonspecific T wave abnormality, improved in Anterior leads  Confirmed by Sergio Baker ([id number]) on 2/17/2025 11:00:12 AM    Referred By: AAAREFERRAL SELF           Confirmed By: Sergio Baker           ASSESSMENT/PLAN:     Active Hospital Problems    Diagnosis    *Acute on chronic congestive heart failure    PAD (peripheral artery disease)    Acute osteomyelitis    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   Continue amiodarone 200 mg po BID x 14 days then reduce to once daily.   Recommend Eliquis 5 mg po BID at PR.   Start metoprolol succinate 25 mg po daily today.   If BP and renal function tolerate, initiate losartan 12.5 mg po daily tomorrow.       Thelma Perez NP  Department of Cardiology  Date of Service: 02/20/2025     Patient continues to do very well on oral diuretic therapy.    From a cardiac perspective as mentioned above maintain on combination of torsemide and spironolactone   Initiate metoprolol succinate today   And if hemodynamically remained stable consider starting on losartan tomorrow at low doses   He is encouraged to follow-up in the office   I had a lengthy discussion with him regarding salt and fluid restriction and reiterated to him the compliance of taking his medications and adherence to diet .  I have personally interviewed and examined the patient, I have reviewed the Nurse Practitioner's history and physical, assessment, and plan. I have personally evaluated the patient at bedside and agree with the findings and made appropriate changes as necessary in recommendations.    José Luis Arellano MD  Department of Cardiology  [facility]  02/20/2025 2:13 PM

## 2025-02-20 NOTE — PT/OT/SLP PROGRESS
Physical Therapy      Patient Name:  Juan C Sanchez   MRN:  [id number]    Patient not seen today secondary to Other (Comment) (Pt sleeping and unable to remain alert enough to safely participate in therapy.). Will follow-up 2/21/25.

## 2025-02-20 NOTE — PLAN OF CARE
[facility] referral with [facility] cancelled due to ID cancelling home IV antibiotics.    Will continue with SNF planning, SW notified       02/20/25 [id number]   Post-Acute Status   Post-Acute Authorization Placement

## 2025-02-20 NOTE — PROGRESS NOTES
[facility] Medicine  Progress Note    Patient Name: Juan C Sanchez  MRN: [id number]  Patient Class: IP- Inpatient   Admission Date: 2/11/2025  Length of Stay: 9 days  Attending Physician: Livia Alexandre MD  Primary Care Provider: Katlin Graf NP        Subjective     Principal Problem:Acute on chronic congestive heart failure        HPI:  Mr. Sanchez is a 69 yr old male with a hx of HTN, MR, PAF, chronic systolic CHF with EF of 25, OA, GERD, nonischemic cardiomyopathy, anemia who presented to the ED with a chief complaint of shortness of breath, leg swelling and hypotension.  Patient states that he had PT come to his home today and he was found to be hypotensive and advised to come to the emergency room.  Patient endorses shortness of breath as well as leg swelling, malaise, fatigue, occasional chills, productive cough with milky sputum, upper abdominal pain, occasional nausea, decreased urine output, and positional lightheadedness.  He states that these symptoms have been progressively worsening over the last 1-2 months possibly even longer  He states that his symptoms are worse when lying flat as well as with exertion and better with rest and while sitting up.  He states that he has been taking his home medications without improvement of his symptoms.  He does endorse that most of all of his contacts recently have been sick. He denies any home oxygen use.  He does endorse occasional PND and states that he has been having to sit to sleep.  He denies tobacco and recreational drug use and states he seldomly drinks alcohol.  He denies fever, chest pain, vomiting, diarrhea, dysuria, hematuria, dizziness, and syncope.    Upon arrival to ED, patient afebrile, HR of 109, RR of 24, BP of 95/62, satting 98% on RA.  Workup in the ED revealed RBC of 4.19, H/H of 10/32.8, sodium of 134, pCO2 of 31, GFR of 55.1, corrected calcium of 9, albumin of 3.1, BNP of 3163, troponin of 21.3, lactic acid  of 2.1.  Blood cultures pending.  CXR shows enlarged cardiac silhouette with pulmonary vascular congestion.  Bilateral pleural effusions.  Mild bibasilar airspace disease and or atelectasis.  Left foot x-ray shows osteopenia.  No overt erosions or osseous destruction.  No acute fracture or dislocation.  Mild multifocal midfoot arthritis.  Forefoot swelling.  Patient was given Lasix 20 mg IV, Zosyn 4.5 g IV, 500 mL NS bolus, vancomycin 20 mg/kg while in the ED. discussed case with ED provider and patient will be admitted under hospital medicine services for further management.    Overview/Hospital Course:  Juan C Sanchez is a 69 year old male with a past medical history of HFrEF, Afib, PAD, anemia, obesity, and GERD who presented with an acute on chronic HFrEF (EF 20-25%) exacerbation as well as Afib with RVR. He is being diuresed with a Lasix infusion as well as dobutamine. Cardiology has been consulted.  Underwent ANGEL cardioversion 2/14. His course was also complicated by a third L toe acute osteomyelitis seen on MRI. Podiatry and ID have been consulted.  He underwent L 3rd toe amputation 2/14. He is on empiric vancomycin and cefepime. Vascular Surgery has been consulted as well and will plan for angiogram of the lower extremities 2/17.    Interval History:  P patient seen and examined on morning multidisciplinary rounds.  No new complaints.  He is now on p.o. torsemide and off the Lasix and dobutamine infusions.  Case management working on rehab placement    Review of Systems   Constitutional:  Positive for activity change.   Respiratory:  Negative for shortness of breath.    Cardiovascular:  Positive for leg swelling. Negative for chest pain and palpitations.   Gastrointestinal:  Negative for nausea and vomiting.   Skin:  Positive for wound.     Objective:     Vital Signs (Most Recent):  Temp: 97.4 °F (36.3 °C) (02/20/25 1538)  Pulse: 68 (02/20/25 1538)  Resp: 18 (02/20/25 1319)  BP: 102/68 (02/20/25  1538)  SpO2: 96 % (02/20/25 1538) Vital Signs (24h Range):  Temp:  [97.4 °F (36.3 °C)-98.2 °F (36.8 °C)] 97.4 °F (36.3 °C)  Pulse:  [64-88] 68  Resp:  [18-20] 18  SpO2:  [92 %-97 %] 96 %  BP: ()/(62-82) 102/68     Weight: 89.9 kg (198 lb 3.1 oz)  Body mass index is 27.64 kg/m².    Intake/Output Summary (Last 24 hours) at 2/20/2025 1616  Last data filed at 2/20/2025 0952  Gross per 24 hour   Intake 120 ml   Output 1150 ml   Net -1030 ml         Physical Exam  Vitals and nursing note reviewed.   Constitutional:       General: He is not in acute distress.  HENT:      Head: Normocephalic and atraumatic.      Right Ear: External ear normal.      Left Ear: External ear normal.      Nose: Nose normal.      Mouth/Throat:      Mouth: Mucous membranes are moist.   Eyes:      Extraocular Movements: Extraocular movements intact.      Conjunctiva/sclera: Conjunctivae normal.   Cardiovascular:      Rate and Rhythm: Normal rate and regular rhythm.      Pulses: Normal pulses.      Heart sounds: Normal heart sounds.   Pulmonary:      Effort: Pulmonary effort is normal.      Breath sounds: Rales (Minimal) present.      Comments: RA.  Abdominal:      General: Bowel sounds are normal. There is no distension.      Palpations: Abdomen is soft.      Tenderness: There is no abdominal tenderness.   Genitourinary:     Comments: Meredith.  Musculoskeletal:      Cervical back: Normal range of motion and neck supple.      Right lower leg: Edema present.      Left lower leg: Edema present.   Skin:     General: Skin is warm and dry.      Comments: See pictures regarding wounds.  Postoperative dressing not removed   Neurological:      Mental Status: He is alert. Mental status is at baseline.   Psychiatric:         Behavior: Behavior normal.             Significant Labs: All pertinent labs within the past 24 hours have been reviewed.    Significant Imaging: I have reviewed all pertinent imaging results/findings within the past 24  hours.    Assessment and Plan     * Acute on chronic congestive heart failure  -now off the Lasix and dobutamine drips and on p.o. 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 L third toe.  -ID consulted  -Podiatry consulted  -Unasyn to complete 4 week course of antibiotics through 3/03/25.  Can use Augmentin at discharge per ID  -status post third toe amputation (L) 2/14  -bone culture with Proteus and Enterococcus      PAD (peripheral artery disease)  -No intervention indicated per Vascular Surgery at this time  -underwent angiogram 2/17      Anemia  Ferritin at lower limit of normal.  -Trend Hgb with CBC    Paroxysmal atrial fibrillation  -Telemetry  -Lovenox  -metoprolol  -Continue home amiodarone  -ANGEL cardioversion per Cardiology 2/14    MR (mitral regurgitation)  Seen on TTE at OSH.  -Cardiology consulted      Hypertension  -Home medications held  -Continue to monitor      GERD (gastroesophageal reflux disease)  -PPI        VTE Risk Mitigation (From admission, onward)           Ordered     enoxaparin injection 100 mg  Every 12 hours (non-standard times)         02/12/25 1622     IP VTE HIGH RISK PATIENT  Once         02/11/25 2331     Place sequential compression device  Until discontinued         02/11/25 2331                    Discharge Planning   PHILIP: 2/21/2025     Code Status: Full Code   Medical Readiness for Discharge Date:   Discharge Plan A: Skilled Nursing Facility   Discharge Delays: None known at this time            Please place Justification for DME        Livia Alexandre MD  Department of Hospital Medicine   [facility]

## 2025-02-20 NOTE — PLAN OF CARE
SONIA messaged Apol[facility] with [facility] and asked her to submit for auth. SONIA will cont to follow.      02/20/25 1348   Post-Acute Status   Post-Acute Authorization Placement   Post-Acute Placement Status Referrals Sent   Discharge Plan   Discharge Plan A Skilled Nursing Facility     1420- SONIA called Brandy ([phone]) and asked her to submit auth. SONIA sent updated referral via Epic.     1536- Brandy with [facility] states they have submitted for auth. SONIA will cont to follow.

## 2025-02-20 NOTE — ASSESSMENT & PLAN NOTE
-now off the Lasix and dobutamine drips and on p.o. torsemide  -Hold metoprolol  -spironolactone 25 daily  -Telemetry  -Strict I's and O's  -Keep K > 4 and Mg > 2  -Cardiology consulted

## 2025-02-20 NOTE — SUBJECTIVE & OBJECTIVE
Interval History:  P patient seen and examined on morning multidisciplinary rounds.  No new complaints.  He is now on p.o. torsemide and off the Lasix and dobutamine infusions.  Case management working on rehab placement    Review of Systems   Constitutional:  Positive for activity change.   Respiratory:  Negative for shortness of breath.    Cardiovascular:  Positive for leg swelling. Negative for chest pain and palpitations.   Gastrointestinal:  Negative for nausea and vomiting.   Skin:  Positive for wound.     Objective:     Vital Signs (Most Recent):  Temp: 97.4 °F (36.3 °C) (02/20/25 1538)  Pulse: 68 (02/20/25 1538)  Resp: 18 (02/20/25 1319)  BP: 102/68 (02/20/25 1538)  SpO2: 96 % (02/20/25 1538) Vital Signs (24h Range):  Temp:  [97.4 °F (36.3 °C)-98.2 °F (36.8 °C)] 97.4 °F (36.3 °C)  Pulse:  [64-88] 68  Resp:  [18-20] 18  SpO2:  [92 %-97 %] 96 %  BP: ()/(62-82) 102/68     Weight: 89.9 kg (198 lb 3.1 oz)  Body mass index is 27.64 kg/m².    Intake/Output Summary (Last 24 hours) at 2/20/2025 1616  Last data filed at 2/20/2025 0952  Gross per 24 hour   Intake 120 ml   Output 1150 ml   Net -1030 ml         Physical Exam  Vitals and nursing note reviewed.   Constitutional:       General: He is not in acute distress.  HENT:      Head: Normocephalic and atraumatic.      Right Ear: External ear normal.      Left Ear: External ear normal.      Nose: Nose normal.      Mouth/Throat:      Mouth: Mucous membranes are moist.   Eyes:      Extraocular Movements: Extraocular movements intact.      Conjunctiva/sclera: Conjunctivae normal.   Cardiovascular:      Rate and Rhythm: Normal rate and regular rhythm.      Pulses: Normal pulses.      Heart sounds: Normal heart sounds.   Pulmonary:      Effort: Pulmonary effort is normal.      Breath sounds: Rales (Minimal) present.      Comments: RA.  Abdominal:      General: Bowel sounds are normal. There is no distension.      Palpations: Abdomen is soft.      Tenderness: There is  no abdominal tenderness.   Genitourinary:     Comments: Meredith.  Musculoskeletal:      Cervical back: Normal range of motion and neck supple.      Right lower leg: Edema present.      Left lower leg: Edema present.   Skin:     General: Skin is warm and dry.      Comments: See pictures regarding wounds.  Postoperative dressing not removed   Neurological:      Mental Status: He is alert. Mental status is at baseline.   Psychiatric:         Behavior: Behavior normal.             Significant Labs: All pertinent labs within the past 24 hours have been reviewed.    Significant Imaging: I have reviewed all pertinent imaging results/findings within the past 24 hours.

## 2025-02-20 NOTE — ASSESSMENT & PLAN NOTE
-Telemetry  -Lovenox  -metoprolol  -Continue home amiodarone  -ANGEL cardioversion per Cardiology 2/14

## 2025-02-21 PROBLEM — E44.0 MODERATE MALNUTRITION: Status: ACTIVE | Noted: 2025-02-21

## 2025-02-21 LAB
ALBUMIN SERPL BCP-MCNC: 3.3 G/DL (ref 3.5–5.2)
ALP SERPL-CCNC: 80 U/L (ref 55–135)
ALT SERPL W/O P-5'-P-CCNC: 6 U/L (ref 10–44)
ANION GAP SERPL CALC-SCNC: 4 MMOL/L (ref 8–16)
AST SERPL-CCNC: 11 U/L (ref 10–40)
BASOPHILS # BLD AUTO: 0.06 K/UL (ref 0–0.2)
BASOPHILS NFR BLD: 0.8 % (ref 0–1.9)
BILIRUB SERPL-MCNC: 0.5 MG/DL (ref 0.1–1)
BUN SERPL-MCNC: 26 MG/DL (ref 8–23)
CALCIUM SERPL-MCNC: 8.6 MG/DL (ref 8.7–10.5)
CHLORIDE SERPL-SCNC: 94 MMOL/L (ref 95–110)
CO2 SERPL-SCNC: 33 MMOL/L (ref 23–29)
CREAT SERPL-MCNC: 1.3 MG/DL (ref 0.5–1.4)
DIFFERENTIAL METHOD BLD: ABNORMAL
EOSINOPHIL # BLD AUTO: 0.3 K/UL (ref 0–0.5)
EOSINOPHIL NFR BLD: 3.9 % (ref 0–8)
ERYTHROCYTE [DISTWIDTH] IN BLOOD BY AUTOMATED COUNT: 19 % (ref 11.5–14.5)
EST. GFR  (NO RACE VARIABLE): >60 ML/MIN/1.73 M^2
GLUCOSE SERPL-MCNC: 104 MG/DL (ref 70–110)
HCT VFR BLD AUTO: 32.8 % (ref 40–54)
HGB BLD-MCNC: 9.9 G/DL (ref 14–18)
IMM GRANULOCYTES # BLD AUTO: 0.07 K/UL (ref 0–0.04)
IMM GRANULOCYTES NFR BLD AUTO: 1 % (ref 0–0.5)
LYMPHOCYTES # BLD AUTO: 2 K/UL (ref 1–4.8)
LYMPHOCYTES NFR BLD: 27.7 % (ref 18–48)
MAGNESIUM SERPL-MCNC: 2.3 MG/DL (ref 1.6–2.6)
MCH RBC QN AUTO: 23.6 PG (ref 27–31)
MCHC RBC AUTO-ENTMCNC: 30.2 G/DL (ref 32–36)
MCV RBC AUTO: 78 FL (ref 82–98)
MONOCYTES # BLD AUTO: 1 K/UL (ref 0.3–1)
MONOCYTES NFR BLD: 14.3 % (ref 4–15)
NEUTROPHILS # BLD AUTO: 3.8 K/UL (ref 1.8–7.7)
NEUTROPHILS NFR BLD: 52.3 % (ref 38–73)
NRBC BLD-RTO: 0 /100 WBC
PLATELET # BLD AUTO: 341 K/UL (ref 150–450)
PMV BLD AUTO: 9.9 FL (ref 9.2–12.9)
POTASSIUM SERPL-SCNC: 4.2 MMOL/L (ref 3.5–5.1)
PROT SERPL-MCNC: 6.9 G/DL (ref 6–8.4)
RBC # BLD AUTO: 4.19 M/UL (ref 4.6–6.2)
SODIUM SERPL-SCNC: 131 MMOL/L (ref 136–145)
WBC # BLD AUTO: 7.19 K/UL (ref 3.9–12.7)

## 2025-02-21 PROCEDURE — 25000003 PHARM REV CODE 250: Performed by: NURSE PRACTITIONER

## 2025-02-21 PROCEDURE — 83735 ASSAY OF MAGNESIUM: CPT

## 2025-02-21 PROCEDURE — 97535 SELF CARE MNGMENT TRAINING: CPT

## 2025-02-21 PROCEDURE — 25000003 PHARM REV CODE 250: Performed by: PODIATRIST

## 2025-02-21 PROCEDURE — 25000003 PHARM REV CODE 250: Performed by: HOSPITALIST

## 2025-02-21 PROCEDURE — 25000003 PHARM REV CODE 250: Performed by: INTERNAL MEDICINE

## 2025-02-21 PROCEDURE — 85025 COMPLETE CBC W/AUTO DIFF WBC: CPT

## 2025-02-21 PROCEDURE — 27000207 HC ISOLATION

## 2025-02-21 PROCEDURE — 25000003 PHARM REV CODE 250

## 2025-02-21 PROCEDURE — 97530 THERAPEUTIC ACTIVITIES: CPT | Mod: CQ

## 2025-02-21 PROCEDURE — 63600175 PHARM REV CODE 636 W HCPCS: Mod: TB,JZ | Performed by: INTERNAL MEDICINE

## 2025-02-21 PROCEDURE — 36415 COLL VENOUS BLD VENIPUNCTURE: CPT

## 2025-02-21 PROCEDURE — 21400001 HC TELEMETRY ROOM

## 2025-02-21 PROCEDURE — 80053 COMPREHEN METABOLIC PANEL: CPT

## 2025-02-21 PROCEDURE — 99232 SBSQ HOSP IP/OBS MODERATE 35: CPT | Mod: ,,, | Performed by: PODIATRIST

## 2025-02-21 PROCEDURE — 63600175 PHARM REV CODE 636 W HCPCS

## 2025-02-21 PROCEDURE — 97116 GAIT TRAINING THERAPY: CPT | Mod: CQ

## 2025-02-21 PROCEDURE — 63600175 PHARM REV CODE 636 W HCPCS: Performed by: INTERNAL MEDICINE

## 2025-02-21 RX ORDER — HYDROMORPHONE HYDROCHLORIDE 1 MG/ML
0.5 INJECTION, SOLUTION INTRAMUSCULAR; INTRAVENOUS; SUBCUTANEOUS EVERY 4 HOURS PRN
Status: DISCONTINUED | OUTPATIENT
Start: 2025-02-21 | End: 2025-02-24 | Stop reason: HOSPADM

## 2025-02-21 RX ORDER — ZINC SULFATE 50(220)MG
220 CAPSULE ORAL DAILY
Status: DISCONTINUED | OUTPATIENT
Start: 2025-02-21 | End: 2025-02-24 | Stop reason: HOSPADM

## 2025-02-21 RX ORDER — ASCORBIC ACID 500 MG
500 TABLET ORAL 2 TIMES DAILY
Status: DISCONTINUED | OUTPATIENT
Start: 2025-02-21 | End: 2025-02-24 | Stop reason: HOSPADM

## 2025-02-21 RX ADMIN — AMMONIUM LACTATE: 12 LOTION TOPICAL at 08:02

## 2025-02-21 RX ADMIN — ZINC SULFATE 220 MG (50 MG) CAPSULE 220 MG: CAPSULE at 04:02

## 2025-02-21 RX ADMIN — AMMONIUM LACTATE: 12 LOTION TOPICAL at 09:02

## 2025-02-21 RX ADMIN — SPIRONOLACTONE 25 MG: 25 TABLET ORAL at 09:02

## 2025-02-21 RX ADMIN — AMPICILLIN SODIUM AND SULBACTAM SODIUM 3 G: 2; 1 INJECTION, POWDER, FOR SOLUTION INTRAMUSCULAR; INTRAVENOUS at 10:02

## 2025-02-21 RX ADMIN — ENOXAPARIN SODIUM 100 MG: 100 INJECTION SUBCUTANEOUS at 04:02

## 2025-02-21 RX ADMIN — OXYCODONE HYDROCHLORIDE AND ACETAMINOPHEN 1 TABLET: 10; 325 TABLET ORAL at 09:02

## 2025-02-21 RX ADMIN — TAMSULOSIN HYDROCHLORIDE 0.4 MG: 0.4 CAPSULE ORAL at 09:02

## 2025-02-21 RX ADMIN — Medication 9 MG: at 08:02

## 2025-02-21 RX ADMIN — METOPROLOL SUCCINATE 25 MG: 25 TABLET, FILM COATED, EXTENDED RELEASE ORAL at 09:02

## 2025-02-21 RX ADMIN — AMPICILLIN SODIUM AND SULBACTAM SODIUM 3 G: 2; 1 INJECTION, POWDER, FOR SOLUTION INTRAMUSCULAR; INTRAVENOUS at 01:02

## 2025-02-21 RX ADMIN — PHENAZOPYRIDINE HYDROCHLORIDE 100 MG: 100 TABLET ORAL at 01:02

## 2025-02-21 RX ADMIN — HYDROMORPHONE HYDROCHLORIDE 0.5 MG: 1 INJECTION, SOLUTION INTRAMUSCULAR; INTRAVENOUS; SUBCUTANEOUS at 10:02

## 2025-02-21 RX ADMIN — SPIRONOLACTONE 25 MG: 25 TABLET ORAL at 08:02

## 2025-02-21 RX ADMIN — AMIODARONE HYDROCHLORIDE 200 MG: 200 TABLET ORAL at 09:02

## 2025-02-21 RX ADMIN — OXYCODONE HYDROCHLORIDE AND ACETAMINOPHEN 1 TABLET: 10; 325 TABLET ORAL at 04:02

## 2025-02-21 RX ADMIN — Medication 400 MG: at 09:02

## 2025-02-21 RX ADMIN — AMPICILLIN SODIUM AND SULBACTAM SODIUM 3 G: 2; 1 INJECTION, POWDER, FOR SOLUTION INTRAMUSCULAR; INTRAVENOUS at 09:02

## 2025-02-21 RX ADMIN — AMIODARONE HYDROCHLORIDE 200 MG: 200 TABLET ORAL at 08:02

## 2025-02-21 RX ADMIN — TORSEMIDE 20 MG: 20 TABLET ORAL at 05:02

## 2025-02-21 RX ADMIN — PHENAZOPYRIDINE HYDROCHLORIDE 100 MG: 100 TABLET ORAL at 10:02

## 2025-02-21 RX ADMIN — TORSEMIDE 20 MG: 20 TABLET ORAL at 04:02

## 2025-02-21 RX ADMIN — OXYCODONE HYDROCHLORIDE AND ACETAMINOPHEN 500 MG: 500 TABLET ORAL at 08:02

## 2025-02-21 RX ADMIN — THERA TABS 1 TABLET: TAB at 04:02

## 2025-02-21 RX ADMIN — ENOXAPARIN SODIUM 100 MG: 100 INJECTION SUBCUTANEOUS at 05:02

## 2025-02-21 RX ADMIN — EMPAGLIFLOZIN 10 MG: 10 TABLET, FILM COATED ORAL at 01:02

## 2025-02-21 RX ADMIN — FERROUS SULFATE TAB 325 MG (65 MG ELEMENTAL FE) 1 EACH: 325 (65 FE) TAB at 09:02

## 2025-02-21 RX ADMIN — PHENAZOPYRIDINE HYDROCHLORIDE 100 MG: 100 TABLET ORAL at 04:02

## 2025-02-21 RX ADMIN — AMPICILLIN SODIUM AND SULBACTAM SODIUM 3 G: 2; 1 INJECTION, POWDER, FOR SOLUTION INTRAMUSCULAR; INTRAVENOUS at 05:02

## 2025-02-21 RX ADMIN — PANTOPRAZOLE SODIUM 40 MG: 40 TABLET, DELAYED RELEASE ORAL at 05:02

## 2025-02-21 RX ADMIN — AMPICILLIN SODIUM AND SULBACTAM SODIUM 3 G: 2; 1 INJECTION, POWDER, FOR SOLUTION INTRAMUSCULAR; INTRAVENOUS at 06:02

## 2025-02-21 RX ADMIN — TRAMADOL HYDROCHLORIDE 50 MG: 50 TABLET, COATED ORAL at 08:02

## 2025-02-21 NOTE — PROGRESS NOTES
[facility]  Podiatry  Progress Note    Patient Name: Juan C Sanchez  MRN: [id number]  Admission Date: 2/11/2025  Hospital Length of Stay: 10 days  Attending Physician: Livia Alexandre MD  Primary Care Provider: Katlin Graf NP     Subjective:     Interval History:  Resting in bed.  States intermittent pain to his foot.  Still waiting on placement.  Otherwise no new complaints.    Scheduled Meds:   amiodarone  200 mg Oral BID    ammonium lactate   Topical (Top) BID    ampicillin-sulbactam  3 g Intravenous Q4H    enoxparin  1 mg/kg Subcutaneous Q12H    ferrous sulfate  1 tablet Oral Daily    magnesium oxide  400 mg Oral Daily    metoprolol succinate  25 mg Oral Daily    pantoprazole  40 mg Oral Daily    phenazopyridine  100 mg Oral TID WM    spironolactone  25 mg Oral BID    tamsulosin  0.4 mg Oral Daily    torsemide  20 mg Oral BID loop     Continuous Infusions:  PRN Meds:  Current Facility-Administered Medications:     acetaminophen, 650 mg, Oral, Q4H PRN    aluminum-magnesium hydroxide-simethicone, 30 mL, Oral, QID PRN    magnesium oxide, 800 mg, Oral, PRN    magnesium oxide, 800 mg, Oral, PRN    melatonin, 9 mg, Oral, Nightly PRN    metoprolol, 5 mg, Intravenous, Q5 Min PRN    midodrine, 2.5 mg, Oral, TID PRN    naloxone, 0.02 mg, Intravenous, PRN    ondansetron, 4 mg, Intravenous, Q6H PRN    oxyCODONE-acetaminophen, 1 tablet, Oral, Q6H PRN    potassium bicarbonate, 35 mEq, Oral, PRN    potassium bicarbonate, 50 mEq, Oral, PRN    potassium bicarbonate, 60 mEq, Oral, PRN    potassium, sodium phosphates, 2 packet, Oral, PRN    potassium, sodium phosphates, 2 packet, Oral, PRN    potassium, sodium phosphates, 2 packet, Oral, PRN    senna-docusate 8.6-50 mg, 1 tablet, Oral, BID PRN    traMADoL, 50 mg, Oral, Q4H PRN    Review of Systems  Objective:     Vital Signs (Most Recent):  Temp: 97.6 °F (36.4 °C) (02/21/25 0740)  Pulse: 77 (02/21/25 0740)  Resp: 18 (02/21/25 0740)  BP: 106/69 (02/21/25  0740)  SpO2: 99 % (02/21/25 0740) Vital Signs (24h Range):  Temp:  [97.4 °F (36.3 °C)-98.1 °F (36.7 °C)] 97.6 °F (36.4 °C)  Pulse:  [64-80] 77  Resp:  [16-18] 18  SpO2:  [96 %-99 %] 99 %  BP: ()/(62-82) 106/69     Weight: 89.5 kg (197 lb 5 oz)  Body mass index is 27.52 kg/m².    Foot Exam              Laboratory:  All pertinent labs reviewed within the last 24 hours.    Diagnostic Results:  I have reviewed all pertinent imaging results/findings within the past 24 hours.  .  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 Three times a week changes.    Patient can WBAT with camwalker boot on at all times while walking or transferring.    Infectious disease following    Carlos A and protein drinks     Counseled patient on increasing protein intake, not getting wound wet, keeping dressing clean dry and intact, following a healthy diet, elevating legs when able, removing pressure from wound          Loren España DPM  Podiatry  [facility]

## 2025-02-21 NOTE — PROGRESS NOTES
[facility]  Department of Cardiology  Progress Note      PATIENT NAME: Juan C Sanchez  MRN: [id number]  TODAY'S DATE: 02/21/2025  ADMIT DATE: 2/11/2025                          CONSULT REQUESTED BY: Livia Alexandre MD    SUBJECTIVE     PRINCIPAL PROBLEM: Acute on chronic congestive heart failure      02/21/2025    Patient seen up in bed and getting ready to work with therapy. He is feeling well. Swelling seems to be controlled.     2/20/25    Patient seen sitting up in bed. Doing well off dobutamine. Renal function stable and UOP has remained sufficient.     2/19/25    Patient seen sitting up in bed with no acute distress noted he seems improved significantly since admission.  Patient with minimal swelling in the lower extremities in the coloration in his legs has improved significantly as well.    2/18/25  Denies complaints.  Net negative 18.8L since admit. Output -2.2L yesterday.  sCr 1.2    02/17/2025  Denies chest pain or shortness of breath. Reports taking lasix at home but states it was not working. Johnny compression sock use. Does not wear home O2. Denies any hx of smoking.  On lasix gtt and dobutamine. Net negative 16.7L since admit, out ~1.4 L overnight.  sCr 1.1  Underwent LLE angiogram with vascular surgery today.    02/16/2025  Patient seen resting in bed. States he is not feeling as well today and has had more pain in his foot from toe amputation. He is not sleeping as well and is tired today. Overall he is still optimistic and his breathing is good.     2/15/25  Patient seen resting in bed with no distress noted. Breathing stable.     2/14/25  Patient seen resting in bed with no distress noted. Breathing is stable. S/p ANGEL/CV today.     2/13/25  Patient seen sitting up in bed with no acute distress noted.  Denies any chest discomfort.  Breathing is stable.  He has been diuresing very well, and his weight is down significantly.    REASON FOR CONSULT:  CHF exacerbation     HPI:    "Daniel is a 69 yr old male with pmh of known HFrEF, HTN, mitral regurg, OA, GERD, and anemia who originally come into the ER yesterday evening after home PT encouraged him to come be evaluated after noticing some worsening swelling in BLE, hypotension, and generalized weakness. It appears he has been in and out of the ER about 8x since the start of this new year. BNP 3163. Trops 21.3 then 16.8. Most recent ECHO uploaded in care everywhere shows EF 25%. Pt also states he had a recent angiogram done in [place] (the last dated one I could see was 2022) showed trivial nonobstructive CAD. He states he follows with Dr. Alegria but has not been seen "in a while." He states compliance with medications but feels PO lasix is not working for him.     Per hospital medicine notes:  Mr. Sanchez is a 69 yr old male with a hx of HTN, MR, PAF, chronic systolic CHF with EF of 25, OA, GERD, nonischemic cardiomyopathy, anemia who presented to the ED with a chief complaint of shortness of breath, leg swelling and hypotension.  Patient states that he had PT come to his home today and he was found to be hypotensive and advised to come to the emergency room.  Patient endorses shortness of breath as well as leg swelling, malaise, fatigue, occasional chills, productive cough with milky sputum, upper abdominal pain, occasional nausea, decreased urine output, and positional lightheadedness.  He states that these symptoms have been progressively worsening over the last 1-2 months possibly even longer  He states that his symptoms are worse when lying flat as well as with exertion and better with rest and while sitting up.  He states that he has been taking his home medications without improvement of his symptoms.  He does endorse that most of all of his contacts recently have been sick. He denies any home oxygen use.  He does endorse occasional PND and states that he has been having to sit to sleep.  He denies tobacco and recreational " drug use and states he seldomly drinks alcohol.  He denies fever, chest pain, vomiting, diarrhea, dysuria, hematuria, dizziness, and syncope.     Upon arrival to ED, patient afebrile, HR of 109, RR of 24, BP of 95/62, satting 98% on RA.  Workup in the ED revealed RBC of 4.19, H/H of 10/32.8, sodium of 134, pCO2 of 31, GFR of 55.1, corrected calcium of 9, albumin of 3.1, BNP of 3163, troponin of 21.3, lactic acid of 2.1.  Blood cultures pending.  CXR shows enlarged cardiac silhouette with pulmonary vascular congestion.  Bilateral pleural effusions.  Mild bibasilar airspace disease and or atelectasis.  Left foot x-ray shows osteopenia.  No overt erosions or osseous destruction.  No acute fracture or dislocation.  Mild multifocal midfoot arthritis.  Forefoot swelling.  Patient was given Lasix 20 mg IV, Zosyn 4.5 g IV, 500 mL NS bolus, vancomycin 20 mg/kg while in the ED. discussed case with ED provider and patient will be admitted under hospital medicine services for further management.        Review of patient's allergies indicates:   Allergen Reactions    Gabapentin Other (See Comments)     Hypersomnia, nightmares    Morphine Other (See Comments)     Pt states systemic cramping       Past Medical History:   Diagnosis Date    Hypertension      Past Surgical History:   Procedure Laterality Date    ANGIOGRAPHY OF LOWER EXTREMITY Left 2/17/2025    Procedure: Angiogram Extremity Unilateral;  Surgeon: Valeri Faith MD;  Location: [facility] CATH/EP LAB;  Service: Vascular;  Laterality: Left;    ECHOCARDIOGRAM,TRANSESOPHAGEAL N/A 2/14/2025    Procedure: Transesophageal echo (ANGEL) intra-procedure log documentation;  Surgeon: José Luis Arellano MD;  Location: [facility] CATH/EP LAB;  Service: Cardiology;  Laterality: N/A;    ORIF closed comminuted displaced intra-articular fx distal left radius & application of external fixator Left 05/28/2018    TOE AMPUTATION Left 2/14/2025    Procedure: AMPUTATION, TOE;  Surgeon: Jerry España  TRUPTI;  Location: [facility] OR;  Service: Podiatry;  Laterality: Left;    TRANSESOPHAGEAL ECHOCARDIOGRAM WITH POSSIBLE CARDIOVERSION (ANGEL W/ POSS CARDIOVERSION) N/A 2/14/2025    Procedure: Transesophageal echo (ANGEL) intra-procedure log documentation;  Surgeon: José Luis Arellano MD;  Location: [facility] CATH/EP LAB;  Service: Cardiology;  Laterality: N/A;    TREATMENT OF CARDIAC ARRHYTHMIA N/A 2/14/2025    Procedure: Cardioversion or Defibrillation;  Surgeon: José Luis Arellano MD;  Location: [facility] CATH/EP LAB;  Service: Cardiology;  Laterality: N/A;     Social History     Tobacco Use    Smoking status: Never    Smokeless tobacco: Never   Substance Use Topics    Alcohol use: Yes     Comment: social    Drug use: No        REVIEW OF SYSTEMS    As mentioned in HPI    OBJECTIVE     VITAL SIGNS (Most Recent)  Temp: 97.6 °F (36.4 °C) (02/21/25 0740)  Pulse: 77 (02/21/25 0740)  Resp: 18 (02/21/25 0740)  BP: 106/69 (02/21/25 0740)  SpO2: 99 % (02/21/25 0740)    VENTILATION STATUS  Resp: 18 (02/21/25 0740)  SpO2: 99 % (02/21/25 0740)           I & O (Last 24H):  Intake/Output Summary (Last 24 hours) at 2/21/2025 0839  Last data filed at 2/21/2025 0830  Gross per 24 hour   Intake 240 ml   Output 1200 ml   Net -960 ml       WEIGHTS  Wt Readings from Last 3 Encounters:   02/21/25 0357 89.5 kg (197 lb 5 oz)   02/19/25 0445 89.9 kg (198 lb 3.1 oz)   02/18/25 0445 87.9 kg (193 lb 12.6 oz)   02/16/25 0453 90.5 kg (199 lb 8.3 oz)   02/15/25 0339 92.5 kg (203 lb 14.8 oz)   02/14/25 0732 87.5 kg (192 lb 14.4 oz)   02/14/25 0500 94.1 kg (207 lb 7.3 oz)   02/13/25 0604 93.9 kg (207 lb 0.2 oz)   02/12/25 0050 98.6 kg (217 lb 6 oz)   02/11/25 1727 92.5 kg (204 lb)   02/14/25 0940 87.5 kg (192 lb 14.4 oz)   10/23/18 1426 92.5 kg (204 lb)       PHYSICAL EXAM    CONSTITUTIONAL: NAD, sitting upright in bed, smiling  HEENT: Normocephalic. No pallor  NECK: JVD improving  LUNGS: faint bibasilar crackles, normal WOB. On room air  HEART: regular rate and  irregular rhythm, PVCs correlating on bedside telemetry, S1, S2 normal, no murmur   ABDOMEN: soft, bowel sounds normal  EXTREMITIES:  Edema is improved significant, LLE with dressing c/d/i  SKIN: No rash  NEURO: AAO X 3  PSYCH: normal affect     HOME MEDICATIONS:  No current facility-administered medications on file prior to encounter.     Current Outpatient Medications on File Prior to Encounter   Medication Sig Dispense Refill    acetaminophen (TYLENOL EXTRA STRENGTH) 500 MG tablet Take 1,000 mg by mouth every 6 (six) hours as needed for Pain.      amiodarone (PACERONE) 200 MG Tab Take 200 mg by mouth 2 (two) times daily.      doxycycline (MONODOX) 100 MG capsule Take 100 mg by mouth 2 (two) times daily.      empagliflozin (JARDIANCE) 10 mg tablet Take 10 mg by mouth once daily.      ENTRESTO 24-26 mg per tablet Take 1 tablet by mouth 2 (two) times daily.      furosemide (LASIX) 20 MG tablet Take 40 mg by mouth 0900, 1800.      gabapentin (NEURONTIN) 300 MG capsule Take 300 mg by mouth 2 (two) times daily.      metoprolol tartrate (LOPRESSOR) 25 MG tablet Take 25 mg by mouth 2 (two) times daily.      nitroGLYCERIN (NITROSTAT) 0.4 MG SL tablet Place 0.4 mg under the tongue every 5 (five) minutes as needed for Chest pain.      pantoprazole (PROTONIX) 40 MG tablet Take 40 mg by mouth once daily.      carvediloL (COREG) 3.125 MG tablet Take 3.125 mg by mouth 2 (two) times daily. (Patient not taking: Reported on 2/11/2025)         SCHEDULED MEDS:   amiodarone  200 mg Oral BID    ammonium lactate   Topical (Top) BID    ampicillin-sulbactam  3 g Intravenous Q4H    enoxparin  1 mg/kg Subcutaneous Q12H    ferrous sulfate  1 tablet Oral Daily    magnesium oxide  400 mg Oral Daily    metoprolol succinate  25 mg Oral Daily    pantoprazole  40 mg Oral Daily    phenazopyridine  100 mg Oral TID WM    spironolactone  25 mg Oral BID    tamsulosin  0.4 mg Oral Daily    torsemide  20 mg Oral BID loop       CONTINUOUS  "INFUSIONS:        PRN MEDS:  Current Facility-Administered Medications:     acetaminophen, 650 mg, Oral, Q4H PRN    aluminum-magnesium hydroxide-simethicone, 30 mL, Oral, QID PRN    magnesium oxide, 800 mg, Oral, PRN    magnesium oxide, 800 mg, Oral, PRN    melatonin, 9 mg, Oral, Nightly PRN    metoprolol, 5 mg, Intravenous, Q5 Min PRN    midodrine, 2.5 mg, Oral, TID PRN    naloxone, 0.02 mg, Intravenous, PRN    ondansetron, 4 mg, Intravenous, Q6H PRN    oxyCODONE-acetaminophen, 1 tablet, Oral, Q6H PRN    potassium bicarbonate, 35 mEq, Oral, PRN    potassium bicarbonate, 50 mEq, Oral, PRN    potassium bicarbonate, 60 mEq, Oral, PRN    potassium, sodium phosphates, 2 packet, Oral, PRN    potassium, sodium phosphates, 2 packet, Oral, PRN    potassium, sodium phosphates, 2 packet, Oral, PRN    senna-docusate 8.6-50 mg, 1 tablet, Oral, BID PRN    traMADoL, 50 mg, Oral, Q4H PRN    LABS AND DIAGNOSTICS     CBC LAST 3 DAYS  Recent Labs   Lab 02/19/25  0433 02/20/25  0423 02/21/25  0604   WBC 8.05 8.32 7.19   RBC 4.01* 4.16* 4.19*   HGB 9.4* 9.7* 9.9*   HCT 31.2* 32.3* 32.8*   MCV 78* 78* 78*   MCH 23.4* 23.3* 23.6*   MCHC 30.1* 30.0* 30.2*   RDW 18.6* 18.6* 19.0*    360 341   MPV 9.7 10.0 9.9   GRAN 58.6  4.7 68.2  5.7 52.3  3.8   LYMPH 21.4  1.7 16.3*  1.4 27.7  2.0   MONO 15.8*  1.3* 11.9  1.0 14.3  1.0   BASO 0.05 0.06 0.06   NRBC 0 0 0       COAGULATION LAST 3 DAYS  No results for input(s): "LABPT", "INR", "APTT" in the last 168 hours.    CHEMISTRY LAST 3 DAYS  Recent Labs   Lab 02/19/25  0433 02/20/25  0423 02/21/25  0604   * 132* 131*   K 3.7 4.0 4.2   CL 95 93* 94*   CO2 28 28 33*   ANIONGAP 8 11 4*   BUN 18 20 26*   CREATININE 1.1 1.1 1.3   GLU 74 99 104   CALCIUM 8.9 9.1 8.6*   MG 2.2 2.3 2.3   ALBUMIN 3.4* 3.6 3.3*   PROT 6.9 7.3 6.9   ALKPHOS 76 79 80   ALT 5* 6* 6*   AST 11 12 11   BILITOT 0.6 0.6 0.5       CARDIAC PROFILE LAST 3 DAYS  Recent Labs   Lab 02/19/25  0433   BNP 1,547* " "      ENDOCRINE LAST 3 DAYS  No results for input(s): "TSH", "PROCAL" in the last 168 hours.      LAST ARTERIAL BLOOD GAS  ABG  No results for input(s): "PH", "PO2", "PCO2", "HCO3", "BE" in the last 168 hours.    LAST 7 DAYS MICROBIOLOGY   Microbiology Results (last 7 days)       Procedure Component Value Units Date/Time    Blood culture x two cultures. Draw prior to antibiotics. [[id number]] Collected: 02/11/25 1749    Order Status: Completed Specimen: Blood from Peripheral, Antecubital, Right Updated: 02/16/25 2032     Blood Culture, Routine No growth after 5 days.    Narrative:      Aerobic and anaerobic    Blood culture x two cultures. Draw prior to antibiotics. [[id number]] Collected: 02/11/25 1744    Order Status: Completed Specimen: Blood from Peripheral, Antecubital, Left Updated: 02/16/25 2032     Blood Culture, Routine No growth after 5 days.    Narrative:      Aerobic and anaerobic    Tissue culture [[id number]]  (Abnormal)  (Susceptibility) Collected: 02/13/25 0748    Order Status: Completed Specimen: Bone from Toe, Left Foot Updated: 02/16/25 0840     Aerobic Culture - Tissue PROTEUS MIRABILIS  From broth only        ENTEROCOCCUS FAECALIS  From broth only       Gram Stain Result No WBC's      Rare Gram positive cocci    Narrative:      Toe, Left Foot    Culture, Anaerobic [[id number]] Collected: 02/13/25 0748    Order Status: Completed Specimen: Bone from Toe, Left Foot Updated: 02/15/25 0933     Anaerobic Culture No anaerobes isolated    Narrative:      Toe, Left Foot            MOST RECENT IMAGING  X-Ray Chest PA And Lateral  Narrative: EXAMINATION:  XR CHEST PA AND LATERAL    CLINICAL HISTORY:  Fluid overload; Heart failure, unspecified    FINDINGS:  PA and lateral chest with comparison chest x-ray 02/13/2025.  Borderline enlarged cardiomediastinal silhouette is unchanged.  Bilateral diffuse interstitial opacities with scattered patchy airspace opacities are observed and are unchanged from previous " exam.  Pulmonary vasculature is normal. No acute osseous abnormality.  Impression: Bilateral diffuse interstitial opacities with scattered patchy airspace opacities are unchanged from previous exam.    Electronically signed by: Abdon Aguilera  Date:    02/19/2025  Time:    08:14      ECHOCARDIOGRAM RESULTS (last 5)  No results found for this or any previous visit.    Echo done locally    Impressions   -----------     1.The estimated LV ejection fraction is 25 %     2.There is moderate to severe global hypokinesis     3.Diastolic function is indeterminate.     4.There is mild aortic valve sclerosis without significant stenosis     5.There is severe mitral regurgitation     6.Estimated RVSP systolic pressure is 37.13 mmHg     7.Compared to the report from prior study dated 04/04/2022, the   severity of mitral regurgitation has increased     Findings   --------     Left Ventricle   The estimated LV ejection fraction is 25 %. The calculated LV ejection   fraction (MOD, Biplane) is 29.99 %. LV chamber is mildly dilated.   There is mild concentric LV hypertrophy. LV systolic function is   normal. There is moderate to severe global hypokinesis. Diastolic   function is indeterminate.     Right Ventricle   RV size is moderately dilated. The RV systolic function is normal.     Septum   There is no atrial septal defect (ASD). There is no ventricular septal   defect (VSD).     Left Atrium   LA chamber size is normal. LA diameter is 3.81 cm.     Right Atrium   RA chamber size is normal.     Aortic Valve   There is a trileaflet aortic valve. There is mild aortic valve   sclerosis without significant stenosis. There is no aortic   regurgitation. Aortic valve area by VMax: 1.02 cm2. Aortic valve area   by VTI: 0.93 cm2. AV Mean gradient: 7.37 mmHg. AV Peak gradient: 13.48   mmHg.     Mitral Valve   Mitral valve is not well visualized. There is severe mitral   regurgitation. There is no mitral stenosis.     Tricuspid Valve    Tricuspid valve is structurally normal. There is mild tricuspid   regurgitation. The estimated RV systolic pressure is normal. Estimated   RVSP systolic pressure is 37.13 mmHg. There is no tricuspid valve   stenosis.     Pulmonary Valve   Pulmonic valve is not well visualized.     Pericardium   The pericardium is normal. There is no pericardial effusion present.     Aorta   The size of the visualized portion of aortic root is within normal   limits. The thoracic aorta is not well visualized. The ascending aorta   is not well visualized.     Venous   The inferior vena cava dimension is normal.     Thrombus/Mass   There is no intracardiac mass or thrombus identified.     Echo Dimensions   --------------     LA Dimen (2D): 3.81 cm   IVS(D) (2D): 1.16 cm   LVPW(D) (2D): 1.16 cm   LV(D) (2D): 6.14 cm   LV(S) (2D): 4.56 cm   RV(D (2D): 3.7 cm   LVOT (2D): 2 cm   EF Teich (2D): 50 %   EF Teich (M-Mode): 25 %   FS (2D): 26 %   RVSP (Doppler): 37.13 mmHg   Doppler   -------   AVvel: 1.84 m/s   Pk Grad: 13.48 mmHg   LUCAS(pk): 1.02 cm2   PV Lee: 0.69 m/s   LVOTvel: 0.6 m/s   Mn Grad: 7.37 mmHg   LUCAS(VTI): 0.93 cm2   Ao Stenosis Dimen Idx: 0.33   MV PHT: 51.71 ms   Diastology   ----------   MV E: 1.27 m/s   MV A: 0.42 m/s   MV E/A: 3.01   MV Dec T: 175.64 ms   E' Lat: 9.29 cm/s   E' Med: 3.93 cm/s   E/Lat E': 13.66   E/Med E': 32.29   TR Lee: 1.72 m/s     Electronically signed by: Sudheer Lee DO   01/08/2025 11:49 AM   Impressions   -----------     1.The estimated LV ejection fraction is 25 %     2.There is moderate to severe global hypokinesis     3.Diastolic function is indeterminate.     4.There is mild aortic valve sclerosis without significant stenosis     5.There is severe mitral regurgitation     6.Estimated RVSP systolic pressure is 37.13 mmHg     7.Compared to the report from prior study dated 04/04/2022, the   severity of mitral regurgitation has increased     Findings   --------     Left Ventricle   The  estimated LV ejection fraction is 25 %. The calculated LV ejection   fraction (MOD, Biplane) is 29.99 %. LV chamber is mildly dilated.   There is mild concentric LV hypertrophy. LV systolic function is   normal. There is moderate to severe global hypokinesis. Diastolic   function is indeterminate.     Right Ventricle   RV size is moderately dilated. The RV systolic function is normal.     Septum   There is no atrial septal defect (ASD). There is no ventricular septal   defect (VSD).     Left Atrium   LA chamber size is normal. LA diameter is 3.81 cm.     Right Atrium   RA chamber size is normal.     Aortic Valve   There is a trileaflet aortic valve. There is mild aortic valve   sclerosis without significant stenosis. There is no aortic   regurgitation. Aortic valve area by VMax: 1.02 cm2. Aortic valve area   by VTI: 0.93 cm2. AV Mean gradient: 7.37 mmHg. AV Peak gradient: 13.48   mmHg.     Mitral Valve   Mitral valve is not well visualized. There is severe mitral   regurgitation. There is no mitral stenosis.     Tricuspid Valve   Tricuspid valve is structurally normal. There is mild tricuspid   regurgitation. The estimated RV systolic pressure is normal. Estimated   RVSP systolic pressure is 37.13 mmHg. There is no tricuspid valve   stenosis.     Pulmonary Valve   Pulmonic valve is not well visualized.     Pericardium   The pericardium is normal. There is no pericardial effusion present.     Aorta   The size of the visualized portion of aortic root is within normal   limits. The thoracic aorta is not well visualized. The ascending aorta   is not well visualized.     Venous   The inferior vena cava dimension is normal.     Thrombus/Mass   There is no intracardiac mass or thrombus identified.     Echo Dimensions   --------------     LA Dimen (2D): 3.81 cm   IVS(D) (2D): 1.16 cm   LVPW(D) (2D): 1.16 cm   LV(D) (2D): 6.14 cm   LV(S) (2D): 4.56 cm   RV(D (2D): 3.7 cm   LVOT (2D): 2 cm   EF Teich (2D): 50 %   EF Teich  (M-Mode): 25 %   FS (2D): 26 %   RVSP (Doppler): 37.13 mmHg   Doppler   -------   AVvel: 1.84 m/s   Pk Grad: 13.48 mmHg   LUCAS(pk): 1.02 cm2   PV Lee: 0.69 m/s   LVOTvel: 0.6 m/s   Mn Grad: 7.37 mmHg   LUCAS(VTI): 0.93 cm2   Ao Stenosis Dimen Idx: 0.33   MV PHT: 51.71 ms   Diastology   ----------   MV E: 1.27 m/s   MV A: 0.42 m/s   MV E/A: 3.01   MV Dec T: 175.64 ms   E' Lat: 9.29 cm/s   E' Med: 3.93 cm/s   E/Lat E': 13.66   E/Med E': 32.29   TR Lee: 1.72 m/s     Electronically signed by: Sudheer Lee DO   01/08/2025 11:49 AM     CURRENT/PREVIOUS VISIT EKG  Results for orders placed or performed during the hospital encounter of 02/11/25   EKG 12-lead    Collection Time: 02/14/25 10:19 AM   Result Value Ref Range    QRS Duration 108 ms    [facility]HS QTC Calculation 504 ms    Narrative    Test Reason : Z13.6,    Vent. Rate :  93 BPM     Atrial Rate :  93 BPM     P-R Int : 186 ms          QRS Dur : 108 ms      QT Int : 406 ms       P-R-T Axes :  64  41  63 degrees    QTcB Int : 504 ms    Sinus rhythm with Premature atrial complexes  Nonspecific T wave abnormality  Prolonged QT  Abnormal ECG  When compared with ECG of 11-Feb-2025 17:14,  Sinus rhythm has replaced Atrial fibrillation  Questionable change in The axis  Nonspecific T wave abnormality, improved in Inferior leads  Nonspecific T wave abnormality, improved in Anterior leads  Confirmed by Sergio Baker ([id number]) on 2/17/2025 11:00:12 AM    Referred By: AAAREFERRAL SELF           Confirmed By: Sergio Baker           ASSESSMENT/PLAN:     Active Hospital Problems    Diagnosis    *Acute on chronic congestive heart failure    PAD (peripheral artery disease)    Acute osteomyelitis    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   Continue metoprolol succinate 25 mg po daily and amiodarone 200 mg po BID.   Plan for Eliquis 5 mg po BID at dc.   Start Jardiance 10 mg po daily.   Check BMP and BNP on Monday.   Discharge appropriateness and timing per hospitalist's discretion/evaluation. Please call if you have cardiac specific questions or concerns. Patient will likely be dc'ing to [facility]/rehab soon. Cardiology will be available PRN. Please reconsult for any cardiac specific needs that have not been addressed.     Thelma Perez NP  Department of Cardiology  Date of Service: 02/21/2025      I have personally interviewed and examined the patient, I have reviewed the Nurse Practitioner's history and physical, assessment, and plan. I have personally evaluated the patient at bedside and agree with the findings and made appropriate changes as necessary in recommendations.      Patient seen and evaluated.   CHF compensated   His CVP is normal his lungs are clear and his peripheral edema is resolved  Continue goal-directed therapy as above    Atrial fibrillation remains in sinus rhythm continue Eliquis amiodarone    Iron deficiency anemia continue iron    Will follow in the office are was his primary cardiology    Sergio Baker MD  Department of Cardiology  [facility]  02/21/2025 8:39 AM

## 2025-02-21 NOTE — PROGRESS NOTES
[facility] Medicine  Progress Note    Patient Name: Juan C Sanchez  MRN: [id number]  Patient Class: IP- Inpatient   Admission Date: 2/11/2025  Length of Stay: 10 days  Attending Physician: Livia Alexandre MD  Primary Care Provider: Katlin Graf NP        Subjective     Principal Problem:Acute on chronic congestive heart failure        HPI:  Mr. Sanchez is a 69 yr old male with a hx of HTN, MR, PAF, chronic systolic CHF with EF of 25, OA, GERD, nonischemic cardiomyopathy, anemia who presented to the ED with a chief complaint of shortness of breath, leg swelling and hypotension.  Patient states that he had PT come to his home today and he was found to be hypotensive and advised to come to the emergency room.  Patient endorses shortness of breath as well as leg swelling, malaise, fatigue, occasional chills, productive cough with milky sputum, upper abdominal pain, occasional nausea, decreased urine output, and positional lightheadedness.  He states that these symptoms have been progressively worsening over the last 1-2 months possibly even longer  He states that his symptoms are worse when lying flat as well as with exertion and better with rest and while sitting up.  He states that he has been taking his home medications without improvement of his symptoms.  He does endorse that most of all of his contacts recently have been sick. He denies any home oxygen use.  He does endorse occasional PND and states that he has been having to sit to sleep.  He denies tobacco and recreational drug use and states he seldomly drinks alcohol.  He denies fever, chest pain, vomiting, diarrhea, dysuria, hematuria, dizziness, and syncope.    Upon arrival to ED, patient afebrile, HR of 109, RR of 24, BP of 95/62, satting 98% on RA.  Workup in the ED revealed RBC of 4.19, H/H of 10/32.8, sodium of 134, pCO2 of 31, GFR of 55.1, corrected calcium of 9, albumin of 3.1, BNP of 3163, troponin of 21.3, lactic  acid of 2.1.  Blood cultures pending.  CXR shows enlarged cardiac silhouette with pulmonary vascular congestion.  Bilateral pleural effusions.  Mild bibasilar airspace disease and or atelectasis.  Left foot x-ray shows osteopenia.  No overt erosions or osseous destruction.  No acute fracture or dislocation.  Mild multifocal midfoot arthritis.  Forefoot swelling.  Patient was given Lasix 20 mg IV, Zosyn 4.5 g IV, 500 mL NS bolus, vancomycin 20 mg/kg while in the ED. discussed case with ED provider and patient will be admitted under hospital medicine services for further management.    Overview/Hospital Course:  Juan C Sanchez is a 69 year old male with a past medical history of HFrEF, Afib, PAD, anemia, obesity, and GERD who presented with an acute on chronic HFrEF (EF 20-25%) exacerbation as well as Afib with RVR. He is being diuresed with a Lasix infusion as well as dobutamine. Cardiology has been consulted.  Underwent ANGEL cardioversion 2/14. His course was also complicated by a third L toe acute osteomyelitis seen on MRI. Podiatry and ID have been consulted.  He underwent L 3rd toe amputation 2/14. He is on empiric vancomycin and cefepime. Vascular Surgery has been consulted as well and will plan for angiogram of the lower extremities 2/17.  Underwent angiogram without intervention.  Patient was transitioned off of Lasix and dobutamine infusions and changed to p.o. torsemide.  Medications were adjusted by Cardiology.  Case management was consulted for skilled nursing facility placement.    Interval History:  P patient seen and examined on morning multidisciplinary rounds.  No new complaints.  Continues on p.o. torsemide Case management working on skilled nursing facility placement    Review of Systems   Constitutional:  Positive for activity change.   Respiratory:  Negative for shortness of breath.    Cardiovascular:  Positive for leg swelling. Negative for chest pain and palpitations.   Gastrointestinal:   Negative for nausea and vomiting.   Skin:  Positive for wound.     Objective:     Vital Signs (Most Recent):  Temp: 97.4 °F (36.3 °C) (02/21/25 1542)  Pulse: 71 (02/21/25 1542)  Resp: 18 (02/21/25 0953)  BP: 107/72 (02/21/25 1542)  SpO2: 96 % (02/21/25 1542) Vital Signs (24h Range):  Temp:  [97.4 °F (36.3 °C)-98.1 °F (36.7 °C)] 97.4 °F (36.3 °C)  Pulse:  [70-77] 71  Resp:  [16-18] 18  SpO2:  [96 %-99 %] 96 %  BP: (106-134)/(69-82) 107/72     Weight: 89.5 kg (197 lb 5 oz)  Body mass index is 27.53 kg/m².    Intake/Output Summary (Last 24 hours) at 2/21/2025 1554  Last data filed at 2/21/2025 0830  Gross per 24 hour   Intake 120 ml   Output 1200 ml   Net -1080 ml         Physical Exam  Vitals and nursing note reviewed.   Constitutional:       General: He is not in acute distress.  HENT:      Head: Normocephalic and atraumatic.      Right Ear: External ear normal.      Left Ear: External ear normal.      Nose: Nose normal.      Mouth/Throat:      Mouth: Mucous membranes are moist.   Eyes:      Extraocular Movements: Extraocular movements intact.      Conjunctiva/sclera: Conjunctivae normal.   Cardiovascular:      Rate and Rhythm: Normal rate and regular rhythm.      Pulses: Normal pulses.      Heart sounds: Normal heart sounds.   Pulmonary:      Effort: Pulmonary effort is normal.      Breath sounds: Rales (Minimal) present.      Comments: RA.  Abdominal:      General: Bowel sounds are normal. There is no distension.      Palpations: Abdomen is soft.      Tenderness: There is no abdominal tenderness.   Genitourinary:     Comments: Meredith.  Musculoskeletal:      Cervical back: Normal range of motion and neck supple.      Right lower leg: Edema present.      Left lower leg: Edema present.   Skin:     General: Skin is warm and dry.      Comments: See pictures regarding wounds.  Postoperative dressing not removed   Neurological:      Mental Status: He is alert. Mental status is at baseline.   Psychiatric:         Behavior:  Behavior normal.             Significant Labs: All pertinent labs within the past 24 hours have been reviewed.    Significant Imaging: I have reviewed all pertinent imaging results/findings within the past 24 hours.    Assessment and Plan     * Acute on chronic congestive heart failure  -now off the Lasix and dobutamine drips and on p.o. 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 L third toe.  -ID consulted  -Podiatry consulted  -Unasyn to complete 4 week course of antibiotics through 3/03/25.  Can use Augmentin at discharge per ID  -status post third toe amputation (L) 2/14  -bone culture with Proteus and Enterococcus      PAD (peripheral artery disease)  -No intervention indicated per Vascular Surgery at this time  -underwent angiogram 2/17      Anemia  Ferritin at lower limit of normal.  -Trend Hgb with CBC    Paroxysmal atrial fibrillation  -Telemetry  -Lovenox  -metoprolol  -Continue home amiodarone  -ANGEL cardioversion per Cardiology 2/14    MR (mitral regurgitation)  Seen on TTE at OSH.  -Cardiology consulted      Hypertension  -Home medications held  -Continue to monitor      GERD (gastroesophageal reflux disease)  -PPI        VTE Risk Mitigation (From admission, onward)           Ordered     enoxaparin injection 100 mg  Every 12 hours (non-standard times)         02/12/25 1622     IP VTE HIGH RISK PATIENT  Once         02/11/25 2331     Place sequential compression device  Until discontinued         02/11/25 2331                    Discharge Planning   PHILIP: 2/25/2025     Code Status: Full Code   Medical Readiness for Discharge Date:   Discharge Plan A: Skilled Nursing Facility   Discharge Delays: None known at this time            Please place  Justification for DME        Livia Alexandre MD  Department of Hospital Medicine   [facility]

## 2025-02-21 NOTE — PT/OT/SLP PROGRESS
Physical Therapy Treatment    Patient Name:  Juan C Sanchez   MRN:  [id number]    Recommendations:     Discharge Recommendations: Moderate Intensity Therapy  Discharge Equipment Recommendations: none  Barriers to discharge:  increased assist with mobility, orthopedic precautions, increased assist with mobility, balance deficits    Assessment:     Juan C Sanchez is a 69 y.o. male admitted with a medical diagnosis of Acute on chronic congestive heart failure.  He presents with the following impairments/functional limitations: decreased ROM, decreased lower extremity function, impaired skin, edema, gait instability, impaired balance, impaired self care skills, impaired functional mobility, weakness, impaired endurance, impaired sensation, pain.    Pt agreeable to visit. Pt transferred from long sitting to sitting EOB with stand by assist. Therapist placed cam boot on pt' L foot.    Pt performed sit to stand transfer with RW and contact guard assist. Pt ambulated 10' with RW and contact guard assist. Pt noted to weight bear through L heel with slow, step-to gait pattern.    Pt tolerated static standing at RW with contact guard to close stand by assist while tech was straightening linens. Linens noted to be soiled and needing to be changed. Pt agreeable to sit up in chair for bed linens to be changed.    Pt left up in chair with PCT present to get vitals.    Rehab Prognosis: Fair; patient would benefit from acute skilled PT services to address these deficits and reach maximum level of function.    Recent Surgery: Procedure(s) (LRB):  Angiogram Extremity Unilateral (Left) 4 Days Post-Op    Plan:     During this hospitalization, patient to be seen 6 x/week to address the identified rehab impairments via gait training, therapeutic activities, therapeutic exercises and progress toward the following goals:    Plan of Care Expires:  03/12/25    Subjective     Chief Complaint: none verbalized  Patient/Family  Comments/goals: to get better  Pain/Comfort:  Pain Rating 1: 0/10      Objective:     Communicated with nurse prior to session.  Patient found HOB elevated with telemetry, peripheral IV upon PT entry to room.     General Precautions: Standard, fall, contact  Orthopedic Precautions: N/A  Braces: N/A  Respiratory Status: Room air     Functional Mobility:  Bed Mobility:     Supine to Sit: stand by assistance  Transfers:     Sit to Stand:  contact guard assistance with rolling walker  Gait: x 10' with RW and CGA, weight bearing through L heel      AM-PAC 6 CLICK MOBILITY          Treatment & Education:  Pt educated on importance of time OOB, importance of intermittent mobility, safe techniques for transfers/ambulation, discharge recommendations/options, and use of call light for assistance and fall prevention.      Patient left up in chair with all lines intact, call button in reach, chair alarm on, and PCT present..    GOALS:   Multidisciplinary Problems       Physical Therapy Goals          Problem: Physical Therapy    Goal Priority Disciplines Outcome Interventions   Physical Therapy Goal     PT, PT/OT Progressing    Description: Goals to be met by: 3/12/25     Patient will increase functional independence with mobility by performin. Supine to sit with Modified [place]  2. Sit to supine with Modified [place]  3. Sit to stand transfer with Supervision and using Rolling Walker  4. Gait  x 100 feet with Stand-by Assistance using Rolling Walker.   5. Ascend/descend 3 stair with bilateral Handrails Contact Guard Assistance using No Assistive Device.                          DME Justifications:  No DME recommended requiring DME justifications    Time Tracking:     PT Received On: 25  PT Start Time: 1110     PT Stop Time: 1133  PT Total Time (min): 23 min     Billable Minutes: Gait Training 10 and Therapeutic Activity 13    Treatment Type: Treatment  PT/PTA: PTA     Number of PTA visits since last PT  visit: 1     02/21/2025

## 2025-02-21 NOTE — PLAN OF CARE
Patient medically ready but awaiting authorization from [facility].       02/21/25 1504   Discharge Reassessment   Assessment Type Discharge Planning Assessment   Did the patient's condition or plan change since previous assessment? Yes   Discharge Plan discussed with: Patient   Discharge Plan B Skilled Nursing Facility

## 2025-02-21 NOTE — PLAN OF CARE
1015 SHABNAM spoke with rep from pt's payor [phone] op3. States SNF request is still pending but more clinicals have been requested. SHABNAM asked for case to be expedite d/t being medically clear for DC. States after requested clinicals have been received, possibly can be approved today as long as doesn't need medical director review.     SHABNAM faxed requested clinicals to [phone]    1300 called pt's insurance again. Request has been sent to medical director review and may need p2p. Asked for PT/OT notes. SHABNAM sent as requested.     1430 called pt's insurance again. Still pending with medical director and cannot give me ETA for decision. I asked for this case to be expedited, states they cannot expedite it unless life threatening. SHABNAM did explain that this is prolonging hospital stay. Again stated that does not meet criteria for expedited       02/21/25 1014   Post-Acute Status   Post-Acute Authorization Placement   Post-Acute Placement Status Pending payor review/awaiting authorization (if required)

## 2025-02-21 NOTE — PROGRESS NOTES
[facility]  Adult Nutrition   Progress Note (Initial Assessment)     SUMMARY     Recommendations  Continue Boost Glucose Control BID.   Continue Carlos A BID.   Add ProMod daily.  Add Vitamin C 500 mg BID.  Add MVI daily.  Add Zinc Sulfate 220 mg daily x 14 days.  Nutrition Goal Status: new  Communication of RD Recs: reviewed with RN    Nutrition Goals:  PO intake will meet >75% of estimated needs by RD follow up.    Nutrition Interventions: Fat modified diet and Sodium modified diet    Nutrition Diagnosis PES Statement:   Malnutrition Type:  Context: acute illness or injury  Level: moderate     Related to (etiology):   CHF     Signs and Symptoms (as evidenced by):   Weight Loss (Malnutrition): 5% in 1 month  Subcutaneous Fat (Malnutrition): mild depletion  Muscle Mass (Malnutrition): mild depletion     Malnutrition Characteristic Summary:  Weight Loss (Malnutrition): 5% in 1 month  Subcutaneous Fat (Malnutrition): mild depletion  Muscle Mass (Malnutrition): mild depletion     Interventions (treatment strategy):  Collaboration of care with other providers  Modified diet  Commercial beverage  Nutrition Related Medication Management  Vitamin Supplement Therapy  Diet education     Nutrition Diagnosis Status:   New      Dietitian Rounds Brief  LOS day 10. Visited with pt in room today. Pt with varying appetite and intake. Pt depressed about current situation. Encouraged pt to drink Boost and Carlos A. Pt waiting on SNF placement.     Nutrition Related Social Determinants of Health: SDOH: None Identified    Malnutrition Assessment  Malnutrition Context: acute illness or injury  Malnutrition Level: moderate    Weight Loss (Malnutrition): 5% in 1 month  Subcutaneous Fat (Malnutrition): mild depletion  Muscle Mass (Malnutrition): mild depletion   Orbital Region (Subcutaneous Fat Loss): mild depletion  Upper Arm Region (Subcutaneous Fat Loss): mild depletion   [place] Region (Muscle Loss): mild depletion  Clavicle  "Bone Region (Muscle Loss): mild depletion       Diet order:   Current Diet Order: Cardiac       Evaluation of Received Nutrient/Fluid Intake  % Intake of Estimated Energy Needs: 75 - 100 %  % Meal Intake: 75 - 100 %      Intake/Output Summary (Last 24 hours) at 2/21/2025 1419  Last data filed at 2/21/2025 0830  Gross per 24 hour   Intake 120 ml   Output 1200 ml   Net -1080 ml        Anthropometrics  Height: 5' 10.98" (180.3 cm)  Height (inches): 70.98 in  Height Method: Stated  Weight: 89.5 kg (197 lb 5 oz)  Weight (lb): 197.31 lb  Weight Method: Bed Scale  Ideal Body Weight (IBW), Male: 171.88 lb  % Ideal Body Weight, Male (lb): 126.38 %  BMI (Calculated): 27.5  BMI Grade: 25 - 29.9 - overweight       Estimated/Assessed Needs  Weight Used For Calorie Calculations: 89.5 kg (197 lb 5 oz)  Energy Calorie Requirements (kcal): 2135-2755 kcal daily  Energy Need Method: Kcal/kg (30-35)  Protein Requirements: 108-134 gm daily  Weight Used For Protein Calculations: 89.5 kg (197 lb 5 oz) (1.2-1.5 gm/kg - wound)  Fluid Requirements (mL): 1 mL/kcal or per MD  Estimated Fluid Requirement Method: RDA Method  RDA Method (mL): 2237  CHO Requirement: 280 gm daily      Reason for Assessment  Reason For Assessment: length of stay  Nutrition Discharge Planning: Pt to discharge on a cardiac diet.    Final diagnoses:  [I10] Hypertension  [Z13.6] Screening for cardiovascular condition  [M79.89] Foot swelling  [M79.89] Foot swelling - C/f osteomyelitis  [I50.9] Acute on chronic congestive heart failure, unspecified heart failure type     Past Medical History:   Diagnosis Date    Hypertension         Nutrition/Diet History  Spiritual, Cultural Beliefs, Zoroastrianism Practices, Values that Affect Care: no  Food Allergies: NKFA  Factors Affecting Nutritional Intake: decreased appetite, depression    Nutrition Risk Screen          Wound 02/14/25 1300 Incision Left Toe, third-Wound Image: Images linked       Wound 02/17/25 1346 Incision Right " anterior Groin angiogram puncture-Wound Image: Images linked       Wound 02/12/25 0730 Other (comment) Right anterior Leg #1-Wound Image: Images linked       Wound 02/12/25 0730 Other (comment) Left distal;anterior Leg #2-Wound Image: Images linked       Wound 02/12/25 0730 Other (comment) Left anterior;dorsal Foot #3-Wound Image: Images linked       Wound 02/12/25 0730 Pressure Injury posterior Sacral spine #4-Wound Image: Images linked       Wound 02/12/25 1015 Fissure Left Heel-Wound Image: Images linked       Wound 02/12/25 1015 Moisture associated dermatitis Groin-Wound Image: Images linked  MST Score: 0  Have you recently lost weight without trying?: No  Weight loss score: 0  Have you been eating poorly because of a decreased appetite?: No  Appetite score: 0       Weight History:  Wt Readings from Last 10 Encounters:   02/21/25 89.5 kg (197 lb 5 oz)   02/14/25 87.5 kg (192 lb 14.4 oz)   10/23/18 92.5 kg (204 lb)   09/18/18 95.3 kg (210 lb)   08/21/18 95.3 kg (210 lb)   07/31/18 95.3 kg (210 lb)   07/09/18 95.3 kg (210 lb)   06/29/18 95.3 kg (210 lb)   06/15/18 95.3 kg (210 lb)   06/08/18 95.3 kg (210 lb)        Lab/Procedures/Meds: Pertinent Labs/Meds Reviewed    Medications:Pertinent Medications Reviewed  Scheduled Meds:   amiodarone  200 mg Oral BID    ammonium lactate   Topical (Top) BID    ampicillin-sulbactam  3 g Intravenous Q4H    empagliflozin  10 mg Oral Daily    enoxparin  1 mg/kg Subcutaneous Q12H    ferrous sulfate  1 tablet Oral Daily    magnesium oxide  400 mg Oral Daily    metoprolol succinate  25 mg Oral Daily    pantoprazole  40 mg Oral Daily    phenazopyridine  100 mg Oral TID WM    spironolactone  25 mg Oral BID    tamsulosin  0.4 mg Oral Daily    torsemide  20 mg Oral BID loop       Labs: Pertinent Labs Reviewed  Clinical Chemistry:  Recent Labs   Lab 02/19/25  0433 02/20/25  0423 02/21/25  0604   * 132* 131*   K 3.7 4.0 4.2   CL 95 93* 94*   CO2 28 28 33*   GLU 74 99 104   BUN 18 20  26*   CREATININE 1.1 1.1 1.3   CALCIUM 8.9 9.1 8.6*   PROT 6.9 7.3 6.9   ALBUMIN 3.4* 3.6 3.3*   BILITOT 0.6 0.6 0.5   ALKPHOS 76 79 80   AST 11 12 11   ALT 5* 6* 6*   ANIONGAP 8 11 4*   MG 2.2 2.3 2.3     CBC:   Recent Labs   Lab 02/21/25  0604   WBC 7.19   RBC 4.19*   HGB 9.9*   HCT 32.8*      MCV 78*   MCH 23.6*   MCHC 30.2*       Cardiac Profile:  Recent Labs   Lab 02/19/25  0433   BNP 1,547*       Monitor and Evaluation  Food and Nutrient Intake: energy intake  Food and Nutrient Adminstration: diet order  Knowledge/Beliefs/Attitudes: food and nutrition knowledge/skill  Physical Activity and Function: nutrition-related ADLs and IADLs  Anthropometric Measurements: weight change  Biochemical Data, Medical Tests and Procedures: electrolyte and renal panel, glucose/endocrine profile, gastrointestinal profile, inflammatory profile, lipid profile  Nutrition-Focused Physical Findings: overall appearance       Nutrition Risk  Level of Risk/Frequency of Follow-up:  (weekly)       Nutrition Follow-Up  RD Follow-up?: Yes

## 2025-02-21 NOTE — PLAN OF CARE
Problem: Malnutrition  Goal: Improved Nutritional Intake  Outcome: Progressing  Intervention: Promote and Optimize Oral Intake  Flowsheets (Taken 2/21/2025 1434)  Nutrition Interventions:   supplemental drinks provided   referred to dietitian

## 2025-02-21 NOTE — PLAN OF CARE
Per Brandy with [facility] ([phone]) they have started auth with pt's insurance and they are just waiting for approval/denial. SW will cont to follow.        02/21/25 1141   Post-Acute Status   Post-Acute Authorization Placement   Post-Acute Placement Status Pending payor review/awaiting authorization (if required)   Discharge Plan   Discharge Plan A Skilled Nursing Facility

## 2025-02-21 NOTE — PT/OT/SLP PROGRESS
Occupational Therapy   Treatment    Name: Juan C Sanchez  MRN: [id number]  Admitting Diagnosis:  Acute on chronic congestive heart failure  4 Days Post-Op    Recommendations:     Discharge Recommendations: Moderate Intensity Therapy  Discharge Equipment Recommendations:  none  Barriers to discharge:   none    Assessment:     Juan C Sanchez is a 69 y.o. male with a medical diagnosis of Acute on chronic congestive heart failure.  He presents with crying upon room entry dt feeling moisés. Performance deficits affecting function are pain, decreased ROM, decreased lower extremity function, impaired skin, edema, gait instability, impaired balance, impaired self care skills, impaired functional mobility, weakness, impaired endurance, impaired sensation.     Rehab Prognosis:  Fair; patient would benefit from acute skilled OT services to address these deficits and reach maximum level of function.       Plan:     Patient to be seen 3 x/week to address the above listed problems via self-care/home management, therapeutic activities, therapeutic exercises  Plan of Care Expires: 03/12/25  Plan of Care Reviewed with: patient    Subjective     Chief Complaint: pt crying upon entry d/t feeling lonely and eager to go to rehab  Patient/Family Comments/goals: go to rehab  Pain/Comfort:   None reported    Objective:     Communicated with: nurse prior to session.  Patient found supine with peripheral IV, telemetry upon OT entry to room.    General Precautions: Standard, fall, contact    Orthopedic Precautions:N/A  Braces: N/A  Respiratory Status: Room air     Occupational Performance:     Bed Mobility:    Pt transfer chair>bed with nursing upon entry    Activities of Daily Living:  Feeding:  minimum assistance to open containers in room   Grooming: supervision for oral hygiene sitting in bed         Treatment & Education:  Pt educated on role of OT/POC, importance of OOB/EOB activity, use of call bell, and safety during ADLs,  transfers, and functional mobility.     Patient left supine with all lines intact, call button in reach, and bed alarm on    GOALS:   Multidisciplinary Problems       Occupational Therapy Goals          Problem: Occupational Therapy    Goal Priority Disciplines Outcome Interventions   Occupational Therapy Goal     OT, PT/OT Progressing    Description: Goals to be met by: 3/12/2025     Patient will increase functional independence with ADLs by performing:    UE Dressing with Supervision.  LE Dressing with Supervision.  Grooming while standing at sink with Supervision.  Toileting from toilet with Supervision for hygiene and clothing management.   Toilet transfer to toilet with Supervision.  Perform sitting/standing ADL activity with no LOB.                       DME Justifications:  No DME recommended requiring DME justifications    Time Tracking:     OT Date of Treatment: 02/21/25  OT Start Time: 1422  OT Stop Time: 1439  OT Total Time (min): 17 min    Billable Minutes:Self Care/Home Management 17    OT/LESTER: OT     Number of LESTER visits since last OT visit: 1    2/21/2025

## 2025-02-21 NOTE — ASSESSMENT & PLAN NOTE
Malnutrition Type:  Context: acute illness or injury  Level: moderate    Related to (etiology):   CHF    Signs and Symptoms (as evidenced by):   Weight Loss (Malnutrition): 5% in 1 month  Subcutaneous Fat (Malnutrition): mild depletion  Muscle Mass (Malnutrition): mild depletion    Malnutrition Characteristic Summary:  Weight Loss (Malnutrition): 5% in 1 month  Subcutaneous Fat (Malnutrition): mild depletion  Muscle Mass (Malnutrition): mild depletion    Interventions (treatment strategy):  Collaboration of care with other providers  Modified diet  Commercial beverage  Nutrition Related Medication Management  Vitamin Supplement Therapy  Diet education    Nutrition Diagnosis Status:   New

## 2025-02-21 NOTE — SUBJECTIVE & OBJECTIVE
Subjective:     Interval History:  Resting in bed.  States intermittent pain to his foot.  Still waiting on placement.  Otherwise no new complaints.    Scheduled Meds:   amiodarone  200 mg Oral BID    ammonium lactate   Topical (Top) BID    ampicillin-sulbactam  3 g Intravenous Q4H    enoxparin  1 mg/kg Subcutaneous Q12H    ferrous sulfate  1 tablet Oral Daily    magnesium oxide  400 mg Oral Daily    metoprolol succinate  25 mg Oral Daily    pantoprazole  40 mg Oral Daily    phenazopyridine  100 mg Oral TID WM    spironolactone  25 mg Oral BID    tamsulosin  0.4 mg Oral Daily    torsemide  20 mg Oral BID loop     Continuous Infusions:  PRN Meds:  Current Facility-Administered Medications:     acetaminophen, 650 mg, Oral, Q4H PRN    aluminum-magnesium hydroxide-simethicone, 30 mL, Oral, QID PRN    magnesium oxide, 800 mg, Oral, PRN    magnesium oxide, 800 mg, Oral, PRN    melatonin, 9 mg, Oral, Nightly PRN    metoprolol, 5 mg, Intravenous, Q5 Min PRN    midodrine, 2.5 mg, Oral, TID PRN    naloxone, 0.02 mg, Intravenous, PRN    ondansetron, 4 mg, Intravenous, Q6H PRN    oxyCODONE-acetaminophen, 1 tablet, Oral, Q6H PRN    potassium bicarbonate, 35 mEq, Oral, PRN    potassium bicarbonate, 50 mEq, Oral, PRN    potassium bicarbonate, 60 mEq, Oral, PRN    potassium, sodium phosphates, 2 packet, Oral, PRN    potassium, sodium phosphates, 2 packet, Oral, PRN    potassium, sodium phosphates, 2 packet, Oral, PRN    senna-docusate 8.6-50 mg, 1 tablet, Oral, BID PRN    traMADoL, 50 mg, Oral, Q4H PRN    Review of Systems  Objective:     Vital Signs (Most Recent):  Temp: 97.6 °F (36.4 °C) (02/21/25 0740)  Pulse: 77 (02/21/25 0740)  Resp: 18 (02/21/25 0740)  BP: 106/69 (02/21/25 0740)  SpO2: 99 % (02/21/25 0740) Vital Signs (24h Range):  Temp:  [97.4 °F (36.3 °C)-98.1 °F (36.7 °C)] 97.6 °F (36.4 °C)  Pulse:  [64-80] 77  Resp:  [16-18] 18  SpO2:  [96 %-99 %] 99 %  BP: ()/(62-82) 106/69     Weight: 89.5 kg (197 lb 5 oz)  Body  mass index is 27.52 kg/m².    Foot Exam              Laboratory:  All pertinent labs reviewed within the last 24 hours.    Diagnostic Results:  I have reviewed all pertinent imaging results/findings within the past 24 hours.  .

## 2025-02-21 NOTE — SUBJECTIVE & OBJECTIVE
Interval History:  P patient seen and examined on morning multidisciplinary rounds.  No new complaints.  Continues on p.o. torsemide Case management working on skilled nursing facility placement    Review of Systems   Constitutional:  Positive for activity change.   Respiratory:  Negative for shortness of breath.    Cardiovascular:  Positive for leg swelling. Negative for chest pain and palpitations.   Gastrointestinal:  Negative for nausea and vomiting.   Skin:  Positive for wound.     Objective:     Vital Signs (Most Recent):  Temp: 97.4 °F (36.3 °C) (02/21/25 1542)  Pulse: 71 (02/21/25 1542)  Resp: 18 (02/21/25 0953)  BP: 107/72 (02/21/25 1542)  SpO2: 96 % (02/21/25 1542) Vital Signs (24h Range):  Temp:  [97.4 °F (36.3 °C)-98.1 °F (36.7 °C)] 97.4 °F (36.3 °C)  Pulse:  [70-77] 71  Resp:  [16-18] 18  SpO2:  [96 %-99 %] 96 %  BP: (106-134)/(69-82) 107/72     Weight: 89.5 kg (197 lb 5 oz)  Body mass index is 27.53 kg/m².    Intake/Output Summary (Last 24 hours) at 2/21/2025 1554  Last data filed at 2/21/2025 0830  Gross per 24 hour   Intake 120 ml   Output 1200 ml   Net -1080 ml         Physical Exam  Vitals and nursing note reviewed.   Constitutional:       General: He is not in acute distress.  HENT:      Head: Normocephalic and atraumatic.      Right Ear: External ear normal.      Left Ear: External ear normal.      Nose: Nose normal.      Mouth/Throat:      Mouth: Mucous membranes are moist.   Eyes:      Extraocular Movements: Extraocular movements intact.      Conjunctiva/sclera: Conjunctivae normal.   Cardiovascular:      Rate and Rhythm: Normal rate and regular rhythm.      Pulses: Normal pulses.      Heart sounds: Normal heart sounds.   Pulmonary:      Effort: Pulmonary effort is normal.      Breath sounds: Rales (Minimal) present.      Comments: RA.  Abdominal:      General: Bowel sounds are normal. There is no distension.      Palpations: Abdomen is soft.      Tenderness: There is no abdominal tenderness.    Genitourinary:     Comments: Viri.  Musculoskeletal:      Cervical back: Normal range of motion and neck supple.      Right lower leg: Edema present.      Left lower leg: Edema present.   Skin:     General: Skin is warm and dry.      Comments: See pictures regarding wounds.  Postoperative dressing not removed   Neurological:      Mental Status: He is alert. Mental status is at baseline.   Psychiatric:         Behavior: Behavior normal.             Significant Labs: All pertinent labs within the past 24 hours have been reviewed.    Significant Imaging: I have reviewed all pertinent imaging results/findings within the past 24 hours.

## 2025-02-21 NOTE — PLAN OF CARE
Problem: Adult Inpatient Plan of Care  Goal: Plan of Care Review  2/21/2025 1402 by Nancie Astudillo LPN  Outcome: Progressing  2/21/2025 1401 by Nancie Astudillo LPN  Outcome: Progressing  Goal: Patient-Specific Goal (Individualized)  2/21/2025 1402 by Nancie Astudillo LPN  Outcome: Progressing  2/21/2025 1401 by Nancie Astudillo LPN  Outcome: Progressing  Goal: Absence of Hospital-Acquired Illness or Injury  2/21/2025 1402 by Nancie Astudillo LPN  Outcome: Progressing  2/21/2025 1401 by Nancie Astudillo LPN  Outcome: Progressing  Goal: Optimal Comfort and Wellbeing  2/21/2025 1402 by Nancie Astudillo LPN  Outcome: Progressing  2/21/2025 1401 by Nancie Astudillo LPN  Outcome: Progressing  Goal: Readiness for Transition of Care  2/21/2025 1402 by Nancie Astudillo LPN  Outcome: Progressing  2/21/2025 1401 by Nancie Astudillo LPN  Outcome: Progressing     Problem: Wound  Goal: Optimal Coping  2/21/2025 1402 by Nancie Astudillo LPN  Outcome: Progressing  2/21/2025 1401 by Nancie Astudillo LPN  Outcome: Progressing  Goal: Optimal Functional Ability  2/21/2025 1402 by Nancie Astudillo LPN  Outcome: Progressing  2/21/2025 1401 by Nancie Astudillo LPN  Outcome: Progressing  Goal: Absence of Infection Signs and Symptoms  2/21/2025 1402 by Nancie Astudillo LPN  Outcome: Progressing  2/21/2025 1401 by Nancie Astudillo LPN  Outcome: Progressing  Goal: Improved Oral Intake  2/21/2025 1402 by Nancie Astudillo LPN  Outcome: Progressing  2/21/2025 1401 by Nancie Astudillo LPN  Outcome: Progressing  Goal: Optimal Pain Control and Function  2/21/2025 1402 by Nancie Astudillo LPN  Outcome: Progressing  2/21/2025 1401 by Nancie Astudillo LPN  Outcome: Progressing  Goal: Skin Health and Integrity  2/21/2025 1402 by Nancie Astudillo LPN  Outcome: Progressing  2/21/2025 1401 by Nancie Astudillo LPN  Outcome: Progressing  Goal: Optimal Wound Healing  2/21/2025 1402 by Nancie Astudillo LPN  Outcome: Progressing  2/21/2025 1401 by Nancie Astudillo LPN  Outcome: Progressing     Problem:  Fall Injury Risk  Goal: Absence of Fall and Fall-Related Injury  2/21/2025 1402 by Nancie Astudillo LPN  Outcome: Progressing  2/21/2025 1401 by Nancie Astudillo LPN  Outcome: Progressing     Problem: Infection  Goal: Absence of Infection Signs and Symptoms  2/21/2025 1402 by Nancie Astudillo LPN  Outcome: Progressing  2/21/2025 1401 by Nancie Astudillo LPN  Outcome: Progressing     Problem: Skin Injury Risk Increased  Goal: Skin Health and Integrity  2/21/2025 1402 by Nancie Astudillo LPN  Outcome: Progressing  2/21/2025 1401 by Nancie Astudillo LPN  Outcome: Progressing

## 2025-02-22 LAB
ALBUMIN SERPL BCP-MCNC: 3.2 G/DL (ref 3.5–5.2)
ALP SERPL-CCNC: 76 U/L (ref 55–135)
ALT SERPL W/O P-5'-P-CCNC: 5 U/L (ref 10–44)
ANION GAP SERPL CALC-SCNC: 9 MMOL/L (ref 8–16)
AST SERPL-CCNC: 11 U/L (ref 10–40)
BASOPHILS # BLD AUTO: 0.07 K/UL (ref 0–0.2)
BASOPHILS NFR BLD: 1 % (ref 0–1.9)
BILIRUB SERPL-MCNC: 0.6 MG/DL (ref 0.1–1)
BUN SERPL-MCNC: 25 MG/DL (ref 8–23)
CALCIUM SERPL-MCNC: 8.9 MG/DL (ref 8.7–10.5)
CHLORIDE SERPL-SCNC: 95 MMOL/L (ref 95–110)
CO2 SERPL-SCNC: 30 MMOL/L (ref 23–29)
CREAT SERPL-MCNC: 1.3 MG/DL (ref 0.5–1.4)
DIFFERENTIAL METHOD BLD: ABNORMAL
EOSINOPHIL # BLD AUTO: 0.2 K/UL (ref 0–0.5)
EOSINOPHIL NFR BLD: 3.2 % (ref 0–8)
ERYTHROCYTE [DISTWIDTH] IN BLOOD BY AUTOMATED COUNT: 19.5 % (ref 11.5–14.5)
EST. GFR  (NO RACE VARIABLE): >60 ML/MIN/1.73 M^2
GLUCOSE SERPL-MCNC: 126 MG/DL (ref 70–110)
HCT VFR BLD AUTO: 32.2 % (ref 40–54)
HGB BLD-MCNC: 9.8 G/DL (ref 14–18)
IMM GRANULOCYTES # BLD AUTO: 0.05 K/UL (ref 0–0.04)
IMM GRANULOCYTES NFR BLD AUTO: 0.7 % (ref 0–0.5)
LYMPHOCYTES # BLD AUTO: 2 K/UL (ref 1–4.8)
LYMPHOCYTES NFR BLD: 28 % (ref 18–48)
MAGNESIUM SERPL-MCNC: 2.4 MG/DL (ref 1.6–2.6)
MCH RBC QN AUTO: 23.6 PG (ref 27–31)
MCHC RBC AUTO-ENTMCNC: 30.4 G/DL (ref 32–36)
MCV RBC AUTO: 77 FL (ref 82–98)
MONOCYTES # BLD AUTO: 1 K/UL (ref 0.3–1)
MONOCYTES NFR BLD: 13.3 % (ref 4–15)
NEUTROPHILS # BLD AUTO: 3.9 K/UL (ref 1.8–7.7)
NEUTROPHILS NFR BLD: 53.8 % (ref 38–73)
NRBC BLD-RTO: 0 /100 WBC
PLATELET # BLD AUTO: 351 K/UL (ref 150–450)
PMV BLD AUTO: 9.8 FL (ref 9.2–12.9)
POTASSIUM SERPL-SCNC: 4.1 MMOL/L (ref 3.5–5.1)
PROT SERPL-MCNC: 7.2 G/DL (ref 6–8.4)
RBC # BLD AUTO: 4.16 M/UL (ref 4.6–6.2)
SODIUM SERPL-SCNC: 134 MMOL/L (ref 136–145)
WBC # BLD AUTO: 7.28 K/UL (ref 3.9–12.7)

## 2025-02-22 PROCEDURE — 25000003 PHARM REV CODE 250: Performed by: INTERNAL MEDICINE

## 2025-02-22 PROCEDURE — 25000003 PHARM REV CODE 250

## 2025-02-22 PROCEDURE — 25000003 PHARM REV CODE 250: Performed by: STUDENT IN AN ORGANIZED HEALTH CARE EDUCATION/TRAINING PROGRAM

## 2025-02-22 PROCEDURE — 21400001 HC TELEMETRY ROOM

## 2025-02-22 PROCEDURE — 25000003 PHARM REV CODE 250: Performed by: NURSE PRACTITIONER

## 2025-02-22 PROCEDURE — 25000003 PHARM REV CODE 250: Performed by: HOSPITALIST

## 2025-02-22 PROCEDURE — 63600175 PHARM REV CODE 636 W HCPCS: Performed by: INTERNAL MEDICINE

## 2025-02-22 PROCEDURE — 27000207 HC ISOLATION

## 2025-02-22 PROCEDURE — 85025 COMPLETE CBC W/AUTO DIFF WBC: CPT

## 2025-02-22 PROCEDURE — 80053 COMPREHEN METABOLIC PANEL: CPT

## 2025-02-22 PROCEDURE — 36415 COLL VENOUS BLD VENIPUNCTURE: CPT

## 2025-02-22 PROCEDURE — 63600175 PHARM REV CODE 636 W HCPCS

## 2025-02-22 PROCEDURE — 83735 ASSAY OF MAGNESIUM: CPT

## 2025-02-22 RX ADMIN — AMPICILLIN SODIUM AND SULBACTAM SODIUM 3 G: 2; 1 INJECTION, POWDER, FOR SOLUTION INTRAMUSCULAR; INTRAVENOUS at 09:02

## 2025-02-22 RX ADMIN — OXYCODONE HYDROCHLORIDE AND ACETAMINOPHEN 1 TABLET: 10; 325 TABLET ORAL at 01:02

## 2025-02-22 RX ADMIN — PHENAZOPYRIDINE HYDROCHLORIDE 100 MG: 100 TABLET ORAL at 01:02

## 2025-02-22 RX ADMIN — OXYCODONE HYDROCHLORIDE AND ACETAMINOPHEN 500 MG: 500 TABLET ORAL at 09:02

## 2025-02-22 RX ADMIN — OXYCODONE HYDROCHLORIDE AND ACETAMINOPHEN 1 TABLET: 10; 325 TABLET ORAL at 03:02

## 2025-02-22 RX ADMIN — SPIRONOLACTONE 25 MG: 25 TABLET ORAL at 09:02

## 2025-02-22 RX ADMIN — AMPICILLIN SODIUM AND SULBACTAM SODIUM 3 G: 2; 1 INJECTION, POWDER, FOR SOLUTION INTRAMUSCULAR; INTRAVENOUS at 01:02

## 2025-02-22 RX ADMIN — PHENAZOPYRIDINE HYDROCHLORIDE 100 MG: 100 TABLET ORAL at 05:02

## 2025-02-22 RX ADMIN — PANTOPRAZOLE SODIUM 40 MG: 40 TABLET, DELAYED RELEASE ORAL at 06:02

## 2025-02-22 RX ADMIN — TORSEMIDE 20 MG: 20 TABLET ORAL at 06:02

## 2025-02-22 RX ADMIN — AMMONIUM LACTATE: 12 LOTION TOPICAL at 10:02

## 2025-02-22 RX ADMIN — AMIODARONE HYDROCHLORIDE 200 MG: 200 TABLET ORAL at 09:02

## 2025-02-22 RX ADMIN — AMPICILLIN SODIUM AND SULBACTAM SODIUM 3 G: 2; 1 INJECTION, POWDER, FOR SOLUTION INTRAMUSCULAR; INTRAVENOUS at 06:02

## 2025-02-22 RX ADMIN — AMPICILLIN SODIUM AND SULBACTAM SODIUM 3 G: 2; 1 INJECTION, POWDER, FOR SOLUTION INTRAMUSCULAR; INTRAVENOUS at 02:02

## 2025-02-22 RX ADMIN — ZINC SULFATE 220 MG (50 MG) CAPSULE 220 MG: CAPSULE at 09:02

## 2025-02-22 RX ADMIN — Medication 400 MG: at 09:02

## 2025-02-22 RX ADMIN — FERROUS SULFATE TAB 325 MG (65 MG ELEMENTAL FE) 1 EACH: 325 (65 FE) TAB at 09:02

## 2025-02-22 RX ADMIN — AMPICILLIN SODIUM AND SULBACTAM SODIUM 3 G: 2; 1 INJECTION, POWDER, FOR SOLUTION INTRAMUSCULAR; INTRAVENOUS at 05:02

## 2025-02-22 RX ADMIN — TAMSULOSIN HYDROCHLORIDE 0.4 MG: 0.4 CAPSULE ORAL at 09:02

## 2025-02-22 RX ADMIN — EMPAGLIFLOZIN 10 MG: 10 TABLET, FILM COATED ORAL at 09:02

## 2025-02-22 RX ADMIN — ENOXAPARIN SODIUM 100 MG: 100 INJECTION SUBCUTANEOUS at 05:02

## 2025-02-22 RX ADMIN — ENOXAPARIN SODIUM 100 MG: 100 INJECTION SUBCUTANEOUS at 06:02

## 2025-02-22 RX ADMIN — PHENAZOPYRIDINE HYDROCHLORIDE 100 MG: 100 TABLET ORAL at 09:02

## 2025-02-22 RX ADMIN — THERA TABS 1 TABLET: TAB at 09:02

## 2025-02-22 RX ADMIN — TORSEMIDE 20 MG: 20 TABLET ORAL at 05:02

## 2025-02-22 RX ADMIN — AMMONIUM LACTATE: 12 LOTION TOPICAL at 09:02

## 2025-02-22 RX ADMIN — METOPROLOL SUCCINATE 25 MG: 25 TABLET, FILM COATED, EXTENDED RELEASE ORAL at 09:02

## 2025-02-22 NOTE — PROGRESS NOTES
[facility]  Progress Note    Patient Name: Juan C Sanchez  MRN: [id number]  Patient Class: IP- Inpatient   Admission Date: 2/11/2025  Length of Stay: 11 days  Attending Physician: Livia Alexandre MD  Primary Care Provider: Katlin Graf NP        Subjective     Principal Problem:Acute on chronic congestive heart failure        HPI:  Mr. Sanchez is a 69 yr old male with a hx of HTN, MR, PAF, chronic systolic CHF with EF of 25, OA, GERD, nonischemic cardiomyopathy, anemia who presented to the ED with a chief complaint of shortness of breath, leg swelling and hypotension.  Patient states that he had PT come to his home today and he was found to be hypotensive and advised to come to the emergency room.  Patient endorses shortness of breath as well as leg swelling, malaise, fatigue, occasional chills, productive cough with milky sputum, upper abdominal pain, occasional nausea, decreased urine output, and positional lightheadedness.  He states that these symptoms have been progressively worsening over the last 1-2 months possibly even longer  He states that his symptoms are worse when lying flat as well as with exertion and better with rest and while sitting up.  He states that he has been taking his home medications without improvement of his symptoms.  He does endorse that most of all of his contacts recently have been sick. He denies any home oxygen use.  He does endorse occasional PND and states that he has been having to sit to sleep.  He denies tobacco and recreational drug use and states he seldomly drinks alcohol.  He denies fever, chest pain, vomiting, diarrhea, dysuria, hematuria, dizziness, and syncope.    Upon arrival to ED, patient afebrile, HR of 109, RR of 24, BP of 95/62, satting 98% on RA.  Workup in the ED revealed RBC of 4.19, H/H of 10/32.8, sodium of 134, pCO2 of 31, GFR of 55.1, corrected calcium of 9, albumin of 3.1, BNP of 3163, troponin of 21.3, lactic  acid of 2.1.  Blood cultures pending.  CXR shows enlarged cardiac silhouette with pulmonary vascular congestion.  Bilateral pleural effusions.  Mild bibasilar airspace disease and or atelectasis.  Left foot x-ray shows osteopenia.  No overt erosions or osseous destruction.  No acute fracture or dislocation.  Mild multifocal midfoot arthritis.  Forefoot swelling.  Patient was given Lasix 20 mg IV, Zosyn 4.5 g IV, 500 mL NS bolus, vancomycin 20 mg/kg while in the ED. discussed case with ED provider and patient will be admitted under hospital medicine services for further management.    Overview/Hospital Course:  Juan C Sanchez is a 69 year old male with a past medical history of HFrEF, Afib, PAD, anemia, obesity, and GERD who presented with an acute on chronic HFrEF (EF 20-25%) exacerbation as well as Afib with RVR. He is being diuresed with a Lasix infusion as well as dobutamine. Cardiology has been consulted.  Underwent ANGEL cardioversion 2/14. His course was also complicated by a third L toe acute osteomyelitis seen on MRI. Podiatry and ID have been consulted.  He underwent L 3rd toe amputation 2/14. He is on empiric vancomycin and cefepime. Vascular Surgery has been consulted as well and will plan for angiogram of the lower extremities 2/17.  Underwent angiogram without intervention.  Patient was transitioned off of Lasix and dobutamine infusions and changed to p.o. torsemide.  Medications were adjusted by Cardiology.  Case management was consulted for skilled nursing facility placement.    Interval History:  P patient seen and examined on morning multidisciplinary rounds.  No new complaints.  Awaiting insurance authorization for placement    Review of Systems   Constitutional:  Positive for activity change.   Respiratory:  Negative for shortness of breath.    Cardiovascular:  Positive for leg swelling. Negative for chest pain and palpitations.   Gastrointestinal:  Negative for nausea and vomiting.   Skin:   Positive for wound.     Objective:     Vital Signs (Most Recent):  Temp: 97.7 °F (36.5 °C) (02/22/25 0714)  Pulse: 77 (02/22/25 0909)  Resp: 17 (02/22/25 0714)  BP: 114/71 (02/22/25 0909)  SpO2: 98 % (02/22/25 0714) Vital Signs (24h Range):  Temp:  [97.4 °F (36.3 °C)-98.4 °F (36.9 °C)] 97.7 °F (36.5 °C)  Pulse:  [66-77] 77  Resp:  [15-20] 17  SpO2:  [92 %-98 %] 98 %  BP: ()/(67-94) 114/71     Weight: 89.5 kg (197 lb 5 oz)  Body mass index is 27.53 kg/m².    Intake/Output Summary (Last 24 hours) at 2/22/2025 1058  Last data filed at 2/21/2025 1901  Gross per 24 hour   Intake 120 ml   Output 300 ml   Net -180 ml         Physical Exam  Vitals and nursing note reviewed.   Constitutional:       General: He is not in acute distress.  HENT:      Head: Normocephalic and atraumatic.      Right Ear: External ear normal.      Left Ear: External ear normal.      Nose: Nose normal.      Mouth/Throat:      Mouth: Mucous membranes are moist.   Eyes:      Extraocular Movements: Extraocular movements intact.      Conjunctiva/sclera: Conjunctivae normal.   Cardiovascular:      Rate and Rhythm: Normal rate and regular rhythm.      Pulses: Normal pulses.      Heart sounds: Normal heart sounds.   Pulmonary:      Effort: Pulmonary effort is normal.      Breath sounds: Rales (Minimal) present.      Comments: RA.  Abdominal:      General: Bowel sounds are normal. There is no distension.      Palpations: Abdomen is soft.      Tenderness: There is no abdominal tenderness.   Genitourinary:     Comments: Meredith.  Musculoskeletal:      Cervical back: Normal range of motion and neck supple.      Right lower leg: Edema present.      Left lower leg: Edema present.   Skin:     General: Skin is warm and dry.      Comments: See pictures regarding wounds.  Postoperative dressing not removed   Neurological:      Mental Status: He is alert. Mental status is at baseline.   Psychiatric:         Behavior: Behavior normal.             Significant Labs:  All pertinent labs within the past 24 hours have been reviewed.    Significant Imaging: I have reviewed all pertinent imaging results/findings within the past 24 hours.    Assessment and Plan     * Acute on chronic congestive heart failure  -now off the Lasix and dobutamine drips and on p.o. 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 L third toe.  -ID consulted  -Podiatry consulted  -Unasyn to complete 4 week course of antibiotics through 3/03/25.  Can use Augmentin at discharge per ID  -status post third toe amputation (L) 2/14  -bone culture with Proteus and Enterococcus      PAD (peripheral artery disease)  -No intervention indicated per Vascular Surgery at this time  -underwent angiogram 2/17      Anemia  Ferritin at lower limit of normal.  -Trend Hgb with CBC    Paroxysmal atrial fibrillation  -Telemetry  -Lovenox  -metoprolol  -Continue home amiodarone  -ANGEL cardioversion per Cardiology 2/14    MR (mitral regurgitation)  Seen on TTE at OSH.  -Cardiology consulted      Hypertension  -Home medications held  -Continue to monitor      GERD (gastroesophageal reflux disease)  -PPI        VTE Risk Mitigation (From admission, onward)           Ordered     enoxaparin injection 100 mg  Every 12 hours (non-standard times)         02/12/25 1622     IP VTE HIGH RISK PATIENT  Once         02/11/25 2331     Place sequential compression device  Until discontinued         02/11/25 2331                    Discharge Planning   PHILIP: 2/24/2025     Code Status: Full Code   Medical Readiness for Discharge Date:   Discharge Plan A: Skilled Nursing Facility   Discharge Delays: None known at this time            Please place Justification for DME        Livia Alexandre,  MD  Department of Hospital Medicine   [facility]

## 2025-02-22 NOTE — PT/OT/SLP PROGRESS
Physical Therapy      Patient Name:  Juan C Sanchez   MRN:  [id number]    Patient not seen today secondary to Other (Comment) (Pt sleeping, and declined to participate when roused.). Will follow-up 2/24/25.

## 2025-02-22 NOTE — PLAN OF CARE
Problem: Adult Inpatient Plan of Care  Goal: Plan of Care Review  Outcome: Progressing  Goal: Patient-Specific Goal (Individualized)  Outcome: Progressing  Goal: Absence of Hospital-Acquired Illness or Injury  Outcome: Progressing  Goal: Optimal Comfort and Wellbeing  Outcome: Progressing  Goal: Readiness for Transition of Care  Outcome: Progressing     Problem: Wound  Goal: Optimal Coping  Outcome: Progressing  Goal: Optimal Functional Ability  Outcome: Progressing  Goal: Absence of Infection Signs and Symptoms  Outcome: Progressing  Goal: Improved Oral Intake  Outcome: Progressing  Goal: Optimal Pain Control and Function  Outcome: Progressing  Goal: Skin Health and Integrity  Outcome: Progressing  Goal: Optimal Wound Healing  Outcome: Progressing     Problem: Fall Injury Risk  Goal: Absence of Fall and Fall-Related Injury  Outcome: Progressing     Problem: Infection  Goal: Absence of Infection Signs and Symptoms  Outcome: Progressing     Problem: Skin Injury Risk Increased  Goal: Skin Health and Integrity  Outcome: Progressing     Problem: Malnutrition  Goal: Improved Nutritional Intake  Outcome: Progressing

## 2025-02-22 NOTE — SUBJECTIVE & OBJECTIVE
Interval History:  P patient seen and examined on morning multidisciplinary rounds.  No new complaints.  Awaiting insurance authorization for placement    Review of Systems   Constitutional:  Positive for activity change.   Respiratory:  Negative for shortness of breath.    Cardiovascular:  Positive for leg swelling. Negative for chest pain and palpitations.   Gastrointestinal:  Negative for nausea and vomiting.   Skin:  Positive for wound.     Objective:     Vital Signs (Most Recent):  Temp: 97.7 °F (36.5 °C) (02/22/25 0714)  Pulse: 77 (02/22/25 0909)  Resp: 17 (02/22/25 0714)  BP: 114/71 (02/22/25 0909)  SpO2: 98 % (02/22/25 0714) Vital Signs (24h Range):  Temp:  [97.4 °F (36.3 °C)-98.4 °F (36.9 °C)] 97.7 °F (36.5 °C)  Pulse:  [66-77] 77  Resp:  [15-20] 17  SpO2:  [92 %-98 %] 98 %  BP: ()/(67-94) 114/71     Weight: 89.5 kg (197 lb 5 oz)  Body mass index is 27.53 kg/m².    Intake/Output Summary (Last 24 hours) at 2/22/2025 1058  Last data filed at 2/21/2025 1901  Gross per 24 hour   Intake 120 ml   Output 300 ml   Net -180 ml         Physical Exam  Vitals and nursing note reviewed.   Constitutional:       General: He is not in acute distress.  HENT:      Head: Normocephalic and atraumatic.      Right Ear: External ear normal.      Left Ear: External ear normal.      Nose: Nose normal.      Mouth/Throat:      Mouth: Mucous membranes are moist.   Eyes:      Extraocular Movements: Extraocular movements intact.      Conjunctiva/sclera: Conjunctivae normal.   Cardiovascular:      Rate and Rhythm: Normal rate and regular rhythm.      Pulses: Normal pulses.      Heart sounds: Normal heart sounds.   Pulmonary:      Effort: Pulmonary effort is normal.      Breath sounds: Rales (Minimal) present.      Comments: RA.  Abdominal:      General: Bowel sounds are normal. There is no distension.      Palpations: Abdomen is soft.      Tenderness: There is no abdominal tenderness.   Genitourinary:     Comments:  Viri.  Musculoskeletal:      Cervical back: Normal range of motion and neck supple.      Right lower leg: Edema present.      Left lower leg: Edema present.   Skin:     General: Skin is warm and dry.      Comments: See pictures regarding wounds.  Postoperative dressing not removed   Neurological:      Mental Status: He is alert. Mental status is at baseline.   Psychiatric:         Behavior: Behavior normal.             Significant Labs: All pertinent labs within the past 24 hours have been reviewed.    Significant Imaging: I have reviewed all pertinent imaging results/findings within the past 24 hours.

## 2025-02-22 NOTE — NURSING
Report received from Brianda DERAS. Assumed care of Pt. No needs at this time call light in reach.

## 2025-02-23 LAB
ALBUMIN SERPL BCP-MCNC: 3.5 G/DL (ref 3.5–5.2)
ALP SERPL-CCNC: 83 U/L (ref 55–135)
ALT SERPL W/O P-5'-P-CCNC: 6 U/L (ref 10–44)
ANION GAP SERPL CALC-SCNC: 10 MMOL/L (ref 8–16)
AST SERPL-CCNC: 13 U/L (ref 10–40)
BASOPHILS # BLD AUTO: 0.06 K/UL (ref 0–0.2)
BASOPHILS NFR BLD: 0.8 % (ref 0–1.9)
BILIRUB SERPL-MCNC: 0.6 MG/DL (ref 0.1–1)
BUN SERPL-MCNC: 25 MG/DL (ref 8–23)
CALCIUM SERPL-MCNC: 9 MG/DL (ref 8.7–10.5)
CHLORIDE SERPL-SCNC: 94 MMOL/L (ref 95–110)
CO2 SERPL-SCNC: 30 MMOL/L (ref 23–29)
CREAT SERPL-MCNC: 1.4 MG/DL (ref 0.5–1.4)
DIFFERENTIAL METHOD BLD: ABNORMAL
EOSINOPHIL # BLD AUTO: 0.2 K/UL (ref 0–0.5)
EOSINOPHIL NFR BLD: 2.3 % (ref 0–8)
ERYTHROCYTE [DISTWIDTH] IN BLOOD BY AUTOMATED COUNT: 20 % (ref 11.5–14.5)
EST. GFR  (NO RACE VARIABLE): 55.1 ML/MIN/1.73 M^2
GLUCOSE SERPL-MCNC: 110 MG/DL (ref 70–110)
HCT VFR BLD AUTO: 34.3 % (ref 40–54)
HGB BLD-MCNC: 10.3 G/DL (ref 14–18)
IMM GRANULOCYTES # BLD AUTO: 0.05 K/UL (ref 0–0.04)
IMM GRANULOCYTES NFR BLD AUTO: 0.7 % (ref 0–0.5)
LYMPHOCYTES # BLD AUTO: 1.8 K/UL (ref 1–4.8)
LYMPHOCYTES NFR BLD: 24.2 % (ref 18–48)
MAGNESIUM SERPL-MCNC: 2.4 MG/DL (ref 1.6–2.6)
MCH RBC QN AUTO: 23.6 PG (ref 27–31)
MCHC RBC AUTO-ENTMCNC: 30 G/DL (ref 32–36)
MCV RBC AUTO: 79 FL (ref 82–98)
MONOCYTES # BLD AUTO: 1 K/UL (ref 0.3–1)
MONOCYTES NFR BLD: 13 % (ref 4–15)
NEUTROPHILS # BLD AUTO: 4.4 K/UL (ref 1.8–7.7)
NEUTROPHILS NFR BLD: 59 % (ref 38–73)
NRBC BLD-RTO: 0 /100 WBC
PLATELET # BLD AUTO: 352 K/UL (ref 150–450)
PMV BLD AUTO: 9.5 FL (ref 9.2–12.9)
POTASSIUM SERPL-SCNC: 4.1 MMOL/L (ref 3.5–5.1)
PROT SERPL-MCNC: 7.8 G/DL (ref 6–8.4)
RBC # BLD AUTO: 4.37 M/UL (ref 4.6–6.2)
SODIUM SERPL-SCNC: 134 MMOL/L (ref 136–145)
WBC # BLD AUTO: 7.45 K/UL (ref 3.9–12.7)

## 2025-02-23 PROCEDURE — 25000003 PHARM REV CODE 250

## 2025-02-23 PROCEDURE — 63600175 PHARM REV CODE 636 W HCPCS

## 2025-02-23 PROCEDURE — 25000003 PHARM REV CODE 250: Performed by: NURSE PRACTITIONER

## 2025-02-23 PROCEDURE — 25000003 PHARM REV CODE 250: Performed by: HOSPITALIST

## 2025-02-23 PROCEDURE — 83735 ASSAY OF MAGNESIUM: CPT

## 2025-02-23 PROCEDURE — 25000003 PHARM REV CODE 250: Performed by: STUDENT IN AN ORGANIZED HEALTH CARE EDUCATION/TRAINING PROGRAM

## 2025-02-23 PROCEDURE — 25000003 PHARM REV CODE 250: Performed by: INTERNAL MEDICINE

## 2025-02-23 PROCEDURE — 85025 COMPLETE CBC W/AUTO DIFF WBC: CPT

## 2025-02-23 PROCEDURE — 63600175 PHARM REV CODE 636 W HCPCS: Performed by: INTERNAL MEDICINE

## 2025-02-23 PROCEDURE — 27000207 HC ISOLATION

## 2025-02-23 PROCEDURE — 36415 COLL VENOUS BLD VENIPUNCTURE: CPT

## 2025-02-23 PROCEDURE — 21400001 HC TELEMETRY ROOM

## 2025-02-23 PROCEDURE — 80053 COMPREHEN METABOLIC PANEL: CPT

## 2025-02-23 RX ADMIN — Medication 9 MG: at 08:02

## 2025-02-23 RX ADMIN — ENOXAPARIN SODIUM 100 MG: 100 INJECTION SUBCUTANEOUS at 04:02

## 2025-02-23 RX ADMIN — Medication 9 MG: at 12:02

## 2025-02-23 RX ADMIN — THERA TABS 1 TABLET: TAB at 08:02

## 2025-02-23 RX ADMIN — AMIODARONE HYDROCHLORIDE 200 MG: 200 TABLET ORAL at 08:02

## 2025-02-23 RX ADMIN — EMPAGLIFLOZIN 10 MG: 10 TABLET, FILM COATED ORAL at 08:02

## 2025-02-23 RX ADMIN — Medication 400 MG: at 08:02

## 2025-02-23 RX ADMIN — AMPICILLIN SODIUM AND SULBACTAM SODIUM 3 G: 2; 1 INJECTION, POWDER, FOR SOLUTION INTRAMUSCULAR; INTRAVENOUS at 03:02

## 2025-02-23 RX ADMIN — OXYCODONE HYDROCHLORIDE AND ACETAMINOPHEN 1 TABLET: 10; 325 TABLET ORAL at 08:02

## 2025-02-23 RX ADMIN — TORSEMIDE 20 MG: 20 TABLET ORAL at 03:02

## 2025-02-23 RX ADMIN — ONDANSETRON 4 MG: 2 INJECTION INTRAMUSCULAR; INTRAVENOUS at 03:02

## 2025-02-23 RX ADMIN — TAMSULOSIN HYDROCHLORIDE 0.4 MG: 0.4 CAPSULE ORAL at 08:02

## 2025-02-23 RX ADMIN — FERROUS SULFATE TAB 325 MG (65 MG ELEMENTAL FE) 1 EACH: 325 (65 FE) TAB at 08:02

## 2025-02-23 RX ADMIN — METOPROLOL SUCCINATE 25 MG: 25 TABLET, FILM COATED, EXTENDED RELEASE ORAL at 08:02

## 2025-02-23 RX ADMIN — PANTOPRAZOLE SODIUM 40 MG: 40 TABLET, DELAYED RELEASE ORAL at 06:02

## 2025-02-23 RX ADMIN — ONDANSETRON 4 MG: 2 INJECTION INTRAMUSCULAR; INTRAVENOUS at 12:02

## 2025-02-23 RX ADMIN — AMMONIUM LACTATE: 12 LOTION TOPICAL at 08:02

## 2025-02-23 RX ADMIN — AMPICILLIN SODIUM AND SULBACTAM SODIUM 3 G: 2; 1 INJECTION, POWDER, FOR SOLUTION INTRAMUSCULAR; INTRAVENOUS at 12:02

## 2025-02-23 RX ADMIN — OXYCODONE HYDROCHLORIDE AND ACETAMINOPHEN 1 TABLET: 10; 325 TABLET ORAL at 12:02

## 2025-02-23 RX ADMIN — AMPICILLIN SODIUM AND SULBACTAM SODIUM 3 G: 2; 1 INJECTION, POWDER, FOR SOLUTION INTRAMUSCULAR; INTRAVENOUS at 08:02

## 2025-02-23 RX ADMIN — ENOXAPARIN SODIUM 100 MG: 100 INJECTION SUBCUTANEOUS at 06:02

## 2025-02-23 RX ADMIN — OXYCODONE HYDROCHLORIDE AND ACETAMINOPHEN 1 TABLET: 10; 325 TABLET ORAL at 01:02

## 2025-02-23 RX ADMIN — SPIRONOLACTONE 25 MG: 25 TABLET ORAL at 08:02

## 2025-02-23 RX ADMIN — PHENAZOPYRIDINE HYDROCHLORIDE 100 MG: 100 TABLET ORAL at 08:02

## 2025-02-23 RX ADMIN — TORSEMIDE 20 MG: 20 TABLET ORAL at 06:02

## 2025-02-23 RX ADMIN — AMPICILLIN SODIUM AND SULBACTAM SODIUM 3 G: 2; 1 INJECTION, POWDER, FOR SOLUTION INTRAMUSCULAR; INTRAVENOUS at 11:02

## 2025-02-23 RX ADMIN — OXYCODONE HYDROCHLORIDE AND ACETAMINOPHEN 500 MG: 500 TABLET ORAL at 08:02

## 2025-02-23 RX ADMIN — ZINC SULFATE 220 MG (50 MG) CAPSULE 220 MG: CAPSULE at 08:02

## 2025-02-23 RX ADMIN — PHENAZOPYRIDINE HYDROCHLORIDE 100 MG: 100 TABLET ORAL at 11:02

## 2025-02-23 RX ADMIN — PHENAZOPYRIDINE HYDROCHLORIDE 100 MG: 100 TABLET ORAL at 04:02

## 2025-02-23 NOTE — PROGRESS NOTES
[facility] Medicine  Progress Note    Patient Name: Juan C Sanchez  MRN: [id number]  Patient Class: IP- Inpatient   Admission Date: 2/11/2025  Length of Stay: 12 days  Attending Physician: Livia Alexandre MD  Primary Care Provider: Katlin Graf NP        Subjective     Principal Problem:Acute on chronic congestive heart failure        HPI:  Mr. Sanchez is a 69 yr old male with a hx of HTN, MR, PAF, chronic systolic CHF with EF of 25, OA, GERD, nonischemic cardiomyopathy, anemia who presented to the ED with a chief complaint of shortness of breath, leg swelling and hypotension.  Patient states that he had PT come to his home today and he was found to be hypotensive and advised to come to the emergency room.  Patient endorses shortness of breath as well as leg swelling, malaise, fatigue, occasional chills, productive cough with milky sputum, upper abdominal pain, occasional nausea, decreased urine output, and positional lightheadedness.  He states that these symptoms have been progressively worsening over the last 1-2 months possibly even longer  He states that his symptoms are worse when lying flat as well as with exertion and better with rest and while sitting up.  He states that he has been taking his home medications without improvement of his symptoms.  He does endorse that most of all of his contacts recently have been sick. He denies any home oxygen use.  He does endorse occasional PND and states that he has been having to sit to sleep.  He denies tobacco and recreational drug use and states he seldomly drinks alcohol.  He denies fever, chest pain, vomiting, diarrhea, dysuria, hematuria, dizziness, and syncope.    Upon arrival to ED, patient afebrile, HR of 109, RR of 24, BP of 95/62, satting 98% on RA.  Workup in the ED revealed RBC of 4.19, H/H of 10/32.8, sodium of 134, pCO2 of 31, GFR of 55.1, corrected calcium of 9, albumin of 3.1, BNP of 3163, troponin of 21.3, lactic  acid of 2.1.  Blood cultures pending.  CXR shows enlarged cardiac silhouette with pulmonary vascular congestion.  Bilateral pleural effusions.  Mild bibasilar airspace disease and or atelectasis.  Left foot x-ray shows osteopenia.  No overt erosions or osseous destruction.  No acute fracture or dislocation.  Mild multifocal midfoot arthritis.  Forefoot swelling.  Patient was given Lasix 20 mg IV, Zosyn 4.5 g IV, 500 mL NS bolus, vancomycin 20 mg/kg while in the ED. discussed case with ED provider and patient will be admitted under hospital medicine services for further management.    Overview/Hospital Course:  Juan C Sanchez is a 69 year old male with a past medical history of HFrEF, Afib, PAD, anemia, obesity, and GERD who presented with an acute on chronic HFrEF (EF 20-25%) exacerbation as well as Afib with RVR. He is being diuresed with a Lasix infusion as well as dobutamine. Cardiology has been consulted.  Underwent ANGEL cardioversion 2/14. His course was also complicated by a third L toe acute osteomyelitis seen on MRI. Podiatry and ID have been consulted.  He underwent L 3rd toe amputation 2/14. He is on empiric vancomycin and cefepime. Vascular Surgery has been consulted as well and will plan for angiogram of the lower extremities 2/17.  Underwent angiogram without intervention.  Patient was transitioned off of Lasix and dobutamine infusions and changed to p.o. torsemide.  Medications were adjusted by Cardiology.  Case management was consulted for skilled nursing facility placement.    Interval History:  Seen and examined.  Reports some foot pain this morning.  Otherwise no new complaints.  Pending placement.          Review of Systems   Constitutional:  Positive for activity change.   Respiratory:  Negative for shortness of breath.    Cardiovascular:  Positive for leg swelling. Negative for chest pain and palpitations.   Gastrointestinal:  Negative for nausea and vomiting.   Skin:  Positive for wound.      Objective:     Vital Signs (Most Recent):  Temp: 96.4 °F (35.8 °C) (02/23/25 0834)  Pulse: 79 (02/23/25 0834)  Resp: 18 (02/23/25 0834)  BP: (!) 135/98 (02/23/25 0834)  SpO2: 100 % (02/23/25 0834) Vital Signs (24h Range):  Temp:  [96.4 °F (35.8 °C)-98 °F (36.7 °C)] 96.4 °F (35.8 °C)  Pulse:  [71-79] 79  Resp:  [16-18] 18  SpO2:  [91 %-100 %] 100 %  BP: (101-135)/(70-98) 135/98     Weight: 86.8 kg (191 lb 5.8 oz)  Body mass index is 26.7 kg/m².    Intake/Output Summary (Last 24 hours) at 2/23/2025 0944  Last data filed at 2/23/2025 0440  Gross per 24 hour   Intake 960 ml   Output 1750 ml   Net -790 ml         Physical Exam  Vitals and nursing note reviewed.   Constitutional:       General: He is not in acute distress.  HENT:      Head: Normocephalic and atraumatic.      Right Ear: External ear normal.      Left Ear: External ear normal.      Nose: Nose normal.      Mouth/Throat:      Mouth: Mucous membranes are moist.   Eyes:      Extraocular Movements: Extraocular movements intact.      Conjunctiva/sclera: Conjunctivae normal.   Cardiovascular:      Rate and Rhythm: Normal rate and regular rhythm.      Pulses: Normal pulses.      Heart sounds: Normal heart sounds.   Pulmonary:      Effort: Pulmonary effort is normal.      Breath sounds: Rales (Minimal) present.      Comments: RA.  Abdominal:      General: Bowel sounds are normal. There is no distension.      Palpations: Abdomen is soft.      Tenderness: There is no abdominal tenderness.   Musculoskeletal:      Cervical back: Normal range of motion and neck supple.      Right lower leg: Edema present.      Left lower leg: Edema present.   Skin:     General: Skin is warm and dry.      Comments: See pictures regarding wounds.  Postoperative dressing not removed   Neurological:      Mental Status: He is alert. Mental status is at baseline.   Psychiatric:         Behavior: Behavior normal.             Significant Labs: All pertinent labs within the past 24 hours have  been reviewed.    Significant Imaging: I have reviewed all pertinent imaging results/findings within the past 24 hours.    Assessment and Plan     * Acute on chronic congestive heart failure  -now off the Lasix and dobutamine drips and on p.o. 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 L third toe.  -ID consulted  -Podiatry consulted  -Unasyn to complete 4 week course of antibiotics through 3/03/25.  Can use Augmentin at discharge per ID  -status post third toe amputation (L) 2/14  -bone culture with Proteus and Enterococcus      PAD (peripheral artery disease)  -No intervention indicated per Vascular Surgery at this time  -underwent angiogram 2/17      Anemia  Ferritin at lower limit of normal.  -Trend Hgb with CBC    Paroxysmal atrial fibrillation  -Telemetry  -Lovenox  -metoprolol  -Continue home amiodarone  -ANGEL cardioversion per Cardiology 2/14    MR (mitral regurgitation)  Seen on TTE at OSH.  -Cardiology consulted      Hypertension  -Home medications held  -Continue to monitor      GERD (gastroesophageal reflux disease)  -PPI        VTE Risk Mitigation (From admission, onward)           Ordered     enoxaparin injection 100 mg  Every 12 hours (non-standard times)         02/12/25 1622     IP VTE HIGH RISK PATIENT  Once         02/11/25 2331     Place sequential compression device  Until discontinued         02/11/25 2331                    Discharge Planning   PHILIP: 2/24/2025     Code Status: Full Code   Medical Readiness for Discharge Date:   Discharge Plan A: Skilled Nursing Facility   Discharge Delays: None known at this time            Please place Justification for DME        Livia Alexandre MD  Department of Hospital Medicine   [facility]  [facility]

## 2025-02-23 NOTE — SUBJECTIVE & OBJECTIVE
Interval History:  Seen and examined.  Reports some foot pain this morning.  Otherwise no new complaints.  Pending placement.          Review of Systems   Constitutional:  Positive for activity change.   Respiratory:  Negative for shortness of breath.    Cardiovascular:  Positive for leg swelling. Negative for chest pain and palpitations.   Gastrointestinal:  Negative for nausea and vomiting.   Skin:  Positive for wound.     Objective:     Vital Signs (Most Recent):  Temp: 96.4 °F (35.8 °C) (02/23/25 0834)  Pulse: 79 (02/23/25 0834)  Resp: 18 (02/23/25 0834)  BP: (!) 135/98 (02/23/25 0834)  SpO2: 100 % (02/23/25 0834) Vital Signs (24h Range):  Temp:  [96.4 °F (35.8 °C)-98 °F (36.7 °C)] 96.4 °F (35.8 °C)  Pulse:  [71-79] 79  Resp:  [16-18] 18  SpO2:  [91 %-100 %] 100 %  BP: (101-135)/(70-98) 135/98     Weight: 86.8 kg (191 lb 5.8 oz)  Body mass index is 26.7 kg/m².    Intake/Output Summary (Last 24 hours) at 2/23/2025 0944  Last data filed at 2/23/2025 0440  Gross per 24 hour   Intake 960 ml   Output 1750 ml   Net -790 ml         Physical Exam  Vitals and nursing note reviewed.   Constitutional:       General: He is not in acute distress.  HENT:      Head: Normocephalic and atraumatic.      Right Ear: External ear normal.      Left Ear: External ear normal.      Nose: Nose normal.      Mouth/Throat:      Mouth: Mucous membranes are moist.   Eyes:      Extraocular Movements: Extraocular movements intact.      Conjunctiva/sclera: Conjunctivae normal.   Cardiovascular:      Rate and Rhythm: Normal rate and regular rhythm.      Pulses: Normal pulses.      Heart sounds: Normal heart sounds.   Pulmonary:      Effort: Pulmonary effort is normal.      Breath sounds: Rales (Minimal) present.      Comments: RA.  Abdominal:      General: Bowel sounds are normal. There is no distension.      Palpations: Abdomen is soft.      Tenderness: There is no abdominal tenderness.   Musculoskeletal:      Cervical back: Normal range of  motion and neck supple.      Right lower leg: Edema present.      Left lower leg: Edema present.   Skin:     General: Skin is warm and dry.      Comments: See pictures regarding wounds.  Postoperative dressing not removed   Neurological:      Mental Status: He is alert. Mental status is at baseline.   Psychiatric:         Behavior: Behavior normal.             Significant Labs: All pertinent labs within the past 24 hours have been reviewed.    Significant Imaging: I have reviewed all pertinent imaging results/findings within the past 24 hours.

## 2025-02-23 NOTE — OP NOTE
[facility]  Vascular Surgery  Operative Note    SUMMARY     Date of Procedure: 2/17/2025     Procedure:  Left lower extremity angiogram  Selective catheterization of the 3rd order vessel: Left common femoral artery  Ultrasound-guided access of the right common femoral artery      Surgeons and Role:     * Valeri Faith MD - Primary    Assisting Surgeon: None    Pre-Operative Diagnosis: PAD (peripheral artery disease) [I73.9]    Post-Operative Diagnosis: Post-Op Diagnosis Codes:     * PAD (peripheral artery disease) [I73.9]    Indication:  Patient with a left foot now status post toe amputation with concern for peripheral arterial disease    Anesthesia: RN IV Sedation    Operative Findings (including complications, if any):  See below    Description of Technical Procedures:   Using real-time ultrasound guidance the right common femoral artery was accessed with a micropuncture kit. Using fluoroscopy we confirmed needle access directly over the femoral head.  The micropuncture kit was then upsized to a 6 [place] sheath and the distal aorta was selected with the Omni flush catheter. An aortoiliofemoral angiogram was performed.  Up and over access of the contralateral distal external iliac artery and proximal common femoral artery was selected and a left lower extremity angiogram was performed.    Angiogram findings  Distal aorta:  Patent without any significant stenosis  Bilateral iliac arteries:  Patent without any significant stenosis  Left common femoral artery:  Patent without any significant stenosis  Left profunda femoris artery:  Patent without any significant stenosis  Left superficial femoral artery:  Patent without any significant stenosis  Left popliteal artery:  Patent without any significant stenosis  Left anterior tibial artery:  Patent without any significant stenosis.  Just past the ankle the dorsalis pedis does appear to have a focal occlusion with reconstitution via retrograde flow of  the posterior tibial artery  Left tibioperoneal trunk:  Patent without any significant stenosis  Left posterior tibial artery:  Patent without any significant stenosis  Left peroneal artery:  Patent without any significant stenosis  Pedal arch:  Intact    Up and over access over the aortic bifurcation was secured using a long 6 fr sheath.     Interventions performed:  The left common femoral artery was selected in his left lower extremity angiogram was performed.  The majority of his vessels were patent with flow to the foot that I do think was suitable for healing a toe amputation.    Conclusion:  If there are any issues with wound healing can consider attempt to revascularize the dorsalis pedis artery which was relatively aggressive but would be warranted the patient did not heal this wound        Estimated Blood Loss (EBL): * No values recorded between 2/17/2025  1:29 PM and 2/17/2025  1:49 PM *           Implants:   Implant Name Type Inv. Item Serial No.  Lot No. LRB No. Used Action   KIT ANGIO SEAL 6FR VIP - [id number] Closure Device Angio KIT ANGIO SEAL 6FR VIP  [employer] [id number] Right 1 Implanted       Specimens:   Specimen (24h ago, onward)      None                    Condition: Good    Disposition: PACU - hemodynamically stable.    Attestation: I was present and scrubbed for the entire procedure.

## 2025-02-24 VITALS
BODY MASS INDEX: 26.79 KG/M2 | RESPIRATION RATE: 18 BRPM | HEART RATE: 72 BPM | TEMPERATURE: 98 F | HEIGHT: 71 IN | SYSTOLIC BLOOD PRESSURE: 103 MMHG | OXYGEN SATURATION: 95 % | DIASTOLIC BLOOD PRESSURE: 78 MMHG | WEIGHT: 191.38 LBS

## 2025-02-24 LAB
ALBUMIN SERPL BCP-MCNC: 3.4 G/DL (ref 3.5–5.2)
ALP SERPL-CCNC: 74 U/L (ref 55–135)
ALT SERPL W/O P-5'-P-CCNC: 5 U/L (ref 10–44)
ANION GAP SERPL CALC-SCNC: 8 MMOL/L (ref 8–16)
AST SERPL-CCNC: 12 U/L (ref 10–40)
BASOPHILS # BLD AUTO: 0.05 K/UL (ref 0–0.2)
BASOPHILS NFR BLD: 0.6 % (ref 0–1.9)
BILIRUB SERPL-MCNC: 0.5 MG/DL (ref 0.1–1)
BUN SERPL-MCNC: 27 MG/DL (ref 8–23)
CALCIUM SERPL-MCNC: 8.7 MG/DL (ref 8.7–10.5)
CHLORIDE SERPL-SCNC: 95 MMOL/L (ref 95–110)
CO2 SERPL-SCNC: 31 MMOL/L (ref 23–29)
CREAT SERPL-MCNC: 1.5 MG/DL (ref 0.5–1.4)
DIFFERENTIAL METHOD BLD: ABNORMAL
EOSINOPHIL # BLD AUTO: 0.2 K/UL (ref 0–0.5)
EOSINOPHIL NFR BLD: 2.5 % (ref 0–8)
ERYTHROCYTE [DISTWIDTH] IN BLOOD BY AUTOMATED COUNT: 20.2 % (ref 11.5–14.5)
EST. GFR  (NO RACE VARIABLE): 50.7 ML/MIN/1.73 M^2
GLUCOSE SERPL-MCNC: 125 MG/DL (ref 70–110)
HCT VFR BLD AUTO: 32.9 % (ref 40–54)
HGB BLD-MCNC: 9.9 G/DL (ref 14–18)
IMM GRANULOCYTES # BLD AUTO: 0.06 K/UL (ref 0–0.04)
IMM GRANULOCYTES NFR BLD AUTO: 0.7 % (ref 0–0.5)
LYMPHOCYTES # BLD AUTO: 1.9 K/UL (ref 1–4.8)
LYMPHOCYTES NFR BLD: 23.3 % (ref 18–48)
MAGNESIUM SERPL-MCNC: 2.4 MG/DL (ref 1.6–2.6)
MCH RBC QN AUTO: 23.7 PG (ref 27–31)
MCHC RBC AUTO-ENTMCNC: 30.1 G/DL (ref 32–36)
MCV RBC AUTO: 79 FL (ref 82–98)
MONOCYTES # BLD AUTO: 1.1 K/UL (ref 0.3–1)
MONOCYTES NFR BLD: 13.2 % (ref 4–15)
NEUTROPHILS # BLD AUTO: 5 K/UL (ref 1.8–7.7)
NEUTROPHILS NFR BLD: 59.7 % (ref 38–73)
NRBC BLD-RTO: 0 /100 WBC
PLATELET # BLD AUTO: 332 K/UL (ref 150–450)
PMV BLD AUTO: 9.6 FL (ref 9.2–12.9)
POTASSIUM SERPL-SCNC: 4.1 MMOL/L (ref 3.5–5.1)
PROT SERPL-MCNC: 7.4 G/DL (ref 6–8.4)
RBC # BLD AUTO: 4.17 M/UL (ref 4.6–6.2)
SODIUM SERPL-SCNC: 134 MMOL/L (ref 136–145)
WBC # BLD AUTO: 8.31 K/UL (ref 3.9–12.7)

## 2025-02-24 PROCEDURE — 99232 SBSQ HOSP IP/OBS MODERATE 35: CPT | Mod: ,,, | Performed by: PODIATRIST

## 2025-02-24 PROCEDURE — 63600175 PHARM REV CODE 636 W HCPCS

## 2025-02-24 PROCEDURE — 25000003 PHARM REV CODE 250

## 2025-02-24 PROCEDURE — 25000003 PHARM REV CODE 250: Performed by: INTERNAL MEDICINE

## 2025-02-24 PROCEDURE — 80053 COMPREHEN METABOLIC PANEL: CPT

## 2025-02-24 PROCEDURE — 63600175 PHARM REV CODE 636 W HCPCS: Performed by: INTERNAL MEDICINE

## 2025-02-24 PROCEDURE — 36415 COLL VENOUS BLD VENIPUNCTURE: CPT

## 2025-02-24 PROCEDURE — 83735 ASSAY OF MAGNESIUM: CPT

## 2025-02-24 PROCEDURE — 25000003 PHARM REV CODE 250: Performed by: NURSE PRACTITIONER

## 2025-02-24 PROCEDURE — 85025 COMPLETE CBC W/AUTO DIFF WBC: CPT

## 2025-02-24 PROCEDURE — 25000003 PHARM REV CODE 250: Performed by: HOSPITALIST

## 2025-02-24 RX ORDER — TORSEMIDE 20 MG/1
20 TABLET ORAL
Qty: 60 TABLET | Refills: 2 | Status: SHIPPED | OUTPATIENT
Start: 2025-02-24 | End: 2025-05-25

## 2025-02-24 RX ORDER — FERROUS SULFATE 325(65) MG
325 TABLET, DELAYED RELEASE (ENTERIC COATED) ORAL DAILY
Qty: 30 TABLET | Refills: 2 | Status: SHIPPED | OUTPATIENT
Start: 2025-02-24

## 2025-02-24 RX ORDER — SPIRONOLACTONE 25 MG/1
25 TABLET ORAL 2 TIMES DAILY
Qty: 60 TABLET | Refills: 2 | Status: SHIPPED | OUTPATIENT
Start: 2025-02-24 | End: 2025-05-25

## 2025-02-24 RX ORDER — ZINC SULFATE 50(220)MG
220 CAPSULE ORAL DAILY
Qty: 15 CAPSULE | Refills: 0 | Status: SHIPPED | OUTPATIENT
Start: 2025-02-25 | End: 2025-03-12

## 2025-02-24 RX ORDER — TAMSULOSIN HYDROCHLORIDE 0.4 MG/1
0.4 CAPSULE ORAL DAILY
Qty: 30 CAPSULE | Refills: 2 | Status: SHIPPED | OUTPATIENT
Start: 2025-02-25 | End: 2025-05-26

## 2025-02-24 RX ORDER — LANOLIN ALCOHOL/MO/W.PET/CERES
400 CREAM (GRAM) TOPICAL EVERY OTHER DAY
Status: DISCONTINUED | OUTPATIENT
Start: 2025-02-26 | End: 2025-02-24 | Stop reason: HOSPADM

## 2025-02-24 RX ORDER — LANOLIN ALCOHOL/MO/W.PET/CERES
400 CREAM (GRAM) TOPICAL EVERY OTHER DAY
Qty: 15 TABLET | Refills: 2 | Status: SHIPPED | OUTPATIENT
Start: 2025-02-26 | End: 2025-05-27

## 2025-02-24 RX ORDER — ACETAMINOPHEN 325 MG/1
650 TABLET ORAL EVERY 6 HOURS PRN
Start: 2025-02-24

## 2025-02-24 RX ORDER — OXYCODONE AND ACETAMINOPHEN 10; 325 MG/1; MG/1
1 TABLET ORAL EVERY 8 HOURS PRN
Qty: 8 TABLET | Refills: 0 | Status: SHIPPED | OUTPATIENT
Start: 2025-02-24 | End: 2025-03-01

## 2025-02-24 RX ORDER — METOPROLOL SUCCINATE 25 MG/1
25 TABLET, EXTENDED RELEASE ORAL DAILY
Qty: 30 TABLET | Refills: 2 | Status: SHIPPED | OUTPATIENT
Start: 2025-02-25 | End: 2025-05-26

## 2025-02-24 RX ORDER — AMOXICILLIN AND CLAVULANATE POTASSIUM 875; 125 MG/1; MG/1
1 TABLET, FILM COATED ORAL 2 TIMES DAILY
Qty: 16 TABLET | Refills: 0 | Status: SHIPPED | OUTPATIENT
Start: 2025-02-24 | End: 2025-03-04

## 2025-02-24 RX ADMIN — SPIRONOLACTONE 25 MG: 25 TABLET ORAL at 09:02

## 2025-02-24 RX ADMIN — THERA TABS 1 TABLET: TAB at 09:02

## 2025-02-24 RX ADMIN — OXYCODONE HYDROCHLORIDE AND ACETAMINOPHEN 500 MG: 500 TABLET ORAL at 09:02

## 2025-02-24 RX ADMIN — AMIODARONE HYDROCHLORIDE 200 MG: 200 TABLET ORAL at 09:02

## 2025-02-24 RX ADMIN — TORSEMIDE 20 MG: 20 TABLET ORAL at 05:02

## 2025-02-24 RX ADMIN — ENOXAPARIN SODIUM 100 MG: 100 INJECTION SUBCUTANEOUS at 05:02

## 2025-02-24 RX ADMIN — ZINC SULFATE 220 MG (50 MG) CAPSULE 220 MG: CAPSULE at 09:02

## 2025-02-24 RX ADMIN — EMPAGLIFLOZIN 10 MG: 10 TABLET, FILM COATED ORAL at 09:02

## 2025-02-24 RX ADMIN — METOPROLOL SUCCINATE 25 MG: 25 TABLET, FILM COATED, EXTENDED RELEASE ORAL at 09:02

## 2025-02-24 RX ADMIN — AMPICILLIN SODIUM AND SULBACTAM SODIUM 3 G: 2; 1 INJECTION, POWDER, FOR SOLUTION INTRAMUSCULAR; INTRAVENOUS at 03:02

## 2025-02-24 RX ADMIN — TAMSULOSIN HYDROCHLORIDE 0.4 MG: 0.4 CAPSULE ORAL at 09:02

## 2025-02-24 RX ADMIN — PANTOPRAZOLE SODIUM 40 MG: 40 TABLET, DELAYED RELEASE ORAL at 05:02

## 2025-02-24 RX ADMIN — FERROUS SULFATE TAB 325 MG (65 MG ELEMENTAL FE) 1 EACH: 325 (65 FE) TAB at 09:02

## 2025-02-24 RX ADMIN — OXYCODONE HYDROCHLORIDE AND ACETAMINOPHEN 1 TABLET: 10; 325 TABLET ORAL at 03:02

## 2025-02-24 RX ADMIN — AMPICILLIN SODIUM AND SULBACTAM SODIUM 3 G: 2; 1 INJECTION, POWDER, FOR SOLUTION INTRAMUSCULAR; INTRAVENOUS at 09:02

## 2025-02-24 NOTE — PLAN OF CARE
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     Allergies:   Review of patient's allergies indicates:   Allergen Reactions    Gabapentin Other (See Comments)     Hypersomnia, nightmares    Morphine Other (See Comments)     Pt states systemic cramping       Code Status: Full    Vitals: Every shift       Diet: cardiac diet and 2 gram sodium diet    Activity:LLE: WBAT with camwalker boot     Nursing Precautions: Fall and Pressure ulcer prevention    Bed/Surface: Low Air Loss    Consults: PT to evaluate and treat- 5 times a week, OT to evaluate and treat- 5 times a week, and Wound Care    Oxygen: room air    Dialysis: Patient is not on dialysis.     Labs:   Check CBC, CMP, magnesium and phosphorus twice weekly while as skilled nursing facility.      Follow-up:  Patient requires follow up with Cardiology in 2 weeks (heart failure and atrial fibrillation.  Discussed tapering amiodarone)   Podiatry within 2 weeks   Infectious Disease within 2 weeks     Pending Diagnostic Studies:       None              Miscellaneous Care:   Routine Skin for Bedridden Patients:  Apply moisture barrier cream to all  CHF Care: Daily Weight with notification of MD/NP of 2lb or > increase in 24 hours    v/s and O2 sat every shift    Oxygen as needed for sats <90%    Report abnormal breath sounds to  MD/NP    Edema checks q shift- notify MD/NP of increased edema    Task segmentation by nursing for daily care to decrease exertion        CHF education to include diet ,medication, and CHF flags for MD notification    Wound care:     Left toe amputation site: continue xeroform to incision site, aquacel AG weaved in between digits with wounds, followed by conform, followed by lightly wrapped ace wrap. Three times a week changes.           Left heel-Fissure--clean with soap and water or wound cleanser.  Apply Xerofoam to fissue/heel.  Apply ABD pad, Wrap lightly with kerlex.  Change Daily and PRN    Bilateral Groins--MASD/Yeast--clean with soap and water.  Apply Miconazole powder BID and PRN.    Sacrum--Stage 2 pressure injury-POA-clean with soap and water.  Apply Triad  cream to area BID and PRN.    Lifevest:  Wear at all times except when showering.        Medications: Discontinue all previous medication orders, if any. See new list below.  Current Discharge Medication List        START taking these medications    Details   amoxicillin-clavulanate 875-125mg (AUGMENTIN) 875-125 mg per tablet Take 1 tablet by mouth 2 (two) times daily. for 8 days  Qty: 16 tablet, Refills: 0      apixaban (ELIQUIS) 5 mg Tab Take 1 tablet (5 mg total) by mouth 2 (two) times daily.  Qty: 60 tablet, Refills: 2      ferrous sulfate 325 (65 FE) MG EC tablet Take 1 tablet (325 mg total) by mouth once daily.  Qty: 30 tablet, Refills: 2      magnesium oxide (MAG-OX) 400 mg (241.3 mg magnesium) tablet Take 1 tablet (400 mg total) by mouth every other day.  Qty: 15 tablet, Refills: 2      metoprolol succinate (TOPROL-XL) 25 MG 24 hr tablet Take 1 tablet (25 mg total) by mouth once daily.  Qty: 30 tablet, Refills: 2    Comments: .      spironolactone (ALDACTONE) 25 MG tablet Take 1 tablet (25 mg total) by mouth 2 (two) times daily.  Qty: 60 tablet, Refills: 2    Comments: .      tamsulosin (FLOMAX) 0.4 mg Cap Take 1 capsule (0.4 mg total) by  mouth once daily.  Qty: 30 capsule, Refills: 2      torsemide (DEMADEX) 20 MG Tab Take 1 tablet (20 mg total) by mouth 2 (two) times daily before meals.  Qty: 60 tablet, Refills: 2    Comments: .      zinc sulfate (ZINCATE) 50 mg zinc (220 mg) capsule Take 1 capsule (220 mg total) by mouth once daily. for 15 days  Qty: 15 capsule, Refills: 0           CONTINUE these medications which have CHANGED    Details   acetaminophen (TYLENOL) 325 MG tablet Take 2 tablets (650 mg total) by mouth every 6 (six) hours as needed for Pain (Mild-moderate pain).      oxyCODONE-acetaminophen (PERCOCET)  mg per tablet Take 1 tablet by mouth every 8 (eight) hours as needed for Pain (Severe pain).  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      empagliflozin (JARDIANCE) 10 mg tablet Take 10 mg by mouth once daily.      gabapentin (NEURONTIN) 300 MG capsule Take 300 mg by mouth 2 (two) times daily.      pantoprazole (PROTONIX) 40 MG tablet Take 40 mg by mouth once daily.           STOP taking these medications       doxycycline (MONODOX) 100 MG capsule Comments:   Reason for Stopping:         ENTRESTO 24-26 mg per tablet Comments:   Reason for Stopping:         furosemide (LASIX) 20 MG tablet Comments:   Reason for Stopping:         levoFLOXacin (LEVAQUIN) 500 MG tablet Comments:   Reason for Stopping:         metoprolol tartrate (LOPRESSOR) 25 MG tablet Comments:   Reason for Stopping:         nitroGLYCERIN (NITROSTAT) 0.4 MG SL tablet Comments:   Reason for Stopping:         promethazine-dextromethorphan (PROMETHAZINE-DM) 6.25-15 mg/5 mL Syrp Comments:   Reason for Stopping:         carvediloL (COREG) 3.125 MG tablet Comments:   Reason for Stopping:             Follow up:    Follow-up Information       [facility] Follow [facility].    Specialties: Nursing Home Agency, SNF Agency, Skilled Nursing  Contact information:  [address]  [place] [address]  [phone]                               Immunizations Administered as of 2/24/2025       No immunizations on file.                Isis Dean MD

## 2025-02-24 NOTE — NURSING
Report given to AUGUSTA Webb at [facility] at 1345. Left upper arm midline removed without difficulty, catheter tip intact. Telemetry monitor removed and returned to monitor room. Patient to discharge to rehab per rehab transportation.

## 2025-02-24 NOTE — DISCHARGE SUMMARY
[facility] Medicine  Discharge Summary      Patient Name: Juan C Sanchez  MRN: [id number]  ADDIE: [id number]  Patient Class: IP- Inpatient  Admission Date: 2/11/2025  Hospital Length of Stay: 13 days  Discharge Date and Time:  02/24/2025 10:17 AM  Attending Physician: Isis Dean MD   Discharging Provider: Isis Dean MD  Primary Care Provider: Katlin Graf NP    Primary Care Team: Networked reference to record PCT     HPI:   Mr. Sanchez is a 69 yr old male with a hx of HTN, MR, PAF, chronic systolic CHF with EF of 25, OA, GERD, nonischemic cardiomyopathy, anemia who presented to the ED with a chief complaint of shortness of breath, leg swelling and hypotension.  Patient states that he had PT come to his home today and he was found to be hypotensive and advised to come to the emergency room.  Patient endorses shortness of breath as well as leg swelling, malaise, fatigue, occasional chills, productive cough with milky sputum, upper abdominal pain, occasional nausea, decreased urine output, and positional lightheadedness.  He states that these symptoms have been progressively worsening over the last 1-2 months possibly even longer  He states that his symptoms are worse when lying flat as well as with exertion and better with rest and while sitting up.  He states that he has been taking his home medications without improvement of his symptoms.  He does endorse that most of all of his contacts recently have been sick. He denies any home oxygen use.  He does endorse occasional PND and states that he has been having to sit to sleep.  He denies tobacco and recreational drug use and states he seldomly drinks alcohol.  He denies fever, chest pain, vomiting, diarrhea, dysuria, hematuria, dizziness, and syncope.    Upon arrival to ED, patient afebrile, HR of 109, RR of 24, BP of 95/62, satting 98% on RA.  Workup in the ED revealed RBC of 4.19, H/H of 10/32.8, sodium of 134, pCO2 of  "31, GFR of 55.1, corrected calcium of 9, albumin of 3.1, BNP of 3163, troponin of 21.3, lactic acid of 2.1.  Blood cultures pending.  CXR shows enlarged cardiac silhouette with pulmonary vascular congestion.  Bilateral pleural effusions.  Mild bibasilar airspace disease and or atelectasis.  Left foot x-ray shows osteopenia.  No overt erosions or osseous destruction.  No acute fracture or dislocation.  Mild multifocal midfoot arthritis.  Forefoot swelling.  Patient was given Lasix 20 mg IV, Zosyn 4.5 g IV, 500 mL NS bolus, vancomycin 20 mg/kg while in the ED. discussed case with ED provider and patient will be admitted under hospital medicine services for further management.    Procedure(s) (LRB):  Angiogram Extremity Unilateral (Left)      Hospital Course:   Juan C Sanchez is a 69 year old male with a past medical history of HFrEF, Afib, PAD, anemia, obesity, and GERD who presented with an acute on chronic HFrEF (EF 20-25%) exacerbation as well as Afib with RVR. He is being diuresed with a Lasix infusion as well as dobutamine. Cardiology has been consulted.  Underwent ANGEL cardioversion 2/14. His course was also complicated by a third L toe acute osteomyelitis seen on MRI. Podiatry and ID have been consulted.  He underwent L 3rd toe amputation 2/14. He is on empiric vancomycin and cefepime. Vascular Surgery has been consulted performed angiogram of the lower extremities 2/17.  Underwent angiogram without intervention.  Patient was transitioned off of Lasix and dobutamine infusions and changed to p.o. torsemide and spironolactone.  Medications were adjusted by Cardiology. Plan per ID "Continue Unasyn while in the hospital.  At discharge use Augmentin 875 mg b.i.d.  to complete 4 week course of antibiotics through 03/03/2025.     He should follow with podiatry within 2 weeks, Infectious Disease within 2 weeks and Cardiology within 2 weeks.           Goals of Care Treatment Preferences:  Code Status: Full " Code      SDOH Screening:  The patient declined to be screened for utility difficulties, food insecurity, transport difficulties, housing insecurity, and interpersonal safety, so no concerns could be identified this admission.     Consults:   Consults (From admission, onward)          Status Ordering Provider     Inpatient consult to Social Work/Case Management  Once        Provider:  (Not yet assigned)    Acknowledged ALEC CLARKE     Inpatient consult to Midline team  Once        Provider:  (Not yet assigned)    Completed MOHAN ZIMMER     Inpatient consult to Infectious Diseases  Once        Provider:  Gely Rosas MD    Completed LOREN ESPAÑA     Inpatient consult to Infectious Diseases  Once        Provider:  Gely Rosas MD    Completed MOHAN ZIMMER     Inpatient consult to Podiatry  Once        Provider:  Loren España DPM    Acknowledged MOHAN ZIMMER     Inpatient consult to Vascular Surgery  Once        Provider:  Valeri Faith MD    Completed JUSTO CARDENAS     Inpatient consult to Social Work/Case Management  Once        Provider:  (Not yet assigned)    Completed JUSTO CARDENAS     Inpatient consult to Registered Dietitian/Nutritionist  Once        Provider:  (Not yet assigned)    Completed JUSTO CARDENAS     Inpatient consult to Cardiology  Once        Provider:  José Luis Arellano MD    Completed JUSTO CARDENAS            * Acute on chronic congestive heart failure  -now off the Lasix and dobutamine drips and on p.o. torsemide and Aldactone.  Goal-directed medical therapy advance as tolerated while hospitalized.  Follow with Cardiology for continue management.  -euvolemic at the time of discharge.  Follow volume status and renal function at rehab/SNF    Moderate malnutrition  Nutrition consulted. Most recent weight and BMI monitored-     Measurements:  Wt Readings from Last 1 Encounters:   02/23/25 86.8 kg (191 lb 5.8 oz)   Body mass  index is 26.7 kg/m².    Patient has been screened and assessed by RD.    Malnutrition Type:  Context: acute illness or injury  Level: moderate    Malnutrition Characteristic Summary:  Weight Loss (Malnutrition): 5% in 1 month  Subcutaneous Fat (Malnutrition): mild depletion  Muscle Mass (Malnutrition): mild depletion    Interventions/Recommendations (treatment strategy):     Initiate supplement at SNF. 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 L third toe.  -ID consulted  -Podiatry consulted  -Unasyn to complete 4 week course of antibiotics through 3/03/25.  Can use Augmentin at discharge per ID  -status post third toe amputation (L) 2/14  -bone culture with Proteus and Enterococcus      PAD (peripheral artery disease)  -No intervention indicated per Vascular Surgery at this time  -underwent angiogram 2/17      Anemia  Discharging on iron.    Paroxysmal atrial fibrillation  -discharged on Eliquis  -Continue home amiodarone  -ANGEL cardioversion per Cardiology 2/14  Cardiology within 2 weeks to discuss tapering amiodarone and for continued management of regimen for heart failure.    MR (mitral regurgitation)  Seen on TTE at OSH.  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    Specialties: Nursing Home Agency, SNF Agency, Skilled Nursing  Contact information:  [address]  [place] [id number]  [phone]                           Patient Instructions:      Notify your health care provider if you experience any of the following:  temperature >100.4     Notify your health care provider if you experience any of the following:  persistent nausea and vomiting or diarrhea     Notify your health care provider if you experience any of the following:  severe uncontrolled pain     Notify your health care provider if you experience any of the following:  difficulty breathing or increased cough     Notify your health care provider if you experience any of the following:  severe persistent headache     Notify your health care provider if you experience any of the following:  worsening rash     Notify your health care provider if you experience any of the following:  persistent dizziness, light-headedness, or visual disturbances     Notify your health care provider if you experience any of the following:  increased confusion or weakness     Notify your health care provider if you experience any of the following:     Activity as tolerated       Significant Diagnostic Studies: N/A    Pending Diagnostic Studies:       None           Medications:  Reconciled Home Medications:      Medication List        START taking these medications      amoxicillin-clavulanate 875-125mg 875-125 mg per tablet  Commonly known as: AUGMENTIN  Take 1 tablet by mouth 2 (two) times daily. for 8 days     apixaban  5 mg Tab  Commonly known as: ELIQUIS  Take 1 tablet (5 mg total) by mouth 2 (two) times daily.     ferrous sulfate 325 (65 FE) MG EC tablet  Take 1 tablet (325 mg total) by mouth once daily.     magnesium oxide 400 mg (241.3 mg magnesium) tablet  Commonly known as: MAG-OX  Take 1 tablet (400 mg total) by mouth every other day.  Start taking on: February 26, 2025     metoprolol succinate 25 MG 24 hr tablet  Commonly known as: TOPROL-XL  Take 1 tablet (25 mg total) by mouth once daily.  Start taking on: February 25, 2025     spironolactone 25 MG tablet  Commonly known as: ALDACTONE  Take 1 tablet (25 mg total) by mouth 2 (two) times daily.     tamsulosin 0.4 mg Cap  Commonly known as: FLOMAX  Take 1 capsule (0.4 mg total) by mouth once daily.  Start taking on: February 25, 2025     torsemide 20 MG Tab  Commonly known as: DEMADEX  Take 1 tablet (20 mg total) by mouth 2 (two) times daily before meals.     zinc sulfate 50 mg zinc (220 mg) capsule  Commonly known as: ZINCATE  Take 1 capsule (220 mg total) by mouth once daily. for 15 days  Start taking on: February 25, 2025            CHANGE how you take these medications      acetaminophen 325 MG tablet  Commonly known as: TYLENOL  Take 2 tablets (650 mg total) by mouth every 6 (six) hours as needed for Pain (Mild-moderate pain).  What changed:   medication strength  how much to take  reasons to take this     oxyCODONE-acetaminophen  mg per tablet  Commonly known as: PERCOCET  Take 1 tablet by mouth every 8 (eight) hours as needed for Pain (Severe pain).  What changed: reasons to take this            CONTINUE taking these medications      amiodarone 200 MG Tab  Commonly known as: PACERONE  Take 200 mg by mouth 2 (two) times daily.     empagliflozin 10 mg tablet  Commonly known as: Jardiance  Take 10 mg by mouth once daily.     gabapentin 300 MG capsule  Commonly known as: NEURONTIN  Take 300 mg by mouth 2 (two) times daily.     pantoprazole 40 MG tablet  Commonly  known as: PROTONIX  Take 40 mg by mouth once daily.            STOP taking these medications      carvediloL 3.125 MG tablet  Commonly known as: COREG     doxycycline 100 MG capsule  Commonly known as: MONODOX     ENTRESTO 24-26 mg per tablet  Generic drug: sacubitriL-valsartan     furosemide 20 MG tablet  Commonly known as: LASIX     levoFLOXacin 500 MG tablet  Commonly known as: LEVAQUIN     metoprolol tartrate 25 MG tablet  Commonly known as: LOPRESSOR     nitroGLYCERIN 0.4 MG SL tablet  Commonly known as: NITROSTAT     promethazine-dextromethorphan 6.25-15 mg/5 mL Syrp  Commonly known as: PROMETHAZINE-DM              Indwelling Lines/Drains at time of discharge:   Lines/Drains/Airways       None                   Time spent on the discharge of patient: 35 minutes         Isis Dean MD  Department of Hospital Medicine  [facility]

## 2025-02-24 NOTE — PT/OT/SLP PROGRESS
Physical Therapy      Patient Name:  Juan C Sanchez   MRN:  [id number]    Patient not seen today secondary to  (Gt NASH).

## 2025-02-24 NOTE — PT/OT/SLP PROGRESS
Occupational Therapy      Patient Name:  Juan C Sanchez   MRN:  [id number]    Patient not seen today secondary to  (pt dc prior session).     2/24/2025

## 2025-02-24 NOTE — NURSING
"Attempted to call report to [place] Rehkuldeep [phone], [phone] and [phone] each time phone rings multiple times before getting a message stating "Call can not be completed at this time". Case management notified.   "

## 2025-02-24 NOTE — PLAN OF CARE
SONIA sent Brandy with [facility] a message to follow up on pt's auth. SONIA will cont to follow.      02/24/25 0852   Post-Acute Status   Post-Acute Authorization Placement   Post-Acute Placement Status Pending payor review/awaiting authorization (if required)   Discharge Plan   Discharge Plan A Skilled Nursing [facility]     0857- SONIA called pt's insurance and spoke with Kelle [phone] opt 3. Kelle states SNF auth is approved from 02/23/2025 - 02/26/2025.   Approval- [id number]  Reference- [id number]  Gloria is the  her fax is [phone].   SONIA notified Brandy with [facility].     1030- SONIA uploaded d/c papers to [facility] via [employer].

## 2025-02-24 NOTE — ASSESSMENT & PLAN NOTE
-now off the Lasix and dobutamine drips and on p.o. torsemide and Aldactone.  Goal-directed medical therapy advance as tolerated while hospitalized.  Follow with Cardiology for continue management.  -euvolemic at the time of discharge.  Follow volume status and renal function at rehab/SNF

## 2025-02-24 NOTE — PLAN OF CARE
Charts and orders reviewed. Pt to discharge [facility] for SNF.  gave pt's floor nurse information for her to call report. Pt has no other needs to be addressed by case management. Pt cleared to discharge from case management standpoint.    Per Bhaskar at [facility] he is setting up transportation to get pt from [facility] to facility.      02/24/25 1141   Final Note   Assessment Type Final Discharge Note   Anticipated Discharge Disposition SNF   What phone number can be called within the next 1-3 days to see how you are doing after discharge? [phone]   Hospital Resources/Appts/Education Provided Post-Acute resouces added to AVS   Post-Acute Status   Post-Acute Authorization Placement   Post-Acute Placement Status Set-up Complete/Auth obtained   Coverage [employer] DUAL COMPLETE HMO SNP   Patient choice form signed by patient/caregiver List with quality metrics by geographic area provided   Discharge Delays None known at this time

## 2025-02-24 NOTE — ASSESSMENT & PLAN NOTE
Nutrition consulted. Most recent weight and BMI monitored-     Measurements:  Wt Readings from Last 1 Encounters:   02/23/25 86.8 kg (191 lb 5.8 oz)   Body mass index is 26.7 kg/m².    Patient has been screened and assessed by RD.    Malnutrition Type:  Context: acute illness or injury  Level: moderate    Malnutrition Characteristic Summary:  Weight Loss (Malnutrition): 5% in 1 month  Subcutaneous Fat (Malnutrition): mild depletion  Muscle Mass (Malnutrition): mild depletion    Interventions/Recommendations (treatment strategy):     Initiate supplement at SNF. Provided while hospitalized

## 2025-02-24 NOTE — ASSESSMENT & PLAN NOTE
-discharged on Eliquis  -Continue home amiodarone  -ANGEL cardioversion per Cardiology 2/14  Cardiology within 2 weeks to discuss tapering amiodarone and for continued management of regimen for heart failure.

## 2025-02-24 NOTE — PROGRESS NOTES
[facility]  Podiatry  Progress Note    Patient Name: Juan C Sanchez  MRN: [id number]  Admission Date: 2/11/2025  Hospital Length of Stay: 13 days  Attending Physician: Isis Dean MD  Primary Care Provider: Katlin Graf NP     Subjective:     Interval History: Patient resting in bed.  States pain is approximately 3/10 to left foot and is being managed well with pain medication according to patient.  Denies any new complaints.    Scheduled Meds:   amiodarone  200 mg Oral BID    ammonium lactate   Topical (Top) BID    ampicillin-sulbactam  3 g Intravenous Q4H    ascorbic acid (vitamin C)  500 mg Oral BID    empagliflozin  10 mg Oral Daily    enoxparin  1 mg/kg Subcutaneous Q12H    ferrous sulfate  1 tablet Oral Daily    magnesium oxide  400 mg Oral Daily    metoprolol succinate  25 mg Oral Daily    multivitamin  1 tablet Oral Daily    pantoprazole  40 mg Oral Daily    spironolactone  25 mg Oral BID    tamsulosin  0.4 mg Oral Daily    torsemide  20 mg Oral BID loop    zinc sulfate  220 mg Oral Daily     Continuous Infusions:  PRN Meds:  Current Facility-Administered Medications:     acetaminophen, 650 mg, Oral, Q4H PRN    aluminum-magnesium hydroxide-simethicone, 30 mL, Oral, QID PRN    HYDROmorphone, 0.5 mg, Intravenous, Q4H PRN    magnesium oxide, 800 mg, Oral, PRN    magnesium oxide, 800 mg, Oral, PRN    melatonin, 9 mg, Oral, Nightly PRN    metoprolol, 5 mg, Intravenous, Q5 Min PRN    midodrine, 2.5 mg, Oral, TID PRN    naloxone, 0.02 mg, Intravenous, PRN    ondansetron, 4 mg, Intravenous, Q6H PRN    oxyCODONE-acetaminophen, 1 tablet, Oral, Q6H PRN    potassium bicarbonate, 35 mEq, Oral, PRN    potassium bicarbonate, 50 mEq, Oral, PRN    potassium bicarbonate, 60 mEq, Oral, PRN    potassium, sodium phosphates, 2 packet, Oral, PRN    potassium, sodium phosphates, 2 packet, Oral, PRN    potassium, sodium phosphates, 2 packet, Oral, PRN    senna-docusate 8.6-50 mg, 1 tablet, Oral, BID PRN     traMADoL, 50 mg, Oral, Q4H PRN    Review of Systems  Objective:     Vital Signs (Most Recent):  Temp: 98.6 °F (37 °C) (02/24/25 0908)  Pulse: 74 (02/24/25 0908)  Resp: 18 (02/24/25 0908)  BP: 111/77 (02/24/25 0908)  SpO2: 95 % (02/24/25 0908) Vital Signs (24h Range):  Temp:  [96.3 °F (35.7 °C)-98.6 °F (37 °C)] 98.6 °F (37 °C)  Pulse:  [67-74] 74  Resp:  [18-20] 18  SpO2:  [95 %-100 %] 95 %  BP: (101-125)/(69-85) 111/77     Weight: 86.8 kg (191 lb 5.8 oz)  Body mass index is 26.7 kg/m².    Foot Exam              Laboratory:  All pertinent labs reviewed within the last 24 hours.    Diagnostic Results:  I have reviewed all pertinent imaging results/findings within the past 24 hours.  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 Three times a week changes.    Patient can WBAT with camwalker boot on at all times while walking or transferring.    Infectious disease following    Carlos A and protein drinks     Counseled patient on increasing protein intake, not getting wound wet, keeping dressing clean dry and intact, following a healthy diet, elevating legs when able, removing pressure from wound          Loren España DPM  Podiatry  [facility]

## 2025-02-24 NOTE — PLAN OF CARE
Spoke with Patient via mobile([phone]), regarding discharge IMM, Understands statement  and IMM will be scanned to chart.

## 2025-02-24 NOTE — PLAN OF CARE
Problem: Adult Inpatient Plan of Care  Goal: Plan of Care Review  Outcome: Met  Goal: Patient-Specific Goal (Individualized)  Outcome: Met  Goal: Absence of Hospital-Acquired Illness or Injury  Outcome: Met  Goal: Optimal Comfort and Wellbeing  Outcome: Met  Goal: Readiness for Transition of Care  Outcome: Met     Problem: Wound  Goal: Optimal Coping  Outcome: Met  Goal: Optimal Functional Ability  Outcome: Met  Goal: Absence of Infection Signs and Symptoms  Outcome: Met  Goal: Improved Oral Intake  Outcome: Met  Goal: Optimal Pain Control and Function  Outcome: Met  Goal: Skin Health and Integrity  Outcome: Met  Goal: Optimal Wound Healing  Outcome: Met     Problem: Occupational Therapy  Goal: Occupational Therapy Goal  Description: Goals to be met by: 3/12/2025     Patient will increase functional independence with ADLs by performing:    UE Dressing with Supervision.  LE Dressing with Supervision.  Grooming while standing at sink with Supervision.  Toileting from toilet with Supervision for hygiene and clothing management.   Toilet transfer to toilet with Supervision.  Perform sitting/standing ADL activity with no LOB.  Outcome: Met     Problem: Fall Injury Risk  Goal: Absence of Fall and Fall-Related Injury  Outcome: Met     Problem: Physical Therapy  Goal: Physical Therapy Goal  Description: Goals to be met by: 3/12/25     Patient will increase functional independence with mobility by performin. Supine to sit with Modified Cochran  2. Sit to supine with Modified Cochran  3. Sit to stand transfer with Supervision and using Rolling Walker  4. Gait  x 100 feet with Stand-by Assistance using Rolling Walker.   5. Ascend/descend 3 stair with bilateral Handrails Contact Guard Assistance using No Assistive Device.     Outcome: Met     Problem: Infection  Goal: Absence of Infection Signs and Symptoms  Outcome: Met     Problem: Skin Injury Risk Increased  Goal: Skin Health and Integrity  Outcome: Met      Problem: Malnutrition  Goal: Improved Nutritional Intake  Outcome: Met

## 2025-02-24 NOTE — SUBJECTIVE & OBJECTIVE
Subjective:     Interval History: Patient resting in bed.  States pain is approximately 3/10 to left foot and is being managed well with pain medication according to patient.  Denies any new complaints.    Scheduled Meds:   amiodarone  200 mg Oral BID    ammonium lactate   Topical (Top) BID    ampicillin-sulbactam  3 g Intravenous Q4H    ascorbic acid (vitamin C)  500 mg Oral BID    empagliflozin  10 mg Oral Daily    enoxparin  1 mg/kg Subcutaneous Q12H    ferrous sulfate  1 tablet Oral Daily    magnesium oxide  400 mg Oral Daily    metoprolol succinate  25 mg Oral Daily    multivitamin  1 tablet Oral Daily    pantoprazole  40 mg Oral Daily    spironolactone  25 mg Oral BID    tamsulosin  0.4 mg Oral Daily    torsemide  20 mg Oral BID loop    zinc sulfate  220 mg Oral Daily     Continuous Infusions:  PRN Meds:  Current Facility-Administered Medications:     acetaminophen, 650 mg, Oral, Q4H PRN    aluminum-magnesium hydroxide-simethicone, 30 mL, Oral, QID PRN    HYDROmorphone, 0.5 mg, Intravenous, Q4H PRN    magnesium oxide, 800 mg, Oral, PRN    magnesium oxide, 800 mg, Oral, PRN    melatonin, 9 mg, Oral, Nightly PRN    metoprolol, 5 mg, Intravenous, Q5 Min PRN    midodrine, 2.5 mg, Oral, TID PRN    naloxone, 0.02 mg, Intravenous, PRN    ondansetron, 4 mg, Intravenous, Q6H PRN    oxyCODONE-acetaminophen, 1 tablet, Oral, Q6H PRN    potassium bicarbonate, 35 mEq, Oral, PRN    potassium bicarbonate, 50 mEq, Oral, PRN    potassium bicarbonate, 60 mEq, Oral, PRN    potassium, sodium phosphates, 2 packet, Oral, PRN    potassium, sodium phosphates, 2 packet, Oral, PRN    potassium, sodium phosphates, 2 packet, Oral, PRN    senna-docusate 8.6-50 mg, 1 tablet, Oral, BID PRN    traMADoL, 50 mg, Oral, Q4H PRN    Review of Systems  Objective:     Vital Signs (Most Recent):  Temp: 98.6 °F (37 °C) (02/24/25 0908)  Pulse: 74 (02/24/25 0908)  Resp: 18 (02/24/25 0908)  BP: 111/77 (02/24/25 0908)  SpO2: 95 % (02/24/25 0908) Vital  Signs (24h Range):  Temp:  [96.3 °F (35.7 °C)-98.6 °F (37 °C)] 98.6 °F (37 °C)  Pulse:  [67-74] 74  Resp:  [18-20] 18  SpO2:  [95 %-100 %] 95 %  BP: (101-125)/(69-85) 111/77     Weight: 86.8 kg (191 lb 5.8 oz)  Body mass index is 26.7 kg/m².    Foot Exam              Laboratory:  All pertinent labs reviewed within the last 24 hours.    Diagnostic Results:  I have reviewed all pertinent imaging results/findings within the past 24 hours.

## 2025-03-05 ENCOUNTER — TELEPHONE (OUTPATIENT)
Dept: CARDIOLOGY | Facility: CLINIC | Age: 70
End: 2025-03-05

## 2025-03-05 NOTE — TELEPHONE ENCOUNTER
----- Message from Mer sent at 3/5/2025  2:10 PM CST -----  Regarding: return call  Contact: patient  Type:  Patient Returning CallWho Called:  patientWho Left Message for Patient:  MarthaMarilia the patient know what this is regarding?:  appointmentBest Call Back Number:  [phone] (home) Additional Information:  Please call patient to advise.  Thanks!

## 2025-03-05 NOTE — TELEPHONE ENCOUNTER
Spoke with the pt he states that he is in rehab until 3/17/25 pt stated that he will call and make an appointment after he is out of rehab.

## 2025-03-05 NOTE — TELEPHONE ENCOUNTER
----- Message from Naye sent at 3/5/2025 10:21 AM CST -----  Regarding: sx clearance  Type:  Needs Medical AdviceWho Called: marvin with [facility] Best Call Back Number: [phone] fax:[phone] Information: marvin st that they need the sx clearance form back.  please call to discuss.

## 2025-03-05 NOTE — TELEPHONE ENCOUNTER
Called [facility] to refax the clearance over, spoke with Fidelia. Fidelia states that the pt is in rehab at [facility] phone number [phone].  Called pt no answer VM was full couldn't leave message. Pt needs cardiac clearance and needs an appointment.

## 2025-03-17 ENCOUNTER — TELEPHONE (OUTPATIENT)
Dept: PODIATRY | Facility: CLINIC | Age: 70
End: 2025-03-17
Payer: MEDICARE

## 2025-03-17 NOTE — TELEPHONE ENCOUNTER
Spoke to Janice at [facility] who states patient is having trouble getting a ride I told her the patient is not under a global so he can see someone closer to his home. She states understanding.

## 2025-05-02 ENCOUNTER — DOCUMENT SCAN (OUTPATIENT)
Dept: HOME HEALTH SERVICES | Facility: HOSPITAL | Age: 70
End: 2025-05-02
Payer: MEDICARE

## 2025-08-05 ENCOUNTER — HOSPITAL ENCOUNTER (INPATIENT)
Facility: HOSPITAL | Age: 70
LOS: 9 days | Discharge: NURSING, REHAB OR LONG-TERM CARE | DRG: 314 | End: 2025-08-14
Attending: EMERGENCY MEDICINE | Admitting: INTERNAL MEDICINE
Payer: MEDICARE

## 2025-08-05 PROBLEM — I50.20 HEART FAILURE WITH REDUCED EJECTION FRACTION: Status: ACTIVE | Noted: 2025-08-05

## 2025-08-05 PROBLEM — R09.89 SUSPECTED ENDOCARDITIS: Status: ACTIVE | Noted: 2025-08-05

## 2025-08-05 PROBLEM — E87.6 HYPOKALEMIA: Status: ACTIVE | Noted: 2025-08-05

## 2025-08-05 PROBLEM — I26.90 ACUTE SEPTIC PULMONARY EMBOLISM: Status: ACTIVE | Noted: 2025-08-05

## 2025-08-06 ENCOUNTER — CLINICAL SUPPORT (OUTPATIENT)
Dept: CARDIOLOGY | Facility: HOSPITAL | Age: 70
End: 2025-08-06
Attending: EMERGENCY MEDICINE
Payer: MEDICARE

## 2025-08-06 VITALS — BODY MASS INDEX: 33.92 KG/M2 | WEIGHT: 229 LBS | HEIGHT: 69 IN

## 2025-08-06 PROCEDURE — 93306 TTE W/DOPPLER COMPLETE: CPT | Mod: 26,,, | Performed by: INTERNAL MEDICINE

## 2025-08-06 PROCEDURE — 93306 TTE W/DOPPLER COMPLETE: CPT

## 2025-08-07 ENCOUNTER — ANESTHESIA EVENT (OUTPATIENT)
Dept: CARDIOLOGY | Facility: HOSPITAL | Age: 70
End: 2025-08-07
Payer: MEDICARE

## 2025-08-07 ENCOUNTER — CLINICAL SUPPORT (OUTPATIENT)
Dept: CARDIOLOGY | Facility: HOSPITAL | Age: 70
End: 2025-08-07
Attending: EMERGENCY MEDICINE
Payer: MEDICARE

## 2025-08-07 ENCOUNTER — ANESTHESIA (OUTPATIENT)
Dept: CARDIOLOGY | Facility: HOSPITAL | Age: 70
End: 2025-08-07
Payer: MEDICARE

## 2025-08-07 VITALS
DIASTOLIC BLOOD PRESSURE: 53 MMHG | RESPIRATION RATE: 14 BRPM | HEART RATE: 78 BPM | OXYGEN SATURATION: 97 % | SYSTOLIC BLOOD PRESSURE: 94 MMHG

## 2025-08-07 PROCEDURE — 93312 ECHO TRANSESOPHAGEAL: CPT | Mod: 26,,, | Performed by: INTERNAL MEDICINE

## 2025-08-07 PROCEDURE — 93321 DOPPLER ECHO F-UP/LMTD STD: CPT | Mod: 26,,, | Performed by: INTERNAL MEDICINE

## 2025-08-07 PROCEDURE — 93319 3D ECHO IMG CGEN CAR ANOMAL: CPT | Mod: ,,, | Performed by: INTERNAL MEDICINE

## 2025-08-07 PROCEDURE — 63600175 PHARM REV CODE 636 W HCPCS: Performed by: NURSE ANESTHETIST, CERTIFIED REGISTERED

## 2025-08-07 PROCEDURE — 93312 ECHO TRANSESOPHAGEAL: CPT

## 2025-08-07 PROCEDURE — 25000003 PHARM REV CODE 250: Performed by: NURSE ANESTHETIST, CERTIFIED REGISTERED

## 2025-08-07 RX ORDER — PROPOFOL 10 MG/ML
VIAL (ML) INTRAVENOUS
Status: DISCONTINUED | OUTPATIENT
Start: 2025-08-07 | End: 2025-08-07

## 2025-08-07 RX ADMIN — SODIUM CHLORIDE: 0.9 INJECTION, SOLUTION INTRAVENOUS at 08:08

## 2025-08-07 RX ADMIN — PROPOFOL 20 MG: 10 INJECTION, EMULSION INTRAVENOUS at 08:08

## 2025-08-07 RX ADMIN — PROPOFOL 100 MG: 10 INJECTION, EMULSION INTRAVENOUS at 08:08

## 2025-08-07 NOTE — ANESTHESIA POSTPROCEDURE EVALUATION
Anesthesia Post Evaluation    Patient: Juan C Sanchez    Procedure(s) Performed: Procedure(s) (LRB):  Transesophageal echo (ANGEL) intra-procedure log documentation (N/A)    Final Anesthesia Type: general      Patient location: [facility].  Patient participation: Yes- Able to Participate  Level of consciousness: awake and alert, oriented and awake  Post-procedure vital signs: reviewed and stable  Pain management: adequate  Airway patency: patent    PONV status at discharge: No PONV  Anesthetic complications: no      Cardiovascular status: blood pressure returned to baseline, hemodynamically stable and stable  Respiratory status: unassisted, spontaneous ventilation and room air  Hydration status: euvolemic  Follow-up not needed.              Vitals Value Taken Time   /62 08/07/25 08:45   Temp 98.5 08/07/25 08:47   Pulse 79 08/07/25 08:45   Resp 16 08/07/25 08:45   SpO2 93 % 08/07/25 08:45         No case tracking events are documented in the log.      Pain/Grecia Score: Pain Rating Prior to Med Admin: 8 (8/7/2025  5:55 AM)  Pain Rating Post Med Admin: 4 (8/7/2025  6:55 AM)  Grecia Score: 10 (8/7/2025  8:45 AM)

## 2025-08-07 NOTE — TRANSFER OF CARE
"Anesthesia Transfer of Care Note    Patient: Juan C Sanchez    Procedure(s) Performed: Procedure(s) (LRB):  Transesophageal echo (ANGEL) intra-procedure log documentation (N/A)    Patient location: Cath Lab    Anesthesia Type: general    Transport from OR: Transported from OR on room air with adequate spontaneous ventilation    Post pain: adequate analgesia    Post assessment: no apparent anesthetic complications and tolerated procedure well    Post vital signs: stable    Level of consciousness: awake, alert and oriented    Nausea/Vomiting: no nausea/vomiting    Complications: none    Transfer of care protocol was followed      Last vitals: Visit Vitals  /72 (BP Location: Left arm, Patient Position: Lying)   Pulse 88   Temp 36.9 °C (98.5 °F) (Oral)   Resp 13   Ht 5' 9" (1.753 m)   Wt 104 kg (229 lb 4.5 oz)   SpO2 (!) 92%   BMI 33.86 kg/m²     "

## 2025-08-07 NOTE — ANESTHESIA PREPROCEDURE EVALUATION
08/07/2025  Juan C Sanchez is a 69 y.o., male.         Results for orders placed or performed during the hospital encounter of 08/05/25   EKG 12-lead    Collection Time: 08/05/25 10:38 AM   Result Value Ref Range    QRS Duration 104 ms    OHS QTC Calculation 453 ms    Narrative    Test Reason : R07.9,    Vent. Rate :  72 BPM     Atrial Rate :  72 BPM     P-R Int : 180 ms          QRS Dur : 104 ms      QT Int : 414 ms       P-R-T Axes :  34  30  75 degrees    QTcB Int : 453 ms    Normal sinus rhythm  Nonspecific ST abnormality  Abnormal ECG  No previous ECGs available    Referred By:            Confirmed By:         Imaging Results              US Lower Extremity Veins Bilateral (Final result)  Result time 08/05/25 18:17:40      Final result by Charlie Dodge MD (08/05/25 18:17:40)                   Impression:      No evidence of deep venous thrombosis in either lower extremity.      Electronically signed by: Charlie Dodge  Date:    08/05/2025  Time:    18:17               Narrative:    EXAMINATION:  US LOWER EXTREMITY VEINS BILATERAL    CLINICAL HISTORY:  PE, left knee swelling;    TECHNIQUE:  Duplex and color flow Doppler and dynamic compression was performed of the bilateral lower extremity veins was performed.    COMPARISON:  None    FINDINGS:  Right thigh veins: The common femoral, femoral, popliteal, upper greater saphenous, and deep femoral veins are patent and free of thrombus. The veins are normally compressible and have normal phasic flow and augmentation response.    Right calf veins: The visualized calf veins are patent.    Left thigh veins: The common femoral, femoral, popliteal, upper greater saphenous, and deep femoral veins are patent and free of thrombus. The veins are normally compressible and have normal phasic flow and augmentation response.    Left calf veins: The  visualized calf veins are patent.    Miscellaneous: None                                       CTA Chest Non-Coronary (PE Studies) (Final result)  Result time 08/05/25 16:11:03      Final result by Alejandro Carney MD (08/05/25 16:11:03)                   Impression:      Positive for small peripheral pulmonary emboli in the right lower lobe.  No central pulmonary embolism. Dr. Lopez notified directly via secure chat.    Hepatic nodularity suggesting cirrhosis.    Aortic and coronary atherosclerosis.      Electronically signed by: Alejandro Carney  Date:    08/05/2025  Time:    16:11               Narrative:    EXAMINATION:  CTA CHEST NON CORONARY (PE STUDIES)    CLINICAL HISTORY:  Pulmonary embolism (PE) suspected, high prob;    TECHNIQUE:  CMS MANDATED QUALITY DATA - CT RADIATION - 436    All CT scans at this facility utilize dose modulation, iterative reconstruction, and/or weight based dosing when appropriate to reduce radiation dose to as low as reasonably achievable.    Maximum intensity projection coronal and sagittal reformations were created at a separate workstation and stored in the patients permanent medical record.    COMPARISON:  Chest radiography same day    No prior CTA chest    FINDINGS:  Contrast bolus timing is suboptimal.  Dense contrast opacification of the SVC results in streak artifact.  There is mild to moderate respiratory motion.  Within the limitations of this exam, no central pulmonary embolism is evident.  Linear intraluminal filling defects involving subsegmental branches of right lower lobe pulmonary artery posteriorly (series 4, images 179 - 184) are highly suspicious for peripheral pulmonary emboli.  Respiratory motion degrades image quality and exam sensitivity regarding peripheral emboli detection.    Atherosclerotic calcification of the aortic arch with normal caliber thoracic aorta.  Coronary artery atherosclerotic calcification.  No pericardial effusion.    Mild dependent/basilar  opacities bilaterally are likely atelectatic in nature.  Small fat containing diaphragmatic hernia on the right posteriorly.  No polina pulmonary edema.  No pleural effusion.    Bone window images show degenerative changes of the spine.  No acute or aggressive osseous abnormality.    Imaging through the upper abdomen shows no acute pathology.  Left-sided renal cysts.  Nodular hepatic contour suggesting cirrhosis.                                       X-Ray Chest PA And Lateral (Final result)  Result time 08/05/25 13:02:50      Final result by Shemar Martines MD (08/05/25 13:02:50)                   Impression:      No evidence of acute cardiopulmonary disease.      Electronically signed by: Shemar Martines  Date:    08/05/2025  Time:    13:02               Narrative:    EXAMINATION:  XR CHEST PA AND LATERAL    CLINICAL HISTORY:  Chest Pain;    FINDINGS:  PA and lateral chest radiograph compared to prior exams show the trachea is midline, with the cardiomediastinal silhouette and pulmonary vascular distribution within normal limits.    The lungs are normally and symmetrically expanded, with no consolidation, pleural effusion or evidence of pulmonary edema.  No pneumothorax or acute fracture.                                       Lab Results   Component Value Date    WBC 13.56 (H) 08/07/2025    HGB 12.5 (L) 08/07/2025    HCT 38.6 (L) 08/07/2025    MCV 88 08/07/2025     08/07/2025     BMP  Lab Results   Component Value Date     (L) 08/07/2025    K 4.2 08/07/2025    CL 99 08/07/2025    CO2 27 08/07/2025    BUN 16 08/07/2025    CREATININE 0.7 08/07/2025    CALCIUM 8.8 08/07/2025    ANIONGAP 9 08/07/2025     08/07/2025     08/06/2025    GLU 97 08/05/2025       Results for orders placed during the hospital encounter of 08/05/25    Echo    Interpretation Summary    Left Ventricle: The left ventricle is normal in size. Normal wall thickness. There is severely reduced systolic function with a visually  estimated ejection fraction of 25 - 30%. Grade I diastolic dysfunction.    Right Ventricle: The right ventricle is normal in size measuring 2.5 cmWall thickness is normal. . Systolic function is normal.    Left Atrium: The left atrium is dilated.    Aortic Valve: The aortic valve is a trileaflet valve. There is mild aortic valve sclerosis.    IVC/SVC: Normal venous pressure at 3 mmHg.             Pre-op Assessment    I have reviewed the Patient Summary Reports.     I have reviewed the Nursing Notes. I have reviewed the NPO Status.   I have reviewed the Medications.     Review of Systems  Anesthesia Hx:  No problems with previous Anesthesia             Denies Family Hx of Anesthesia complications.    Denies Personal Hx of Anesthesia complications.                    Social:  Non-Smoker, Alcohol Use       Hematology/Oncology:    Oncology Normal    -- Anemia:                                  EENT/Dental:  EENT/Dental Normal           Cardiovascular:    Pacemaker Hypertension, well controlled Valvular problems/Murmurs, MR   Dysrhythmias atrial fibrillation  CHF   PVD    ECG has been reviewed. Suspected endocarditis  Non-ischemic cardiomyopathy  Acute on chronic congestive heart failure ejection fraction 25%  Uses LifeVest defibrillator                           Pulmonary:         Oxygen saturation 94% prior to procedure               Renal/:  Renal/ Normal                 Hepatic/GI:     GERD                Musculoskeletal:  Arthritis (Bilateral knees)      Joint Disease:  Arthritis, Osteoarthritis          Neurological:  Neurology Normal              Osteoarthritis                           Endocrine:        Obesity / BMI > 30  Psych:  Psychiatric Normal                    Physical Exam  General: Well nourished, Cooperative, Alert and Oriented    Airway:  Mallampati: III   Mouth Opening: Normal  TM Distance: 4 - 6 cm  Tongue: Normal  Neck ROM: Normal ROM    Dental:  Periodontal disease  Multiple missing  teeth  Patient denies loose  Chest/Lungs:  Clear to auscultation, Normal Respiratory Rate    Heart:  Rate: Normal  Rhythm: Regular Rhythm        Anesthesia Plan  Type of Anesthesia, risks & benefits discussed:    Anesthesia Type: Gen Natural Airway  Intra-op Monitoring Plan: Standard ASA Monitors  Post Op Pain Control Plan:   (medical reason for not using multimodal pain management)  Induction:  IV  Informed Consent: Informed consent signed with the Patient and all parties understand the risks and agree with anesthesia plan.  All questions answered.   ASA Score: 4  Anesthesia Plan Notes:       General with natural airway.    Propofol  POM    Ready For Surgery From Anesthesia Perspective.     .

## 2025-08-09 PROBLEM — E87.6 HYPOKALEMIA: Status: RESOLVED | Noted: 2025-08-05 | Resolved: 2025-08-09

## 2025-08-12 ENCOUNTER — ANESTHESIA EVENT (OUTPATIENT)
Dept: SURGERY | Facility: HOSPITAL | Age: 70
End: 2025-08-12
Payer: MEDICARE

## 2025-08-12 ENCOUNTER — ANESTHESIA (OUTPATIENT)
Dept: SURGERY | Facility: HOSPITAL | Age: 70
End: 2025-08-12
Payer: MEDICARE

## 2025-08-12 PROCEDURE — 63600175 PHARM REV CODE 636 W HCPCS: Performed by: NURSE ANESTHETIST, CERTIFIED REGISTERED

## 2025-08-12 RX ORDER — SODIUM CHLORIDE, SODIUM LACTATE, POTASSIUM CHLORIDE, CALCIUM CHLORIDE 600; 310; 30; 20 MG/100ML; MG/100ML; MG/100ML; MG/100ML
INJECTION, SOLUTION INTRAVENOUS CONTINUOUS PRN
Status: DISCONTINUED | OUTPATIENT
Start: 2025-08-12 | End: 2025-08-12

## 2025-08-12 RX ORDER — MIDAZOLAM HYDROCHLORIDE 1 MG/ML
INJECTION INTRAMUSCULAR; INTRAVENOUS
Status: DISCONTINUED | OUTPATIENT
Start: 2025-08-12 | End: 2025-08-12

## 2025-08-12 RX ORDER — PROPOFOL 10 MG/ML
INJECTION, EMULSION INTRAVENOUS
Status: DISCONTINUED | OUTPATIENT
Start: 2025-08-12 | End: 2025-08-12

## 2025-08-12 RX ORDER — ONDANSETRON HYDROCHLORIDE 2 MG/ML
INJECTION, SOLUTION INTRAVENOUS
Status: DISCONTINUED | OUTPATIENT
Start: 2025-08-12 | End: 2025-08-12

## 2025-08-12 RX ORDER — FENTANYL CITRATE 50 UG/ML
INJECTION, SOLUTION INTRAMUSCULAR; INTRAVENOUS
Status: DISCONTINUED | OUTPATIENT
Start: 2025-08-12 | End: 2025-08-12

## 2025-08-12 RX ORDER — LIDOCAINE HYDROCHLORIDE 20 MG/ML
INJECTION INTRAVENOUS
Status: DISCONTINUED | OUTPATIENT
Start: 2025-08-12 | End: 2025-08-12

## 2025-08-12 RX ADMIN — MIDAZOLAM HYDROCHLORIDE 2 MG: 1 INJECTION, SOLUTION INTRAMUSCULAR; INTRAVENOUS at 04:08

## 2025-08-12 RX ADMIN — PROPOFOL 100 MG: 10 INJECTION, EMULSION INTRAVENOUS at 04:08

## 2025-08-12 RX ADMIN — FENTANYL CITRATE 100 MCG: 50 INJECTION, SOLUTION INTRAMUSCULAR; INTRAVENOUS at 04:08

## 2025-08-12 RX ADMIN — SODIUM CHLORIDE, SODIUM LACTATE, POTASSIUM CHLORIDE, AND CALCIUM CHLORIDE: .6; .31; .03; .02 INJECTION, SOLUTION INTRAVENOUS at 04:08

## 2025-08-12 RX ADMIN — LIDOCAINE HYDROCHLORIDE 50 MG: 20 INJECTION, SOLUTION INTRAVENOUS at 04:08

## 2025-08-12 RX ADMIN — ONDANSETRON 4 MG: 2 INJECTION INTRAMUSCULAR; INTRAVENOUS at 04:08

## 2025-09-04 ENCOUNTER — TELEPHONE (OUTPATIENT)
Dept: INFECTIOUS DISEASES | Facility: CLINIC | Age: 70
End: 2025-09-04
Payer: MEDICARE

## (undated) DEVICE — ELECTRODE REM PLYHSV RETURN 9

## (undated) DEVICE — SHEATH INTRODUCER 5FR 10CM

## (undated) DEVICE — COVER LIGHT HANDLE 80/CA

## (undated) DEVICE — KIT MICROINTRODUCE MINI 5X10CM

## (undated) DEVICE — GLOVE SENSICARE PI SLT 7.5

## (undated) DEVICE — SPONGE COTTON TRAY 4X4IN

## (undated) DEVICE — Device

## (undated) DEVICE — GUIDEWIRE ADVNTG 035X260CM ANG

## (undated) DEVICE — SOL NACL IRR 1000ML BTL

## (undated) DEVICE — TRAY LOWER EXTREMITY SMH

## (undated) DEVICE — CATH 5FR OMNIFLUSH 65CM .038

## (undated) DEVICE — COVER CAMERA OPERATING ROOM

## (undated) DEVICE — BANDAGE ESMARK ELASTIC ST 4X9

## (undated) DEVICE — BLADE SURG #15 CARBON STEEL

## (undated) DEVICE — CONTRAST VISIPAQUE 150ML

## (undated) DEVICE — DRESSING GAUZE XEROFORM 5X9